# Patient Record
Sex: FEMALE | Race: WHITE | Employment: PART TIME | URBAN - METROPOLITAN AREA
[De-identification: names, ages, dates, MRNs, and addresses within clinical notes are randomized per-mention and may not be internally consistent; named-entity substitution may affect disease eponyms.]

---

## 2020-06-11 ENCOUNTER — HOSPITAL ENCOUNTER (INPATIENT)
Age: 20
LOS: 7 days | Discharge: PSYCHIATRIC OR SUBSTANCE USE FACILITY | DRG: 421 | End: 2020-06-18
Attending: EMERGENCY MEDICINE | Admitting: INTERNAL MEDICINE
Payer: MEDICARE

## 2020-06-11 ENCOUNTER — APPOINTMENT (OUTPATIENT)
Dept: CT IMAGING | Age: 20
DRG: 421 | End: 2020-06-11
Payer: MEDICARE

## 2020-06-11 ENCOUNTER — APPOINTMENT (OUTPATIENT)
Dept: GENERAL RADIOLOGY | Age: 20
DRG: 421 | End: 2020-06-11
Payer: MEDICARE

## 2020-06-11 PROBLEM — E87.1 HYPONATREMIA: Status: ACTIVE | Noted: 2020-06-11

## 2020-06-11 LAB
A/G RATIO: 1 (ref 1.1–2.2)
ALBUMIN SERPL-MCNC: 3.4 G/DL (ref 3.4–5)
ALBUMIN SERPL-MCNC: 3.5 G/DL (ref 3.4–5)
ALP BLD-CCNC: 92 U/L (ref 40–129)
ALT SERPL-CCNC: 10 U/L (ref 10–40)
AMORPHOUS: ABNORMAL /HPF
AMPHETAMINE SCREEN, URINE: NORMAL
ANION GAP SERPL CALCULATED.3IONS-SCNC: 18 MMOL/L (ref 3–16)
ANION GAP SERPL CALCULATED.3IONS-SCNC: 24 MMOL/L (ref 3–16)
ANION GAP SERPL CALCULATED.3IONS-SCNC: 26 MMOL/L (ref 3–16)
APTT: 25.6 SEC (ref 24.2–36.2)
AST SERPL-CCNC: 25 U/L (ref 15–37)
BACTERIA: ABNORMAL /HPF
BANDED NEUTROPHILS RELATIVE PERCENT: 11 % (ref 0–7)
BARBITURATE SCREEN URINE: NORMAL
BASE EXCESS VENOUS: 0.4 MMOL/L (ref -3–3)
BASOPHILS ABSOLUTE: 0 K/UL (ref 0–0.2)
BASOPHILS RELATIVE PERCENT: 0 %
BENZODIAZEPINE SCREEN, URINE: NORMAL
BETA-HYDROXYBUTYRATE: 0.5 MMOL/L (ref 0–0.27)
BILIRUB SERPL-MCNC: 1.7 MG/DL (ref 0–1)
BILIRUBIN URINE: ABNORMAL
BLOOD, URINE: ABNORMAL
BUN BLDV-MCNC: 24 MG/DL (ref 7–20)
BUN BLDV-MCNC: 31 MG/DL (ref 7–20)
BUN BLDV-MCNC: 33 MG/DL (ref 7–20)
CALCIUM SERPL-MCNC: 4.8 MG/DL (ref 8.3–10.6)
CALCIUM SERPL-MCNC: 7.4 MG/DL (ref 8.3–10.6)
CALCIUM SERPL-MCNC: 8.1 MG/DL (ref 8.3–10.6)
CANNABINOID SCREEN URINE: NORMAL
CARBOXYHEMOGLOBIN: 2.2 % (ref 0–1.5)
CHLORIDE BLD-SCNC: 64 MMOL/L (ref 99–110)
CHLORIDE BLD-SCNC: 72 MMOL/L (ref 99–110)
CHLORIDE BLD-SCNC: 90 MMOL/L (ref 99–110)
CHLORIDE URINE RANDOM: <20 MMOL/L
CLARITY: CLEAR
CO2: 20 MMOL/L (ref 21–32)
CO2: 26 MMOL/L (ref 21–32)
CO2: 33 MMOL/L (ref 21–32)
COCAINE METABOLITE SCREEN URINE: NORMAL
COLOR: YELLOW
CREAT SERPL-MCNC: 0.7 MG/DL (ref 0.6–1.1)
CREAT SERPL-MCNC: 1.1 MG/DL (ref 0.6–1.1)
CREAT SERPL-MCNC: 1.1 MG/DL (ref 0.6–1.1)
CREATININE URINE: 239.6 MG/DL (ref 28–259)
CRYSTALS, UA: ABNORMAL /HPF
EKG ATRIAL RATE: 126 BPM
EKG DIAGNOSIS: NORMAL
EKG P AXIS: 12 DEGREES
EKG P-R INTERVAL: 114 MS
EKG Q-T INTERVAL: 398 MS
EKG QRS DURATION: 96 MS
EKG QTC CALCULATION (BAZETT): 576 MS
EKG R AXIS: 82 DEGREES
EKG T AXIS: -73 DEGREES
EKG VENTRICULAR RATE: 126 BPM
EOSINOPHILS ABSOLUTE: 0 K/UL (ref 0–0.6)
EOSINOPHILS RELATIVE PERCENT: 0 %
ETHANOL: NORMAL MG/DL (ref 0–0.08)
GFR AFRICAN AMERICAN: >60
GFR NON-AFRICAN AMERICAN: >60
GLOBULIN: 3.5 G/DL
GLUCOSE BLD-MCNC: 117 MG/DL (ref 70–99)
GLUCOSE BLD-MCNC: 152 MG/DL (ref 70–99)
GLUCOSE BLD-MCNC: 197 MG/DL (ref 70–99)
GLUCOSE BLD-MCNC: 206 MG/DL (ref 70–99)
GLUCOSE URINE: NEGATIVE MG/DL
HCG QUALITATIVE: NEGATIVE
HCO3 VENOUS: 22.6 MMOL/L (ref 23–29)
HCT VFR BLD CALC: 36.9 % (ref 36–48)
HEMOGLOBIN: 12.8 G/DL (ref 12–16)
INR BLD: 1.6 (ref 0.86–1.14)
KETONES, URINE: NEGATIVE MG/DL
LEUKOCYTE ESTERASE, URINE: NEGATIVE
LYMPHOCYTES ABSOLUTE: 2.1 K/UL (ref 1–5.1)
LYMPHOCYTES RELATIVE PERCENT: 23 %
Lab: NORMAL
MAGNESIUM: 1.8 MG/DL (ref 1.8–2.4)
MAGNESIUM: 1.9 MG/DL (ref 1.8–2.4)
MCH RBC QN AUTO: 34.4 PG (ref 26–34)
MCHC RBC AUTO-ENTMCNC: 34.7 G/DL (ref 31–36)
MCV RBC AUTO: 99.1 FL (ref 80–100)
METHADONE SCREEN, URINE: NORMAL
METHEMOGLOBIN VENOUS: 0.3 %
MICROSCOPIC EXAMINATION: YES
MONOCYTES ABSOLUTE: 0.4 K/UL (ref 0–1.3)
MONOCYTES RELATIVE PERCENT: 4 %
MUCUS: ABNORMAL /LPF
MYELOCYTE PERCENT: 1 %
NEUTROPHILS ABSOLUTE: 6.7 K/UL (ref 1.7–7.7)
NEUTROPHILS RELATIVE PERCENT: 61 %
NITRITE, URINE: POSITIVE
O2 CONTENT, VEN: 11 VOL %
O2 SAT, VEN: 98 %
O2 THERAPY: ABNORMAL
OPIATE SCREEN URINE: NORMAL
OSMOLALITY URINE: 709 MOSM/KG (ref 390–1070)
OSMOLALITY: 272 MOSM/KG (ref 275–295)
OXYCODONE URINE: NORMAL
PCO2, VEN: 27.4 MMHG (ref 40–50)
PDW BLD-RTO: 17.5 % (ref 12.4–15.4)
PERFORMED ON: ABNORMAL
PH UA: 6.5
PH UA: 6.5 (ref 5–8)
PH VENOUS: 7.53 (ref 7.35–7.45)
PHENCYCLIDINE SCREEN URINE: NORMAL
PHOSPHORUS: 2.3 MG/DL (ref 2.5–4.9)
PLATELET # BLD: 338 K/UL (ref 135–450)
PLATELET SLIDE REVIEW: ADEQUATE
PMV BLD AUTO: 8.7 FL (ref 5–10.5)
PO2, VEN: 104.9 MMHG (ref 25–40)
POTASSIUM REFLEX MAGNESIUM: 2.2 MMOL/L (ref 3.5–5.1)
POTASSIUM SERPL-SCNC: 1.5 MMOL/L (ref 3.5–5.1)
POTASSIUM SERPL-SCNC: 2.2 MMOL/L (ref 3.5–5.1)
POTASSIUM, UR: 32.2 MMOL/L
PRO-BNP: 730 PG/ML (ref 0–124)
PROPOXYPHENE SCREEN: NORMAL
PROTEIN UA: 30 MG/DL
PROTHROMBIN TIME: 18.6 SEC (ref 10–13.2)
RBC # BLD: 3.73 M/UL (ref 4–5.2)
RBC UA: ABNORMAL /HPF (ref 0–4)
SLIDE REVIEW: ABNORMAL
SODIUM BLD-SCNC: 121 MMOL/L (ref 136–145)
SODIUM BLD-SCNC: 124 MMOL/L (ref 136–145)
SODIUM BLD-SCNC: 128 MMOL/L (ref 136–145)
SODIUM URINE: <20 MMOL/L
SPECIFIC GRAVITY UA: 1.02 (ref 1–1.03)
TCO2 CALC VENOUS: 23 MMOL/L
TOTAL CK: 139 U/L (ref 26–192)
TOTAL PROTEIN: 7 G/DL (ref 6.4–8.2)
TROPONIN: 0.03 NG/ML
TROPONIN: <0.01 NG/ML
TSH REFLEX: 11.48 UIU/ML (ref 0.27–4.2)
UREA NITROGEN, UR: 1282 MG/DL (ref 800–1666)
URINE REFLEX TO CULTURE: ABNORMAL
URINE TYPE: ABNORMAL
UROBILINOGEN, URINE: 2 E.U./DL
VACUOLATED NEUTROPHILS: PRESENT
WBC # BLD: 9.2 K/UL (ref 4–11)
WBC UA: ABNORMAL /HPF (ref 0–5)

## 2020-06-11 PROCEDURE — 84481 FREE ASSAY (FT-3): CPT

## 2020-06-11 PROCEDURE — 6360000002 HC RX W HCPCS: Performed by: INTERNAL MEDICINE

## 2020-06-11 PROCEDURE — 83880 ASSAY OF NATRIURETIC PEPTIDE: CPT

## 2020-06-11 PROCEDURE — 82803 BLOOD GASES ANY COMBINATION: CPT

## 2020-06-11 PROCEDURE — 84300 ASSAY OF URINE SODIUM: CPT

## 2020-06-11 PROCEDURE — 81001 URINALYSIS AUTO W/SCOPE: CPT

## 2020-06-11 PROCEDURE — 2580000003 HC RX 258: Performed by: INTERNAL MEDICINE

## 2020-06-11 PROCEDURE — 71045 X-RAY EXAM CHEST 1 VIEW: CPT

## 2020-06-11 PROCEDURE — 84133 ASSAY OF URINE POTASSIUM: CPT

## 2020-06-11 PROCEDURE — G0480 DRUG TEST DEF 1-7 CLASSES: HCPCS

## 2020-06-11 PROCEDURE — 96365 THER/PROPH/DIAG IV INF INIT: CPT

## 2020-06-11 PROCEDURE — 85025 COMPLETE CBC W/AUTO DIFF WBC: CPT

## 2020-06-11 PROCEDURE — 83036 HEMOGLOBIN GLYCOSYLATED A1C: CPT

## 2020-06-11 PROCEDURE — 93005 ELECTROCARDIOGRAM TRACING: CPT | Performed by: EMERGENCY MEDICINE

## 2020-06-11 PROCEDURE — 6370000000 HC RX 637 (ALT 250 FOR IP): Performed by: EMERGENCY MEDICINE

## 2020-06-11 PROCEDURE — 83735 ASSAY OF MAGNESIUM: CPT

## 2020-06-11 PROCEDURE — 82436 ASSAY OF URINE CHLORIDE: CPT

## 2020-06-11 PROCEDURE — 6360000002 HC RX W HCPCS: Performed by: EMERGENCY MEDICINE

## 2020-06-11 PROCEDURE — 84540 ASSAY OF URINE/UREA-N: CPT

## 2020-06-11 PROCEDURE — 2500000003 HC RX 250 WO HCPCS: Performed by: INTERNAL MEDICINE

## 2020-06-11 PROCEDURE — 6370000000 HC RX 637 (ALT 250 FOR IP): Performed by: INTERNAL MEDICINE

## 2020-06-11 PROCEDURE — 82570 ASSAY OF URINE CREATININE: CPT

## 2020-06-11 PROCEDURE — 80061 LIPID PANEL: CPT

## 2020-06-11 PROCEDURE — 2000000000 HC ICU R&B

## 2020-06-11 PROCEDURE — 84443 ASSAY THYROID STIM HORMONE: CPT

## 2020-06-11 PROCEDURE — 93010 ELECTROCARDIOGRAM REPORT: CPT | Performed by: INTERNAL MEDICINE

## 2020-06-11 PROCEDURE — 80307 DRUG TEST PRSMV CHEM ANLYZR: CPT

## 2020-06-11 PROCEDURE — 82010 KETONE BODYS QUAN: CPT

## 2020-06-11 PROCEDURE — 84703 CHORIONIC GONADOTROPIN ASSAY: CPT

## 2020-06-11 PROCEDURE — 83935 ASSAY OF URINE OSMOLALITY: CPT

## 2020-06-11 PROCEDURE — 2580000003 HC RX 258

## 2020-06-11 PROCEDURE — 87086 URINE CULTURE/COLONY COUNT: CPT

## 2020-06-11 PROCEDURE — 80053 COMPREHEN METABOLIC PANEL: CPT

## 2020-06-11 PROCEDURE — 82533 TOTAL CORTISOL: CPT

## 2020-06-11 PROCEDURE — 85730 THROMBOPLASTIN TIME PARTIAL: CPT

## 2020-06-11 PROCEDURE — 84439 ASSAY OF FREE THYROXINE: CPT

## 2020-06-11 PROCEDURE — 70450 CT HEAD/BRAIN W/O DYE: CPT

## 2020-06-11 PROCEDURE — 2580000003 HC RX 258: Performed by: EMERGENCY MEDICINE

## 2020-06-11 PROCEDURE — 82550 ASSAY OF CK (CPK): CPT

## 2020-06-11 PROCEDURE — 82024 ASSAY OF ACTH: CPT

## 2020-06-11 PROCEDURE — 36415 COLL VENOUS BLD VENIPUNCTURE: CPT

## 2020-06-11 PROCEDURE — 99285 EMERGENCY DEPT VISIT HI MDM: CPT

## 2020-06-11 PROCEDURE — 84484 ASSAY OF TROPONIN QUANT: CPT

## 2020-06-11 PROCEDURE — 83930 ASSAY OF BLOOD OSMOLALITY: CPT

## 2020-06-11 PROCEDURE — 85610 PROTHROMBIN TIME: CPT

## 2020-06-11 RX ORDER — POTASSIUM CHLORIDE 7.45 MG/ML
10 INJECTION INTRAVENOUS
Status: DISPENSED | OUTPATIENT
Start: 2020-06-11 | End: 2020-06-11

## 2020-06-11 RX ORDER — SODIUM CHLORIDE 9 MG/ML
INJECTION, SOLUTION INTRAVENOUS CONTINUOUS
Status: DISCONTINUED | OUTPATIENT
Start: 2020-06-11 | End: 2020-06-12

## 2020-06-11 RX ORDER — SODIUM CHLORIDE 0.9 % (FLUSH) 0.9 %
SYRINGE (ML) INJECTION
Status: COMPLETED
Start: 2020-06-11 | End: 2020-06-11

## 2020-06-11 RX ORDER — DEXTROSE, SODIUM CHLORIDE, AND POTASSIUM CHLORIDE 5; .45; .3 G/100ML; G/100ML; G/100ML
INJECTION INTRAVENOUS CONTINUOUS
Status: DISCONTINUED | OUTPATIENT
Start: 2020-06-11 | End: 2020-06-11 | Stop reason: ALTCHOICE

## 2020-06-11 RX ORDER — SODIUM CHLORIDE 9 MG/ML
INJECTION, SOLUTION INTRAVENOUS
Status: DISPENSED
Start: 2020-06-11 | End: 2020-06-12

## 2020-06-11 RX ORDER — MAGNESIUM SULFATE 1 G/100ML
1 INJECTION INTRAVENOUS ONCE
Status: COMPLETED | OUTPATIENT
Start: 2020-06-11 | End: 2020-06-11

## 2020-06-11 RX ORDER — LORAZEPAM 1 MG/1
1 TABLET ORAL ONCE
Status: DISCONTINUED | OUTPATIENT
Start: 2020-06-11 | End: 2020-06-11

## 2020-06-11 RX ORDER — 0.9 % SODIUM CHLORIDE 0.9 %
1000 INTRAVENOUS SOLUTION INTRAVENOUS ONCE
Status: COMPLETED | OUTPATIENT
Start: 2020-06-11 | End: 2020-06-11

## 2020-06-11 RX ORDER — LORAZEPAM 2 MG/ML
1 INJECTION INTRAMUSCULAR ONCE
Status: COMPLETED | OUTPATIENT
Start: 2020-06-12 | End: 2020-06-11

## 2020-06-11 RX ORDER — CALCIUM CHLORIDE 100 MG/ML
1 INJECTION INTRAVENOUS; INTRAVENTRICULAR ONCE
Status: DISCONTINUED | OUTPATIENT
Start: 2020-06-11 | End: 2020-06-11 | Stop reason: ALTCHOICE

## 2020-06-11 RX ORDER — MAGNESIUM SULFATE 1 G/100ML
1 INJECTION INTRAVENOUS ONCE
Status: COMPLETED | OUTPATIENT
Start: 2020-06-11 | End: 2020-06-12

## 2020-06-11 RX ORDER — POTASSIUM CHLORIDE 20 MEQ/1
40 TABLET, EXTENDED RELEASE ORAL ONCE
Status: DISCONTINUED | OUTPATIENT
Start: 2020-06-11 | End: 2020-06-11

## 2020-06-11 RX ORDER — MAGNESIUM SULFATE 1 G/100ML
1 INJECTION INTRAVENOUS PRN
Status: DISCONTINUED | OUTPATIENT
Start: 2020-06-11 | End: 2020-06-18 | Stop reason: HOSPADM

## 2020-06-11 RX ORDER — PROCHLORPERAZINE EDISYLATE 5 MG/ML
10 INJECTION INTRAMUSCULAR; INTRAVENOUS EVERY 6 HOURS PRN
Status: DISCONTINUED | OUTPATIENT
Start: 2020-06-11 | End: 2020-06-18 | Stop reason: HOSPADM

## 2020-06-11 RX ORDER — DEXTROSE, SODIUM CHLORIDE, AND POTASSIUM CHLORIDE 5; .45; .15 G/100ML; G/100ML; G/100ML
INJECTION INTRAVENOUS CONTINUOUS PRN
Status: DISCONTINUED | OUTPATIENT
Start: 2020-06-11 | End: 2020-06-12

## 2020-06-11 RX ORDER — POTASSIUM CHLORIDE 7.45 MG/ML
10 INJECTION INTRAVENOUS
Status: COMPLETED | OUTPATIENT
Start: 2020-06-11 | End: 2020-06-12

## 2020-06-11 RX ORDER — DEXTROSE MONOHYDRATE 25 G/50ML
12.5 INJECTION, SOLUTION INTRAVENOUS PRN
Status: DISCONTINUED | OUTPATIENT
Start: 2020-06-11 | End: 2020-06-18 | Stop reason: HOSPADM

## 2020-06-11 RX ORDER — POTASSIUM CHLORIDE 20MEQ/15ML
40 LIQUID (ML) ORAL ONCE
Status: DISCONTINUED | OUTPATIENT
Start: 2020-06-11 | End: 2020-06-11 | Stop reason: CLARIF

## 2020-06-11 RX ORDER — DEXTROSE, SODIUM CHLORIDE, AND POTASSIUM CHLORIDE 5; .45; .3 G/100ML; G/100ML; G/100ML
INJECTION INTRAVENOUS CONTINUOUS
Status: DISCONTINUED | OUTPATIENT
Start: 2020-06-11 | End: 2020-06-11 | Stop reason: CLARIF

## 2020-06-11 RX ORDER — POTASSIUM CHLORIDE 7.45 MG/ML
10 INJECTION INTRAVENOUS PRN
Status: DISCONTINUED | OUTPATIENT
Start: 2020-06-11 | End: 2020-06-12

## 2020-06-11 RX ORDER — DEXAMETHASONE SODIUM PHOSPHATE 10 MG/ML
6 INJECTION, SOLUTION INTRAMUSCULAR; INTRAVENOUS ONCE
Status: COMPLETED | OUTPATIENT
Start: 2020-06-11 | End: 2020-06-11

## 2020-06-11 RX ORDER — LEVOTHYROXINE SODIUM ANHYDROUS 100 UG/5ML
66 INJECTION, POWDER, LYOPHILIZED, FOR SOLUTION INTRAVENOUS
Status: DISCONTINUED | OUTPATIENT
Start: 2020-06-11 | End: 2020-06-11

## 2020-06-11 RX ORDER — ONDANSETRON 2 MG/ML
4 INJECTION INTRAMUSCULAR; INTRAVENOUS
Status: DISCONTINUED | OUTPATIENT
Start: 2020-06-11 | End: 2020-06-11

## 2020-06-11 RX ADMIN — SODIUM CHLORIDE 1000 ML: 9 INJECTION, SOLUTION INTRAVENOUS at 20:15

## 2020-06-11 RX ADMIN — POTASSIUM CHLORIDE, DEXTROSE MONOHYDRATE AND SODIUM CHLORIDE: 300; 5; 450 INJECTION, SOLUTION INTRAVENOUS at 22:25

## 2020-06-11 RX ADMIN — ONDANSETRON 4 MG: 2 INJECTION INTRAMUSCULAR; INTRAVENOUS at 17:56

## 2020-06-11 RX ADMIN — POTASSIUM BICARBONATE 40 MEQ: 782 TABLET, EFFERVESCENT ORAL at 18:27

## 2020-06-11 RX ADMIN — POTASSIUM CHLORIDE: 2 INJECTION, SOLUTION, CONCENTRATE INTRAVENOUS at 23:06

## 2020-06-11 RX ADMIN — POTASSIUM BICARBONATE 40 MEQ: 782 TABLET, EFFERVESCENT ORAL at 23:04

## 2020-06-11 RX ADMIN — Medication 10 ML: at 23:35

## 2020-06-11 RX ADMIN — NYSTATIN 500000 UNITS: 500000 SUSPENSION ORAL at 23:10

## 2020-06-11 RX ADMIN — POTASSIUM CHLORIDE 10 MEQ: 7.46 INJECTION, SOLUTION INTRAVENOUS at 22:21

## 2020-06-11 RX ADMIN — POTASSIUM CHLORIDE 10 MEQ: 7.46 INJECTION, SOLUTION INTRAVENOUS at 17:58

## 2020-06-11 RX ADMIN — CEFTRIAXONE SODIUM 1 G: 1 INJECTION, POWDER, FOR SOLUTION INTRAMUSCULAR; INTRAVENOUS at 20:59

## 2020-06-11 RX ADMIN — LORAZEPAM 1 MG: 2 INJECTION INTRAMUSCULAR; INTRAVENOUS at 23:39

## 2020-06-11 RX ADMIN — DEXAMETHASONE SODIUM PHOSPHATE 6 MG: 10 INJECTION, SOLUTION INTRAMUSCULAR; INTRAVENOUS at 23:04

## 2020-06-11 RX ADMIN — NYSTATIN 500000 UNITS: 500000 SUSPENSION ORAL at 19:12

## 2020-06-11 RX ADMIN — POTASSIUM CHLORIDE 10 MEQ: 7.46 INJECTION, SOLUTION INTRAVENOUS at 19:12

## 2020-06-11 RX ADMIN — FOLIC ACID: 5 INJECTION, SOLUTION INTRAMUSCULAR; INTRAVENOUS; SUBCUTANEOUS at 22:58

## 2020-06-11 RX ADMIN — SODIUM CHLORIDE 1000 ML: 9 INJECTION, SOLUTION INTRAVENOUS at 17:58

## 2020-06-11 RX ADMIN — POTASSIUM CHLORIDE 10 MEQ: 7.46 INJECTION, SOLUTION INTRAVENOUS at 23:20

## 2020-06-11 RX ADMIN — MAGNESIUM SULFATE 1 G: 1 INJECTION INTRAVENOUS at 20:02

## 2020-06-11 RX ADMIN — CALCIUM CHLORIDE 1 G: 100 INJECTION INTRAVENOUS; INTRAVENTRICULAR at 23:27

## 2020-06-11 ASSESSMENT — PAIN SCALES - GENERAL: PAINLEVEL_OUTOF10: 0

## 2020-06-11 NOTE — ED PROVIDER NOTES
children: Not on file    Years of education: Not on file    Highest education level: Not on file   Occupational History    Not on file   Social Needs    Financial resource strain: Not on file    Food insecurity     Worry: Not on file     Inability: Not on file    Transportation needs     Medical: Not on file     Non-medical: Not on file   Tobacco Use    Smoking status: Never Smoker    Smokeless tobacco: Never Used   Substance and Sexual Activity    Alcohol use: No    Drug use: Not on file    Sexual activity: Not on file   Lifestyle    Physical activity     Days per week: Not on file     Minutes per session: Not on file    Stress: Not on file   Relationships    Social connections     Talks on phone: Not on file     Gets together: Not on file     Attends Hindu service: Not on file     Active member of club or organization: Not on file     Attends meetings of clubs or organizations: Not on file     Relationship status: Not on file    Intimate partner violence     Fear of current or ex partner: Not on file     Emotionally abused: Not on file     Physically abused: Not on file     Forced sexual activity: Not on file   Other Topics Concern    Not on file   Social History Narrative    Not on file     Current Facility-Administered Medications   Medication Dose Route Frequency Provider Last Rate Last Dose    ondansetron (ZOFRAN) injection 4 mg  4 mg Intravenous Q1H PRN William Saha MD   4 mg at 06/11/20 1756    potassium chloride 10 mEq/100 mL IVPB (Peripheral Line)  10 mEq Intravenous Q1H William Saha MD   Stopped at 06/11/20 2015    cefTRIAXone (ROCEPHIN) 1 g IVPB in 50 mL D5W minibag  1 g Intravenous Q24H Giovanny Crooks MD   Stopped at 06/11/20 2120     No current outpatient medications on file. No Known Allergies    REVIEW OF SYSTEMS  10 systems reviewed, pertinent positives per HPI otherwise noted to be negative.     PHYSICAL EXAM  /77   Pulse 128   Temp 99 °F (37.2 °C) (Oral) Lymphocytes Absolute 2.1 1.0 - 5.1 K/uL    Monocytes Absolute 0.4 0.0 - 1.3 K/uL    Eosinophils Absolute 0.0 0.0 - 0.6 K/uL    Basophils Absolute 0.0 0.0 - 0.2 K/uL    Bands Relative 11 (H) 0 - 7 %    Myelocyte Percent 1 (A) %    Vacuolated Neutrophils Present (A)    Comprehensive Metabolic Panel w/ Reflex to MG   Result Value Ref Range    Sodium 121 (L) 136 - 145 mmol/L    Potassium reflex Magnesium 2.2 (LL) 3.5 - 5.1 mmol/L    Chloride 64 (L) 99 - 110 mmol/L    CO2 33 (H) 21 - 32 mmol/L    Anion Gap 24 (H) 3 - 16    Glucose 197 (H) 70 - 99 mg/dL    BUN 33 (H) 7 - 20 mg/dL    CREATININE 1.1 0.6 - 1.1 mg/dL    GFR Non-African American >60 >60    GFR African American >60 >60    Calcium 8.1 (L) 8.3 - 10.6 mg/dL    Total Protein 7.0 6.4 - 8.2 g/dL    Alb 3.5 3.4 - 5.0 g/dL    Albumin/Globulin Ratio 1.0 (L) 1.1 - 2.2    Total Bilirubin 1.7 (H) 0.0 - 1.0 mg/dL    Alkaline Phosphatase 92 40 - 129 U/L    ALT 10 10 - 40 U/L    AST 25 15 - 37 U/L    Globulin 3.5 g/dL   Urinalysis Reflex to Culture   Result Value Ref Range    Color, UA Yellow Straw/Yellow    Clarity, UA Clear Clear    Glucose, Ur Negative Negative mg/dL    Bilirubin Urine MODERATE (A) Negative    Ketones, Urine Negative Negative mg/dL    Specific Gravity, UA 1.020 1.005 - 1.030    Blood, Urine SMALL (A) Negative    pH, UA 6.5 5.0 - 8.0    Protein, UA 30 (A) Negative mg/dL    Urobilinogen, Urine 2.0 (A) <2.0 E.U./dL    Nitrite, Urine POSITIVE (A) Negative    Leukocyte Esterase, Urine Negative Negative    Microscopic Examination YES     Urine Type NotGiven     Urine Reflex to Culture Not Indicated    HCG Qualitative, Serum   Result Value Ref Range    hCG Qual Negative Detects HCG level >10 MIU/mL   Ethanol   Result Value Ref Range    Ethanol Lvl None Detected mg/dL   Drug screen multi urine   Result Value Ref Range    Amphetamine Screen, Urine Neg Negative <1000ng/mL    Barbiturate Screen, Ur Neg Negative <200 ng/mL    Benzodiazepine Screen, Urine Neg Negative <200 ng/mL    Cannabinoid Scrn, Ur Neg Negative <50 ng/mL    Cocaine Metabolite Screen, Urine Neg Negative <300 ng/mL    Opiate Scrn, Ur Neg Negative <300 ng/mL    PCP Screen, Urine Neg Negative <25 ng/mL    Methadone Screen, Urine Neg Negative <300 ng/mL    Propoxyphene Scrn, Ur Neg Negative <300 ng/mL    Oxycodone Urine Neg Negative <100 ng/ml    pH, UA 6.5     Drug Screen Comment: see below    TSH with Reflex   Result Value Ref Range    TSH 11.48 (H) 0.27 - 4.20 uIU/mL   Magnesium   Result Value Ref Range    Magnesium 1.80 1.80 - 2.40 mg/dL   Brain Natriuretic Peptide   Result Value Ref Range    Pro- (H) 0 - 124 pg/mL   Troponin   Result Value Ref Range    Troponin 0.03 (H) <0.01 ng/mL   CK   Result Value Ref Range    Total  26 - 192 U/L   Protime-INR   Result Value Ref Range    Protime 18.6 (H) 10.0 - 13.2 sec    INR 1.60 (H) 0.86 - 1.14   APTT   Result Value Ref Range    aPTT 25.6 24.2 - 36.2 sec   Beta-Hydroxybutyrate   Result Value Ref Range    Beta-Hydroxybutyrate 0.50 (H) 0.00 - 0.27 mmol/L   Microscopic Urinalysis   Result Value Ref Range    Mucus, UA 2+ (A) None Seen /LPF    WBC, UA 3-5 0 - 5 /HPF    RBC, UA 5-10 (A) 0 - 4 /HPF    Bacteria, UA 1+ (A) None Seen /HPF    Amorphous, UA 2+ /HPF    Crystals, UA 2+ Sulfa (A) None Seen /HPF   Osmolality   Result Value Ref Range    Osmolality 272 (L) 275 - 295 mOsm/kg   Creatinine, urine, random   Result Value Ref Range    Creatinine, Ur 239.6 28.0 - 259.0 mg/dL   Urea nitrogen, urine   Result Value Ref Range    Urea Nitrogen, Ur 1282.0 800.0 - 1666.0 mg/dL   Electrolytes urine random   Result Value Ref Range    Sodium, Ur <20 Not Established mmol/L    Potassium, Ur 32.2 Not Established mmol/L    Chloride <20 Not Established mmol/L   Blood gas, venous   Result Value Ref Range    pH, Anthony 7.534 (H) 7.350 - 7.450    pCO2, Anthony 27.4 (L) 40.0 - 50.0 mmHg    pO2, Anthony 104.9 (H) 25.0 - 40.0 mmHg    HCO3, Venous 22.6 (L) 23.0 - 29.0 mmol/L    Base Excess, Anthony normal in appearance. No evidence of mass effect or midline shift. There is mild prominence of sulci overlying convexities of cerebral hemispheres. No significant area of abnormal attenuation of brain parenchyma is identified. No abnormal extra-axial fluid collections are identified. ORBITS: The visualized portion of the orbits demonstrate no acute abnormality. SINUSES: The visualized paranasal sinuses and mastoid air cells demonstrate no acute abnormality. SOFT TISSUES/SKULL:  No acute abnormality of the visualized skull or soft tissues. No evidence of acute intracranial abnormality. Xr Chest Portable    Result Date: 6/11/2020  EXAMINATION: ONE XRAY VIEW OF THE CHEST 6/11/2020 4:59 pm COMPARISON: None available HISTORY: ORDERING SYSTEM PROVIDED HISTORY: syncope TECHNOLOGIST PROVIDED HISTORY: Reason for exam:->syncope Reason for Exam: Dizziness (family found her down) FINDINGS: No acute bony abnormality. The heart size and mediastinal contours are within normal limits. The lungs are clear. Negative portable study. ED COURSE/MDM  Patient seen and evaluated. Old records reviewed. Labs and imaging reviewed and results discussed with patient. Patient presenting for possible syncopal event and actually states that she feels \"numb\" all over although no focal neurologic deficits to suggest stroke. She is noted to be markedly hypokalemic and hyponatremic. These were briefly repleted both orally and intravenously regarding her potassium as well as patient was given IV fluids. Her creatinine is noted to be 1.1. Negative pregnancy test.  Negative CT scan of the head. Patient with odd affect, stating that also she has been unable to eat. She has thrush noted throughout her mouth this certainly may be contributory but certainly I am concerned about her social situation this will likely need to be looked into further once her acute medical issues are initially addressed.   At this time I feel that inpatient admission for further management is most appropriate. I spoke with Dr. Jabari Wynn. We thoroughly discussed the history, physical exam, laboratory and imaging studies, as well as, emergency department course. Based upon that discussion, we've decided to admit Hair Lynch for further observation and evaluation of Pepe Langston's possible syncope and hypoK/hypoNa. As I have deemed necessary from their history, physical and studies, I have considered and evaluated Hair Lynch for the following diagnoses:  DIABETES, INTRACRANIAL HEMORRHAGE, MENINGITIS, SEPSIS SUBARACHNOID HEMORRHAGE, SUBDURAL HEMATOMA, & STROKE. The total Critical Care time is 44 minutes which excludes separately billable procedures. During the patient's ED course, the patient was given:  Medications   ondansetron (ZOFRAN) injection 4 mg (4 mg Intravenous Given 6/11/20 1756)   potassium chloride 10 mEq/100 mL IVPB (Peripheral Line) (0 mEq Intravenous Stopped 6/11/20 2015)   cefTRIAXone (ROCEPHIN) 1 g IVPB in 50 mL D5W minibag (0 g Intravenous Stopped 6/11/20 2120)   0.9 % sodium chloride bolus (0 mLs Intravenous Stopped 6/11/20 1912)   potassium bicarb-citric acid (EFFER-K) effervescent tablet 40 mEq (40 mEq Oral Given 6/11/20 1827)   nystatin (MYCOSTATIN) 519191 UNIT/ML suspension 500,000 Units (500,000 Units Oral Given 6/11/20 1912)   magnesium sulfate 1 g in dextrose 5% 100 mL IVPB (0 g Intravenous Stopped 6/11/20 2120)   0.9 % sodium chloride bolus (0 mLs Intravenous Stopped 6/11/20 2120)        CLINICAL IMPRESSION  1. Hyponatremia    2. Hypokalemia    3. Paresthesias    4. Syncope, unspecified syncope type        Blood pressure 111/77, pulse 128, temperature 99 °F (37.2 °C), temperature source Oral, resp. rate 18, height 5' 5\" (1.651 m), weight 120 lb (54.4 kg), last menstrual period 05/11/2020, SpO2 100 %, not currently breastfeeding.     DISPOSITION  Milagro Leon was

## 2020-06-11 NOTE — ED NOTES
Pt was rolled off of lift board. Pt had dried stool all over her body and was wearing a depends. When asked why she was covered in stool, she said that \"it was from earlier\". Pt reports that she has been having difficulty walking for a couple weeks. Today they \"carried\" her to the living room.  Socks were ori Brewer RN  06/11/20 4229

## 2020-06-12 ENCOUNTER — APPOINTMENT (OUTPATIENT)
Dept: GENERAL RADIOLOGY | Age: 20
DRG: 421 | End: 2020-06-12
Payer: MEDICARE

## 2020-06-12 ENCOUNTER — APPOINTMENT (OUTPATIENT)
Dept: VASCULAR LAB | Age: 20
DRG: 421 | End: 2020-06-12
Payer: MEDICARE

## 2020-06-12 ENCOUNTER — APPOINTMENT (OUTPATIENT)
Dept: CT IMAGING | Age: 20
DRG: 421 | End: 2020-06-12
Payer: MEDICARE

## 2020-06-12 PROBLEM — E44.0 MODERATE PROTEIN-CALORIE MALNUTRITION (HCC): Chronic | Status: ACTIVE | Noted: 2020-06-12

## 2020-06-12 LAB
ANION GAP SERPL CALCULATED.3IONS-SCNC: 10 MMOL/L (ref 3–16)
ANION GAP SERPL CALCULATED.3IONS-SCNC: 11 MMOL/L (ref 3–16)
ANION GAP SERPL CALCULATED.3IONS-SCNC: 11 MMOL/L (ref 3–16)
ANION GAP SERPL CALCULATED.3IONS-SCNC: 12 MMOL/L (ref 3–16)
ANION GAP SERPL CALCULATED.3IONS-SCNC: 12 MMOL/L (ref 3–16)
ANISOCYTOSIS: ABNORMAL
BANDED NEUTROPHILS RELATIVE PERCENT: 2 % (ref 0–7)
BASOPHILS ABSOLUTE: 0 K/UL (ref 0–0.2)
BASOPHILS RELATIVE PERCENT: 0 %
BUN BLDV-MCNC: 25 MG/DL (ref 7–20)
BUN BLDV-MCNC: 31 MG/DL (ref 7–20)
BUN BLDV-MCNC: 32 MG/DL (ref 7–20)
BUN BLDV-MCNC: 32 MG/DL (ref 7–20)
BUN BLDV-MCNC: 33 MG/DL (ref 7–20)
C DIFF TOXIN/ANTIGEN: NORMAL
C-REACTIVE PROTEIN: 81.6 MG/L (ref 0–5.1)
CALCIUM SERPL-MCNC: 8 MG/DL (ref 8.3–10.6)
CALCIUM SERPL-MCNC: 8.1 MG/DL (ref 8.3–10.6)
CALCIUM SERPL-MCNC: 8.3 MG/DL (ref 8.3–10.6)
CALCIUM SERPL-MCNC: 8.4 MG/DL (ref 8.3–10.6)
CALCIUM SERPL-MCNC: 8.5 MG/DL (ref 8.3–10.6)
CHLORIDE BLD-SCNC: 76 MMOL/L (ref 99–110)
CHLORIDE BLD-SCNC: 78 MMOL/L (ref 99–110)
CHLORIDE BLD-SCNC: 79 MMOL/L (ref 99–110)
CHLORIDE BLD-SCNC: 80 MMOL/L (ref 99–110)
CHLORIDE BLD-SCNC: 83 MMOL/L (ref 99–110)
CHOLESTEROL, TOTAL: 136 MG/DL (ref 0–199)
CO2: 29 MMOL/L (ref 21–32)
CO2: 30 MMOL/L (ref 21–32)
CO2: 31 MMOL/L (ref 21–32)
CO2: 31 MMOL/L (ref 21–32)
CO2: 32 MMOL/L (ref 21–32)
CORTISOL TOTAL: 90.2 UG/DL
CREAT SERPL-MCNC: 0.7 MG/DL (ref 0.6–1.1)
CREAT SERPL-MCNC: 0.8 MG/DL (ref 0.6–1.1)
CREAT SERPL-MCNC: 0.9 MG/DL (ref 0.6–1.1)
CREAT SERPL-MCNC: 0.9 MG/DL (ref 0.6–1.1)
CREAT SERPL-MCNC: 1 MG/DL (ref 0.6–1.1)
EKG ATRIAL RATE: 125 BPM
EKG DIAGNOSIS: NORMAL
EKG P AXIS: -9 DEGREES
EKG P-R INTERVAL: 132 MS
EKG Q-T INTERVAL: 338 MS
EKG QRS DURATION: 86 MS
EKG QTC CALCULATION (BAZETT): 487 MS
EKG R AXIS: 51 DEGREES
EKG T AXIS: -70 DEGREES
EKG VENTRICULAR RATE: 125 BPM
EOSINOPHILS ABSOLUTE: 0 K/UL (ref 0–0.6)
EOSINOPHILS RELATIVE PERCENT: 0 %
ESTIMATED AVERAGE GLUCOSE: 111.2 MG/DL
FERRITIN: 1051 NG/ML (ref 15–150)
FOLATE: 3.69 NG/ML (ref 4.78–24.2)
FREE THYROXINE INDEX: 6.2 UG/DL (ref 4.4–11.4)
GFR AFRICAN AMERICAN: >60
GFR NON-AFRICAN AMERICAN: >60
GLUCOSE BLD-MCNC: 100 MG/DL (ref 70–99)
GLUCOSE BLD-MCNC: 113 MG/DL (ref 70–99)
GLUCOSE BLD-MCNC: 121 MG/DL (ref 70–99)
GLUCOSE BLD-MCNC: 121 MG/DL (ref 70–99)
GLUCOSE BLD-MCNC: 126 MG/DL (ref 70–99)
GLUCOSE BLD-MCNC: 138 MG/DL (ref 70–99)
GLUCOSE BLD-MCNC: 164 MG/DL (ref 70–99)
GLUCOSE BLD-MCNC: 88 MG/DL (ref 70–99)
GLUCOSE BLD-MCNC: 95 MG/DL (ref 70–99)
HBA1C MFR BLD: 5.5 %
HCT VFR BLD CALC: 27 % (ref 36–48)
HDLC SERPL-MCNC: 21 MG/DL (ref 40–60)
HEMOGLOBIN: 9.4 G/DL (ref 12–16)
HIV AG/AB: NORMAL
HIV ANTIGEN: NORMAL
HIV-1 ANTIBODY: NORMAL
HIV-2 AB: NORMAL
INR BLD: 1.45 (ref 0.86–1.14)
IRON SATURATION: NORMAL % (ref 15–50)
IRON: 140 UG/DL (ref 37–145)
LACTIC ACID: 1.2 MMOL/L (ref 0.4–2)
LACTIC ACID: 1.5 MMOL/L (ref 0.4–2)
LDL CHOLESTEROL CALCULATED: 74 MG/DL
LV EF: 58 %
LVEF MODALITY: NORMAL
LYMPHOCYTES ABSOLUTE: 1 K/UL (ref 1–5.1)
LYMPHOCYTES RELATIVE PERCENT: 14 %
MAGNESIUM: 1.9 MG/DL (ref 1.8–2.4)
MAGNESIUM: 2.1 MG/DL (ref 1.8–2.4)
MAGNESIUM: 2.2 MG/DL (ref 1.8–2.4)
MAGNESIUM: 2.3 MG/DL (ref 1.8–2.4)
MAGNESIUM: 2.5 MG/DL (ref 1.8–2.4)
MCH RBC QN AUTO: 34.7 PG (ref 26–34)
MCHC RBC AUTO-ENTMCNC: 35 G/DL (ref 31–36)
MCV RBC AUTO: 99.1 FL (ref 80–100)
MONOCYTES ABSOLUTE: 0.1 K/UL (ref 0–1.3)
MONOCYTES RELATIVE PERCENT: 2 %
NEUTROPHILS ABSOLUTE: 6 K/UL (ref 1.7–7.7)
NEUTROPHILS RELATIVE PERCENT: 82 %
ORGANISM: ABNORMAL
PARATHYROID HORMONE INTACT: 26.2 PG/ML (ref 14–72)
PDW BLD-RTO: 17.2 % (ref 12.4–15.4)
PERFORMED ON: ABNORMAL
PERFORMED ON: NORMAL
PHOSPHORUS: 1.4 MG/DL (ref 2.5–4.9)
PHOSPHORUS: 1.5 MG/DL (ref 2.5–4.9)
PHOSPHORUS: 2.6 MG/DL (ref 2.5–4.9)
PHOSPHORUS: 2.9 MG/DL (ref 2.5–4.9)
PHOSPHORUS: 4.6 MG/DL (ref 2.5–4.9)
PLATELET # BLD: 236 K/UL (ref 135–450)
PLATELET SLIDE REVIEW: ADEQUATE
PMV BLD AUTO: 9.2 FL (ref 5–10.5)
POTASSIUM SERPL-SCNC: 3.1 MMOL/L (ref 3.5–5.1)
POTASSIUM SERPL-SCNC: 3.2 MMOL/L (ref 3.5–5.1)
POTASSIUM SERPL-SCNC: 3.4 MMOL/L (ref 3.5–5.1)
POTASSIUM SERPL-SCNC: 3.4 MMOL/L (ref 3.5–5.1)
POTASSIUM SERPL-SCNC: 3.6 MMOL/L (ref 3.5–5.1)
PROTHROMBIN TIME: 16.9 SEC (ref 10–13.2)
RBC # BLD: 2.72 M/UL (ref 4–5.2)
RHEUMATOID FACTOR: <10 IU/ML
SEDIMENTATION RATE, ERYTHROCYTE: 31 MM/HR (ref 0–20)
SLIDE REVIEW: ABNORMAL
SODIUM BLD-SCNC: 119 MMOL/L (ref 136–145)
SODIUM BLD-SCNC: 119 MMOL/L (ref 136–145)
SODIUM BLD-SCNC: 121 MMOL/L (ref 136–145)
SODIUM BLD-SCNC: 122 MMOL/L (ref 136–145)
SODIUM BLD-SCNC: 124 MMOL/L (ref 136–145)
T3 FREE: 1.5 PG/ML (ref 2.3–4.2)
T3 TOTAL: 0.74 NG/ML (ref 0.8–2)
T3 UPTAKE PERCENT: 0.9 TBI (ref 0.8–1.3)
T4 FREE: 1.8 NG/DL (ref 0.9–1.8)
T4 TOTAL: 5.6 UG/DL (ref 4.5–10.9)
TOTAL CK: 322 U/L (ref 26–192)
TOTAL IRON BINDING CAPACITY: NORMAL UG/DL (ref 260–445)
TRIGL SERPL-MCNC: 204 MG/DL (ref 0–150)
TROPONIN: <0.01 NG/ML
URINE CULTURE, ROUTINE: ABNORMAL
VITAMIN B-12: 353 PG/ML (ref 211–911)
VITAMIN D 25-HYDROXY: 13.1 NG/ML
VLDLC SERPL CALC-MCNC: 41 MG/DL
WBC # BLD: 7.2 K/UL (ref 4–11)
WHITE BLOOD CELLS (WBC), STOOL: ABNORMAL

## 2020-06-12 PROCEDURE — 86235 NUCLEAR ANTIGEN ANTIBODY: CPT

## 2020-06-12 PROCEDURE — 6360000002 HC RX W HCPCS: Performed by: INTERNAL MEDICINE

## 2020-06-12 PROCEDURE — 80048 BASIC METABOLIC PNL TOTAL CA: CPT

## 2020-06-12 PROCEDURE — 86140 C-REACTIVE PROTEIN: CPT

## 2020-06-12 PROCEDURE — 86038 ANTINUCLEAR ANTIBODIES: CPT

## 2020-06-12 PROCEDURE — 2500000003 HC RX 250 WO HCPCS: Performed by: INTERNAL MEDICINE

## 2020-06-12 PROCEDURE — 87505 NFCT AGENT DETECTION GI: CPT

## 2020-06-12 PROCEDURE — 83540 ASSAY OF IRON: CPT

## 2020-06-12 PROCEDURE — 82728 ASSAY OF FERRITIN: CPT

## 2020-06-12 PROCEDURE — 83970 ASSAY OF PARATHORMONE: CPT

## 2020-06-12 PROCEDURE — 84100 ASSAY OF PHOSPHORUS: CPT

## 2020-06-12 PROCEDURE — 93010 ELECTROCARDIOGRAM REPORT: CPT | Performed by: INTERNAL MEDICINE

## 2020-06-12 PROCEDURE — 87336 ENTAMOEB HIST DISPR AG IA: CPT

## 2020-06-12 PROCEDURE — 82306 VITAMIN D 25 HYDROXY: CPT

## 2020-06-12 PROCEDURE — 86702 HIV-2 ANTIBODY: CPT

## 2020-06-12 PROCEDURE — 74177 CT ABD & PELVIS W/CONTRAST: CPT

## 2020-06-12 PROCEDURE — 2580000003 HC RX 258: Performed by: INTERNAL MEDICINE

## 2020-06-12 PROCEDURE — 36592 COLLECT BLOOD FROM PICC: CPT

## 2020-06-12 PROCEDURE — 36415 COLL VENOUS BLD VENIPUNCTURE: CPT

## 2020-06-12 PROCEDURE — 86431 RHEUMATOID FACTOR QUANT: CPT

## 2020-06-12 PROCEDURE — 71045 X-RAY EXAM CHEST 1 VIEW: CPT

## 2020-06-12 PROCEDURE — 83630 LACTOFERRIN FECAL (QUAL): CPT

## 2020-06-12 PROCEDURE — 02HV33Z INSERTION OF INFUSION DEVICE INTO SUPERIOR VENA CAVA, PERCUTANEOUS APPROACH: ICD-10-PCS | Performed by: INTERNAL MEDICINE

## 2020-06-12 PROCEDURE — 87324 CLOSTRIDIUM AG IA: CPT

## 2020-06-12 PROCEDURE — 83550 IRON BINDING TEST: CPT

## 2020-06-12 PROCEDURE — 99254 IP/OBS CNSLTJ NEW/EST MOD 60: CPT | Performed by: INTERNAL MEDICINE

## 2020-06-12 PROCEDURE — 85025 COMPLETE CBC W/AUTO DIFF WBC: CPT

## 2020-06-12 PROCEDURE — 93970 EXTREMITY STUDY: CPT

## 2020-06-12 PROCEDURE — 83036 HEMOGLOBIN GLYCOSYLATED A1C: CPT

## 2020-06-12 PROCEDURE — 71270 CT THORAX DX C-/C+: CPT

## 2020-06-12 PROCEDURE — 93306 TTE W/DOPPLER COMPLETE: CPT

## 2020-06-12 PROCEDURE — 82550 ASSAY OF CK (CPK): CPT

## 2020-06-12 PROCEDURE — 6360000004 HC RX CONTRAST MEDICATION: Performed by: INTERNAL MEDICINE

## 2020-06-12 PROCEDURE — 83735 ASSAY OF MAGNESIUM: CPT

## 2020-06-12 PROCEDURE — 87390 HIV-1 AG IA: CPT

## 2020-06-12 PROCEDURE — 2580000003 HC RX 258

## 2020-06-12 PROCEDURE — 84484 ASSAY OF TROPONIN QUANT: CPT

## 2020-06-12 PROCEDURE — 85652 RBC SED RATE AUTOMATED: CPT

## 2020-06-12 PROCEDURE — 87449 NOS EACH ORGANISM AG IA: CPT

## 2020-06-12 PROCEDURE — 36556 INSERT NON-TUNNEL CV CATH: CPT

## 2020-06-12 PROCEDURE — 93005 ELECTROCARDIOGRAM TRACING: CPT | Performed by: INTERNAL MEDICINE

## 2020-06-12 PROCEDURE — 99255 IP/OBS CONSLTJ NEW/EST HI 80: CPT | Performed by: PSYCHIATRY & NEUROLOGY

## 2020-06-12 PROCEDURE — 6370000000 HC RX 637 (ALT 250 FOR IP): Performed by: INTERNAL MEDICINE

## 2020-06-12 PROCEDURE — 82607 VITAMIN B-12: CPT

## 2020-06-12 PROCEDURE — 87328 CRYPTOSPORIDIUM AG IA: CPT

## 2020-06-12 PROCEDURE — 82746 ASSAY OF FOLIC ACID SERUM: CPT

## 2020-06-12 PROCEDURE — 83605 ASSAY OF LACTIC ACID: CPT

## 2020-06-12 PROCEDURE — 85610 PROTHROMBIN TIME: CPT

## 2020-06-12 PROCEDURE — 99232 SBSQ HOSP IP/OBS MODERATE 35: CPT | Performed by: INTERNAL MEDICINE

## 2020-06-12 PROCEDURE — 99291 CRITICAL CARE FIRST HOUR: CPT | Performed by: INTERNAL MEDICINE

## 2020-06-12 PROCEDURE — 2000000000 HC ICU R&B

## 2020-06-12 PROCEDURE — 86701 HIV-1ANTIBODY: CPT

## 2020-06-12 PROCEDURE — 86225 DNA ANTIBODY NATIVE: CPT

## 2020-06-12 RX ORDER — POTASSIUM CHLORIDE 750 MG/1
40 TABLET, EXTENDED RELEASE ORAL ONCE
Status: COMPLETED | OUTPATIENT
Start: 2020-06-12 | End: 2020-06-12

## 2020-06-12 RX ORDER — SODIUM CHLORIDE 0.9 % (FLUSH) 0.9 %
SYRINGE (ML) INJECTION
Status: COMPLETED
Start: 2020-06-12 | End: 2020-06-12

## 2020-06-12 RX ORDER — FLUCONAZOLE 2 MG/ML
200 INJECTION, SOLUTION INTRAVENOUS EVERY 24 HOURS
Status: DISCONTINUED | OUTPATIENT
Start: 2020-06-12 | End: 2020-06-15

## 2020-06-12 RX ORDER — LORAZEPAM 2 MG/ML
0.5 INJECTION INTRAMUSCULAR ONCE
Status: COMPLETED | OUTPATIENT
Start: 2020-06-12 | End: 2020-06-12

## 2020-06-12 RX ORDER — SODIUM CHLORIDE 0.9 % (FLUSH) 0.9 %
SYRINGE (ML) INJECTION
Status: DISPENSED
Start: 2020-06-12 | End: 2020-06-12

## 2020-06-12 RX ORDER — LEVOTHYROXINE SODIUM 0.03 MG/1
25 TABLET ORAL DAILY
Status: DISCONTINUED | OUTPATIENT
Start: 2020-06-13 | End: 2020-06-18

## 2020-06-12 RX ORDER — POTASSIUM CHLORIDE 29.8 MG/ML
20 INJECTION INTRAVENOUS PRN
Status: DISCONTINUED | OUTPATIENT
Start: 2020-06-12 | End: 2020-06-18 | Stop reason: HOSPADM

## 2020-06-12 RX ADMIN — CALCIUM GLUCONATE 1 G: 98 INJECTION, SOLUTION INTRAVENOUS at 10:55

## 2020-06-12 RX ADMIN — NYSTATIN 500000 UNITS: 500000 SUSPENSION ORAL at 12:02

## 2020-06-12 RX ADMIN — POTASSIUM CHLORIDE AND SODIUM CHLORIDE: 900; 150 INJECTION, SOLUTION INTRAVENOUS at 10:35

## 2020-06-12 RX ADMIN — NYSTATIN 500000 UNITS: 500000 SUSPENSION ORAL at 21:10

## 2020-06-12 RX ADMIN — LORAZEPAM 0.5 MG: 2 INJECTION INTRAMUSCULAR; INTRAVENOUS at 00:47

## 2020-06-12 RX ADMIN — ENOXAPARIN SODIUM 40 MG: 40 INJECTION SUBCUTANEOUS at 09:08

## 2020-06-12 RX ADMIN — POTASSIUM CHLORIDE 40 MEQ: 750 TABLET, EXTENDED RELEASE ORAL at 10:34

## 2020-06-12 RX ADMIN — MAGNESIUM SULFATE IN DEXTROSE 1 G: 10 INJECTION, SOLUTION INTRAVENOUS at 00:12

## 2020-06-12 RX ADMIN — POTASSIUM CHLORIDE AND SODIUM CHLORIDE: 900; 150 INJECTION, SOLUTION INTRAVENOUS at 23:04

## 2020-06-12 RX ADMIN — POTASSIUM PHOSPHATE, MONOBASIC AND POTASSIUM PHOSPHATE, DIBASIC 30 MMOL: 224; 236 INJECTION, SOLUTION, CONCENTRATE INTRAVENOUS at 10:33

## 2020-06-12 RX ADMIN — POTASSIUM CHLORIDE 20 MEQ: 400 INJECTION, SOLUTION INTRAVENOUS at 23:48

## 2020-06-12 RX ADMIN — NYSTATIN 500000 UNITS: 500000 SUSPENSION ORAL at 09:08

## 2020-06-12 RX ADMIN — SODIUM CHLORIDE, PRESERVATIVE FREE: 5 INJECTION INTRAVENOUS at 23:00

## 2020-06-12 RX ADMIN — POTASSIUM CHLORIDE 10 MEQ: 7.46 INJECTION, SOLUTION INTRAVENOUS at 01:07

## 2020-06-12 RX ADMIN — NYSTATIN 500000 UNITS: 500000 SUSPENSION ORAL at 17:35

## 2020-06-12 RX ADMIN — CEFTRIAXONE SODIUM 1 G: 1 INJECTION, POWDER, FOR SOLUTION INTRAMUSCULAR; INTRAVENOUS at 21:10

## 2020-06-12 RX ADMIN — IOHEXOL 50 ML: 240 INJECTION, SOLUTION INTRATHECAL; INTRAVASCULAR; INTRAVENOUS; ORAL at 16:44

## 2020-06-12 RX ADMIN — FLUCONAZOLE, SODIUM CHLORIDE 200 MG: 2 INJECTION INTRAVENOUS at 09:14

## 2020-06-12 RX ADMIN — POTASSIUM CHLORIDE 10 MEQ: 7.46 INJECTION, SOLUTION INTRAVENOUS at 02:44

## 2020-06-12 RX ADMIN — IOPAMIDOL 75 ML: 755 INJECTION, SOLUTION INTRAVENOUS at 16:43

## 2020-06-12 ASSESSMENT — PAIN SCALES - GENERAL
PAINLEVEL_OUTOF10: 0

## 2020-06-12 ASSESSMENT — ENCOUNTER SYMPTOMS
TROUBLE SWALLOWING: 1
EYES NEGATIVE: 1
GASTROINTESTINAL NEGATIVE: 1
RESPIRATORY NEGATIVE: 1

## 2020-06-12 NOTE — CONSULTS
[facility]  [phone]        Reason for Consultation/Chief Complaint: \"I have been having difficulty swallowing, Right atrial mass\"    History of Present Illness:  Valeria Esquivel is a 22 y.o. patient who presented to the hospital with complaints of depression dysphagia failure to thrive. Weight loss 50 lbs over last 18 months. She admits to dysphagia with solids. Incidental finding of possible right atrial mass. No prior heart disease. No cardiac symptoms. I have been asked to provide consultation regarding further management and testing. Past Medical History:   has no past medical history on file. Surgical History:   has no past surgical history on file. Social History:   reports that she has never smoked. She has never used smokeless tobacco. She reports that she does not drink alcohol. Family History:  family history includes Diabetes in her sister; High Blood Pressure in her father; High Cholesterol in her father. Home Medications:  Were reviewed and are listed in nursing record. and/or listed below  Prior to Admission medications    Not on File        Allergies:  Patient has no known allergies. Review of Systems:   12 point ROS negative in all areas as listed below except as in [place]  Constitutional, EENT, Cardiovascular, pulmonary, GI, , Musculoskeletal, skin, neurological, hematological, endocrine, Psychiatric    Physical Examination:    Vitals:    06/12/20 1400   BP: 121/82   Pulse: 123   Resp: 18   Temp:    SpO2: 100%    Weight: 148 lb (67.1 kg)         General Appearance:  Alert, cooperative, no distress, appears stated age   Head:  Normocephalic, without obvious abnormality, atraumatic   Eyes:   eyes prominent almost exophthalmos PERRL, conjunctiva/corneas clear    Teeth are in poor condition.    Nose: Nares normal, no drainage or sinus tenderness   Throat: Lips, mucosa, and tongue normal   Neck: Supple, symmetrical, trachea midline, no adenopathy, thyroid: not enlarged, symmetric, no tenderness/mass/nodules, no carotid bruit or JVD       Lungs:   Diminished  to auscultation bilaterally, respirations unlabored   Chest Wall:  No tenderness or deformity   Heart:  Regular rate and rhythm, S1, S2 normal, no murmur, rub or gallop   Abdomen:   Soft, non-tender, bowel sounds active all four quadrants,  no masses, no organomegaly           Extremities: Extremities normal, atraumatic, no cyanosis, 1+ bilat leg edema   Pulses: 1+ and symmetric   Skin: Skin color red on legs thick texture, turgor poor, no rashes or lesions   Pysch: Normal mood and affect   Neurologic: Normal gross motor and sensory exam.         Labs  CBC:   Lab Results   Component Value Date    WBC 7.2 06/12/2020    RBC 2.72 06/12/2020    HGB 9.4 06/12/2020    HCT 27.0 06/12/2020    MCV 99.1 06/12/2020    RDW 17.2 06/12/2020     06/12/2020     CMP:    Lab Results   Component Value Date     06/12/2020    K 3.2 06/12/2020    K 2.2 06/11/2020    CL 78 06/12/2020    CO2 30 06/12/2020    BUN 32 06/12/2020    CREATININE 0.9 06/12/2020    GFRAA >60 06/12/2020    AGRATIO 1.0 06/11/2020    LABGLOM >60 06/12/2020    GLUCOSE 126 06/12/2020    PROT 7.0 06/11/2020    CALCIUM 8.5 06/12/2020    BILITOT 1.7 06/11/2020    ALKPHOS 92 06/11/2020    AST 25 06/11/2020    ALT 10 06/11/2020     PT/INR:  No results found for: PTINR  Lab Results   Component Value Date    CKTOTAL 322 (H) 06/12/2020    TROPONINI <0.01 06/12/2020       I have reviewed the following tests and documented in this encounter as follows:        Impression   No acute cardiopulmonary process. Left internal jugular line with the tip at the cavoatrial junction.          Final 06/12/2020 6:32 AM 14 Sinus tachycardiaPossible Inferior infarct , age undeterminedST & T wave abnormality, consider anterolateral ischemiaAbnormal ECGWhen compared with ECG of 11-JUN-2020 16:19,No significant change was foundConfirmed by Primus Barthel MD, [address] ([id number]) on 6/12/2020 8:19:14 AM      Final 06/12/2020  6:32 AM 14   Sinus tachycardiaPossible Inferior infarct , age undeterminedST & T wave abnormality, consider anterolateral ischemiaAbnormal ECGWhen compared with ECG of 11-JUN-2020 16:19,No significant change was foundConfirmed by Mary Kate Blanco MD, [address] (1988) on 6/12/2020 8:19:14 AM   Echo doppler of heart: 6.12.20    Summary   Normal LV systolic function with an estimated EF of 55-60%. No regional wall motion abnormalities are seen. Normal left ventricular diastolic filling pressure. Mild pulmonic regurgitation present. Medium-sized echodensity and possible mass attached to right atrial free   wall. Consider RON for better evaluation. Normal systolic pulmonary artery pressure (SPAP) estimated at 19mmHg (RA   pressure 3mmHg). Assessment  Possible right atrial mass    Patient Active Problem List   Diagnosis    Obesity    Hyponatremia    Moderate protein-calorie malnutrition (HCC)    Hypokalemia    Syncope    Right atrial mass    Dysphagia    Elevated TSH    Severe malnutrition ([address]         Plan:    I had the opportunity to review the clinical symptoms and presentation of Ericka Lundberg. I recommend that the patient undergo dysphagia evaluation  If no esophageal obstruction then we will do RON  Meanwhile recommend CT chest with contrast.    I will address the patient's cardiac risk factors and adjusted pharmacologic treatment as needed. In addition, I have reinforced the need for patient directed risk factor modification. Tobacco use was discussed with the patient and educated on the negative effects. I have asked the patient to not utilize these agents. NA    Thank you for allowing to us to participate in the care or Ericka Lundberg. Further evaluation will be based upon the patient's clinical course and testing results. All questions and concerns were addressed to the patient/family.  Alternatives to my

## 2020-06-12 NOTE — CONSULTS
Medications: potassium chloride, dextrose, magnesium sulfate, sodium phosphate IVPB **OR** sodium phosphate IVPB **OR** sodium phosphate IVPB, dextrose 5% and 0.45% NaCl with KCl 20 mEq, prochlorperazine, perflutren lipid microspheres  Allergies: No Known Allergies    ROS:   Constitutional: negative for chills, fevers and sweats. Positive for activity change, decreased appetite, fatigue, and unintentional weight loss. Eyes: negative for cataracts, icterus and redness  Ears, nose, mouth, throat, and face: negative for epistaxis, hearing loss and sore throat  Respiratory: negative for cough, hemoptysis and sputum  Cardiovascular: negative for chest pain, dyspnea and lower extremity edema  Gastrointestinal: as per HPI  Genitourinary:negative for dysuria, frequency and hematuria  Neurological: negative for coordination problems and gait problems. Positive for dizziness, weakness. Behavioral/Psych: Negative for mood swings. Positive for anxiety, depression. Physical Exam   BP (!) 126/93   Pulse 120   Temp 98 °F (36.7 °C) (Oral)   Resp 14   Ht 5' 4.5\" (1.638 m)   Wt 148 lb (67.1 kg)   LMP 05/11/2020 (Approximate)   SpO2 100%   BMI 25.01 kg/m²     General appearance: alert, appears stated age, cooperative and no distress  Head: Normocephalic, without obvious abnormality, atraumatic  Eyes: conjunctivae/corneas clear. PERRL, EOM's intact. Fundi benign.   Neck: no adenopathy and supple, symmetrical, trachea midline  Lungs: clear to auscultation bilaterally  Heart: regular rate and rhythm, S1, S2 normal, no murmur, click, rub or gallop  Abdomen: soft, non-tender; bowel sounds normal; no masses,  no organomegaly  Extremities: extremities normal, atraumatic, no cyanosis or edema    Lab and Imaging Review   Labs:  CBC:   Recent Labs     06/11/20  1630 06/12/20  0443   WBC 9.2 7.2   HGB 12.8 9.4*   HCT 36.9 27.0*   MCV 99.1 99.1    236     BMP:   Recent Labs     06/11/20  2227 06/12/20  0443 06/12/20  0850   NA clear to auscultation, no wheezes, rales or rhonchi, symmetric air entry  Heart - normal rate, regular rhythm, normal S1, S2, no murmurs, rubs, clicks or gallops  Abdomen - soft, nontender, nondistended, no masses or organomegaly  Extremities - no pedal edema noted          Assessment:   75-year-old female with no past medical history admitted for evaluation of dysphagia and weight loss c/b multiple electrolyte abnormalities. Symptoms are concerning for esophageal dysmotility disorder, candida esophagitis, eosinophilic esophagitis, esophageal stricture, or hiatal hernia. Plan:   1. Continue supportive care  2. PPI qAM  3. Strict I/Os  4. Monitor and replenish electrolytes  5. Monitor and document BMs  6. Speech pathology consult  7. Consider CT abdomen/pelvis with contrast  8. Continue NPO  9. Continue Nystatin swish and swallow for oral thrush  10. PT/OT  11. Will consider EGD pending speech therapy recommendations     Nery Calabrese PA-C  11:50 AM 6/12/2020                      22year old female admitted with failure to thrive. She complains of dysphagia. Monitor and replenish electrolytes. Check CT abd/pelvis. Check stool tests. Speech therapy consult. Nutritionist consult. Will consider EGD based on above workup pending correction of electrolytes.     Meghan Duke MD          [phone]  [phone]

## 2020-06-12 NOTE — PROGRESS NOTES
Shift assessment was completed (see flow sheet). Pt is A/O, denies any pain or other needs at this time- Redness noted to BLE, foot weakness. Respirations are even, unlabored, with diminished sounds. Labs to be drawn per Nephro. Echo at bedside. Scheduled medications to follow- crushed with water. Call light within reach. Bed in lowest position. Bed alarm on. Will continue to monitor. Patient is not able to demonstrated the ability to move from a reclining position to an upright position within the recliner.  however patient is alert, oriented and able to provide informed consent

## 2020-06-12 NOTE — CONSULTS
Psychiatry Initial Consultation    Admission Date:    2020    Reason for Consult:  Depression? Unable to care for self? SOI: pt, ED, some family     HPI:   This is a domiciled, never , unemployed 17-year-old without known psych history who was brought in by EMS severely deconditioned. She was admitted to the ICU with multiple medical issues including hyponatremia, and hyperkalemia      Today, the patient tells me that she has struggled with symptoms of depression for months including low mood, anhedonia, amotivation, decreased appetite, insomnia, excessive tearfulness, excessive guilt, self criticism, and suicidality. She has not thought about suicide recently. She reports that she has lost her appetite and even when she wants to eat, she cannot. She says \"it just stays on my plate. \"  She has been spending most of her time isolated in bed. She has no motivation including to engage in ADLs. She is deconditioned. She had to be carried out from the house to EMS. She does not endorse or describe positive psychotic symptoms but does have significant latencies, flat affect, and impaired insight and judgment. Despite her electrolyte abnormalities, she was oriented to day, date, place, and the context of this evaluation. Past Psychiatric History:    Previous Diagnoses: None   PreviousHosp: None  OutpatientTx: None  Med Trials: None  Suicidality: Yes, no history of attempts. History of violence: none    Past Medical History:  No chronic conditions  No traumatic brain injuries  no seizures  No major surgeries    Home Medications:  None     Chemical Dependency History:   None. No treatment programs. UDS negative    Family Mental Health Hx:    Other. Unknown diagnosis. Multiple hospitalizations. Paternal uncle completed suicide    Social Hx:   [place]  1 sister. Parents . Mother  in  of meningitis. She graduated high school. Afterward traveled with her dad.   He is

## 2020-06-12 NOTE — PROGRESS NOTES
Care rounds completed with Dr. Brandie Villafana and multidisciplinary team. Reviewed labs, meds, VS, assessment, & plan of care for today. Give PO K- 40meq, replacements per Nephro. See dictated note and new orders for details.      Dr. Vernon Lindsay at the bedside to examine patient. See progress note for details.     High risk vesicant drug infusing:  __________    Multiple incompatible medications infusing:  _________    CVP Monitoring:  _________    Extremely difficult IV access challenge:  ___YEs_____    Continued need for central line access:  ____Yes______    Addressed with physician:  ____Yes____    RIGHT PATIENT, RIGHT TIME, RIGHT LINE

## 2020-06-12 NOTE — CARE COORDINATION
Case Management Assessment  Initial Evaluation    Date/Time of Evaluation: 6/12/2020 9:59 AM  Assessment Completed by: Chris Thorne    Patient Name: Deniz Zacarias  YOB: 2000  Diagnosis: Hyponatremia [E87.1]  Date / Time: 6/11/2020  3:21 PM  Admission status/Date:6/11/2020 1521 inpt  Chart Reviewed: Yes      Patient Interviewed: Yes   Family Interviewed:  No      Hospitalization in the last 30 days:  No    Contacts  :     Relationship to Patient:   Phone Number:    Alternate Contact:     Relationship to Patient:     Phone Number:    Met with:    Current PCP none--referred to [facility]  none--referred to [facility] required for SNF : Y, N        3 night stay required - Y, N    ADLS  Support Systems: Family Members  Transportation: sister    Meal Preparation: self    Housing  Home Environment: 2 story with her father, her sister, her sister's boyfriend and her niece  Steps: 4  Plans to Return to Present Housing: Yes  Other Identified Issues:     Milton Chapin 76  Currently active with [address] : No  Type of Home Care Services: None  Passport/Waiver : No  :                      Phone Number:    Passport/Waiver Services:           [facility] Provider: none  Equipment: noneWalker___Cane___RTS___ BSC___Shower Chair___  02__ at ____Liter(s)---Uses________HHN___ CPAP___ BiPap___ Hospital Bed___W/C____Other________      Has Home O2 in place on admit:  No    If above answer is No ---is pt presently on O2 during hospitalization:  No  if yes CM to follow for potential DC O2 need  Informed of need to bring portable home O2 tank on day of discharge for nursing to connect prior to leaving:   Not Indicated  Verbalized agreement/Understanding:   Not Indicated    Community Service Affiliation  Dialysis:  No    · Name:  · Location  · Dialysis Schedule:  · Phone:   · Fax:     Outpatient PT/OT: No    Cancer Center: No CHF Clinic: No     Pulmonary Rehab: No  Pain Clinic: No  Community Mental Health: No    Wound Clinic: No     COVID SCREENING: No       Other: The Plan for Transition of Care is related to the following treatment goals: home    The Patient and/or patient representative pt was provided with a choice of provider and agrees   with the discharge plan. [] Yes [] No    Freedom of choice list was provided with basic dialogue that supports the patient's individualized plan of care/goals, treatment preferences and shares the quality data associated with the providers. [] Yes [] No    DISCHARGE PLAN: Reviewed chart. Role of discharge planner explained and patient verbalized understanding. Pt is currently in the ICU. Pt is from a  2 story with her father, her sister, her sister's boyfriend and her niece and plans to return. Pt does not have a PCP. Pt states that she would like a referral to [facility]. CM placed info in d/c instructions. CM also called Sulema Rivas with [facility], and left a VM regarding pt. Pt states that she does not have insurance. CM made referral to [facility]. Katiana Mcnulty is aware. Pt states that she is self sufficient. Explained Case Management role/services. Addendum 6/12/2020 1205: Katiana Mcnulty with [facility] saw pt and pt has Medicaid now as of today.

## 2020-06-12 NOTE — CONSULTS
Heart rate 90 patient is being seen at the request of Naveen Carbajal MD  for a consultation for Possible DKA.  Hypo Na/K/Cl.  Weight loss, syncope, weakness, inability to ambulate. HISTORY OF PRESENT ILLNESS:   22years old presented with generalized weakness and inability to ambulate. Felt numb all over. Feels too weak to ambulate. Questionable passing out. Difficulty swallowing with weight loss-about 50 pounds past 6 months. Very poor PO intake since end of last year. Very poor historian with vague answers. Patient lives with several family members and EMS was asked to wait outside while they family carried patient to them. GI illness in January and since then patient has not been eating very well. No alcohol. No drugs. No smoking. No constipation or diarrhea. PAST MEDICAL HISTORY:  None       PAST SURGICAL HISTORY:  No past surgical history on file. FAMILY HISTORY:  family history includes Diabetes in her sister; High Blood Pressure in her father; High Cholesterol in her father. SOCIAL HISTORY:   reports that she has never smoked. She has never used smokeless tobacco.    Scheduled Meds:   cefTRIAXone (ROCEPHIN) IV  1 g Intravenous Q24H    enoxaparin  40 mg Subcutaneous Daily    nystatin  5 mL Oral 4x Daily     Continuous Infusions:   dextrose 5% and 0.45% NaCl with KCl 20 mEq      [Held by provider] sodium chloride      insulin      sodium chloride      IV infusion builder Stopped (06/12/20 0546)    sodium chloride       PRN Meds:  potassium chloride, dextrose, magnesium sulfate, sodium phosphate IVPB **OR** sodium phosphate IVPB **OR** sodium phosphate IVPB, dextrose 5% and 0.45% NaCl with KCl 20 mEq, prochlorperazine, perflutren lipid microspheres    ALLERGIES:  Patient has No Known Allergies.     REVIEW OF SYSTEMS:  Constitutional: Negative for fever  HENT: Negative for sore throat  Eyes: Negative for redness   Respiratory: Negative for dyspnea, cough  Cardiovascular: Negative for chest pain  Gastrointestinal: + dysphagia   Genitourinary: Negative for hematuria   Musculoskeletal: Negative for arthralgias   Skin: Negative for rash  Neurological: Negative for syncope  Hematological: Negative for adenopathy  Psychiatric/Behavorial: Negative for anxiety    PHYSICAL EXAM:  Blood pressure 117/74, pulse 124, temperature 97.8 °F (36.6 °C), temperature source Oral, resp. rate 15, height 5' 5\" (1.651 m), weight 148 lb (67.1 kg), last menstrual period 05/11/2020, SpO2 100 %, not currently breastfeeding.' on RA  Gen: + Distress. Ill-appearing  Eyes: PERRL. No sclera icterus. No conjunctival injection. ENT: No discharge. Pharynx clear. Neck: Trachea midline. No obvious mass. Resp: + Accessory muscle use. No crackles. No wheezes. Few rhonchi. No dullness on percussion. CV: Regular rate. Regular rhythm. No murmur or rub. No edema. GI: Non-tender. Non-distended. No hernia. Skin: Warm and dry. No nodule on exposed extremities. Lymph: No cervical LAD. No supraclavicular LAD. M/S: No cyanosis. No joint deformity. No clubbing. L foot edema erythema and stiffness   Neuro: Awake. Alert. Moves all four extremities. Psych: Oriented x 3. No anxiety.        LABS:  CBC:   Recent Labs     06/11/20  1630 06/12/20  0443   WBC 9.2 7.2   HGB 12.8 9.4*   HCT 36.9 27.0*   MCV 99.1 99.1    236     BMP:   Recent Labs     06/11/20  2048 06/11/20  2227 06/12/20  0443   * 124* 119*   K 1.5* 2.2* 3.4*   CL 90* 72* 76*   CO2 20* 26 31   PHOS  --  2.3* 1.4*   BUN 24* 31* 32*   CREATININE 0.7 1.1 0.9     LIVER PROFILE:   Recent Labs     06/11/20  1630   AST 25   ALT 10   BILITOT 1.7*   ALKPHOS 92     PT/INR:   Recent Labs     06/11/20  1630 06/12/20  0443   PROTIME 18.6* 16.9*   INR 1.60* 1.45*     APTT:   Recent Labs     06/11/20  1630   APTT 25.6     UA:  Recent Labs     06/11/20  1914   COLORU Yellow   PHUR 6.5  6.5   WBCUA 3-5   RBCUA 5-10*   MUCUS 2+*   BACTERIA 1+*   CLARITYU Clear   SPECGRAV with life threatening, unstable organ failure, including direct patient contact, management of life support systems, review of data including imaging and labs, discussions with other team members and physicians is 33 minutes, excluding procedures.

## 2020-06-12 NOTE — PROGRESS NOTES
Occupational/Physical Therapy  Orders received, chart reviewed. Held per RN due to patient working with MD and has to drink contrast.  Will reattempt 6/13/20.   SHALONDA Houston/TAWANA GalavizReyesBaylor Scott & [facility], [address] [facility], DPT #[id number]

## 2020-06-12 NOTE — PLAN OF CARE
Nutrition Problem:  Moderate malnutrition  Intervention: Food and/or Nutrient Delivery: Continue NPO  Nutritional Goals: patient will adhere to NPO status until medically cleared/cleared by SLP for diet advancement with appropriate po diet consistencies

## 2020-06-12 NOTE — CONSULTS
Nephrology Consult Note  [email or web address]        Reason for Consult: Electrolyte Abnormalities  Consulting Physician: Sheri Joe MD    HISTORY OF PRESENT ILLNESS:      The patient is a 22 y.o. female with no past medical history presents with dysphagia the past 6 months. Patient said that about end of last year, she started experiencing intermittent dysphagia to solids. Denies any difficulty with fluid intake and no odynophagia. The symptom had been progressively worse and lost about 50lbs in the past 6 months. Recently experiencing dizziness, lightheadedness and generalized weakness. On admission her sodium, potassium and phosphorus were low. She was tachycardic but nit hypotensive. She was started on IVF with electrolyte replacement and we were consulted for further evaluation and management. Past Medical History:    No past medical history on file. Past Surgical History:    No past surgical history on file. Current Medications:    No current facility-administered medications on file prior to encounter. No current outpatient medications on file prior to encounter. Allergies:  Patient has no known allergies.     Social History:    Social History     Socioeconomic History    Marital status: Single     Spouse name: Not on file    Number of children: Not on file    Years of education: Not on file    Highest education level: Not on file   Occupational History    Not on file   Social Needs    Financial resource strain: Not on file    Food insecurity     Worry: Not on file     Inability: Not on file    Transportation needs     Medical: Not on file     Non-medical: Not on file   Tobacco Use    Smoking status: Never Smoker    Smokeless tobacco: Never Used   Substance and Sexual Activity    Alcohol use: No    Drug use: Not on file    Sexual activity: Not on file   Lifestyle    Physical activity     Days per week: Not on file     Minutes per session: Not on file    Stress: Not on file Relationships    Social connections     Talks on phone: Not on file     Gets together: Not on file     Attends Baptism service: Not on file     Active member of club or organization: Not on file     Attends meetings of clubs or organizations: Not on file     Relationship status: Not on file    Intimate partner violence     Fear of current or ex partner: Not on file     Emotionally abused: Not on file     Physically abused: Not on file     Forced sexual activity: Not on file   Other Topics Concern    Not on file   Social History Narrative    Not on file       Family History:       Problem Relation Age of Onset    High Blood Pressure Father     High Cholesterol Father     Diabetes Sister        REVIEW OF SYSTEMS:    Review of Systems   Constitutional: Positive for activity change, appetite change, fatigue and unexpected weight change. Negative for chills and fever. HENT: Positive for trouble swallowing. Eyes: Negative. Respiratory: Negative. Cardiovascular: Negative. Gastrointestinal: Negative. Genitourinary: Negative. Neurological: Positive for dizziness, weakness and light-headedness. PHYSICAL EXAM:    Vitals:    /80   Pulse 126   Temp 98 °F (36.7 °C) (Oral)   Resp 14   Ht 5' 5\" (1.651 m)   Wt 148 lb (67.1 kg)   LMP 05/11/2020 (Approximate)   SpO2 100%   BMI 24.63 kg/m²   I/O last 3 completed shifts: In: 7067 [P.O.:120; I.V.:1595]  Out: -   I/O this shift: In: 80 [I.V.:82]  Out: -     Physical Exam:  Physical Exam  Vitals signs reviewed. Constitutional:       General: She is not in acute distress. HENT:      Head: Normocephalic and atraumatic. Eyes:      General: No scleral icterus. Conjunctiva/sclera: Conjunctivae normal.   Cardiovascular:      Rate and Rhythm: Tachycardia present. Heart sounds: No friction rub. Pulmonary:      Effort: Pulmonary effort is normal. No respiratory distress. Breath sounds: No wheezing.    Abdominal: anti-scl Ab, RF and CRP were ordered. - Will also check ESR.  - Further plans per primary service. Anemia. - Will check ferritin, iron studies, B12 and folate. Critical care time=50mins. Discussed with ICU team.    Thank you for allowing me to participate in the care of this patient. Please do not hesitate to contact me at [phone] if with questions.     Marian Johnson MD  6/12/2020  [facility]

## 2020-06-12 NOTE — DISCHARGE INSTR - COC
treatment of the diagnosis listed and that she requires {Admit to Appropriate Level of Care:28503} for {GREATER/LESS:346569332} 30 days.      Update Admission H&P: {CHP DME Changes in [facility]}    PHYSICIAN SIGNATURE:  {Esignature:[id number]}

## 2020-06-12 NOTE — PROGRESS NOTES
I have been informed of the new consult on Ms. Mickey Lee. Dr. Radha Waldrop to see in the am.    Extremely complex constellation of electrolyte abnormalities to include hyponatremia, hypokalemia, profound hypochloremic metabolic alkalosis and hypotension. TSH is elevated at 11.4. Note urine Na <20 and urine K is 32.2 (even with hypokalemia) with CL <20. Beta hydroxybutyric acid elevated at 0.5 - glucose mildly elevated, but question if this may also be c/w starvation ketosis. Reportedly also with thrush and with weight loss and though this could be c/w possible diabetes, will check HIV as well. Discussed with Dr. Blake Dillon. Agree with volume administration, K supplementation.

## 2020-06-12 NOTE — PROCEDURES
A time-out was completed verifying correct patient, procedure, site, positioning, and special equipment if applicable. The patient was placed in a dependent position appropriate for central line placement based on the vein to be cannulated. The patients left neck was prepped and draped in sterile fashion. 1% Lidocaine was used to anesthetize the surrounding skin area. A 20 cm triple lumen catheter was introduced into the the internal jugular using the Seldinger technique with ultrasound guidance. The catheter was threaded smoothly over the guide wire and appropriate dark blood return was obtained. Each lumen of the catheter was evacuated of air and flushed with sterile saline. The catheter was then sutured in place to the skin and a sterile dressing applied. I was present for the entire procedure. Estimated Blood Loss: <10cc    The patient tolerated the procedure well and there were no complications. Post procedure CXR was ordered and is pending. Image will be reviewed prior to use of the line.       Myke Stearns M.D.

## 2020-06-13 LAB
ANION GAP SERPL CALCULATED.3IONS-SCNC: 10 MMOL/L (ref 3–16)
ANION GAP SERPL CALCULATED.3IONS-SCNC: 10 MMOL/L (ref 3–16)
ANION GAP SERPL CALCULATED.3IONS-SCNC: 11 MMOL/L (ref 3–16)
ANION GAP SERPL CALCULATED.3IONS-SCNC: 9 MMOL/L (ref 3–16)
ANISOCYTOSIS: ABNORMAL
ANTI-DSDNA IGG: <1 IU/ML (ref 0–9)
ANTI-NUCLEAR ANTIBODY (ANA): NEGATIVE
ANTI-SCL70 IGG: <0.2 AI (ref 0–0.9)
BANDED NEUTROPHILS RELATIVE PERCENT: 1 % (ref 0–7)
BASOPHILS ABSOLUTE: 0 K/UL (ref 0–0.2)
BASOPHILS RELATIVE PERCENT: 0 %
BUN BLDV-MCNC: 11 MG/DL (ref 7–20)
BUN BLDV-MCNC: 15 MG/DL (ref 7–20)
BUN BLDV-MCNC: 19 MG/DL (ref 7–20)
BUN BLDV-MCNC: 9 MG/DL (ref 7–20)
CALCIUM SERPL-MCNC: 7.3 MG/DL (ref 8.3–10.6)
CALCIUM SERPL-MCNC: 7.7 MG/DL (ref 8.3–10.6)
CALCIUM SERPL-MCNC: 7.8 MG/DL (ref 8.3–10.6)
CALCIUM SERPL-MCNC: 8.3 MG/DL (ref 8.3–10.6)
CHLORIDE BLD-SCNC: 88 MMOL/L (ref 99–110)
CHLORIDE BLD-SCNC: 92 MMOL/L (ref 99–110)
CHLORIDE BLD-SCNC: 92 MMOL/L (ref 99–110)
CHLORIDE BLD-SCNC: 94 MMOL/L (ref 99–110)
CO2: 26 MMOL/L (ref 21–32)
CO2: 27 MMOL/L (ref 21–32)
CREAT SERPL-MCNC: <0.5 MG/DL (ref 0.6–1.1)
CRYPTOSPORIDIUM ANTIGEN STOOL: NORMAL
E HISTOLYTICA ANTIGEN STOOL: NORMAL
EKG ATRIAL RATE: 124 BPM
EKG DIAGNOSIS: NORMAL
EKG P AXIS: 43 DEGREES
EKG P-R INTERVAL: 128 MS
EKG Q-T INTERVAL: 334 MS
EKG QRS DURATION: 76 MS
EKG QTC CALCULATION (BAZETT): 479 MS
EKG R AXIS: 52 DEGREES
EKG T AXIS: -4 DEGREES
EKG VENTRICULAR RATE: 124 BPM
EOSINOPHILS ABSOLUTE: 0 K/UL (ref 0–0.6)
EOSINOPHILS RELATIVE PERCENT: 0 %
GFR AFRICAN AMERICAN: >60
GFR NON-AFRICAN AMERICAN: >60
GIARDIA ANTIGEN STOOL: NORMAL
GLUCOSE BLD-MCNC: 125 MG/DL (ref 70–99)
GLUCOSE BLD-MCNC: 90 MG/DL (ref 70–99)
GLUCOSE BLD-MCNC: 91 MG/DL (ref 70–99)
GLUCOSE BLD-MCNC: 93 MG/DL (ref 70–99)
GLUCOSE BLD-MCNC: 94 MG/DL (ref 70–99)
HCT VFR BLD CALC: 22 % (ref 36–48)
HEMOGLOBIN: 7.5 G/DL (ref 12–16)
HYPOCHROMIA: ABNORMAL
INR BLD: 1.06 (ref 0.86–1.14)
INR BLD: 1.17 (ref 0.86–1.14)
LYMPHOCYTES ABSOLUTE: 1.6 K/UL (ref 1–5.1)
LYMPHOCYTES RELATIVE PERCENT: 24 %
MAGNESIUM: 1.5 MG/DL (ref 1.8–2.4)
MAGNESIUM: 1.8 MG/DL (ref 1.8–2.4)
MAGNESIUM: 2.1 MG/DL (ref 1.8–2.4)
MAGNESIUM: 2.4 MG/DL (ref 1.8–2.4)
MCH RBC QN AUTO: 34.7 PG (ref 26–34)
MCHC RBC AUTO-ENTMCNC: 34.3 G/DL (ref 31–36)
MCV RBC AUTO: 101.2 FL (ref 80–100)
METAMYELOCYTES RELATIVE PERCENT: 1 %
MONOCYTES ABSOLUTE: 0.3 K/UL (ref 0–1.3)
MONOCYTES RELATIVE PERCENT: 4 %
MYELOCYTE PERCENT: 1 %
NEUTROPHILS ABSOLUTE: 4.9 K/UL (ref 1.7–7.7)
NEUTROPHILS RELATIVE PERCENT: 69 %
PDW BLD-RTO: 18.7 % (ref 12.4–15.4)
PERFORMED ON: NORMAL
PHOSPHORUS: 2 MG/DL (ref 2.5–4.9)
PHOSPHORUS: 2.6 MG/DL (ref 2.5–4.9)
PHOSPHORUS: 3.3 MG/DL (ref 2.5–4.9)
PHOSPHORUS: 3.3 MG/DL (ref 2.5–4.9)
PLATELET # BLD: 160 K/UL (ref 135–450)
PLATELET SLIDE REVIEW: ADEQUATE
PMV BLD AUTO: 8.7 FL (ref 5–10.5)
POTASSIUM SERPL-SCNC: 3.4 MMOL/L (ref 3.5–5.1)
POTASSIUM SERPL-SCNC: 3.4 MMOL/L (ref 3.5–5.1)
POTASSIUM SERPL-SCNC: 3.7 MMOL/L (ref 3.5–5.1)
POTASSIUM SERPL-SCNC: 4.1 MMOL/L (ref 3.5–5.1)
PROTHROMBIN TIME: 12.3 SEC (ref 10–13.2)
PROTHROMBIN TIME: 13.6 SEC (ref 10–13.2)
RBC # BLD: 2.17 M/UL (ref 4–5.2)
SLIDE REVIEW: ABNORMAL
SODIUM BLD-SCNC: 125 MMOL/L (ref 136–145)
SODIUM BLD-SCNC: 129 MMOL/L (ref 136–145)
SODIUM BLD-SCNC: 129 MMOL/L (ref 136–145)
SODIUM BLD-SCNC: 130 MMOL/L (ref 136–145)
TOTAL CK: 200 U/L (ref 26–192)
TOTAL CK: 338 U/L (ref 26–192)
WBC # BLD: 6.8 K/UL (ref 4–11)

## 2020-06-13 PROCEDURE — 2580000003 HC RX 258: Performed by: INTERNAL MEDICINE

## 2020-06-13 PROCEDURE — 92526 ORAL FUNCTION THERAPY: CPT

## 2020-06-13 PROCEDURE — 6370000000 HC RX 637 (ALT 250 FOR IP): Performed by: INTERNAL MEDICINE

## 2020-06-13 PROCEDURE — 93005 ELECTROCARDIOGRAM TRACING: CPT | Performed by: INTERNAL MEDICINE

## 2020-06-13 PROCEDURE — 36415 COLL VENOUS BLD VENIPUNCTURE: CPT

## 2020-06-13 PROCEDURE — 97166 OT EVAL MOD COMPLEX 45 MIN: CPT

## 2020-06-13 PROCEDURE — 99232 SBSQ HOSP IP/OBS MODERATE 35: CPT | Performed by: INTERNAL MEDICINE

## 2020-06-13 PROCEDURE — 84100 ASSAY OF PHOSPHORUS: CPT

## 2020-06-13 PROCEDURE — 6360000002 HC RX W HCPCS: Performed by: INTERNAL MEDICINE

## 2020-06-13 PROCEDURE — 36592 COLLECT BLOOD FROM PICC: CPT

## 2020-06-13 PROCEDURE — 2060000000 HC ICU INTERMEDIATE R&B

## 2020-06-13 PROCEDURE — 82530 CORTISOL FREE: CPT

## 2020-06-13 PROCEDURE — 92610 EVALUATE SWALLOWING FUNCTION: CPT

## 2020-06-13 PROCEDURE — 99233 SBSQ HOSP IP/OBS HIGH 50: CPT | Performed by: INTERNAL MEDICINE

## 2020-06-13 PROCEDURE — 2580000003 HC RX 258

## 2020-06-13 PROCEDURE — 93010 ELECTROCARDIOGRAM REPORT: CPT | Performed by: INTERNAL MEDICINE

## 2020-06-13 PROCEDURE — 80048 BASIC METABOLIC PNL TOTAL CA: CPT

## 2020-06-13 PROCEDURE — 85025 COMPLETE CBC W/AUTO DIFF WBC: CPT

## 2020-06-13 PROCEDURE — 82550 ASSAY OF CK (CPK): CPT

## 2020-06-13 PROCEDURE — 2500000003 HC RX 250 WO HCPCS: Performed by: INTERNAL MEDICINE

## 2020-06-13 PROCEDURE — 83735 ASSAY OF MAGNESIUM: CPT

## 2020-06-13 PROCEDURE — 97530 THERAPEUTIC ACTIVITIES: CPT

## 2020-06-13 PROCEDURE — 85610 PROTHROMBIN TIME: CPT

## 2020-06-13 PROCEDURE — 94761 N-INVAS EAR/PLS OXIMETRY MLT: CPT

## 2020-06-13 RX ORDER — MAGNESIUM SULFATE IN WATER 40 MG/ML
2 INJECTION, SOLUTION INTRAVENOUS ONCE
Status: COMPLETED | OUTPATIENT
Start: 2020-06-13 | End: 2020-06-13

## 2020-06-13 RX ORDER — SODIUM CHLORIDE 0.9 % (FLUSH) 0.9 %
SYRINGE (ML) INJECTION
Status: COMPLETED
Start: 2020-06-13 | End: 2020-06-13

## 2020-06-13 RX ORDER — SODIUM CHLORIDE 9 MG/ML
INJECTION, SOLUTION INTRAVENOUS CONTINUOUS
Status: DISCONTINUED | OUTPATIENT
Start: 2020-06-13 | End: 2020-06-14

## 2020-06-13 RX ORDER — BUSPIRONE HYDROCHLORIDE 5 MG/1
5 TABLET ORAL EVERY 8 HOURS PRN
Status: DISCONTINUED | OUTPATIENT
Start: 2020-06-13 | End: 2020-06-18

## 2020-06-13 RX ADMIN — MAGNESIUM SULFATE IN WATER 2 G: 40 INJECTION, SOLUTION INTRAVENOUS at 13:02

## 2020-06-13 RX ADMIN — CEFTRIAXONE SODIUM 1 G: 1 INJECTION, POWDER, FOR SOLUTION INTRAMUSCULAR; INTRAVENOUS at 21:37

## 2020-06-13 RX ADMIN — POTASSIUM CHLORIDE 20 MEQ: 400 INJECTION, SOLUTION INTRAVENOUS at 00:52

## 2020-06-13 RX ADMIN — Medication 10 ML: at 05:32

## 2020-06-13 RX ADMIN — NYSTATIN 500000 UNITS: 500000 SUSPENSION ORAL at 18:25

## 2020-06-13 RX ADMIN — SODIUM CHLORIDE: 9 INJECTION, SOLUTION INTRAVENOUS at 12:46

## 2020-06-13 RX ADMIN — AZITHROMYCIN MONOHYDRATE 500 MG: 500 INJECTION, POWDER, LYOPHILIZED, FOR SOLUTION INTRAVENOUS at 10:02

## 2020-06-13 RX ADMIN — POTASSIUM CHLORIDE AND SODIUM CHLORIDE: 900; 150 INJECTION, SOLUTION INTRAVENOUS at 09:57

## 2020-06-13 RX ADMIN — ENOXAPARIN SODIUM 40 MG: 40 INJECTION SUBCUTANEOUS at 10:07

## 2020-06-13 RX ADMIN — FLUCONAZOLE, SODIUM CHLORIDE 200 MG: 2 INJECTION INTRAVENOUS at 09:58

## 2020-06-13 RX ADMIN — LEVOTHYROXINE SODIUM 25 MCG: 25 TABLET ORAL at 06:31

## 2020-06-13 RX ADMIN — SODIUM PHOSPHATE, MONOBASIC, MONOHYDRATE 15 MMOL: 276; 142 INJECTION, SOLUTION INTRAVENOUS at 06:57

## 2020-06-13 RX ADMIN — POTASSIUM CHLORIDE 20 MEQ: 400 INJECTION, SOLUTION INTRAVENOUS at 18:25

## 2020-06-13 RX ADMIN — NYSTATIN 500000 UNITS: 500000 SUSPENSION ORAL at 21:37

## 2020-06-13 RX ADMIN — NYSTATIN 500000 UNITS: 500000 SUSPENSION ORAL at 12:46

## 2020-06-13 RX ADMIN — BUSPIRONE HYDROCHLORIDE 5 MG: 5 TABLET ORAL at 10:07

## 2020-06-13 RX ADMIN — NYSTATIN 500000 UNITS: 500000 SUSPENSION ORAL at 10:06

## 2020-06-13 ASSESSMENT — PAIN SCALES - GENERAL
PAINLEVEL_OUTOF10: 0
PAINLEVEL_OUTOF10: 0

## 2020-06-13 NOTE — PROGRESS NOTES
PROTIME 18.6* 16.9* 13.6*   INR 1.60* 1.45* 1.17*     APTT:   Recent Labs     06/11/20  1630   APTT 25.6       Doppler LE:  negative for DVT      6/12/20 ECHO Summary   Normal LV systolic function with an estimated EF of 55-60%. No regional wall motion abnormalities are seen. Normal left ventricular diastolic filling pressure. Mild pulmonic regurgitation present. Medium-sized echodensity and possible mass attached to right atrial free   wall. Consider RON for better evaluation. Normal systolic pulmonary artery pressure (SPAP) estimated at 19mmHg (RAP 3mmHg). Assessment:    1. Abnormal ECHO finding:   -possible right atrial mass with echodensity visualized attached to right atrial free wall   -? Artifact vs myxoma vs tumor vs thrombus. -will perform RON if medically stable and after GI evaluation for dysphagia and weight loss   -correct lytes as appropriate    2. Tachycardia:   -EKG with sinus tachycardia vs atrial tachycardia   -continue to follow tele   -repeat EKG when HR decreased       3. Electrolytes and endocrine:   -correct TSH per IM doc (11.48)   -Initial K+ 1.5 and Qr=773   --admit EKG with ST changes which may be related to lyte abnormalities    4. Dysphagia:   -GI consult   -EGD when stable and after speech evaluation per Dr. Dung Rosario   -note H/H decreased to 7.5/22 (prior 12.8/36. 9)    5.  Psychiatry:   -Dr. Radha Jules inpatient when medically stable      Patient Active Problem List    Diagnosis Date Noted    Moderate protein-calorie malnutrition ([facility] [address] 06/12/2020    Hypokalemia     Syncope     Right atrial mass     Dysphagia     Elevated TSH     Severe malnutrition ([facility] [address]     Hyponatremia 06/11/2020    Obesity 04/17/2015     Tanisha Copeland MD 6/13/2020 7:18 AM

## 2020-06-13 NOTE — PROGRESS NOTES
Admit: 2020    Name:  Fabi Cuadra  Room:  [address] [place], [place] [address]  MRN:    [id number]    Critical Care Daily Progress Note for 2020     Interval History:     22year old presented with weakness, inability to ambulate, numbness, light-headedness, dysphagia, and possible syncope. Also reported unplanned weight loss. Noted to have severe hyponatremia, failure to thrive  Admitted to ICU.      Patient seen. She is awake alert and oriented. Flat affect. Her appetite has been poor. Complains of dysphagia. NPO for swallowing eval. she is depressed, seen by psychiatry. Sodium level slowly improving with IV hydration. Seen by intensivist and nephrologist.   Plan patient will need to go to inpatient psychiatric unit [facility] once medically stable. Patient denies any specific complaints to me other than generalized weakness. She has been having diarrhea.   No fevers or chills        Scheduled Meds:   fluconazole  200 mg Intravenous Q24H    levothyroxine  25 mcg Oral Daily    cefTRIAXone (ROCEPHIN) IV  1 g Intravenous Q24H    enoxaparin  40 mg Subcutaneous Daily    nystatin  5 mL Oral 4x Daily       Continuous Infusions:   IV infusion builder 100 mL/hr at 20 2304       PRN Meds:  potassium chloride, dextrose, magnesium sulfate, sodium phosphate IVPB **OR** sodium phosphate IVPB **OR** sodium phosphate IVPB, prochlorperazine, perflutren lipid microspheres         Objective:     Temp  Av.3 °F (36.8 °C)  Min: 98 °F (36.7 °C)  Max: 98.7 °F (37.1 °C)  Pulse  Av.7  Min: 111  Max: 139  BP  Min: 113/83  Max: 129/73  SpO2  Av.8 %  Min: 97 %  Max: 100 %  Patient Vitals for the past 4 hrs:   BP Temp Temp src Pulse Resp SpO2 Weight   20 0736 -- -- -- 123 14 100 % --   20 0700 126/78 -- -- 120 12 100 % --   20 0500 117/82 98.7 °F (37.1 °C) Oral 139 26 100 % 145 lb (65.8 kg)         Intake/Output Summary (Last 24 hours) at 2020 0840  Last data filed at 2020 6514  Gross per possible DKA. DKA protocol will also allow for correcting electrolytes. pH pending (VBG), AG 24, , ?-hydroxybutyrate 0.5. HgbA1C 5.5. #  UTI, IV ceftriaxone. Cx negative. #  Elevated INR, 1.60, unclear etiology but possibly related to malnutrition, normal LFTs except for mildly elevated T bili of 1.7. Repeat INR 1.45. Would repeat bilirubin. #  Indeterminate troponin, 0.03, repeats ordered, telemetry. Abnormal EKG may be related to electrolyte abnormalities will monitor on telemetry. #  Failure to thrive with 50 pound unplanned weight loss. Unclear etiology. Banana bag. Tox screen negative. Dietitian c/s. Psychiatry consulted.    -check CT scan of chest abdomen and pelvis- infiltrates in lungs    Gram positive pna  Rocephin,azithro    #  Diffuse numbness/tingling, unclear etiology (acid/base, electrolytes?). Monitor. #  Dysphagia  - pt C/o Difficulty swallowing, SLP eval.    #  Possible syncope w/ generalized weakness, inability to ambulate. Fall precautions, PT/OT. Checked echo: this showed Medium-sized echodensity and possible mass attached to right atrial free   wall. #  Prolonged QTC, 576 ms, may be related to electrolyte abnormalities. Avoid QT prolonging agents as able. #  Marked dehydration, IVF gently secondary to hyponatremia. Left atrial mass by ECHO  Card consult  RON    #  Oral thrush, Nystatin swish and sallow. Chk HIV. Pt reports that she has never been sexually active and is not currently. She lives with family, denies abuse/captivity/being forced to do anything she did not want to, etc.   Gi consult   Will need EGD     #Depression.   She will need inpatient psych transfer to [facility] once medically stable      DVT Prophylaxis: Lovenox  Diet:  NPO  Code Status: Full Code   Dispo - Admit to inpatient ICU      Kishor Rolle MD   6/13/2020

## 2020-06-13 NOTE — PROGRESS NOTES
PROGRESS NOTE  S:19 yrs Patient  admitted on 6/11/2020 with Hyponatremia [E87.1] . Today she is tolerating general diet. Denies dysphagia, heartburn or epigastric pain    Exam:   Vitals:    06/13/20 1400   BP: (!) 145/84   Pulse: 123   Resp: 19   Temp:    SpO2:       General appearance: alert, appears stated age and cooperative  HEENT: Oropharynx clear, no lesions  Neck: no adenopathy and supple, symmetrical, trachea midline  Lungs: clear to auscultation bilaterally  Heart: regular rate and rhythm, S1, S2 normal, no murmur, click, rub or gallop  Abdomen: soft, non-tender; bowel sounds normal; no masses,  no organomegaly  Extremities: extremities normal, atraumatic, no cyanosis or edema     Medications: Reviewed    Labs:  CBC:   Recent Labs     06/11/20  1630 06/12/20  0443 06/13/20  0520   WBC 9.2 7.2 6.8   HGB 12.8 9.4* 7.5*   HCT 36.9 27.0* 22.0*   MCV 99.1 99.1 101.2*    236 160     BMP:   Recent Labs     06/12/20  2245 06/13/20  0520 06/13/20  1040   * 125* 129*   K 3.1* 4.1 3.7   CL 83* 88* 92*   CO2 29 26 27   PHOS 2.9 2.0* 3.3   BUN 25* 19 15   CREATININE 0.7 <0.5* <0.5*     LIVER PROFILE:   Recent Labs     06/11/20  1630   AST 25   ALT 10   PROT 7.0   BILITOT 1.7*   ALKPHOS 92     PT/INR:   Recent Labs     06/11/20  1630 06/12/20  0443 06/13/20  0520   INR 1.60* 1.45* 1.17*     CT chest/abd/pelvis  1. Mild multifocal bilateral infectious versus inflammatory pneumonitis. 2. Fatty infiltration of the liver. Impression:22year old female admitted with failure to thrive. Recommendation:  1. Continue supportive care  2. No plans for endoscopy at this time as she is tolerating general diet  3. Can check barium esophagram if necessary for RON purposes  4. Encourage nutrition with supplements  5. PT/OT  6.  Will follow      Crystal Solares MD  2:53 PM 6/13/2020

## 2020-06-13 NOTE — PROGRESS NOTES
eating environment. Per MD note, \"The patient is a 22 y.o. female with PMH below, presents with diffuse numbness, generalized weakness, inability to ambulate, light headedness, difficulty swallowing, possible syncope, unplanned weight loss. While she is alert and oriented, she is not a very good historian. Many of her answers are vague and non-specific. Reportedly EMS was summoned to home where she says she lives with several family members they were reportedly asked to wait outside while occupants of the house carried the pt out to them. Her sister reported to me by phone that she had a GI illness in January and has not been eating very well since that time. She has been spending increasing amount of time in bed recently.       When the pt is asked why she isn't eating she says she \"can't swallow. \"  When asked why she says \"things will not go down. \"  She denies globus/pain. She denies bulimia or trying to lose weight. She has reportedly lost ~50# in last 6 mos. She denies any drug use, no OTC's, no Rx's. She says she has felt numb all over. She says she has been unable to walk bc she is too weak. She says that when she was being brought to the door to she thinks she might have \"passed out. \"  However, she was still in bed at the time and did not fall. She also reports that she has been lightheaded. She reports a family hx of thyroid problems and DM. \"    Rigoberto/LIZZIE echevarriaays SLP for entry for BSE. Pt pleasant and cooperative. Oriented x4. Pt reinforces above h/o dysphagia with dry solids, including steak and chicken. Pt is able to eat mac and cheese and other moist and soft solids as well as cereal with peanut butter. Pt reports frequent dry mouth. Pt states she does not feel solids sticking, rather is just unable to initiate swallow of dry solids. Pt reports nystatin rinse (also Diflucan) now being used for thrush, and pt reports sensation of improvement this day. Pt states she is lactose intolerant.

## 2020-06-13 NOTE — PLAN OF CARE
Problem: Falls - Risk of:  Goal: Will remain free from falls  Description: Will remain free from falls  Outcome: Ongoing     Problem: Nutrition  Goal: Understanding of nutritional guidelines  6/13/2020 0015 by Rufino Allen RN  Outcome: Ongoing

## 2020-06-13 NOTE — PROGRESS NOTES
Pulmonary & Critical Care Medicine ICU Progress Note    CC: Hyponatremia    Events of Last 24 hours: On RA   Sinus tachycardia     Invasive Lines: IV: PIVs          / / /   No results for input(s): PHART, HJO3DVR, PO2ART in the last 72 hours. IV:   IV infusion builder 100 mL/hr at 20 2304       Vitals:  Blood pressure 117/82, pulse 139, temperature 98.7 °F (37.1 °C), temperature source Oral, resp. rate 26, height 5' 4.5\" (1.638 m), weight 145 lb (65.8 kg), last menstrual period 2020, SpO2 100 %, not currently breastfeeding. on RA  Temp  Av.3 °F (36.8 °C)  Min: 98 °F (36.7 °C)  Max: 98.7 °F (37.1 °C)    Intake/Output Summary (Last 24 hours) at 2020 0736  Last data filed at 2020 0533  Gross per 24 hour   Intake 2986 ml   Output --   Net 2986 ml     EXAM:  Gen:  No distress. Ill-appearing  Eyes: PERRL. No sclera icterus. No conjunctival injection. ENT: No discharge. Pharynx clear. Neck: Trachea midline. No obvious mass. Resp:  No accessory muscle use. Minimal crackles. No wheezes. No rhonchi. No dullness on percussion. CV: Regular rate. Regular rhythm. No murmur or rub. No edema. GI: Non-tender. Non-distended. No hernia. Skin: Warm and dry. No nodule on exposed extremities. Lymph: No cervical LAD. No supraclavicular LAD. M/S: No cyanosis. No joint deformity. No clubbing. L foot edema erythema and stiffness. R foot blister    Neuro: Awake. Alert. Moves all four extremities. Psych: Oriented x 3. No anxiety.      Scheduled Meds:   fluconazole  200 mg Intravenous Q24H    levothyroxine  25 mcg Oral Daily    cefTRIAXone (ROCEPHIN) IV  1 g Intravenous Q24H    enoxaparin  40 mg Subcutaneous Daily    nystatin  5 mL Oral 4x Daily     PRN Meds:  potassium chloride, dextrose, magnesium sulfate, sodium phosphate IVPB **OR** sodium phosphate IVPB **OR** sodium phosphate IVPB, prochlorperazine, perflutren lipid microspheres    Results:  CBC:   Recent Labs     20  1630 06/12/20  0443 06/13/20  0520   WBC 9.2 7.2 6.8   HGB 12.8 9.4* 7.5*   HCT 36.9 27.0* 22.0*   MCV 99.1 99.1 101.2*    236 160     BMP:   Recent Labs     06/12/20  1540 06/12/20  2245 06/13/20  0520   * 122* 125*   K 3.6 3.1* 4.1   CL 79* 83* 88*   CO2 31 29 26   PHOS 4.6 2.9 2.0*   BUN 31* 25* 19   CREATININE 0.8 0.7 <0.5*     LIVER PROFILE:   Recent Labs     06/11/20  1630   AST 25   ALT 10   BILITOT 1.7*   ALKPHOS 92       Cultures:  6/11 UC E. Coli  6/12 C. difficile negative        Films:  6/12 CT chest/abdomen imaging reviewed by me and showed  1. Mild multifocal bilateral infectious versus inflammatory pneumonitis. 2. Fatty infiltration of the liver. Chest x-ray 6/12 imaging was reviewed by me and showed   No acute cardiopulmonary process. Left internal jugular line with the tip at the cavoatrial junction     6/11/20 head CT  No evidence of acute intracranial abnormality.     Echocardiogram 6/12/2020   Normal LV systolic function with an estimated EF of 55-60%.   No regional wall motion abnormalities are seen.   Normal left ventricular diastolic filling pressure.   Mild pulmonic regurgitation present.   Medium-sized echodensity and possible mass attached to right atrial free   wall. Consider RON for better evaluation.   Normal systolic pulmonary artery pressure (SPAP) estimated at 19mmHg (RA   pressure 3mmHg).    6/12 LE Doppler no DVT       ASSESSMENT:  · Abnormal CT chest with multifocal GGO's-favor inflammatory/infection  · E. coli UTI  · Dysphagia with 50 pound weight loss along   · Syncope- likely right atrial mass with outflow obstruction  · R atrial mass on echocardiogram 6/12/2020.  DDx  artifact, myxoma, clot, sarcoma  · Severe dehydration with malnutrition and possible starvation ketosis  · Oral thrush  · Left foot erythema with small ulcer/blister  -negative LE Doppler  · Electrolytes disturbance with metabolic acidosis             PLAN:  · Supplemental oxygen to maintain SaO2 >92%; wean as tolerated  · Electrolytes replacement  · Diflucan, ceftriaxone, Zithromax  · CTD workup- negative so far   · Discussed with CT surgery- Dr. Majo Marie   · EGD followed by RON   · Daily CK   · D/W cardiology and internal medicine  · GI prophylaxis: PPIs  · DVT prophylaxis: Lovenox  · MRSA prophylaxis: Bactroban  · Okay to move out of the ICU from our perspective

## 2020-06-13 NOTE — PROGRESS NOTES
Shift assessment was completed (see flow sheet). Pt is A/O- Flat affect. Confused on previous days events/ Conversations. Pt encouraged to participate in being Bathed. Teeth Brushed. Pt denies any pain or other needs at this time- requests Flexi seal Not be replaced. Noted blisters on top of bilateral feet (Dr Linton Orn informed). Respirations are even, unlabored, with Clear sounds. Scheduled medications to follow- whole with water. Speech at bedside. Call light within reach. Bed in lowest position. Bed alarm on. Will continue to monitor. Patient is not able to demonstrated the ability to move from a reclining position to an upright position within the recliner.  however patient is alert, oriented and able to provide informed consent     High risk vesicant drug infusing:  __________    Multiple incompatible medications infusing:  _________    CVP Monitoring:  _________    Extremely difficult IV access challenge:  ________    Continued need for central line access:  ____YES______    Addressed with physician:  ____YES____    RIGHT PATIENT, RIGHT TIME, RIGHT LINE

## 2020-06-13 NOTE — PROGRESS NOTES
Bedside report given to Mitchell Velasqeuz. Pt to be transferred to PCU room 319 via BED. Tele monitoring continued. Pt denies any needs. 4 eyes completed on transfer.

## 2020-06-13 NOTE — PROGRESS NOTES
Department of Internal Medicine  Nephrology Progress Note        SUBJECTIVE:    We are following this patient for electrolytes disorders. The patient was seen and examined; she feels well today with no CP, SOB, nausea or vomiting. ROS: No fever or chills. Social: No family at bedside. Physical Exam:    VITALS:  BP (!) 149/73   Pulse 120   Temp 98 °F (36.7 °C) (Axillary)   Resp 15   Ht 5' 4.5\" (1.638 m)   Wt 145 lb (65.8 kg)   LMP 05/11/2020 (Approximate)   SpO2 100%   BMI 24.50 kg/m²     General appearance: Seems comfortable, no acute distress. Neck: Trachea midline, thyroid normal.   Lungs:  Non labored breathing, CTA to anterior auscultation. Heart:  S1S2 normal, rub or gallop. No peripheral edema. Abdomen: Soft, non-tender, no organomegaly. Skin: No lesions or rashes, warm to touch. DATA:    CBC:   Lab Results   Component Value Date    WBC 6.8 06/13/2020    RBC 2.17 06/13/2020    HGB 7.5 06/13/2020    HCT 22.0 06/13/2020    .2 06/13/2020    MCH 34.7 06/13/2020    MCHC 34.3 06/13/2020    RDW 18.7 06/13/2020     06/13/2020    MPV 8.7 06/13/2020     BMP:    Lab Results   Component Value Date     06/13/2020    K 3.7 06/13/2020    K 2.2 06/11/2020    CL 92 06/13/2020    CO2 27 06/13/2020    BUN 15 06/13/2020    LABALBU 3.4 06/11/2020    CREATININE <0.5 06/13/2020    CALCIUM 7.3 06/13/2020    GFRAA >60 06/13/2020    LABGLOM >60 06/13/2020    GLUCOSE 125 06/13/2020       IMPRESSION/RECOMMENDATIONS:      Hyponatremia.   - Serum osmolality low, urine sodium and chloride <20 and urine osmolality elevated consistent with hypovolemic cause of hyponatremia.   - Improving with NS IVF.     Hypokalemia.   - High suspicion that this is related to her poor nutritional intake. - Urine K-to-creatinine ratio of 13meq/g; hard to interpret this result in the setting of volume depletion as there is likely a component of secondary hyperaldosteronism.    - Will continue with volume

## 2020-06-13 NOTE — PROGRESS NOTES
throughout  BP: 134/86    Positioning Needs: In bed, call light and needs in reach. Exercise / Activities Initiated:   Hand flex/ext:  x10  Wrist flex/ext:  x10  Forearm sup/pronation:  x10  Shoulder flex/ext:  x10   Elbow flex/ext:  x10     Patient/Family Education:   Role of OT    Assessment of Deficits: Pt seen for Occupational therapy evaluation in acute care setting. Pt demonstrated decreased Activity tolerance, ADLs, IADLs, Balance , Bed mobility, Strength, Transfers and Coping Skills. Pt functioning below baseline and will likely benefit from skilled occupational therapy services to maximize safety and independence. Goal(s) : To be met in 3 Visits:  1). Bed to [facility]: Mod A    To be met in 5 Visits:  1). Supine to/from Sit:  Assess at next visit as able  2). Upper Body Bathing:   Min A  3). Lower Body Bathing: Mod A  4). Upper Body Dressing:  Min A  5). Lower Body Dressing: Mod A  6). Pt to demonstrate UE exs x 15 reps with minimal cues    Rehabilitation Potential:  Good for goals listed above. Strengths for achieving goals include: PLOF and Pt cooperative  Barriers to achieving goals include:  Weakness     Plan: To be seen 5 x/wk while in acute care setting for therapeutic exercises, bed mobility, transfers, dressing, bathing, family/patient education, ADL/IADL retraining, energy conservation training.      Rachel Smoke, MOT, OTR/L   [id number]           If patient discharges from this facility prior to next visit, this note will serve as the Discharge Summary

## 2020-06-14 LAB
ABO/RH: NORMAL
ANION GAP SERPL CALCULATED.3IONS-SCNC: 9 MMOL/L (ref 3–16)
ANION GAP SERPL CALCULATED.3IONS-SCNC: 9 MMOL/L (ref 3–16)
ANISOCYTOSIS: ABNORMAL
ANTIBODY SCREEN: NORMAL
BASOPHILS ABSOLUTE: 0 K/UL (ref 0–0.2)
BASOPHILS RELATIVE PERCENT: 0 %
BLOOD BANK DISPENSE STATUS: NORMAL
BLOOD BANK PRODUCT CODE: NORMAL
BPU ID: NORMAL
BUN BLDV-MCNC: 5 MG/DL (ref 7–20)
BUN BLDV-MCNC: 8 MG/DL (ref 7–20)
CALCIUM SERPL-MCNC: 8 MG/DL (ref 8.3–10.6)
CALCIUM SERPL-MCNC: 8.1 MG/DL (ref 8.3–10.6)
CHLORIDE BLD-SCNC: 95 MMOL/L (ref 99–110)
CHLORIDE BLD-SCNC: 97 MMOL/L (ref 99–110)
CO2: 27 MMOL/L (ref 21–32)
CO2: 28 MMOL/L (ref 21–32)
CREAT SERPL-MCNC: <0.5 MG/DL (ref 0.6–1.1)
CREAT SERPL-MCNC: <0.5 MG/DL (ref 0.6–1.1)
DESCRIPTION BLOOD BANK: NORMAL
EOSINOPHILS ABSOLUTE: 0.1 K/UL (ref 0–0.6)
EOSINOPHILS RELATIVE PERCENT: 2 %
GFR AFRICAN AMERICAN: >60
GFR AFRICAN AMERICAN: >60
GFR NON-AFRICAN AMERICAN: >60
GFR NON-AFRICAN AMERICAN: >60
GLUCOSE BLD-MCNC: 111 MG/DL (ref 70–99)
GLUCOSE BLD-MCNC: 92 MG/DL (ref 70–99)
HCT VFR BLD CALC: 20.1 % (ref 36–48)
HCT VFR BLD CALC: 26.5 % (ref 36–48)
HEMOGLOBIN: 6.9 G/DL (ref 12–16)
HEMOGLOBIN: 9.1 G/DL (ref 12–16)
INR BLD: 1.03 (ref 0.86–1.14)
LYMPHOCYTES ABSOLUTE: 2.2 K/UL (ref 1–5.1)
LYMPHOCYTES RELATIVE PERCENT: 31 %
MAGNESIUM: 1.5 MG/DL (ref 1.8–2.4)
MAGNESIUM: 1.9 MG/DL (ref 1.8–2.4)
MCH RBC QN AUTO: 34.8 PG (ref 26–34)
MCHC RBC AUTO-ENTMCNC: 34.1 G/DL (ref 31–36)
MCV RBC AUTO: 102.1 FL (ref 80–100)
METAMYELOCYTES RELATIVE PERCENT: 5 %
MONOCYTES ABSOLUTE: 0.3 K/UL (ref 0–1.3)
MONOCYTES RELATIVE PERCENT: 4 %
MYELOCYTE PERCENT: 4 %
NEUTROPHILS ABSOLUTE: 4.5 K/UL (ref 1.7–7.7)
NEUTROPHILS RELATIVE PERCENT: 54 %
PDW BLD-RTO: 18.7 % (ref 12.4–15.4)
PHOSPHORUS: 2.6 MG/DL (ref 2.5–4.9)
PHOSPHORUS: 2.7 MG/DL (ref 2.5–4.9)
PLATELET # BLD: 148 K/UL (ref 135–450)
PLATELET SLIDE REVIEW: ADEQUATE
PMV BLD AUTO: 8.9 FL (ref 5–10.5)
POTASSIUM SERPL-SCNC: 3 MMOL/L (ref 3.5–5.1)
POTASSIUM SERPL-SCNC: 3.7 MMOL/L (ref 3.5–5.1)
PROTHROMBIN TIME: 12 SEC (ref 10–13.2)
RBC # BLD: 1.97 M/UL (ref 4–5.2)
SLIDE REVIEW: ABNORMAL
SODIUM BLD-SCNC: 131 MMOL/L (ref 136–145)
SODIUM BLD-SCNC: 134 MMOL/L (ref 136–145)
TOTAL CK: 159 U/L (ref 26–192)
WBC # BLD: 7.2 K/UL (ref 4–11)

## 2020-06-14 PROCEDURE — 2580000003 HC RX 258: Performed by: INTERNAL MEDICINE

## 2020-06-14 PROCEDURE — P9016 RBC LEUKOCYTES REDUCED: HCPCS

## 2020-06-14 PROCEDURE — 99233 SBSQ HOSP IP/OBS HIGH 50: CPT | Performed by: INTERNAL MEDICINE

## 2020-06-14 PROCEDURE — 86923 COMPATIBILITY TEST ELECTRIC: CPT

## 2020-06-14 PROCEDURE — 86850 RBC ANTIBODY SCREEN: CPT

## 2020-06-14 PROCEDURE — 85018 HEMOGLOBIN: CPT

## 2020-06-14 PROCEDURE — 85610 PROTHROMBIN TIME: CPT

## 2020-06-14 PROCEDURE — 6360000002 HC RX W HCPCS: Performed by: INTERNAL MEDICINE

## 2020-06-14 PROCEDURE — 86901 BLOOD TYPING SEROLOGIC RH(D): CPT

## 2020-06-14 PROCEDURE — 85014 HEMATOCRIT: CPT

## 2020-06-14 PROCEDURE — 84100 ASSAY OF PHOSPHORUS: CPT

## 2020-06-14 PROCEDURE — 97112 NEUROMUSCULAR REEDUCATION: CPT

## 2020-06-14 PROCEDURE — 97530 THERAPEUTIC ACTIVITIES: CPT

## 2020-06-14 PROCEDURE — 6370000000 HC RX 637 (ALT 250 FOR IP): Performed by: INTERNAL MEDICINE

## 2020-06-14 PROCEDURE — 97535 SELF CARE MNGMENT TRAINING: CPT

## 2020-06-14 PROCEDURE — 36592 COLLECT BLOOD FROM PICC: CPT

## 2020-06-14 PROCEDURE — 97162 PT EVAL MOD COMPLEX 30 MIN: CPT

## 2020-06-14 PROCEDURE — 80048 BASIC METABOLIC PNL TOTAL CA: CPT

## 2020-06-14 PROCEDURE — 83735 ASSAY OF MAGNESIUM: CPT

## 2020-06-14 PROCEDURE — 99232 SBSQ HOSP IP/OBS MODERATE 35: CPT | Performed by: INTERNAL MEDICINE

## 2020-06-14 PROCEDURE — 85025 COMPLETE CBC W/AUTO DIFF WBC: CPT

## 2020-06-14 PROCEDURE — 82550 ASSAY OF CK (CPK): CPT

## 2020-06-14 PROCEDURE — 2060000000 HC ICU INTERMEDIATE R&B

## 2020-06-14 PROCEDURE — 86900 BLOOD TYPING SEROLOGIC ABO: CPT

## 2020-06-14 PROCEDURE — 36430 TRANSFUSION BLD/BLD COMPNT: CPT

## 2020-06-14 RX ORDER — DEXAMETHASONE 1 MG
1 TABLET ORAL ONCE
Status: COMPLETED | OUTPATIENT
Start: 2020-06-14 | End: 2020-06-14

## 2020-06-14 RX ORDER — 0.9 % SODIUM CHLORIDE 0.9 %
20 INTRAVENOUS SOLUTION INTRAVENOUS ONCE
Status: COMPLETED | OUTPATIENT
Start: 2020-06-14 | End: 2020-06-14

## 2020-06-14 RX ORDER — SODIUM CHLORIDE 9 MG/ML
INJECTION, SOLUTION INTRAVENOUS
Status: DISCONTINUED
Start: 2020-06-14 | End: 2020-06-15

## 2020-06-14 RX ADMIN — LEVOTHYROXINE SODIUM 25 MCG: 25 TABLET ORAL at 09:04

## 2020-06-14 RX ADMIN — CEFTRIAXONE SODIUM 1 G: 1 INJECTION, POWDER, FOR SOLUTION INTRAMUSCULAR; INTRAVENOUS at 20:08

## 2020-06-14 RX ADMIN — SODIUM CHLORIDE 20 ML: 9 INJECTION, SOLUTION INTRAVENOUS at 13:35

## 2020-06-14 RX ADMIN — MAGNESIUM SULFATE 1 G: 1 INJECTION INTRAVENOUS at 21:18

## 2020-06-14 RX ADMIN — DEXAMETHASONE 1 MG: 1 TABLET ORAL at 21:15

## 2020-06-14 RX ADMIN — POTASSIUM CHLORIDE 20 MEQ: 400 INJECTION, SOLUTION INTRAVENOUS at 22:36

## 2020-06-14 RX ADMIN — POTASSIUM CHLORIDE 20 MEQ: 400 INJECTION, SOLUTION INTRAVENOUS at 18:54

## 2020-06-14 RX ADMIN — NYSTATIN 500000 UNITS: 500000 SUSPENSION ORAL at 18:08

## 2020-06-14 RX ADMIN — NYSTATIN 500000 UNITS: 500000 SUSPENSION ORAL at 20:08

## 2020-06-14 RX ADMIN — NYSTATIN 500000 UNITS: 500000 SUSPENSION ORAL at 13:35

## 2020-06-14 RX ADMIN — AZITHROMYCIN MONOHYDRATE 500 MG: 500 INJECTION, POWDER, LYOPHILIZED, FOR SOLUTION INTRAVENOUS at 09:03

## 2020-06-14 RX ADMIN — NYSTATIN 500000 UNITS: 500000 SUSPENSION ORAL at 09:04

## 2020-06-14 RX ADMIN — POTASSIUM CHLORIDE 20 MEQ: 400 INJECTION, SOLUTION INTRAVENOUS at 21:20

## 2020-06-14 RX ADMIN — FLUCONAZOLE, SODIUM CHLORIDE 200 MG: 2 INJECTION INTRAVENOUS at 09:03

## 2020-06-14 RX ADMIN — ENOXAPARIN SODIUM 40 MG: 40 INJECTION SUBCUTANEOUS at 09:04

## 2020-06-14 RX ADMIN — MAGNESIUM SULFATE 1 G: 1 INJECTION INTRAVENOUS at 18:48

## 2020-06-14 NOTE — CARE COORDINATION
INTERDISCIPLINARY PLAN OF CARE CONFERENCE    Date/Time: 6/14/2020 8:44 AM  Completed by: Jelena Solorio, Case Management      Patient Name:  Osman Ponce  YOB: 2000  Admitting Diagnosis: Hyponatremia [E87.1]     Admit Date/Time:  6/11/2020  3:21 PM    Chart reviewed. Interdisciplinary team met to discuss patient progress and discharge plans. Disciplines included Case Management, Nursing, and Dietitian. Current Status:ongoing    PT/OT recommendation:ARU is recommended    Anticipated Discharge Date: tbd  Expected D/C Disposition:  [facility] when medically stable  Confirmed plan with patient and/or family Yes  Discharge Plan Comments: Reviewed chart. Pt was transferred out of ICU to PCU. Role of discharge planner explained and patient verbalized understanding. Pt lives in 2 story home with father (who is a ), sister, brother in law, niece. Per Dr. Julio Anand note, plan is for pt to go to [facility] when medically stable. PT/OT recommending ARU. Can eval after [facility]  PT/OT will continue to work with pt while at [facility]. Referral made to [facility] on 6/12/2020 and Demian Frankel is aware. Info in d/c instructions, as well. Demian Frankel will follow up with pt. Marcell Rogers with [facility] spoke with pt and was able to get Medicaid for pt at bedside. Pt now has Medicaid. The Plan for Transition of Care is related to the following treatment goals: [facility]    The Patient and/or patient representative pt was provided with a choice of provider and agrees   with the discharge plan. [] Yes [] No    Freedom of choice list was provided with basic dialogue that supports the patient's individualized plan of care/goals, treatment preferences and shares the quality data associated with the providers.  [] Yes [] No    Home O2 in place on admit: No  Pt informed of need to bring portable home O2 tank on day of discharge for nursing to connect prior to leaving:  Not Indicated  Verbalized agreement/Understanding:  Not Indicated

## 2020-06-14 NOTE — PROGRESS NOTES
1 unit PRBC started. Patient observed for initial 15 minutes with no sign/symptoms of transfusion reaction. Patient currently awake in bed with call light and personal belongings within reach. Patient voices no current needs.

## 2020-06-14 NOTE — PROGRESS NOTES
[facility] Daily Progress Note      Admit Date:  6/11/2020    Subjective:  Ms. Jose Luis Ya seen today for cardiology follow up. Denies chest pain, shortness of breath, edema, dizziness, palpitations and syncope. She is eating breakfast. Denies dysphagia abdominal pain but has not had any bowel movements and denies bleeding any where.            Reason for Consultation/Chief Complaint: \"I have been having difficulty swallowing, Right atrial mass\"     History of Present Illness:  Joanna Wallace is a 22 y.o. patient who presented to the hospital with complaints of depression dysphagia failure to thrive. Weight loss 50 lbs over last 18 months. She admits to dysphagia with solids. Incidental finding of possible right atrial mass. No prior heart disease. No cardiac symptoms. ROS:  12 point ROS negative in all areas as listed below except as in [address], EENT, Cardiovascular, pulmonary, GI, , Musculoskeletal, skin, neurological, hematological, endocrine, Psychiatric    History reviewed. No pertinent past medical history. No past surgical history on file. Objective:   /85   Pulse 125   Temp 98 °F (36.7 °C) (Oral)   Resp 16   Ht 5' 4.5\" (1.638 m)   Wt 159 lb 8 oz (72.3 kg)   LMP 05/11/2020 (Approximate)   SpO2 100%   BMI 26.96 kg/m²       Intake/Output Summary (Last 24 hours) at 6/14/2020 2016  Last data filed at 6/14/2020 2401  Gross per 24 hour   Intake 2975 ml   Output 700 ml   Net 2275 ml       TELEMETRY: sinus tachycardia rate 106/min  Physical Exam:  General: No Respiratory distress, appears well developed and well nourished. Eyes:  Sclera nonicteric  Nose/Sinuses:  negative findings: nose shows no deformity, asymmetry, or inflammation, nasal mucosa normal, septum midline with no perforation or bleeding  Back:  no pain to palpation  Joint:  no active joint inflammation  Musculoskeletal:  negative  Skin:  Warm and dry  Neck:  Negative for JVD and Carotid Bruits. Chest:  Clear to auscultation, respiration easy  Cardiovascular:  RRR, S1S2 normal, no murmur, no rub or thrill. Neuro: intact    Medications:    azithromycin  500 mg Intravenous Q24H    fluconazole  200 mg Intravenous Q24H    levothyroxine  25 mcg Oral Daily    cefTRIAXone (ROCEPHIN) IV  1 g Intravenous Q24H    enoxaparin  40 mg Subcutaneous Daily    nystatin  5 mL Oral 4x Daily       busPIRone, potassium chloride, dextrose, magnesium sulfate, sodium phosphate IVPB **OR** sodium phosphate IVPB **OR** sodium phosphate IVPB, prochlorperazine, perflutren lipid microspheres    Lab Data:  CBC:   Recent Labs     06/12/20  0443 06/13/20  0520 06/14/20  0600   WBC 7.2 6.8 7.2   HGB 9.4* 7.5* 6.9*   HCT 27.0* 22.0* 20.1*   MCV 99.1 101.2* 102.1*    160 148     BMP:   Recent Labs     06/13/20  1730 06/13/20  2315 06/14/20  0200   * 130* 131*   K 3.4* 3.4* 3.7   CL 92* 94* 95*   CO2 27 27 27   PHOS 3.3 2.6 2.6   BUN 11 9 8   CREATININE <0.5* <0.5* <0.5*     LIVER PROFILE:   Recent Labs     06/11/20  1630   AST 25   ALT 10   BILITOT 1.7*   ALKPHOS 92     PT/INR:   Recent Labs     06/13/20  0520 06/13/20  2315 06/14/20  0600   PROTIME 13.6* 12.3 12.0   INR 1.17* 1.06 1.03     APTT:   Recent Labs     06/11/20  1630   APTT 25.6     BNP:  No results for input(s): BNP in the last 72 hours. IMAGING:   Doppler LE:  negative for DVT   chest xray 6.12.20     Impression   No acute cardiopulmonary process.       Left internal jugular line with the tip at the cavoatrial junction.                  6/12/20 ECHO Summary   Normal LV systolic function with an estimated EF of 55-60%.   No regional wall motion abnormalities are seen.   Normal left ventricular diastolic filling pressure.   Mild pulmonic regurgitation present.   Medium-sized echodensity and possible mass attached to right atrial free   wall.  Consider RON for better evaluation.   Normal systolic pulmonary artery pressure (SPAP) estimated at 19mmHg (RAP

## 2020-06-14 NOTE — PROGRESS NOTES
PROGRESS NOTE  S:19 yrs Patient  admitted on 6/11/2020 with Hyponatremia [E87.1] . Today she is tolerating diet without any difficulty. Good appetite. Exam:   Vitals:    06/14/20 1339   BP: 129/87   Pulse: 130   Resp: 16   Temp: 98.9 °F (37.2 °C)   SpO2: 100%      General appearance: alert, appears stated age and cooperative  HEENT: Oropharynx clear, no lesions  Neck: no adenopathy and supple, symmetrical, trachea midline  Lungs: clear to auscultation bilaterally  Heart: regular rate and rhythm, S1, S2 normal, no murmur, click, rub or gallop  Abdomen: soft, non-tender; bowel sounds normal; no masses,  no organomegaly  Extremities: extremities normal, atraumatic, no cyanosis or edema     Medications: Reviewed    Labs:  CBC:   Recent Labs     06/12/20  0443 06/13/20  0520 06/14/20  0600   WBC 7.2 6.8 7.2   HGB 9.4* 7.5* 6.9*   HCT 27.0* 22.0* 20.1*   MCV 99.1 101.2* 102.1*    160 148     BMP:   Recent Labs     06/13/20  1730 06/13/20  2315 06/14/20  0200   * 130* 131*   K 3.4* 3.4* 3.7   CL 92* 94* 95*   CO2 27 27 27   PHOS 3.3 2.6 2.6   BUN 11 9 8   CREATININE <0.5* <0.5* <0.5*     LIVER PROFILE:   Recent Labs     06/11/20  1630   AST 25   ALT 10   PROT 7.0   BILITOT 1.7*   ALKPHOS 92     PT/INR:   Recent Labs     06/13/20  0520 06/13/20  2315 06/14/20  0600   INR 1.17* 1.06 1.03       Impression:22year old female admitted with failure to thrive.        Recommendation:  1. Continue supportive care  2. Barium esophagram to evaluate for any structural abnormalities  3. Advance diet with supplements  4. PT/OT  5. Monitor and replenish electrolytes  6.  Ok to proceed with RON from GI standpoint      Julieta Galan MD  2:56 PM 6/14/2020

## 2020-06-14 NOTE — PROGRESS NOTES
Department of Internal Medicine  Nephrology Progress Note        SUBJECTIVE:    We are following this patient for electrolytes disorders. The patient was seen and examined; she feels well today with no CP, SOB, nausea or vomiting. ROS: No fever or chills. Social: Family at bedside. Physical Exam:    VITALS:  /85   Pulse 125   Temp 98 °F (36.7 °C) (Oral)   Resp 16   Ht 5' 4.5\" (1.638 m)   Wt 159 lb 8 oz (72.3 kg)   LMP 05/11/2020 (Approximate)   SpO2 100%   BMI 26.96 kg/m²     General appearance: Seems comfortable, no acute distress. Neck: Trachea midline, thyroid normal.   Lungs:  Non labored breathing, CTA to anterior auscultation. Heart:  S1S2 normal, rub or gallop. No peripheral edema. Abdomen: Soft, non-tender, no organomegaly. Skin: No lesions or rashes, warm to touch. DATA:    CBC:   Lab Results   Component Value Date    WBC 7.2 06/14/2020    RBC 1.97 06/14/2020    HGB 6.9 06/14/2020    HCT 20.1 06/14/2020    .1 06/14/2020    MCH 34.8 06/14/2020    MCHC 34.1 06/14/2020    RDW 18.7 06/14/2020     06/14/2020    MPV 8.9 06/14/2020     BMP:    Lab Results   Component Value Date     06/14/2020    K 3.7 06/14/2020    K 2.2 06/11/2020    CL 95 06/14/2020    CO2 27 06/14/2020    BUN 8 06/14/2020    LABALBU 3.4 06/11/2020    CREATININE <0.5 06/14/2020    CALCIUM 8.0 06/14/2020    GFRAA >60 06/14/2020    LABGLOM >60 06/14/2020    GLUCOSE 92 06/14/2020       IMPRESSION/RECOMMENDATIONS:      Hyponatremia.   - Serum osmolality low, urine sodium and chloride <20 and urine osmolality elevated consistent with hypovolemic cause of hyponatremia.   - Improved with NS IVF.     Hypokalemia.   - High suspicion that this is related to her poor nutritional intake.   - Urine K-to-creatinine ratio of 13meq/g; hard to interpret this result in the setting of volume depletion as there is likely a component of secondary hyperaldosteronism.   - Continue with PRN potassium replacement.     Hypophosphatemia/Hypocalcemia. - Improved with replacement.     Dysphagia. - GI consulted.   - Further plans per primary service.     Anemia.  - Hemoglobin trending down rather quickly, will defer to primary team.

## 2020-06-14 NOTE — PROGRESS NOTES
Pulmonary & Critical Care Medicine ICU Progress Note    CC: Hyponatremia    Events of Last 24 hours: On RA   No fever and denies any respiratory symptoms  Sinus tachycardia   100% saturation on room air  No birds at home  Denies any exposure to fumes, dust, or chemicals    Invasive Lines: IV: PIVs          / / /   No results for input(s): PHART, DFT1LMC, PO2ART in the last 72 hours. IV:      Vitals:  Blood pressure 124/81, pulse 132, temperature 97.4 °F (36.3 °C), temperature source Oral, resp. rate 16, height 5' 4.5\" (1.638 m), weight 159 lb 8 oz (72.3 kg), last menstrual period 2020, SpO2 100 %, not currently breastfeeding. on RA  Temp  Av °F (36.7 °C)  Min: 97.4 °F (36.3 °C)  Max: 98.5 °F (36.9 °C)    Intake/Output Summary (Last 24 hours) at 2020 0811  Last data filed at 2020 5273  Gross per 24 hour   Intake 2975 ml   Output 700 ml   Net 2275 ml     EXAM:  Gen:  No distress. Ill-appearing  Eyes: PERRL. No sclera icterus. No conjunctival injection. ENT: No discharge. Pharynx clear. Neck: Trachea midline. No obvious mass. Resp:  No accessory muscle use. No crackles. No wheezes. No rhonchi. No dullness on percussion. CV: Regular rate. Regular rhythm. No murmur or rub. No edema. GI: Non-tender. Non-distended. No hernia. Skin: Warm and dry. No nodule on exposed extremities. Lymph: No cervical LAD. No supraclavicular LAD. M/S: No cyanosis. No joint deformity. No clubbing. L foot edema erythema and stiffness-improving. R foot blister    Neuro: Awake. Alert. Moves all four extremities. Psych: Oriented x 3. No anxiety.      Scheduled Meds:   azithromycin  500 mg Intravenous Q24H    fluconazole  200 mg Intravenous Q24H    levothyroxine  25 mcg Oral Daily    cefTRIAXone (ROCEPHIN) IV  1 g Intravenous Q24H    enoxaparin  40 mg Subcutaneous Daily    nystatin  5 mL Oral 4x Daily     PRN Meds:  busPIRone, potassium chloride, dextrose, magnesium sulfate, sodium phosphate IVPB

## 2020-06-14 NOTE — PROGRESS NOTES
Dr. Heraclio Deleon on unit, this RN verified MD receiving message with panic lab values she stated Dr. Mary Alice Felipe would be caring for this patient. Perfect serve sent to Dr. Mary Alice Felipe to notify with no new orders at this time.

## 2020-06-14 NOTE — PROGRESS NOTES
alkalosis, VBG pH 7.5; pCO2 27; HCo3 22.6. Correct electrolytes. #  Hyperglycemia, 206, 197, new onset DM2? Concerning for possible DKA. DKA protocol will also allow for correcting electrolytes. pH pending (VBG), AG 24, , ?-hydroxybutyrate 0.5. HgbA1C 5.5. #  UTI, IV ceftriaxone. Cx negative. #  Elevated INR, 1.60, unclear etiology but possibly related to malnutrition, normal LFTs except for mildly elevated T bili of 1.7. Repeat INR 1.45. Would repeat bilirubin. #  Indeterminate troponin, 0.03, repeats ordered, telemetry. Abnormal EKG may be related to electrolyte abnormalities will monitor on telemetry. #  Failure to thrive with 50 pound unplanned weight loss. Unclear etiology. Banana bag. Tox screen negative. Dietitian c/s. Psychiatry consulted.    -check CT scan of chest abdomen and pelvis- infiltrates in lungs    Gram positive pna  Rocephin,azithro    #  Diffuse numbness/tingling, unclear etiology (acid/base, electrolytes?). Monitor. #  Possible syncope w/ generalized weakness, inability to ambulate. Fall precautions, PT/OT. Checked echo: this showed Medium-sized echodensity and possible mass attached to right atrial free   wall. #  Prolonged QTC, 576 ms, may be related to electrolyte abnormalities. Avoid QT prolonging agents as able. #  Marked dehydration, IVF gently secondary to hyponatremia. Left atrial mass by ECHO  Card consult  RON  Barium swallow to rule out esophageal issues before RON    #  Oral thrush, Nystatin swish and sallow. Chk HIV. Pt reports that she has never been sexually active and is not currently. She lives with family, denies abuse/captivity/being forced to do anything she did not want to, etc.   Gi consult- no need for EGD      #Depression.   She will need inpatient psych transfer to [facility] once medically stable     Elevated cortisol  Free cortisol ordered- pending  Will order  overnight dexamethasone suppression test

## 2020-06-15 ENCOUNTER — APPOINTMENT (OUTPATIENT)
Dept: GENERAL RADIOLOGY | Age: 20
DRG: 421 | End: 2020-06-15
Payer: MEDICARE

## 2020-06-15 ENCOUNTER — APPOINTMENT (OUTPATIENT)
Dept: MRI IMAGING | Age: 20
DRG: 421 | End: 2020-06-15
Payer: MEDICARE

## 2020-06-15 LAB
ANION GAP SERPL CALCULATED.3IONS-SCNC: 8 MMOL/L (ref 3–16)
ANISOCYTOSIS: ABNORMAL
ATYPICAL LYMPHOCYTE RELATIVE PERCENT: 4 % (ref 0–6)
BANDED NEUTROPHILS RELATIVE PERCENT: 4 % (ref 0–7)
BASOPHILS ABSOLUTE: 0 K/UL (ref 0–0.2)
BASOPHILS RELATIVE PERCENT: 0 %
BUN BLDV-MCNC: 4 MG/DL (ref 7–20)
CALCIUM SERPL-MCNC: 8.1 MG/DL (ref 8.3–10.6)
CHLORIDE BLD-SCNC: 99 MMOL/L (ref 99–110)
CO2: 26 MMOL/L (ref 21–32)
CORTISOL - AM: 2 UG/DL (ref 4.3–22.4)
CREAT SERPL-MCNC: <0.5 MG/DL (ref 0.6–1.1)
EOSINOPHILS ABSOLUTE: 0 K/UL (ref 0–0.6)
EOSINOPHILS RELATIVE PERCENT: 0 %
GFR AFRICAN AMERICAN: >60
GFR NON-AFRICAN AMERICAN: >60
GI BACTERIAL PATHOGENS BY PCR: NORMAL
GLUCOSE BLD-MCNC: 119 MG/DL (ref 70–99)
HCT VFR BLD CALC: 28.3 % (ref 36–48)
HEMOGLOBIN: 9.7 G/DL (ref 12–16)
INR BLD: 1.03 (ref 0.86–1.14)
LYMPHOCYTES ABSOLUTE: 2.7 K/UL (ref 1–5.1)
LYMPHOCYTES RELATIVE PERCENT: 20 %
MAGNESIUM: 2.1 MG/DL (ref 1.8–2.4)
MCH RBC QN AUTO: 34 PG (ref 26–34)
MCHC RBC AUTO-ENTMCNC: 34.2 G/DL (ref 31–36)
MCV RBC AUTO: 99.5 FL (ref 80–100)
METAMYELOCYTES RELATIVE PERCENT: 2 %
MONOCYTES ABSOLUTE: 1 K/UL (ref 0–1.3)
MONOCYTES RELATIVE PERCENT: 9 %
MYELOCYTE PERCENT: 2 %
NEUTROPHILS ABSOLUTE: 7.6 K/UL (ref 1.7–7.7)
NEUTROPHILS RELATIVE PERCENT: 59 %
PDW BLD-RTO: 18.7 % (ref 12.4–15.4)
PHOSPHORUS: 2.4 MG/DL (ref 2.5–4.9)
PLATELET # BLD: 152 K/UL (ref 135–450)
PLATELET SLIDE REVIEW: ADEQUATE
PMV BLD AUTO: 8.9 FL (ref 5–10.5)
POIKILOCYTES: ABNORMAL
POTASSIUM SERPL-SCNC: 3.7 MMOL/L (ref 3.5–5.1)
PROTHROMBIN TIME: 12 SEC (ref 10–13.2)
RBC # BLD: 2.85 M/UL (ref 4–5.2)
SARS-COV-2, NAAT: NOT DETECTED
SLIDE REVIEW: ABNORMAL
SODIUM BLD-SCNC: 133 MMOL/L (ref 136–145)
TOTAL CK: 155 U/L (ref 26–192)
WBC # BLD: 11.4 K/UL (ref 4–11)

## 2020-06-15 PROCEDURE — 85025 COMPLETE CBC W/AUTO DIFF WBC: CPT

## 2020-06-15 PROCEDURE — 97110 THERAPEUTIC EXERCISES: CPT

## 2020-06-15 PROCEDURE — 82550 ASSAY OF CK (CPK): CPT

## 2020-06-15 PROCEDURE — 74220 X-RAY XM ESOPHAGUS 1CNTRST: CPT

## 2020-06-15 PROCEDURE — 99233 SBSQ HOSP IP/OBS HIGH 50: CPT | Performed by: INTERNAL MEDICINE

## 2020-06-15 PROCEDURE — 36592 COLLECT BLOOD FROM PICC: CPT

## 2020-06-15 PROCEDURE — 2580000003 HC RX 258: Performed by: INTERNAL MEDICINE

## 2020-06-15 PROCEDURE — 83735 ASSAY OF MAGNESIUM: CPT

## 2020-06-15 PROCEDURE — 97112 NEUROMUSCULAR REEDUCATION: CPT

## 2020-06-15 PROCEDURE — 6360000002 HC RX W HCPCS: Performed by: INTERNAL MEDICINE

## 2020-06-15 PROCEDURE — A9579 GAD-BASE MR CONTRAST NOS,1ML: HCPCS | Performed by: INTERNAL MEDICINE

## 2020-06-15 PROCEDURE — U0002 COVID-19 LAB TEST NON-CDC: HCPCS

## 2020-06-15 PROCEDURE — 99232 SBSQ HOSP IP/OBS MODERATE 35: CPT | Performed by: NURSE PRACTITIONER

## 2020-06-15 PROCEDURE — 6370000000 HC RX 637 (ALT 250 FOR IP): Performed by: INTERNAL MEDICINE

## 2020-06-15 PROCEDURE — 97530 THERAPEUTIC ACTIVITIES: CPT

## 2020-06-15 PROCEDURE — 6360000004 HC RX CONTRAST MEDICATION: Performed by: INTERNAL MEDICINE

## 2020-06-15 PROCEDURE — 2580000003 HC RX 258

## 2020-06-15 PROCEDURE — 84100 ASSAY OF PHOSPHORUS: CPT

## 2020-06-15 PROCEDURE — 70553 MRI BRAIN STEM W/O & W/DYE: CPT

## 2020-06-15 PROCEDURE — 2060000000 HC ICU INTERMEDIATE R&B

## 2020-06-15 PROCEDURE — 85610 PROTHROMBIN TIME: CPT

## 2020-06-15 PROCEDURE — 93005 ELECTROCARDIOGRAM TRACING: CPT | Performed by: INTERNAL MEDICINE

## 2020-06-15 PROCEDURE — 82533 TOTAL CORTISOL: CPT

## 2020-06-15 PROCEDURE — 80048 BASIC METABOLIC PNL TOTAL CA: CPT

## 2020-06-15 RX ORDER — FOLIC ACID 1 MG/1
1 TABLET ORAL DAILY
Status: DISCONTINUED | OUTPATIENT
Start: 2020-06-15 | End: 2020-06-18

## 2020-06-15 RX ORDER — HYDROCORTISONE 10 MG/1
10 TABLET ORAL 2 TIMES DAILY
Status: DISCONTINUED | OUTPATIENT
Start: 2020-06-15 | End: 2020-06-15

## 2020-06-15 RX ORDER — COSYNTROPIN 0.25 MG/ML
250 INJECTION, POWDER, FOR SOLUTION INTRAMUSCULAR; INTRAVENOUS ONCE
Status: DISCONTINUED | OUTPATIENT
Start: 2020-06-16 | End: 2020-06-16

## 2020-06-15 RX ORDER — SODIUM CHLORIDE 0.9 % (FLUSH) 0.9 %
SYRINGE (ML) INJECTION
Status: COMPLETED
Start: 2020-06-15 | End: 2020-06-15

## 2020-06-15 RX ADMIN — NYSTATIN 500000 UNITS: 500000 SUSPENSION ORAL at 13:14

## 2020-06-15 RX ADMIN — Medication 10 ML: at 10:49

## 2020-06-15 RX ADMIN — GADOTERIDOL 14 ML: 279.3 INJECTION, SOLUTION INTRAVENOUS at 16:53

## 2020-06-15 RX ADMIN — FLUCONAZOLE, SODIUM CHLORIDE 200 MG: 2 INJECTION INTRAVENOUS at 10:53

## 2020-06-15 RX ADMIN — NYSTATIN 500000 UNITS: 500000 SUSPENSION ORAL at 10:48

## 2020-06-15 RX ADMIN — ENOXAPARIN SODIUM 40 MG: 40 INJECTION SUBCUTANEOUS at 10:48

## 2020-06-15 RX ADMIN — CEFTRIAXONE SODIUM 1 G: 1 INJECTION, POWDER, FOR SOLUTION INTRAMUSCULAR; INTRAVENOUS at 22:06

## 2020-06-15 RX ADMIN — METOPROLOL TARTRATE 25 MG: 25 TABLET, FILM COATED ORAL at 22:06

## 2020-06-15 RX ADMIN — AZITHROMYCIN MONOHYDRATE 500 MG: 500 INJECTION, POWDER, LYOPHILIZED, FOR SOLUTION INTRAVENOUS at 10:45

## 2020-06-15 RX ADMIN — NYSTATIN 500000 UNITS: 500000 SUSPENSION ORAL at 22:11

## 2020-06-15 RX ADMIN — LEVOTHYROXINE SODIUM 25 MCG: 25 TABLET ORAL at 10:53

## 2020-06-15 RX ADMIN — FOLIC ACID 1 MG: 1 TABLET ORAL at 10:48

## 2020-06-15 RX ADMIN — NYSTATIN 500000 UNITS: 500000 SUSPENSION ORAL at 19:07

## 2020-06-15 RX ADMIN — METOPROLOL TARTRATE 25 MG: 25 TABLET, FILM COATED ORAL at 13:14

## 2020-06-15 RX ADMIN — BUSPIRONE HYDROCHLORIDE 5 MG: 5 TABLET ORAL at 10:48

## 2020-06-15 ASSESSMENT — PAIN SCALES - GENERAL
PAINLEVEL_OUTOF10: 0

## 2020-06-15 ASSESSMENT — ENCOUNTER SYMPTOMS
WHEEZING: 0
SHORTNESS OF BREATH: 0

## 2020-06-15 NOTE — PROGRESS NOTES
thyroidmegaly, no JVD  Skin:  Warm and dry; no rash or lesions  Heart:  Tachycardic, normal apex, S1 and S2 normal, no M/G/R  Lungs:  Normal respiratory effort; CTA  Abdomen: soft, non tender, + bowel sounds  Extremities: redness to top left foot and edema  Neuro: alert and oriented, moves legs and arms equally, flat affect      Data Reviewed:  Ekg 6/13/2020  Sinus tachycardiaNonspecific ST & T wave changesWhen compared with ECG of 12-JUN-2020 06:32,T wave inversion less evident in Inferior leadsT wave inversion no longer evident in Anterolateral leadsConfirmed by Mannie Vazquez MD, [facility] ([id number]) on 6/13/2020 4:35:35 PM       Echo 6/12/2020:   Normal LV systolic function with an estimated EF of 55-60%. No regional wall motion abnormalities are seen. Normal left ventricular diastolic filling pressure. Mild pulmonic regurgitation present. Medium-sized echodensity and possible mass attached to right atrial free   wall. Consider RON for better evaluation. Normal systolic pulmonary artery pressure (SPAP) estimated at 19mmHg (RAP 3mmHg). Doppler LE:  negative for DVT    Esophogram 6/15/2020: Impression   1. Mild esophageal dysmotility.  No mucosal lesions or stricture. 2. Small sliding-type hiatal hernia.          Renal Profile:  Lab Results   Component Value Date    CREATININE <0.5 06/15/2020    BUN 4 06/15/2020     06/15/2020    K 3.7 06/15/2020    K 2.2 06/11/2020    CL 99 06/15/2020    CO2 26 06/15/2020     CBC:    Lab Results   Component Value Date    WBC 11.4 06/15/2020    RBC 2.85 06/15/2020    HGB 9.7 06/15/2020    HCT 28.3 06/15/2020    MCV 99.5 06/15/2020    RDW 18.7 06/15/2020     06/15/2020     BNP:    Lab Results   Component Value Date    PROBNP 730 06/11/2020     Fasting Lipid Panel:    Lab Results   Component Value Date    CHOL 136 06/11/2020    HDL 21 06/11/2020    TRIG 204 06/11/2020     Cardiac Enzymes:  CK/MbTroponin  Lab Results   Component Value Date    CKTOTAL 155 06/15/2020

## 2020-06-15 NOTE — PROGRESS NOTES
cortisol  For cosyntropin stim test  Ct abd with no adrenal masses  MRI brain with no pituitory mass    # Anemia  Transfused 1 unit  of red cells   Iron def noted  Likely malnutrition     # Diarrhea  - + fecal leukocytes  - GI consulted     DVT Prophylaxis: Lovenox  Diet:  soft diet   Code Status: Full Code   Dispo - PCU, then BHI once medically stable.        RAMIREZ Viera - CNP   6/15/2020    Agree with above  Changes made to note    Ramy Cortez MD 6/15/2020 8:06 PM

## 2020-06-15 NOTE — PROGRESS NOTES
PROGRESS NOTE  S:19 yrs Patient  admitted on 6/11/2020 with Hyponatremia [E87.1] . Today she is tolerating dysphagia, soft diet without difficulty. Good appetite. Passed a loose, brown BM this AM.    Exam:   Vitals:    06/15/20 0822   BP: (!) 135/93   Pulse: 123   Resp: 14   Temp: 97.3 °F (36.3 °C)   SpO2: 100%      General appearance: alert, appears stated age and cooperative  HEENT: Oropharynx clear, no lesions  Neck: no adenopathy and supple, symmetrical, trachea midline  Lungs: clear to auscultation bilaterally  Heart: regular rate and rhythm, S1, S2 normal, no murmur, click, rub or gallop  Abdomen: soft, non-tender; bowel sounds normal; no masses,  no organomegaly  Extremities: edema 2+ BLE and tenderness     Medications: Reviewed    Labs:  CBC:   Recent Labs     06/13/20  0520 06/14/20  0600 06/14/20  1810 06/15/20  0600   WBC 6.8 7.2  --  11.4*   HGB 7.5* 6.9* 9.1* 9.7*   HCT 22.0* 20.1* 26.5* 28.3*   .2* 102.1*  --  99.5    148  --  152     BMP:   Recent Labs     06/14/20  0200 06/14/20  1810 06/15/20  0200   * 134* 133*   K 3.7 3.0* 3.7   CL 95* 97* 99   CO2 27 28 26   PHOS 2.6 2.7 2.4*   BUN 8 5* 4*   CREATININE <0.5* <0.5* <0.5*     LIVER PROFILE: No results for input(s): AST, ALT, LIPASE, PROT, BILIDIR, BILITOT, ALKPHOS in the last 72 hours. Invalid input(s): AMYLASE,  ALB  PT/INR:   Recent Labs     06/13/20  2315 06/14/20  0600 06/15/20  0600   INR 1.06 1.03 1.03       IMAGING:  FL ESOPHAGRAM   Impression   1. Mild esophageal dysmotility.  No mucosal lesions or stricture. 2. Small sliding-type hiatal hernia. Attending Supervising [de-identified] Attestation Statement  The patient is a 22 y.o. female. I have performed a history and physical examination of the patient. I discussed the case with my physician assistant Keiry Alvarez PA-C    I reviewed the patient's Past Medical History, Past Surgical History, Medications, and Allergies. Physical Exam:  Vitals:    06/15/20 0345 06/15/20 0459 06/15/20 0822 06/15/20 1314   BP: (!) 134/93  (!) 135/93 125/89   Pulse: 128  123 125   Resp: 16  14    Temp: 97.5 °F (36.4 °C)  97.3 °F (36.3 °C)    TempSrc: Oral  Oral    SpO2: 100%  100%    Weight:  159 lb (72.1 kg)     Height:           Physical Examination: General appearance - alert, well appearing, and in no distress  Mental status - alert, oriented to person, place, and time  Eyes - pupils equal and reactive, extraocular eye movements intact  Neck - supple, no significant adenopathy  Chest - clear to auscultation, no wheezes, rales or rhonchi, symmetric air entry  Heart - normal rate, regular rhythm, normal S1, S2, no murmurs, rubs, clicks or gallops  Abdomen - soft, nontender, nondistended, no masses or organomegaly  Extremities - no pedal edema noted          Impression: 22year old female admitted with failure to thrive.     Recommendation:  1. Continue supportive care  2. Continue diet as tolerated per SLP recommendations  3. Encourage nutrition with supplementation  4. PT/OT  5. Monitor and replenish electrolytes  6. Monitor and document output  7. Ok to proceed with RON from GI standpoint  8. No plans for EGD at this time      Windy Stevens PA-C  11:19 AM 6/15/2020                      22year old female admitted with failure to thrive, UTI, R atrial mass. She denies any further dysphagia and tolerating general diet. Her esophagram was essentially negative. Advance diet as tolerated. PT/OT. 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 Cleared to proceed with RON from GI standpoint.     Partha Colon MD          [phone]  [phone]

## 2020-06-15 NOTE — PROGRESS NOTES
Inpatient Physical Therapy Daily Treatment Note    Unit: PCU  Date:  6/15/2020  Patient Name:    Osman Ponce  Admitting diagnosis:  Hyponatremia [E87.1]  Admit Date:  6/11/2020  Precautions/Restrictions:  Fall risk, Bed/chair alarm, Lines -Purewick catheter and PICC left and Telemetry      Discharge Recommendations: ARU/IRF (inpatient rehab facility)   DME needs for discharge: defer to facility       Therapy recommendation for EMS Transport: requires transport by cot due to difficulty with transfers    Therapy recommendations for staff:   Assist of 2 with use of gait belt and squat pivot transfer  for all transfers to/from [facility]  to/from chair   Can use maxi-move if pt unable to tolerate pivot due to pain in  B LEs    History of Present Illness: (Per Dr. Kena Fritz H&P on 6/11/20) The patient is [phone] y.o. female with PMH below, presents with diffuse numbness, generalized weakness, inability to ambulate, light headedness, difficulty swallowing, possible syncope, unplanned weight loss. While she is alert and oriented, she is not a very good historian. Josselin Tapia of her answers are vague and non-specific.  Reportedly EMS was summoned to home where she says she lives with several family members they were reportedly asked to wait outside while occupants of the house carried the pt out to them. Karla Foster sister reported to me by phone that she had a GI illness in January and has not been eating very well since that time. She has been spending increasing amount of time in bed recently.    When the pt is asked why she isn't eating she says she \"can't swallow. \"  When asked why she says \"things will not go down. \"  She denies globus/pain.  She denies bulimia or trying to lose weight. Esthela Sorto has reportedly lost ~50# in last 6 mos. Esthela Sorto denies any drug use, no OTC's, no Rx's. Esthela Sorto says she has felt numb all over. Esthela Sorto says she has been unable to walk bc she is too weak.  She says that when she was being brought to the door to she

## 2020-06-15 NOTE — PROGRESS NOTES
Occupational Therapy Daily Treatment Note    Unit: PCU  Date:  6/15/2020  Patient Name:    Domenico Gallo  Admitting diagnosis:  Hyponatremia [E87.1]  Admit Date:  6/11/2020  Precautions/Restrictions:  fall risk, IV, bed/chair alarm and purewick catheter        Discharge Recommendations: Acute Rehab (ARU)/ Inpatient Rehab Facility (IRF)  DME needs for discharge: defer to facility       Therapy recommendations for staff:   Assist of 2 (moderate assist) with use of CINDY STEDY for all transfers to/from BSC/chair    AM-PAC Score: AM-PAC Inpatient Daily Activity Raw Score: 11  Home Health S4 Level: NA       Treatment Time:  10:40-11:25  Treatment number:  2    Total Treatment Time:  45 minutes    History of Present Illness: From hospital ED note, Dr. Philippe Deleon: \"19 y.o. female  who presents to the ED complaining of possible syncopal event.  Patient states that she was actually going to come to the hospital today because she has felt \"numb all over\".  She has had several months now where she was having difficulty eating.  She states that just was that she could not swallow still had decreased p.o. intake.  Seem to be getting better but then has since worsened and she estimates that she has lost possibly 50 pounds over the past several months.  No vomiting.  She states that she is not nauseous with this it is just more that she cannot swallow.  It does not feel like it is getting stuck. \"     Subjective:  Pt agreeable to tx however pt did not want to stand due to pain in her feet. Pain   Yes  Rating: severe  Location:B feet   Pain Medicine Status: No request made      Bed Mobility:   Supine to Sit:  SBA using bedrail for assist   Sit to Supine:  Min A with L LE   Rolling:           SBA  Scooting:        SBA    Transfer Training:   Sit to stand:    Max A of 2 up to walker only standing on L LE due to pain in R (needed encouragement to participate in any standing activity)   Stand to sit:  Min A  Bed to

## 2020-06-16 ENCOUNTER — ANESTHESIA EVENT (OUTPATIENT)
Dept: NON INVASIVE DIAGNOSTICS | Age: 20
DRG: 421 | End: 2020-06-16
Payer: MEDICARE

## 2020-06-16 ENCOUNTER — ANESTHESIA (OUTPATIENT)
Dept: NON INVASIVE DIAGNOSTICS | Age: 20
DRG: 421 | End: 2020-06-16
Payer: MEDICARE

## 2020-06-16 VITALS — SYSTOLIC BLOOD PRESSURE: 97 MMHG | DIASTOLIC BLOOD PRESSURE: 59 MMHG | OXYGEN SATURATION: 100 %

## 2020-06-16 LAB
ADRENOCORTICOTROPIC HORMONE: 17 PG/ML (ref 6–58)
ALBUMIN SERPL-MCNC: 2.7 G/DL (ref 3.4–5)
ANION GAP SERPL CALCULATED.3IONS-SCNC: 9 MMOL/L (ref 3–16)
BUN BLDV-MCNC: 4 MG/DL (ref 7–20)
CALCIUM SERPL-MCNC: 8.1 MG/DL (ref 8.3–10.6)
CHLORIDE BLD-SCNC: 100 MMOL/L (ref 99–110)
CO2: 25 MMOL/L (ref 21–32)
CREAT SERPL-MCNC: <0.5 MG/DL (ref 0.6–1.1)
EKG ATRIAL RATE: 90 BPM
EKG DIAGNOSIS: NORMAL
EKG P AXIS: 59 DEGREES
EKG P-R INTERVAL: 142 MS
EKG Q-T INTERVAL: 374 MS
EKG QRS DURATION: 88 MS
EKG QTC CALCULATION (BAZETT): 457 MS
EKG R AXIS: 34 DEGREES
EKG T AXIS: 13 DEGREES
EKG VENTRICULAR RATE: 90 BPM
GFR AFRICAN AMERICAN: >60
GFR NON-AFRICAN AMERICAN: >60
GLUCOSE BLD-MCNC: 97 MG/DL (ref 70–99)
LV EF: 58 %
LVEF MODALITY: NORMAL
MAGNESIUM: 1.4 MG/DL (ref 1.8–2.4)
PHOSPHORUS: 3.2 MG/DL (ref 2.5–4.9)
POTASSIUM SERPL-SCNC: 3.1 MMOL/L (ref 3.5–5.1)
SODIUM BLD-SCNC: 134 MMOL/L (ref 136–145)

## 2020-06-16 PROCEDURE — 2580000003 HC RX 258

## 2020-06-16 PROCEDURE — 97112 NEUROMUSCULAR REEDUCATION: CPT

## 2020-06-16 PROCEDURE — 2060000000 HC ICU INTERMEDIATE R&B

## 2020-06-16 PROCEDURE — 93325 DOPPLER ECHO COLOR FLOW MAPG: CPT | Performed by: INTERNAL MEDICINE

## 2020-06-16 PROCEDURE — 93010 ELECTROCARDIOGRAM REPORT: CPT | Performed by: INTERNAL MEDICINE

## 2020-06-16 PROCEDURE — 93312 ECHO TRANSESOPHAGEAL: CPT

## 2020-06-16 PROCEDURE — 97535 SELF CARE MNGMENT TRAINING: CPT

## 2020-06-16 PROCEDURE — 6370000000 HC RX 637 (ALT 250 FOR IP): Performed by: PHYSICIAN ASSISTANT

## 2020-06-16 PROCEDURE — 2500000003 HC RX 250 WO HCPCS: Performed by: NURSE ANESTHETIST, CERTIFIED REGISTERED

## 2020-06-16 PROCEDURE — 6370000000 HC RX 637 (ALT 250 FOR IP): Performed by: INTERNAL MEDICINE

## 2020-06-16 PROCEDURE — 97530 THERAPEUTIC ACTIVITIES: CPT

## 2020-06-16 PROCEDURE — 85025 COMPLETE CBC W/AUTO DIFF WBC: CPT

## 2020-06-16 PROCEDURE — 2580000003 HC RX 258: Performed by: INTERNAL MEDICINE

## 2020-06-16 PROCEDURE — 2580000003 HC RX 258: Performed by: NURSE ANESTHETIST, CERTIFIED REGISTERED

## 2020-06-16 PROCEDURE — 99232 SBSQ HOSP IP/OBS MODERATE 35: CPT | Performed by: INTERNAL MEDICINE

## 2020-06-16 PROCEDURE — 80069 RENAL FUNCTION PANEL: CPT

## 2020-06-16 PROCEDURE — B246ZZ4 ULTRASONOGRAPHY OF RIGHT AND LEFT HEART, TRANSESOPHAGEAL: ICD-10-PCS | Performed by: INTERNAL MEDICINE

## 2020-06-16 PROCEDURE — 93312 ECHO TRANSESOPHAGEAL: CPT | Performed by: INTERNAL MEDICINE

## 2020-06-16 PROCEDURE — 6360000002 HC RX W HCPCS: Performed by: NURSE ANESTHETIST, CERTIFIED REGISTERED

## 2020-06-16 PROCEDURE — 6360000002 HC RX W HCPCS: Performed by: INTERNAL MEDICINE

## 2020-06-16 PROCEDURE — 83735 ASSAY OF MAGNESIUM: CPT

## 2020-06-16 RX ORDER — TRAMADOL HYDROCHLORIDE 50 MG/1
50 TABLET ORAL EVERY 6 HOURS PRN
Status: DISCONTINUED | OUTPATIENT
Start: 2020-06-16 | End: 2020-06-18

## 2020-06-16 RX ORDER — LIDOCAINE HYDROCHLORIDE 20 MG/ML
INJECTION, SOLUTION INFILTRATION; PERINEURAL PRN
Status: DISCONTINUED | OUTPATIENT
Start: 2020-06-16 | End: 2020-06-16 | Stop reason: SDUPTHER

## 2020-06-16 RX ORDER — LACTOBACILLUS RHAMNOSUS GG 10B CELL
1 CAPSULE ORAL
Status: DISCONTINUED | OUTPATIENT
Start: 2020-06-16 | End: 2020-06-18

## 2020-06-16 RX ORDER — DICYCLOMINE HYDROCHLORIDE 10 MG/1
10 CAPSULE ORAL
Status: DISCONTINUED | OUTPATIENT
Start: 2020-06-16 | End: 2020-06-18

## 2020-06-16 RX ORDER — SODIUM CHLORIDE, SODIUM LACTATE, POTASSIUM CHLORIDE, CALCIUM CHLORIDE 600; 310; 30; 20 MG/100ML; MG/100ML; MG/100ML; MG/100ML
INJECTION, SOLUTION INTRAVENOUS CONTINUOUS PRN
Status: DISCONTINUED | OUTPATIENT
Start: 2020-06-16 | End: 2020-06-16 | Stop reason: SDUPTHER

## 2020-06-16 RX ORDER — COSYNTROPIN 0.25 MG/ML
250 INJECTION, POWDER, FOR SOLUTION INTRAMUSCULAR; INTRAVENOUS ONCE
Status: COMPLETED | OUTPATIENT
Start: 2020-06-17 | End: 2020-06-17

## 2020-06-16 RX ORDER — SODIUM CHLORIDE 0.9 % (FLUSH) 0.9 %
SYRINGE (ML) INJECTION
Status: COMPLETED
Start: 2020-06-16 | End: 2020-06-16

## 2020-06-16 RX ORDER — PROPOFOL 10 MG/ML
INJECTION, EMULSION INTRAVENOUS PRN
Status: DISCONTINUED | OUTPATIENT
Start: 2020-06-16 | End: 2020-06-16 | Stop reason: SDUPTHER

## 2020-06-16 RX ADMIN — SODIUM CHLORIDE, POTASSIUM CHLORIDE, SODIUM LACTATE AND CALCIUM CHLORIDE: 600; 310; 30; 20 INJECTION, SOLUTION INTRAVENOUS at 09:06

## 2020-06-16 RX ADMIN — FOLIC ACID 1 MG: 1 TABLET ORAL at 12:53

## 2020-06-16 RX ADMIN — POTASSIUM CHLORIDE 20 MEQ: 400 INJECTION, SOLUTION INTRAVENOUS at 10:18

## 2020-06-16 RX ADMIN — LIDOCAINE HYDROCHLORIDE 40 MG: 20 INJECTION, SOLUTION INFILTRATION; PERINEURAL at 09:26

## 2020-06-16 RX ADMIN — NYSTATIN 500000 UNITS: 500000 SUSPENSION ORAL at 12:54

## 2020-06-16 RX ADMIN — DICYCLOMINE HYDROCHLORIDE 10 MG: 10 CAPSULE ORAL at 18:45

## 2020-06-16 RX ADMIN — NYSTATIN 500000 UNITS: 500000 SUSPENSION ORAL at 20:56

## 2020-06-16 RX ADMIN — Medication 1 CAPSULE: at 12:54

## 2020-06-16 RX ADMIN — CEFTRIAXONE SODIUM 1 G: 1 INJECTION, POWDER, FOR SOLUTION INTRAMUSCULAR; INTRAVENOUS at 20:55

## 2020-06-16 RX ADMIN — Medication 10 ML: at 10:20

## 2020-06-16 RX ADMIN — POTASSIUM CHLORIDE 20 MEQ: 400 INJECTION, SOLUTION INTRAVENOUS at 08:18

## 2020-06-16 RX ADMIN — MAGNESIUM SULFATE 1 G: 1 INJECTION INTRAVENOUS at 08:08

## 2020-06-16 RX ADMIN — PROPOFOL 100 MG: 10 INJECTION, EMULSION INTRAVENOUS at 09:26

## 2020-06-16 RX ADMIN — ENOXAPARIN SODIUM 40 MG: 40 INJECTION SUBCUTANEOUS at 12:55

## 2020-06-16 RX ADMIN — METOPROLOL TARTRATE 12.5 MG: 25 TABLET, FILM COATED ORAL at 20:56

## 2020-06-16 RX ADMIN — MAGNESIUM SULFATE 1 G: 1 INJECTION INTRAVENOUS at 13:16

## 2020-06-16 RX ADMIN — LEVOTHYROXINE SODIUM 25 MCG: 25 TABLET ORAL at 13:03

## 2020-06-16 RX ADMIN — METOPROLOL TARTRATE 12.5 MG: 25 TABLET, FILM COATED ORAL at 12:55

## 2020-06-16 RX ADMIN — BUSPIRONE HYDROCHLORIDE 5 MG: 5 TABLET ORAL at 12:54

## 2020-06-16 RX ADMIN — NYSTATIN 500000 UNITS: 500000 SUSPENSION ORAL at 18:45

## 2020-06-16 RX ADMIN — AZITHROMYCIN MONOHYDRATE 500 MG: 500 INJECTION, POWDER, LYOPHILIZED, FOR SOLUTION INTRAVENOUS at 12:57

## 2020-06-16 ASSESSMENT — PAIN SCALES - GENERAL
PAINLEVEL_OUTOF10: 0
PAINLEVEL_OUTOF10: 0

## 2020-06-16 NOTE — ANESTHESIA PRE PROCEDURE
Status:                                                                                 BMI:   Wt Readings from Last 3 Encounters:   06/16/20 153 lb 11.2 oz (69.7 kg)   11/20/15 (!) 225 lb (102.1 kg) (99 %, Z= 2.27)*   04/17/15 (!) 231 lb (104.8 kg) (>99 %, Z= 2.36)*     * Growth percentiles are based on [facility] (Girls, 2-20 Years) data. There is no height or weight on file to calculate BMI.    CBC:   Lab Results   Component Value Date    WBC 14.2 06/16/2020    RBC 2.68 06/16/2020    HGB 8.9 06/16/2020    HCT 26.8 06/16/2020    MCV 99.7 06/16/2020    RDW 18.9 06/16/2020     06/16/2020       CMP:   Lab Results   Component Value Date     06/16/2020    K 3.1 06/16/2020    K 2.2 06/11/2020     06/16/2020    CO2 25 06/16/2020    BUN 4 06/16/2020    CREATININE <0.5 06/16/2020    GFRAA >60 06/16/2020    AGRATIO 1.0 06/11/2020    LABGLOM >60 06/16/2020    GLUCOSE 97 06/16/2020    PROT 7.0 06/11/2020    CALCIUM 8.1 06/16/2020    BILITOT 1.7 06/11/2020    ALKPHOS 92 06/11/2020    AST 25 06/11/2020    ALT 10 06/11/2020       POC Tests: No results for input(s): POCGLU, POCNA, POCK, POCCL, POCBUN, POCHEMO, POCHCT in the last 72 hours.     Coags:   Lab Results   Component Value Date    PROTIME 12.0 06/15/2020    INR 1.03 06/15/2020    APTT 25.6 06/11/2020       HCG (If Applicable): No results found for: PREGTESTUR, PREGSERUM, HCG, HCGQUANT     ABGs: No results found for: PHART, PO2ART, SUX3OLX, ZWB7JRJ, BEART, [id number]     Type & Screen (If Applicable):  No results found for: LABABO, LABRH    Drug/Infectious Status (If Applicable):  No results found for: HIV, HEPCAB    COVID-19 Screening (If Applicable):   Lab Results   Component Value Date    COVID19 Not Detected 06/15/2020         Anesthesia Evaluation  Patient summary reviewed and Nursing notes reviewed  Airway: Mallampati: II  TM distance: >3 FB   Neck ROM: full  Mouth opening: > = 3 FB Dental: normal exam         Pulmonary:Negative Pulmonary ROS and normal

## 2020-06-16 NOTE — FLOWSHEET NOTE
06/15/20 2205   Vitals   Temp 98 °F (36.7 °C)   Temp Source Oral   Pulse 96   Heart Rate Source Monitor   Resp 15   /67   BP Location Right upper arm   BP Upper/Lower Upper   BP Method Automatic   Patient Position Semi fowlers   Level of Consciousness 0   MEWS Score 1   Patient Currently in Pain Denies   Cardiac Rhythm NSR;SB   Oxygen Therapy   SpO2 99 %   Pulse Oximeter Device Location Finger   O2 Device None (Room air)   Shift assessment completed-see flow sheet. Patient in bed awake,alert and oriented. Vitals WNL. HR 96. Normal sinus on monitor. Evening medications given per order. Replaced purewick and changed patient. Patient denies any further needs at this time,will continue to monitor,call light within reach.

## 2020-06-16 NOTE — PROGRESS NOTES
Inpatient Physical Therapy Daily Treatment Note    Unit: PCU  Date:  6/16/2020  Patient Name:    Miguel Stevenson  Admitting diagnosis:  Hyponatremia [E87.1]  Admit Date:  6/11/2020  Precautions/Restrictions:  Fall risk, Bed/chair alarm, Lines -PICC left and Telemetry      Discharge Recommendations: ARU/IRF (inpatient rehab facility)   DME needs for discharge: defer to facility       Therapy recommendation for EMS Transport: can transport by wheelchair    Therapy recommendations for staff:   Assist of 2 with use of CINDY STEDY for all transfers to/from [facility]  to/from chair    History of Present Illness: (Per Dr. Chavez Kimball H&P on 6/11/20) The patient is [phone] y.o. female with PMH below, presents with diffuse numbness, generalized weakness, inability to ambulate, light headedness, difficulty swallowing, possible syncope, unplanned weight loss. While she is alert and oriented, she is not a very good historian. Everett Novak of her answers are vague and non-specific.  Reportedly EMS was summoned to home where she says she lives with several family members they were reportedly asked to wait outside while occupants of the house carried the pt out to them. Elder Monteiro sister reported to me by phone that she had a GI illness in January and has not been eating very well since that time. She has been spending increasing amount of time in bed recently.    When the pt is asked why she isn't eating she says she \"can't swallow. \"  When asked why she says \"things will not go down. \"  She denies globus/pain.  She denies bulimia or trying to lose weight. Megan Reinoso has reportedly lost ~50# in last 6 mos. Megan Reinoso denies any drug use, no OTC's, no Rx's. Megan Reinoso says she has felt numb all over. Megan Reinoso says she has been unable to walk bc she is too weak.  She says that when she was being brought to the door to she thinks she might have \"passed out. \" [facility], she was still in bed at the time and did not fall.  She also reports that she has been lightheaded.  She reports a family hx of thyroid problems and DM.  6/12 - Psych consult, plan for pt to transfer to [facility] once medically stable    Home Health S4 Level Recommendation: NA  AM-PAC Mobility Score   AM-PAC Inpatient Mobility Raw Score : 12       Treatment Time:  10:45-11:30  Treatment number: 3  Timed Code Treatment Minutes: 45 minutes  Total Treatment Minutes:  45  minutes    Cognition    A&O orientation not directly assessed. Able to follow 2 step commands    Subjective  Patient lying supine in bed with no family present   Pt agreeable to this PT tx. Pain   No    Bed Mobility   Supine to Sit:    CGA, with cues for sequencing  Sit to Supine:   Not Tested  Rolling:   CGA with use of rail  Scooting:   SBA    Transfer Training     Sit to stand:    Mod A  and 2 persons to  (3 trials) and to [facility] (3 trials)   Pt unable to reach neutral with B ankle DF and compensates with increased hip flexion and knee extension for sit<>stands, therapist must block feet/LE anteriorly intermittently to prevent sliding  Stand to sit:   Mod A  and 2 persons  Bed to Chair:   Total A with use of CINDY STEDY    Gait Training gait completed as indicated below  Marching in [facility] x3 (bilaterally) - pt with hyperextension in knees due to unable to reach neutral ankle, leaning back against chair for support and increased hip flexion (able to weight shift well)    Stair Training deferred, pt unsafe/not appropriate to complete stairs at this time    Therapeutic Exercise all completed bilaterally unless indicated   See OT note for UE reaching    Balance  Sitting:  Good - ; Supervision  Comments: 5 minutes at EOB    Standing: Fair -; CGA-Mod A  Comments: 10 minutes total   Forward flexed posture with moderate use of UEs on walker, pt able to use unilateral support intermittently but leans heavily back against bed/chair to do so (maintains hyperextension of knees and hip flexion in order to place heel flat on floor due to decreased ankle

## 2020-06-16 NOTE — PLAN OF CARE
Nutrition Problem:  Moderate malnutrition  Intervention: Food and/or Nutrient Delivery: Continue current diet, Start ONS  Nutritional Goals: patient will consume > 50% of her meals on general + soft and bite-sized consistency x 3 meals per day + > 50% of Ensure high-protein with meals

## 2020-06-16 NOTE — PROGRESS NOTES
[facility]       Progress Note           Subjective/   22y.o. year old female who we are seeing in consultation for hyponatremia. HPI:  Sodium trend improving, urine output of 1.5 liters over last 24 hours. Denies any sob. ROS:  Intake improving. +weak. Objective/   GEN:  Chronically ill, BP (!) 91/57   Pulse 92   Temp 98 °F (36.7 °C) (Oral)   Resp 14   Ht 5' 4.5\" (1.638 m)   Wt 153 lb 11.2 oz (69.7 kg)   LMP 05/11/2020 (Approximate)   SpO2 99%   BMI 25.98 kg/m²   HEENT: non-icteric, no JVD  CV: S1, S2 without m/r/g; no LE edema  RESP: CTA B without w/r/r; breathing wnl  ABD: +bs, soft, nt, no hsm  SKIN: warm, no rashes    Data/  Recent Labs     06/14/20  0600 06/14/20  1810 06/15/20  0600 06/16/20  0523   WBC 7.2  --  11.4* 14.2*   HGB 6.9* 9.1* 9.7* 8.9*   HCT 20.1* 26.5* 28.3* 26.8*   .1*  --  99.5 99.7     --  152 138     Recent Labs     06/14/20  1810 06/15/20  0200 06/16/20  0523   * 133* 134*   K 3.0* 3.7 3.1*   CL 97* 99 100   CO2 28 26 25   GLUCOSE 111* 119* 97   PHOS 2.7 2.4* 3.2   MG 1.50* 2.10 1.40*   BUN 5* 4* 4*   CREATININE <0.5* <0.5* <0.5*   LABGLOM >60 >60 >60   GFRAA >60 >60 >60       Assessment/Plan     Hyponatremia.   - Serum osmolality low, urine sodium and chloride <20 and urine osmolality elevated   consistent with hypovolemic cause of hyponatremia.   - Improved with NS IVF, now off     Hypokalemia/Hypomagnesemia.   - High suspicion that this is related to her poor nutritional intake. - Urine K-to-creatinine ratio of 13meq/g; hard to interpret this result in the setting of volume   depletion as there is likely a component of secondary hyperaldosteronism.   - Continue with PRN replacement.     Hypophosphatemia/Hypocalcemia. - Improved with replacement.     Dysphagia. - GI consulted.     Anemia. - Plans per Admitting.

## 2020-06-16 NOTE — PROGRESS NOTES
· Problem:  Moderate malnutrition  · Etiology: related to Insufficient energy/nutrient consumption, Difficulty swallowing     Signs and symptoms:  as evidenced by Intake 25-50%, Diet history of poor intake, Presence of wounds, Swallow study results, Mild muscle loss, Lab values    Objective Information:  · Nutrition-Focused Physical Findings: patient continues to have poor appetite/po intake; SLP is following patient - patient c/o difficulty swallowing/dysphagia; + flat affect; she is A & O x 4; patient c/o pain when swallowing - tongue is coated/blistered; abdomen is non-distended, non-tender, soft, and bowel sounds are active; + BM this am noted  · Wound Type: Stage II, Pressure Ulcer(stage II pressure injury on coccyx)  · Current Nutrition Therapies:  · Oral Diet Orders: General, Dysphagia  Soft and Bite-Sized (Dysphagia 3)   · Oral Diet intake: 26-50%, %(most meals < 50% consumed)  · Oral Nutrition Supplement (ONS) Orders: None  · ONS intake: (none ordered)  · Anthropometric Measures:  · Ht: 5' 4.5\" (163.8 cm)   · Current Body Wt: 153 lb 11 oz (69.7 kg)(actual; bed scale; obtained on 6/16/20)  · Admission Body Wt: 145 lb (65.8 kg)(actual; bed scale; obtained on 6/11/20)  · Usual Body Wt: (unable to obtain; patient weighed 254# 3 oz in Aug. 2013; she weighed 225# in Nov. 2015)  · Weight Change:  + 8# weight gain x 5 days admission  · Ideal Body Wt: 123 lb (55.8 kg), % Ideal Body 124%  · BMI Classification: BMI 25.0 - 29.9 Overweight    Nutrition Interventions:   Continue current diet, Start ONS  Continued Inpatient Monitoring, Coordination of Care, Speech Therapy    Nutrition Evaluation:   · Evaluation: Goals set   · Goals: patient will consume > 50% of her meals on general + soft and bite-sized consistency x 3 meals per day + > 50% of Ensure high-protein with meals    · Monitoring: Meal Intake, Supplement Intake, Diet Tolerance, Skin Integrity, Wound Healing, Mental Status/Confusion, Weight, Pertinent

## 2020-06-16 NOTE — PROGRESS NOTES
4 Eyes Skin Assessment     The patient is being assess for   Shift Handoff    I agree that 2 RN's have performed a thorough Head to Toe Skin Assessment on the patient. ALL assessment sites listed below have been assessed. Areas assessed by both nurses:   [x]   Head, Face, and Ears   [x]   Shoulders, Back, and Chest, Abdomen  [x]   Arms, Elbows, and Hands   [x]   Coccyx, Sacrum, and Ischium  [x]   Legs, Feet, and Heels        Excoriation to Aretha area, scattered bruising and redness. Blister to R foot. **SHARE this note so that the co-signing nurse is able to place an eSignature**    Co-signer eSignature: Electronically signed by Nicanor Krabbe, RN on 6/17/20 at 5:49 PM EDT    Does the Patient have Skin Breakdown?   No          Ez Prevention initiated:  Yes   Wound Care Orders initiated:  No      WOC nurse consulted for Pressure Injury (Stage 3,4, Unstageable, DTI, NWPT, Complex wounds)and New or Established Ostomies:  NA      Primary Nurse eSignature: Electronically signed by Eleazar Beltrán RN on 6/16/20 at 7:50 AM EDT

## 2020-06-16 NOTE — PROGRESS NOTES
infiltration of the liver. VL Extremity Venous Bilateral   Final Result      XR CHEST PORTABLE   Final Result   No acute cardiopulmonary process. Left internal jugular line with the tip at the cavoatrial junction. CT Head WO Contrast   Final Result   No evidence of acute intracranial abnormality. XR CHEST PORTABLE   Final Result   Negative portable study. Echo 6/12/2020   Summary   Normal LV systolic function with an estimated EF of 55-60%. No regional wall motion abnormalities are seen. Normal left ventricular diastolic filling pressure. Mild pulmonic regurgitation present. Medium-sized echodensity and possible mass attached to right atrial free   wall. Consider RON for better evaluation. Normal systolic pulmonary artery pressure (SPAP) estimated at 19mmHg (RA   pressure 3mmHg). Venous doppler LE  There is no evidence of deep or superficial venous thrombosis involving the   lower extremities bilaterally. Assessment & Plan:     Severe hyponatremia  - sec to Severe dehydration,malnutrition and starvation ketosis- unsure if hyperaldosteronism playing a role  - Na of 121 on admission   - Admitted to ICU , intensivist and nephro consulted. - improved with  IV fluids - NS  -close monitoring given numerous electrolyte abnormalities. Resolving      # Hypokalemia, severe, 2.2  # Hypomagnesemia  - likely with poor oral intake   - Replacement protocols. K Improved to 3.7 today. - Mag WNL today. #  Elevated TSH, 11.48  #  Hypothyroid   other thyroid studies   T3-T4 studies ordered. T3 is low. T4 WNL. - Started Synthroid. # Tachycardia  - likely starvation related.  Improving with IVF and adding low dose BB  buspar prn for anxiety    #  Gram positive pna  Rocephin D#5, azithro D#4      #  Hyperglycemia, 206, , no DM , A1c at 5.5     #  Elevated INR, 1.60, unclear etiology but possibly related to malnutrition, normal LFTs except for mildly elevated T bili of 1.7. Repeat INR 1.45. #  Indeterminate troponin, 0.03, repeats ordered, telemetry. Abnormal EKG may be related to electrolyte abnormalities will monitor on telemetry. Repeats negative. #  Failure to thrive with 50 pound unplanned weight loss. Unclear etiology. Banana bag. Tox screen negative. Dietitian c/s. Psychiatry consulted.    -checked CT scan of chest abdomen and pelvis- neg   Except for  infiltrates in lungs      #  Diffuse numbness/tingling, unclear etiology- Connective disease workup neg  RA factor neg. ORLY neg  Elevated sed rate and CRP noted  Unsure if related to hypothyroid and folate def   Supplements initiated   Add tramadol for pain     #  Possible syncope w/ generalized weakness, inability to ambulate. Fall precautions, PT/OT. # Left atrial mass by ECHO  Card consulted  RON in am    #  Oral thrush, Nystatin swish and swallow. Chk HIV. Pt reports that she has never been sexually active and is not currently. She lives with family, denies abuse/captivity/being forced to do anything she did not want to, etc.   Gi consulted- no need for EGD    #Depression. She will need inpatient psych transfer to [facility] once medically stable     # Elevated free cortisol level. Low AM cortisol  For cosyntropin stim test  Ct abd with no adrenal masses  MRI brain with no pituitory mass    # Anemia  Transfused 1 unit  of red cells   Iron def noted  Likely malnutrition     # Diarrhea  - + fecal leukocytes  - GI consulted     DVT Prophylaxis: Lovenox  Diet:  soft diet   Code Status: Full Code   Dispo - PCU, then [facility] once medically stable.            Giovanny Mars MD 6/16/2020 7:16 AM

## 2020-06-16 NOTE — PROGRESS NOTES
Bedside shift report given to [address] transferred    4 Eyes Skin Assessment     The patient is being assess for   Shift Handoff    I agree that 2 RN's have performed a thorough Head to Toe Skin Assessment on the patient. ALL assessment sites listed below have been assessed. Areas assessed by both nurses:   [x]   Head, Face, and Ears   [x]   Shoulders, Back, and Chest, Abdomen  [x]   Arms, Elbows, and Hands   [x]   Coccyx, Sacrum, and Ischium  [x]   Legs, Feet, and Heels        Excoriation to Aretha area, scattered bruising and redness. Blister to R foot. **SHARE this note so that the co-signing nurse is able to place an eSignature**    Co-signer eSignature: Electronically signed by Nidia Suarez RN on 6/16/20 at 12:52 AM EDT    Does the Patient have Skin Breakdown?   No          Ez Prevention initiated:  Yes   Wound Care Orders initiated:  No      WOC nurse consulted for Pressure Injury (Stage 3,4, Unstageable, DTI, NWPT, Complex wounds)and New or Established Ostomies:  NA      Primary Nurse eSignature: Electronically signed by Mara Granados RN on 6/15/20 at 9:26 PM EDT

## 2020-06-16 NOTE — PROGRESS NOTES
Pulmonary & Critical Care Medicine Progress Note    CC: Hyponatremia    Events of Last 24 hours:   Patient had RON which did not reveal any cardiac mass. Invasive Lines: IV: PIVs          / / /   Vitals:  Blood pressure 109/70, pulse 91, temperature 97.8 °F (36.6 °C), temperature source Oral, resp. rate 14, height 5' 4.5\" (1.638 m), weight 153 lb 11.2 oz (69.7 kg), last menstrual period 2020, SpO2 100 %, not currently breastfeeding. on RA  Temp  Av.9 °F (36.6 °C)  Min: 97.8 °F (36.6 °C)  Max: 98 °F (36.7 °C)    Intake/Output Summary (Last 24 hours) at 2020 1202  Last data filed at 2020 1053  Gross per 24 hour   Intake 845 ml   Output 1650 ml   Net -805 ml     EXAM:  Gen:  No distress. chronically Ill-appearing, comfortable. Eyes: PERRL. No sclera icterus. No conjunctival injection. ENT: No discharge. Pharynx clear. Neck: Trachea midline. No obvious mass. Resp:  No accessory muscle use. No crackles. No wheezes. No rhonchi. No dullness on percussion. CV: Regular rate. Regular rhythm. No murmur or rub. No edema. GI: Non-tender. Non-distended. No hernia. Skin: Warm and dry. No nodule on exposed extremities. Lymph: No cervical LAD. No supraclavicular LAD. M/S: No cyanosis. No joint deformity. No clubbing. L foot edema erythema and stiffness-improving. R foot blister    Neuro: Awake. Alert. Moves all four extremities. Psych: Oriented x 3. No anxiety.      Scheduled Meds:   metoprolol tartrate  12.5 mg Oral BID    lactobacillus  1 capsule Oral Daily with breakfast    dicyclomine  10 mg Oral BID AC    folic acid  1 mg Oral Daily    cosyntropin  250 mcg Intravenous Once    azithromycin  500 mg Intravenous Q24H    levothyroxine  25 mcg Oral Daily    cefTRIAXone (ROCEPHIN) IV  1 g Intravenous Q24H    enoxaparin  40 mg Subcutaneous Daily    nystatin  5 mL Oral 4x Daily     PRN Meds:  traMADol, busPIRone, potassium chloride, dextrose, magnesium sulfate, sodium phosphate

## 2020-06-16 NOTE — PROCEDURES
H&P Update    I have reviewed the history and physical and examined the patient and find no relevant changes. I have reviewed with the patient and/or family the risks, benefits, and alternatives to the procedure. Pre-sedation Assessment    Patient:  Susy Enriquez   :   2000  Intended Procedure: RON for abnormal ECHO concerning for possible right atrial mass      Penryn nurses notes reviewed and agreed. Medications reviewed  Allergies: Allergies   Allergen Reactions    Lactose Intolerance (Gi)          Pre-Procedure Assessment/Plan: Per anesthesia    Level of Sedation Plan: Per anesthesia    Post Procedure plan: Return to same level of care    Prelim findings:  -normal LV systolic function  -NO intracardiac masses; right atrium with catheter tip superior part of RA  -lipomatous hypertrophy of interatrial septum    Formal report to follow    No complications    SIgning off. Please call if further questions.

## 2020-06-17 LAB
ALBUMIN SERPL-MCNC: 2.7 G/DL (ref 3.4–5)
ANION GAP SERPL CALCULATED.3IONS-SCNC: 11 MMOL/L (ref 3–16)
ANISOCYTOSIS: ABNORMAL
ANISOCYTOSIS: ABNORMAL
ATYPICAL LYMPHOCYTE RELATIVE PERCENT: 6 % (ref 0–6)
BANDED NEUTROPHILS RELATIVE PERCENT: 5 % (ref 0–7)
BANDED NEUTROPHILS RELATIVE PERCENT: 7 % (ref 0–7)
BASOPHILS ABSOLUTE: 0 K/UL (ref 0–0.2)
BASOPHILS ABSOLUTE: 0 K/UL (ref 0–0.2)
BASOPHILS RELATIVE PERCENT: 0 %
BASOPHILS RELATIVE PERCENT: 0 %
BUN BLDV-MCNC: 5 MG/DL (ref 7–20)
CALCIUM SERPL-MCNC: 8.2 MG/DL (ref 8.3–10.6)
CHLORIDE BLD-SCNC: 101 MMOL/L (ref 99–110)
CO2: 22 MMOL/L (ref 21–32)
CORTISOL TOTAL: 11.8 UG/DL
CORTISOL TOTAL: 20.9 UG/DL
CORTISOL TOTAL: 23.2 UG/DL
CREAT SERPL-MCNC: <0.5 MG/DL (ref 0.6–1.1)
EOSINOPHILS ABSOLUTE: 0 K/UL (ref 0–0.6)
EOSINOPHILS ABSOLUTE: 0.1 K/UL (ref 0–0.6)
EOSINOPHILS RELATIVE PERCENT: 0 %
EOSINOPHILS RELATIVE PERCENT: 1 %
GFR AFRICAN AMERICAN: >60
GFR NON-AFRICAN AMERICAN: >60
GLUCOSE BLD-MCNC: 91 MG/DL (ref 70–99)
HCT VFR BLD CALC: 26.8 % (ref 36–48)
HCT VFR BLD CALC: 28.2 % (ref 36–48)
HEMATOLOGY PATH CONSULT: NORMAL
HEMATOLOGY PATH CONSULT: YES
HEMOGLOBIN: 8.9 G/DL (ref 12–16)
HEMOGLOBIN: 9.4 G/DL (ref 12–16)
LYMPHOCYTES ABSOLUTE: 4.6 K/UL (ref 1–5.1)
LYMPHOCYTES ABSOLUTE: 6.2 K/UL (ref 1–5.1)
LYMPHOCYTES RELATIVE PERCENT: 37 %
LYMPHOCYTES RELATIVE PERCENT: 38 %
MAGNESIUM: 1.7 MG/DL (ref 1.8–2.4)
MCH RBC QN AUTO: 33.3 PG (ref 26–34)
MCH RBC QN AUTO: 33.7 PG (ref 26–34)
MCHC RBC AUTO-ENTMCNC: 33.4 G/DL (ref 31–36)
MCHC RBC AUTO-ENTMCNC: 33.4 G/DL (ref 31–36)
MCV RBC AUTO: 100.8 FL (ref 80–100)
MCV RBC AUTO: 99.7 FL (ref 80–100)
METAMYELOCYTES RELATIVE PERCENT: 1 %
METAMYELOCYTES RELATIVE PERCENT: 3 %
MONOCYTES ABSOLUTE: 0.6 K/UL (ref 0–1.3)
MONOCYTES ABSOLUTE: 0.9 K/UL (ref 0–1.3)
MONOCYTES RELATIVE PERCENT: 5 %
MONOCYTES RELATIVE PERCENT: 6 %
MYELOCYTE PERCENT: 4 %
MYELOCYTE PERCENT: 4 %
NEUTROPHILS ABSOLUTE: 7 K/UL (ref 1.7–7.7)
NEUTROPHILS ABSOLUTE: 7.1 K/UL (ref 1.7–7.7)
NEUTROPHILS RELATIVE PERCENT: 38 %
NEUTROPHILS RELATIVE PERCENT: 45 %
NUCLEATED RED BLOOD CELLS: 1 /100 WBC
NUCLEATED RED BLOOD CELLS: 1 /100 WBC
PDW BLD-RTO: 18.9 % (ref 12.4–15.4)
PDW BLD-RTO: 18.9 % (ref 12.4–15.4)
PHOSPHORUS: 4.1 MG/DL (ref 2.5–4.9)
PLATELET # BLD: 138 K/UL (ref 135–450)
PLATELET # BLD: 199 K/UL (ref 135–450)
PLATELET SLIDE REVIEW: ADEQUATE
PLATELET SLIDE REVIEW: ADEQUATE
PMV BLD AUTO: 9.2 FL (ref 5–10.5)
PMV BLD AUTO: 9.3 FL (ref 5–10.5)
POIKILOCYTES: ABNORMAL
POLYCHROMASIA: ABNORMAL
POTASSIUM SERPL-SCNC: 3.4 MMOL/L (ref 3.5–5.1)
RBC # BLD: 2.68 M/UL (ref 4–5.2)
RBC # BLD: 2.8 M/UL (ref 4–5.2)
SLIDE REVIEW: ABNORMAL
SLIDE REVIEW: ABNORMAL
SODIUM BLD-SCNC: 134 MMOL/L (ref 136–145)
WBC # BLD: 12.3 K/UL (ref 4–11)
WBC # BLD: 14.2 K/UL (ref 4–11)

## 2020-06-17 PROCEDURE — 2580000003 HC RX 258: Performed by: INTERNAL MEDICINE

## 2020-06-17 PROCEDURE — 6360000002 HC RX W HCPCS: Performed by: INTERNAL MEDICINE

## 2020-06-17 PROCEDURE — 6370000000 HC RX 637 (ALT 250 FOR IP): Performed by: INTERNAL MEDICINE

## 2020-06-17 PROCEDURE — 97530 THERAPEUTIC ACTIVITIES: CPT

## 2020-06-17 PROCEDURE — 6370000000 HC RX 637 (ALT 250 FOR IP): Performed by: PHYSICIAN ASSISTANT

## 2020-06-17 PROCEDURE — 99232 SBSQ HOSP IP/OBS MODERATE 35: CPT | Performed by: INTERNAL MEDICINE

## 2020-06-17 PROCEDURE — 36415 COLL VENOUS BLD VENIPUNCTURE: CPT

## 2020-06-17 PROCEDURE — 80069 RENAL FUNCTION PANEL: CPT

## 2020-06-17 PROCEDURE — 97535 SELF CARE MNGMENT TRAINING: CPT

## 2020-06-17 PROCEDURE — 82533 TOTAL CORTISOL: CPT

## 2020-06-17 PROCEDURE — 92526 ORAL FUNCTION THERAPY: CPT

## 2020-06-17 PROCEDURE — 85025 COMPLETE CBC W/AUTO DIFF WBC: CPT

## 2020-06-17 PROCEDURE — 83735 ASSAY OF MAGNESIUM: CPT

## 2020-06-17 PROCEDURE — 2060000000 HC ICU INTERMEDIATE R&B

## 2020-06-17 RX ORDER — BUSPIRONE HYDROCHLORIDE 5 MG/1
5 TABLET ORAL EVERY 8 HOURS PRN
Status: CANCELLED | OUTPATIENT
Start: 2020-06-17

## 2020-06-17 RX ORDER — FOLIC ACID 1 MG/1
1 TABLET ORAL DAILY
Status: CANCELLED | OUTPATIENT
Start: 2020-06-18

## 2020-06-17 RX ORDER — ERGOCALCIFEROL 1.25 MG/1
50000 CAPSULE ORAL WEEKLY
Status: DISCONTINUED | OUTPATIENT
Start: 2020-06-17 | End: 2020-06-18

## 2020-06-17 RX ORDER — TRAMADOL HYDROCHLORIDE 50 MG/1
50 TABLET ORAL EVERY 6 HOURS PRN
Status: CANCELLED | OUTPATIENT
Start: 2020-06-17

## 2020-06-17 RX ORDER — PAROXETINE HYDROCHLORIDE 20 MG/1
10 TABLET, FILM COATED ORAL NIGHTLY
Status: DISCONTINUED | OUTPATIENT
Start: 2020-06-17 | End: 2020-06-18

## 2020-06-17 RX ORDER — LACTOBACILLUS RHAMNOSUS GG 10B CELL
1 CAPSULE ORAL
Status: CANCELLED | OUTPATIENT
Start: 2020-06-18

## 2020-06-17 RX ORDER — LEVOTHYROXINE SODIUM 0.03 MG/1
25 TABLET ORAL DAILY
Status: CANCELLED | OUTPATIENT
Start: 2020-06-18

## 2020-06-17 RX ORDER — DICYCLOMINE HYDROCHLORIDE 10 MG/1
10 CAPSULE ORAL
Status: CANCELLED | OUTPATIENT
Start: 2020-06-17

## 2020-06-17 RX ORDER — SODIUM CHLORIDE 0.9 % (FLUSH) 0.9 %
SYRINGE (ML) INJECTION
Status: DISPENSED
Start: 2020-06-17 | End: 2020-06-17

## 2020-06-17 RX ORDER — ERGOCALCIFEROL 1.25 MG/1
50000 CAPSULE ORAL WEEKLY
Status: CANCELLED | OUTPATIENT
Start: 2020-06-24

## 2020-06-17 RX ADMIN — ENOXAPARIN SODIUM 40 MG: 40 INJECTION SUBCUTANEOUS at 08:03

## 2020-06-17 RX ADMIN — NYSTATIN 500000 UNITS: 500000 SUSPENSION ORAL at 16:50

## 2020-06-17 RX ADMIN — FOLIC ACID 1 MG: 1 TABLET ORAL at 08:02

## 2020-06-17 RX ADMIN — DICYCLOMINE HYDROCHLORIDE 10 MG: 10 CAPSULE ORAL at 16:50

## 2020-06-17 RX ADMIN — MAGNESIUM GLUCONATE 500 MG ORAL TABLET 400 MG: 500 TABLET ORAL at 12:27

## 2020-06-17 RX ADMIN — METOPROLOL TARTRATE 12.5 MG: 25 TABLET, FILM COATED ORAL at 08:02

## 2020-06-17 RX ADMIN — ERGOCALCIFEROL 50000 UNITS: 1.25 CAPSULE ORAL at 08:05

## 2020-06-17 RX ADMIN — Medication 1 CAPSULE: at 08:02

## 2020-06-17 RX ADMIN — AZITHROMYCIN MONOHYDRATE 500 MG: 500 INJECTION, POWDER, LYOPHILIZED, FOR SOLUTION INTRAVENOUS at 08:02

## 2020-06-17 RX ADMIN — NYSTATIN 500000 UNITS: 500000 SUSPENSION ORAL at 08:02

## 2020-06-17 RX ADMIN — METOPROLOL TARTRATE 12.5 MG: 25 TABLET, FILM COATED ORAL at 20:49

## 2020-06-17 RX ADMIN — DICYCLOMINE HYDROCHLORIDE 10 MG: 10 CAPSULE ORAL at 05:14

## 2020-06-17 RX ADMIN — COSYNTROPIN 250 MCG: 0.25 INJECTION, POWDER, LYOPHILIZED, FOR SOLUTION INTRAMUSCULAR; INTRAVENOUS at 08:02

## 2020-06-17 RX ADMIN — CEFTRIAXONE SODIUM 1 G: 1 INJECTION, POWDER, FOR SOLUTION INTRAMUSCULAR; INTRAVENOUS at 20:48

## 2020-06-17 RX ADMIN — NYSTATIN 500000 UNITS: 500000 SUSPENSION ORAL at 12:27

## 2020-06-17 RX ADMIN — PAROXETINE HYDROCHLORIDE 10 MG: 20 TABLET, FILM COATED ORAL at 20:49

## 2020-06-17 RX ADMIN — NYSTATIN 500000 UNITS: 500000 SUSPENSION ORAL at 20:48

## 2020-06-17 RX ADMIN — LEVOTHYROXINE SODIUM 25 MCG: 25 TABLET ORAL at 05:14

## 2020-06-17 ASSESSMENT — PAIN SCALES - GENERAL
PAINLEVEL_OUTOF10: 0

## 2020-06-17 NOTE — ANESTHESIA POSTPROCEDURE EVALUATION
Department of Anesthesiology  Postprocedure Note    Patient: Florian Uribe  MRN: [id number]  YOB: 2000  Date of evaluation: 6/17/2020  Time:  9:09 AM     Procedure Summary     Date:  06/16/20 Room / Location:  [facility] Stress Lab    Anesthesia Start:  0163 Anesthesia Stop:  6464    Procedure:  TRANSESOPHAGEAL ECHO Diagnosis:      Scheduled Providers:   Responsible Provider:  Kenia Landon MD    Anesthesia Type:  MAC ASA Status:  3          Anesthesia Type: No value filed. Lito Phase I:      Lito Phase II:      Last vitals: Reviewed and per EMR flowsheets.        Anesthesia Post Evaluation    Patient location during evaluation: PACU  Patient participation: complete - patient participated  Level of consciousness: awake and alert  Pain score: 0  Airway patency: patent  Nausea & Vomiting: no nausea and no vomiting  Complications: no  Cardiovascular status: blood pressure returned to baseline  Respiratory status: acceptable  Hydration status: stable

## 2020-06-17 NOTE — PROGRESS NOTES
exs x 15 reps with minimal cues      Plan: cont with [address], OTR/L [id number]        If patient discharges from this facility prior to next visit, this note will serve as the Discharge Summary

## 2020-06-17 NOTE — PROGRESS NOTES
Admit: 2020    Name:  Kaylene Zaidi  Room:  /3352-85  MRN:    [id number]    Critical Care Daily Progress Note for 2020     Interval History:     22year old presented with weakness, inability to ambulate, numbness, light-headedness, dysphagia, and possible syncope. Also reported unplanned weight loss. Noted to have severe hyponatremia, failure to thrive  Admitted to ICU.      Patient seen. She is awake alert and oriented. Flat affect. Her appetite has been poor. Complains of dysphagia. NPO for swallowing eval. she is depressed, seen by psychiatry. Sodium level slowly improving with IV hydration. Seen by intensivist and nephrologist.   Plan patient will need to go to inpatient psychiatric unit [facility] once medically stable. Patient denies any specific complaints to me other than generalized weakness. She has been having diarrhea. No fevers or chills    Diarrhea improved. HR better with low dose BB  Unable to work with PT with severe neuropathic pain in both feet    Neg RON with no atrial mass     Pt reports she is doing ok today   Mood much improved.  Able to eat better  Reports depression for a while  PT recommends ARU      Scheduled Meds:   vitamin D  50,000 Units Oral Weekly    metoprolol tartrate  12.5 mg Oral BID    lactobacillus  1 capsule Oral Daily with breakfast    dicyclomine  10 mg Oral BID AC    cosyntropin  250 mcg Intravenous Once    folic acid  1 mg Oral Daily    azithromycin  500 mg Intravenous Q24H    levothyroxine  25 mcg Oral Daily    cefTRIAXone (ROCEPHIN) IV  1 g Intravenous Q24H    enoxaparin  40 mg Subcutaneous Daily    nystatin  5 mL Oral 4x Daily       Continuous Infusions:      PRN Meds:  traMADol, busPIRone, potassium chloride, dextrose, magnesium sulfate, sodium phosphate IVPB **OR** sodium phosphate IVPB **OR** sodium phosphate IVPB, prochlorperazine, perflutren lipid microspheres         Objective:     Temp  Av.8 °F (36.6 °C)  Min: 96.9 °F (36.1 °C)  Max: 98.6 °F (37 °C)  Pulse  Av.7  Min: 86  Max: 101  BP  Min: 87/49  Max: 119/83  SpO2  Av %  Min: 100 %  Max: 100 %  No data found. Intake/Output Summary (Last 24 hours) at 2020 0801  Last data filed at 2020 0533  Gross per 24 hour   Intake 490 ml   Output 800 ml   Net -310 ml       Physical Exam:    General:  Young female, fully Awake, alert and oriented. Appears to be not in any distress  Mucous Membranes:  Pink , anicteric  Neck: No JVD, no carotid bruit, no thyromegaly  Chest:  Clear to auscultation bilaterally, no added sounds  Cardiovascular:  RRR S1S2 heard, no murmurs or gallops  Abdomen:  Soft, undistended, non tender, no organomegaly, BS present  Extremities: No edema or cyanosis. Distal pulses well felt  Neurological : significant weakness in both LE from thigh to foot noted  Tenderness in both feet to touch . Reflexes normal   grossly normal              Lab Data:  CBC:   Recent Labs     06/15/20  0600 20  0523 20  0515   WBC 11.4* 14.2* 12.3*   RBC 2.85* 2.68* 2.80*   HGB 9.7* 8.9* 9.4*   HCT 28.3* 26.8* 28.2*   MCV 99.5 99.7 100.8*   RDW 18.7* 18.9* 18.9*    138 199     BMP:   Recent Labs     20  1810 06/15/20  0200 20  0523   * 133* 134*   K 3.0* 3.7 3.1*   CL 97* 99 100   CO2 28 26 25   PHOS 2.7 2.4* 3.2   BUN 5* 4* 4*   CREATININE <0.5* <0.5* <0.5*     BNP: No results for input(s): BNP in the last 72 hours. PT/INR:   Recent Labs     06/15/20  0600   PROTIME 12.0   INR 1.03     APTT:  No results for input(s): APTT in the last 72 hours. Cultures  Urine: less than 10 K CFU/mL E. Coli  Fecal leukocytes positive    Radiology  MRI BRAIN W WO CONTRAST   Final Result   No pituitary mass identified. FL ESOPHAGRAM   Final Result   1. Mild esophageal dysmotility. No mucosal lesions or stricture. 2. Small sliding-type hiatal hernia. CT CHEST ABDOMEN PELVIS W CONTRAST   Final Result   1.  Mild multifocal bilateral

## 2020-06-17 NOTE — PROGRESS NOTES
Patient resting in bed, no distress noted. VSS. Patient denies any needs. Call light in reach. Will continue to monitor.

## 2020-06-17 NOTE — PROGRESS NOTES
Patient resting in bed, AM assessment completed. Coritsol level drawn and meds given. Lungs clear. BS+. Speech at bedside to evaluate patient. Meds given. Purewick in place. No acute distress noted. Call light in reach. Will continue to monitor.

## 2020-06-17 NOTE — PROGRESS NOTES
4 Eyes Skin Assessment     The patient is being assess for   Shift Handoff    I agree that 2 RN's have performed a thorough Head to Toe Skin Assessment on the patient. ALL assessment sites listed below have been assessed. Areas assessed by both nurses:   [x]   Head, Face, and Ears   [x]   Shoulders, Back, and Chest, Abdomen  [x]   Arms, Elbows, and Hands   [x]   Coccyx, Sacrum, and Ischium  [x]   Legs, Feet, and Heels        Excoriation to Aretha area, scattered bruising and redness. Blister to R foot.       **SHARE this note so that the co-signing nurse is able to place an eSignature**    Co-signer eSignature: Electronically signed by Stanley Green RN on 6/18/20 at 6:16 AM EDT    Does the Patient have Skin Breakdown?   No          Ez Prevention initiated:  No   Wound Care Orders initiated:  No      WOC nurse consulted for Pressure Injury (Stage 3,4, Unstageable, DTI, NWPT, Complex wounds)and New or Established Ostomies:  No      Primary Nurse eSignature: Electronically signed by Steve Nguyen RN on 6/17/20 at 5:49 PM EDT

## 2020-06-17 NOTE — FLOWSHEET NOTE
06/16/20 2053   Vitals   Temp 96.9 °F (36.1 °C)   Temp Source Oral   Pulse 97   Heart Rate Source Monitor   Resp 15   /60   BP Location Right upper arm   BP Upper/Lower Upper   BP Method Automatic   Patient Position Supine   Level of Consciousness 0   MEWS Score 1   Patient Currently in Pain No   Cardiac Rhythm NSR;ST   Oxygen Therapy   SpO2 100 %   O2 Device None (Room air)   Shift assessment completed-see flow sheet. Patient in bed awake,alert and oriented x4. Vitals obtained. 100% on RA, lung sounds clear. HR 97 normal sinus to sinus tach on monitor. Evening medications given per order. Patient denies any needs at this time. Will continue to monitor,call light within reach.

## 2020-06-17 NOTE — PROGRESS NOTES
treatment with goals per plan of care. Discharge Recommendations: No SLP services required at this time    If pt discharges from hospital prior to Speech/Swallowing discharge, this note serves as tx and discharge summary. Total Treatment Time / Charges     Time in Time out Total Time / units   Cognitive Tx         Speech Tx      Dysphagia Tx 810 830 20 min / 1 unit     Signature: Abhi KUNZ.  [id number] [facility]  Speech-Language Pathologist [id number]

## 2020-06-17 NOTE — PROGRESS NOTES
Inpatient Physical Therapy Daily Treatment Note    Unit: PCU  Date:  6/17/2020  Patient Name:    Florian Uribe  Admitting diagnosis:  Hyponatremia [E87.1]  Admit Date:  6/11/2020  Precautions/Restrictions:  Fall risk, Bed/chair alarm, Lines -PICC left and Telemetry      Discharge Recommendations: ARU/IRF (inpatient rehab facility)   DME needs for discharge: defer to facility       Therapy recommendation for EMS Transport: can transport by wheelchair    Therapy recommendations for staff:   Assist of 2 with use of CINDY STEDY for all transfers to/from [facility]  to/from chair    History of Present Illness: (Per Dr. Henna Jerome H&P on 6/11/20) The patient is [phone] y.o. female with PMH below, presents with diffuse numbness, generalized weakness, inability to ambulate, light headedness, difficulty swallowing, possible syncope, unplanned weight loss. While she is alert and oriented, she is not a very good historian. Jorge A Hector of her answers are vague and non-specific.  Reportedly EMS was summoned to home where she says she lives with several family members they were reportedly asked to wait outside while occupants of the house carried the pt out to them. Russell Valdovinos sister reported to me by phone that she had a GI illness in January and has not been eating very well since that time. She has been spending increasing amount of time in bed recently.    When the pt is asked why she isn't eating she says she \"can't swallow. \"  When asked why she says \"things will not go down. \"  She denies globus/pain.  She denies bulimia or trying to lose weight. Georges Smith has reportedly lost ~50# in last 6 mos. Georges Smith denies any drug use, no OTC's, no Rx's. Georges Smith says she has felt numb all over. Georges Smith says she has been unable to walk bc she is too weak.  She says that when she was being brought to the door to she thinks she might have \"passed out. \" Dolly Sousa, she was still in bed at the time and did not fall.  She also reports that she has been lightheaded.  She

## 2020-06-18 ENCOUNTER — HOSPITAL ENCOUNTER (INPATIENT)
Age: 20
LOS: 6 days | Discharge: NURSING, REHAB OR LONG-TERM CARE | DRG: 754 | End: 2020-06-24
Attending: PSYCHIATRY & NEUROLOGY | Admitting: PSYCHIATRY & NEUROLOGY
Payer: MEDICARE

## 2020-06-18 VITALS
RESPIRATION RATE: 18 BRPM | WEIGHT: 155.1 LBS | OXYGEN SATURATION: 100 % | HEART RATE: 115 BPM | TEMPERATURE: 97 F | SYSTOLIC BLOOD PRESSURE: 109 MMHG | HEIGHT: 65 IN | DIASTOLIC BLOOD PRESSURE: 79 MMHG | BODY MASS INDEX: 25.84 KG/M2

## 2020-06-18 PROBLEM — F39 MOOD DISORDER (HCC): Status: ACTIVE | Noted: 2020-06-18

## 2020-06-18 LAB
ALBUMIN SERPL-MCNC: 2.4 G/DL (ref 3.4–5)
ANION GAP SERPL CALCULATED.3IONS-SCNC: 7 MMOL/L (ref 3–16)
ANISOCYTOSIS: ABNORMAL
BANDED NEUTROPHILS RELATIVE PERCENT: 2 % (ref 0–7)
BASOPHILS ABSOLUTE: 0 K/UL (ref 0–0.2)
BASOPHILS RELATIVE PERCENT: 0 %
BUN BLDV-MCNC: 6 MG/DL (ref 7–20)
CALCIUM SERPL-MCNC: 8.1 MG/DL (ref 8.3–10.6)
CHLORIDE BLD-SCNC: 103 MMOL/L (ref 99–110)
CO2: 25 MMOL/L (ref 21–32)
CREAT SERPL-MCNC: <0.5 MG/DL (ref 0.6–1.1)
EOSINOPHILS ABSOLUTE: 0.1 K/UL (ref 0–0.6)
EOSINOPHILS RELATIVE PERCENT: 1 %
GFR AFRICAN AMERICAN: >60
GFR NON-AFRICAN AMERICAN: >60
GLUCOSE BLD-MCNC: 98 MG/DL (ref 70–99)
HCT VFR BLD CALC: 25 % (ref 36–48)
HEMATOLOGY PATH CONSULT: NO
HEMOGLOBIN: 8.4 G/DL (ref 12–16)
IGA: 359 MG/DL (ref 70–400)
LYMPHOCYTES ABSOLUTE: 3.4 K/UL (ref 1–5.1)
LYMPHOCYTES RELATIVE PERCENT: 39 %
MAGNESIUM: 1.6 MG/DL (ref 1.8–2.4)
MCH RBC QN AUTO: 33.8 PG (ref 26–34)
MCHC RBC AUTO-ENTMCNC: 33.5 G/DL (ref 31–36)
MCV RBC AUTO: 100.8 FL (ref 80–100)
MONOCYTES ABSOLUTE: 0.4 K/UL (ref 0–1.3)
MONOCYTES RELATIVE PERCENT: 5 %
NEUTROPHILS ABSOLUTE: 4.8 K/UL (ref 1.7–7.7)
NEUTROPHILS RELATIVE PERCENT: 53 %
PDW BLD-RTO: 20.1 % (ref 12.4–15.4)
PHOSPHORUS: 4 MG/DL (ref 2.5–4.9)
PLATELET # BLD: 243 K/UL (ref 135–450)
PLATELET SLIDE REVIEW: ADEQUATE
PMV BLD AUTO: 8.6 FL (ref 5–10.5)
POTASSIUM SERPL-SCNC: 3.6 MMOL/L (ref 3.5–5.1)
RBC # BLD: 2.48 M/UL (ref 4–5.2)
SLIDE REVIEW: ABNORMAL
SODIUM BLD-SCNC: 135 MMOL/L (ref 136–145)
TISSUE TRANSGLUTAMINASE IGA: <0.5 U/ML (ref 0–14)
WBC # BLD: 8.7 K/UL (ref 4–11)

## 2020-06-18 PROCEDURE — 99239 HOSP IP/OBS DSCHRG MGMT >30: CPT | Performed by: INTERNAL MEDICINE

## 2020-06-18 PROCEDURE — 97530 THERAPEUTIC ACTIVITIES: CPT

## 2020-06-18 PROCEDURE — 6370000000 HC RX 637 (ALT 250 FOR IP): Performed by: INTERNAL MEDICINE

## 2020-06-18 PROCEDURE — 6360000002 HC RX W HCPCS: Performed by: INTERNAL MEDICINE

## 2020-06-18 PROCEDURE — 2580000003 HC RX 258: Performed by: INTERNAL MEDICINE

## 2020-06-18 PROCEDURE — 83520 IMMUNOASSAY QUANT NOS NONAB: CPT

## 2020-06-18 PROCEDURE — 97110 THERAPEUTIC EXERCISES: CPT

## 2020-06-18 PROCEDURE — 99233 SBSQ HOSP IP/OBS HIGH 50: CPT | Performed by: PSYCHIATRY & NEUROLOGY

## 2020-06-18 PROCEDURE — 1240000000 HC EMOTIONAL WELLNESS R&B

## 2020-06-18 PROCEDURE — 80069 RENAL FUNCTION PANEL: CPT

## 2020-06-18 PROCEDURE — 83735 ASSAY OF MAGNESIUM: CPT

## 2020-06-18 PROCEDURE — 83516 IMMUNOASSAY NONANTIBODY: CPT

## 2020-06-18 PROCEDURE — 82784 ASSAY IGA/IGD/IGG/IGM EACH: CPT

## 2020-06-18 PROCEDURE — 2580000003 HC RX 258

## 2020-06-18 PROCEDURE — 97535 SELF CARE MNGMENT TRAINING: CPT

## 2020-06-18 PROCEDURE — 85025 COMPLETE CBC W/AUTO DIFF WBC: CPT

## 2020-06-18 PROCEDURE — 6370000000 HC RX 637 (ALT 250 FOR IP): Performed by: PHYSICIAN ASSISTANT

## 2020-06-18 PROCEDURE — 90833 PSYTX W PT W E/M 30 MIN: CPT | Performed by: PSYCHIATRY & NEUROLOGY

## 2020-06-18 RX ORDER — OLANZAPINE 10 MG/1
10 TABLET ORAL
Status: ACTIVE | OUTPATIENT
Start: 2020-06-18 | End: 2020-06-18

## 2020-06-18 RX ORDER — ACETAMINOPHEN 325 MG/1
650 TABLET ORAL EVERY 4 HOURS PRN
Status: DISCONTINUED | OUTPATIENT
Start: 2020-06-18 | End: 2020-06-19

## 2020-06-18 RX ORDER — BUSPIRONE HYDROCHLORIDE 5 MG/1
5 TABLET ORAL EVERY 8 HOURS PRN
Status: DISCONTINUED | OUTPATIENT
Start: 2020-06-18 | End: 2020-06-18 | Stop reason: ALTCHOICE

## 2020-06-18 RX ORDER — PAROXETINE HYDROCHLORIDE 20 MG/1
10 TABLET, FILM COATED ORAL NIGHTLY
Status: DISCONTINUED | OUTPATIENT
Start: 2020-06-18 | End: 2020-06-22

## 2020-06-18 RX ORDER — DICYCLOMINE HYDROCHLORIDE 10 MG/1
10 CAPSULE ORAL
Status: DISCONTINUED | OUTPATIENT
Start: 2020-06-19 | End: 2020-06-24 | Stop reason: HOSPADM

## 2020-06-18 RX ORDER — SODIUM CHLORIDE 0.9 % (FLUSH) 0.9 %
SYRINGE (ML) INJECTION
Status: COMPLETED
Start: 2020-06-18 | End: 2020-06-18

## 2020-06-18 RX ORDER — ERGOCALCIFEROL 1.25 MG/1
50000 CAPSULE ORAL WEEKLY
Status: DISCONTINUED | OUTPATIENT
Start: 2020-06-24 | End: 2020-06-24 | Stop reason: HOSPADM

## 2020-06-18 RX ORDER — OLANZAPINE 10 MG/1
10 INJECTION, POWDER, LYOPHILIZED, FOR SOLUTION INTRAMUSCULAR
Status: ACTIVE | OUTPATIENT
Start: 2020-06-18 | End: 2020-06-18

## 2020-06-18 RX ORDER — TRAMADOL HYDROCHLORIDE 50 MG/1
50 TABLET ORAL EVERY 6 HOURS PRN
Status: DISCONTINUED | OUTPATIENT
Start: 2020-06-18 | End: 2020-06-19

## 2020-06-18 RX ORDER — HYDROXYZINE PAMOATE 25 MG/1
50 CAPSULE ORAL 3 TIMES DAILY PRN
Status: DISCONTINUED | OUTPATIENT
Start: 2020-06-18 | End: 2020-06-22

## 2020-06-18 RX ORDER — LEVOTHYROXINE SODIUM 0.03 MG/1
25 TABLET ORAL DAILY
Status: DISCONTINUED | OUTPATIENT
Start: 2020-06-19 | End: 2020-06-19

## 2020-06-18 RX ORDER — FOLIC ACID 1 MG/1
1 TABLET ORAL DAILY
Status: DISCONTINUED | OUTPATIENT
Start: 2020-06-19 | End: 2020-06-24 | Stop reason: HOSPADM

## 2020-06-18 RX ORDER — TRAZODONE HYDROCHLORIDE 50 MG/1
50 TABLET ORAL NIGHTLY PRN
Status: DISCONTINUED | OUTPATIENT
Start: 2020-06-18 | End: 2020-06-22

## 2020-06-18 RX ORDER — PAROXETINE HYDROCHLORIDE 20 MG/1
10 TABLET, FILM COATED ORAL NIGHTLY
Status: CANCELLED | OUTPATIENT
Start: 2020-06-18

## 2020-06-18 RX ORDER — LACTOBACILLUS RHAMNOSUS GG 10B CELL
1 CAPSULE ORAL
Status: DISCONTINUED | OUTPATIENT
Start: 2020-06-19 | End: 2020-06-24 | Stop reason: HOSPADM

## 2020-06-18 RX ORDER — MAGNESIUM HYDROXIDE/ALUMINUM HYDROXICE/SIMETHICONE 120; 1200; 1200 MG/30ML; MG/30ML; MG/30ML
30 SUSPENSION ORAL EVERY 6 HOURS PRN
Status: DISCONTINUED | OUTPATIENT
Start: 2020-06-18 | End: 2020-06-24 | Stop reason: HOSPADM

## 2020-06-18 RX ORDER — BENZTROPINE MESYLATE 1 MG/ML
2 INJECTION INTRAMUSCULAR; INTRAVENOUS 2 TIMES DAILY PRN
Status: DISCONTINUED | OUTPATIENT
Start: 2020-06-18 | End: 2020-06-22

## 2020-06-18 RX ADMIN — METOPROLOL TARTRATE 12.5 MG: 25 TABLET, FILM COATED ORAL at 08:58

## 2020-06-18 RX ADMIN — LEVOTHYROXINE SODIUM 25 MCG: 25 TABLET ORAL at 05:08

## 2020-06-18 RX ADMIN — Medication 10 ML: at 08:59

## 2020-06-18 RX ADMIN — ENOXAPARIN SODIUM 40 MG: 40 INJECTION SUBCUTANEOUS at 08:58

## 2020-06-18 RX ADMIN — Medication 400 MG: at 22:02

## 2020-06-18 RX ADMIN — NYSTATIN 500000 UNITS: 500000 SUSPENSION ORAL at 12:14

## 2020-06-18 RX ADMIN — AZITHROMYCIN MONOHYDRATE 500 MG: 500 INJECTION, POWDER, LYOPHILIZED, FOR SOLUTION INTRAVENOUS at 08:58

## 2020-06-18 RX ADMIN — MAGNESIUM GLUCONATE 500 MG ORAL TABLET 400 MG: 500 TABLET ORAL at 08:58

## 2020-06-18 RX ADMIN — Medication 1 CAPSULE: at 08:57

## 2020-06-18 RX ADMIN — NYSTATIN 500000 UNITS: 500000 SUSPENSION ORAL at 08:57

## 2020-06-18 RX ADMIN — FOLIC ACID 1 MG: 1 TABLET ORAL at 08:58

## 2020-06-18 RX ADMIN — DICYCLOMINE HYDROCHLORIDE 10 MG: 10 CAPSULE ORAL at 05:08

## 2020-06-18 RX ADMIN — PAROXETINE HYDROCHLORIDE 10 MG: 20 TABLET, FILM COATED ORAL at 22:02

## 2020-06-18 RX ADMIN — METOPROLOL TARTRATE 25 MG: 25 TABLET, FILM COATED ORAL at 22:03

## 2020-06-18 ASSESSMENT — PAIN SCALES - GENERAL
PAINLEVEL_OUTOF10: 0

## 2020-06-18 ASSESSMENT — LIFESTYLE VARIABLES: HISTORY_ALCOHOL_USE: NO

## 2020-06-18 ASSESSMENT — PATIENT HEALTH QUESTIONNAIRE - PHQ9: SUM OF ALL RESPONSES TO PHQ QUESTIONS 1-9: 5

## 2020-06-18 ASSESSMENT — SLEEP AND FATIGUE QUESTIONNAIRES
DO YOU USE A SLEEP AID: NO
DO YOU HAVE DIFFICULTY SLEEPING: NO

## 2020-06-18 NOTE — PROGRESS NOTES
Reflexes normal   grossly normal              Lab Data:  CBC:   Recent Labs     06/16/20  0523 06/17/20  0515 06/18/20  0511   WBC 14.2* 12.3* 8.7   RBC 2.68* 2.80* 2.48*   HGB 8.9* 9.4* 8.4*   HCT 26.8* 28.2* 25.0*   MCV 99.7 100.8* 100.8*   RDW 18.9* 18.9* 20.1*    199 243     BMP:   Recent Labs     06/16/20  0523 06/17/20  0515 06/18/20  0511   * 134* 135*   K 3.1* 3.4* 3.6    101 103   CO2 25 22 25   PHOS 3.2 4.1 4.0   BUN 4* 5* 6*   CREATININE <0.5* <0.5* <0.5*     BNP: No results for input(s): BNP in the last 72 hours. PT/INR:   No results for input(s): PROTIME, INR in the last 72 hours. APTT:  No results for input(s): APTT in the last 72 hours. Cultures  Urine: less than 10 K CFU/mL E. Coli  Fecal leukocytes positive    Radiology  MRI BRAIN W WO CONTRAST   Final Result   No pituitary mass identified. FL ESOPHAGRAM   Final Result   1. Mild esophageal dysmotility. No mucosal lesions or stricture. 2. Small sliding-type hiatal hernia. CT CHEST ABDOMEN PELVIS W CONTRAST   Final Result   1. Mild multifocal bilateral infectious versus inflammatory pneumonitis. 2. Fatty infiltration of the liver. VL Extremity Venous Bilateral   Final Result      XR CHEST PORTABLE   Final Result   No acute cardiopulmonary process. Left internal jugular line with the tip at the cavoatrial junction. CT Head WO Contrast   Final Result   No evidence of acute intracranial abnormality. XR CHEST PORTABLE   Final Result   Negative portable study. Echo 6/12/2020   Summary   Normal LV systolic function with an estimated EF of 55-60%. No regional wall motion abnormalities are seen. Normal left ventricular diastolic filling pressure. Mild pulmonic regurgitation present. Medium-sized echodensity and possible mass attached to right atrial free   wall. Consider RON for better evaluation.    Normal systolic pulmonary artery pressure (SPAP) estimated at 19mmHg

## 2020-06-18 NOTE — PROGRESS NOTES
board  Bed to [facility]:   Min A for squat pivot   Standard toilet:   Not Tested    Activity Tolerance   Pt completed therapy session with minimal Pain noted with transfers. ADL Training:   Upper body dressing:  Not Tested  Upper body bathing:  Not Tested  Lower body dressing:  SBA to don socks and underwear  Lower body bathing:  Not Tested  Toileting:   SBA for hygiene after BM   Grooming/Hygiene:  Not Tested    Therapeutic Exercise:   N/A     Patient Education:   Role of OT  Recommendations for DC   Use of slide transfer board     Positioning Needs:   Reclined in chair with call light and needs in reach. Alarm Set    Family Present:  No    Assessment: Patient with improved participation in mobility and ADLs. Patient able to use slide transfer board with stand by assist for all transfers. Patient will benefit from intensive inpatient rehabilitation to return to independent lifestyle. GOALS-continue all  1). Bed to [facility]: Mod A     To be met in 5 Visits:  1). Supine to/from Sit:            Min assist 6/14 (GOAL MET 6/16, upgrade to independent)   2). Upper Body Bathing:         Min A  3). Lower Body Bathing: Mod A  4). Upper Body Dressing:       Min A  5). Lower Body Dressing: Mod A  6).  Pt to demonstrate UE exs x 15 reps with minimal cues      Plan: cont with [facility], OTR/L #[id number]      If patient discharges from this facility prior to next visit, this note will serve as the Discharge Summary

## 2020-06-18 NOTE — PROGRESS NOTES
Report given to [facility]. Pt. Stable. Care transferred at this time. PCA was in room while patient was on the phone with father and sister. [place] father say F you to patients sister and she replied you already have done that. Notified MD of conversation. Order to re consult psych. Case management already consulted passed on to day shift RN to notify.

## 2020-06-18 NOTE — PROGRESS NOTES
SHERWIN  11:28 AM 6/18/2020                      I reviewed and agree with the findings and plan documented in her note with the appropriate changes made to it.     Electronically signed by Abimael Tripathi MD on 6/18/20 at 6:51 PM EDT          (O) [phone]  [phone]

## 2020-06-18 NOTE — DISCHARGE SUMMARY
Psychiatry  Cardiology  GI   Nephrology  Pulmonology       Physical Exam at Discharge:    /77   Pulse 115   Temp 97 °F (36.1 °C) (Oral)   Resp 18   Ht 5' 4.5\" (1.638 m)   Wt 155 lb 1.6 oz (70.4 kg)   LMP 05/11/2020 (Approximate)   SpO2 100%   BMI 26.21 kg/m²     See today's progress note    CBC:   Recent Labs     06/16/20  0523 06/17/20  0515 06/18/20  0511   WBC 14.2* 12.3* 8.7   HGB 8.9* 9.4* 8.4*   HCT 26.8* 28.2* 25.0*   MCV 99.7 100.8* 100.8*    199 243     BMP:   Recent Labs     06/16/20  0523 06/17/20  0515 06/18/20  0511   * 134* 135*   K 3.1* 3.4* 3.6    101 103   CO2 25 22 25   PHOS 3.2 4.1 4.0   BUN 4* 5* 6*   CREATININE <0.5* <0.5* <0.5*       U/A:    Lab Results   Component Value Date    COLORU Yellow 06/11/2020    WBCUA 3-5 06/11/2020    RBCUA 5-10 06/11/2020    MUCUS 2+ 06/11/2020    BACTERIA 1+ 06/11/2020    CLARITYU Clear 06/11/2020    SPECGRAV 1.020 06/11/2020    LEUKOCYTESUR Negative 06/11/2020    BLOODU SMALL 06/11/2020    GLUCOSEU Negative 06/11/2020    AMORPHOUS 2+ 06/11/2020       CULTURES  Urine: less than 10 K CFU/mL E. Coli  Fecal leukocytes positive    RADIOLOGY  MRI BRAIN W WO CONTRAST   Final Result   No pituitary mass identified. FL ESOPHAGRAM   Final Result   1. Mild esophageal dysmotility. No mucosal lesions or stricture. 2. Small sliding-type hiatal hernia. CT CHEST ABDOMEN PELVIS W CONTRAST   Final Result   1. Mild multifocal bilateral infectious versus inflammatory pneumonitis. 2. Fatty infiltration of the liver. VL Extremity Venous Bilateral   Final Result      XR CHEST PORTABLE   Final Result   No acute cardiopulmonary process. Left internal jugular line with the tip at the cavoatrial junction. CT Head WO Contrast   Final Result   No evidence of acute intracranial abnormality. XR CHEST PORTABLE   Final Result   Negative portable study.            Echo 6/12/2020   Summary   Normal LV systolic

## 2020-06-18 NOTE — FLOWSHEET NOTE
06/18/20 1410   Encounter Summary   Services provided to: Patient not available   Referral/Consult From: [address] [facility] Family members   Continue Visiting   (6/18/Phone support.  No answer.)

## 2020-06-19 LAB
A/G RATIO: 1.2 (ref 1.1–2.2)
ALBUMIN SERPL-MCNC: 2.7 G/DL (ref 3.4–5)
ALP BLD-CCNC: 225 U/L (ref 40–129)
ALT SERPL-CCNC: 96 U/L (ref 10–40)
ANION GAP SERPL CALCULATED.3IONS-SCNC: 10 MMOL/L (ref 3–16)
ANISOCYTOSIS: ABNORMAL
AST SERPL-CCNC: 131 U/L (ref 15–37)
BANDED NEUTROPHILS RELATIVE PERCENT: 1 % (ref 0–7)
BASOPHILS ABSOLUTE: 0 K/UL (ref 0–0.2)
BASOPHILS RELATIVE PERCENT: 0 %
BILIRUB SERPL-MCNC: 0.3 MG/DL (ref 0–1)
BUN BLDV-MCNC: 4 MG/DL (ref 7–20)
CALCIUM SERPL-MCNC: 8.4 MG/DL (ref 8.3–10.6)
CHLORIDE BLD-SCNC: 100 MMOL/L (ref 99–110)
CHOLESTEROL, TOTAL: 120 MG/DL (ref 0–199)
CO2: 25 MMOL/L (ref 21–32)
CORTISOL FREE, SERUM: 0.57 UG/DL
CREAT SERPL-MCNC: <0.5 MG/DL (ref 0.6–1.1)
EOSINOPHILS ABSOLUTE: 0.1 K/UL (ref 0–0.6)
EOSINOPHILS RELATIVE PERCENT: 1 %
GFR AFRICAN AMERICAN: >60
GFR NON-AFRICAN AMERICAN: >60
GLOBULIN: 2.3 G/DL
GLUCOSE BLD-MCNC: 92 MG/DL (ref 70–99)
HCT VFR BLD CALC: 25.8 % (ref 36–48)
HDLC SERPL-MCNC: 27 MG/DL (ref 40–60)
HEMATOLOGY PATH CONSULT: NO
HEMOGLOBIN: 8.5 G/DL (ref 12–16)
LDL CHOLESTEROL CALCULATED: 77 MG/DL
LYMPHOCYTES ABSOLUTE: 3.1 K/UL (ref 1–5.1)
LYMPHOCYTES RELATIVE PERCENT: 61 %
MCH RBC QN AUTO: 33 PG (ref 26–34)
MCHC RBC AUTO-ENTMCNC: 33.1 G/DL (ref 31–36)
MCV RBC AUTO: 99.7 FL (ref 80–100)
MONOCYTES ABSOLUTE: 0.3 K/UL (ref 0–1.3)
MONOCYTES RELATIVE PERCENT: 6 %
NEUTROPHILS ABSOLUTE: 1.6 K/UL (ref 1.7–7.7)
NEUTROPHILS RELATIVE PERCENT: 31 %
PDW BLD-RTO: 20 % (ref 12.4–15.4)
PLATELET # BLD: 414 K/UL (ref 135–450)
PLATELET SLIDE REVIEW: ADEQUATE
PMV BLD AUTO: 7.8 FL (ref 5–10.5)
POTASSIUM SERPL-SCNC: 3.4 MMOL/L (ref 3.5–5.1)
RBC # BLD: 2.58 M/UL (ref 4–5.2)
SLIDE REVIEW: ABNORMAL
SODIUM BLD-SCNC: 135 MMOL/L (ref 136–145)
TOTAL PROTEIN: 5 G/DL (ref 6.4–8.2)
TRIGL SERPL-MCNC: 79 MG/DL (ref 0–150)
TSH SERPL DL<=0.05 MIU/L-ACNC: 33.24 UIU/ML (ref 0.27–4.2)
VLDLC SERPL CALC-MCNC: 16 MG/DL
WBC # BLD: 5.1 K/UL (ref 4–11)

## 2020-06-19 PROCEDURE — 83036 HEMOGLOBIN GLYCOSYLATED A1C: CPT

## 2020-06-19 PROCEDURE — 1240000000 HC EMOTIONAL WELLNESS R&B

## 2020-06-19 PROCEDURE — 80053 COMPREHEN METABOLIC PANEL: CPT

## 2020-06-19 PROCEDURE — 85025 COMPLETE CBC W/AUTO DIFF WBC: CPT

## 2020-06-19 PROCEDURE — 97112 NEUROMUSCULAR REEDUCATION: CPT

## 2020-06-19 PROCEDURE — 99223 1ST HOSP IP/OBS HIGH 75: CPT | Performed by: PSYCHIATRY & NEUROLOGY

## 2020-06-19 PROCEDURE — 97166 OT EVAL MOD COMPLEX 45 MIN: CPT

## 2020-06-19 PROCEDURE — 36415 COLL VENOUS BLD VENIPUNCTURE: CPT

## 2020-06-19 PROCEDURE — 84443 ASSAY THYROID STIM HORMONE: CPT

## 2020-06-19 PROCEDURE — 97530 THERAPEUTIC ACTIVITIES: CPT

## 2020-06-19 PROCEDURE — 97162 PT EVAL MOD COMPLEX 30 MIN: CPT

## 2020-06-19 PROCEDURE — 6370000000 HC RX 637 (ALT 250 FOR IP): Performed by: INTERNAL MEDICINE

## 2020-06-19 PROCEDURE — 80061 LIPID PANEL: CPT

## 2020-06-19 RX ORDER — LEVOTHYROXINE SODIUM 0.05 MG/1
50 TABLET ORAL DAILY
Status: DISCONTINUED | OUTPATIENT
Start: 2020-06-20 | End: 2020-06-24 | Stop reason: HOSPADM

## 2020-06-19 RX ADMIN — PAROXETINE HYDROCHLORIDE 10 MG: 20 TABLET, FILM COATED ORAL at 21:37

## 2020-06-19 RX ADMIN — METOPROLOL TARTRATE 12.5 MG: 25 TABLET, FILM COATED ORAL at 21:36

## 2020-06-19 RX ADMIN — FOLIC ACID 1 MG: 1 TABLET ORAL at 10:01

## 2020-06-19 RX ADMIN — DICYCLOMINE HYDROCHLORIDE 10 MG: 10 CAPSULE ORAL at 06:31

## 2020-06-19 RX ADMIN — DICYCLOMINE HYDROCHLORIDE 10 MG: 10 CAPSULE ORAL at 16:06

## 2020-06-19 RX ADMIN — Medication 400 MG: at 21:38

## 2020-06-19 RX ADMIN — LEVOTHYROXINE SODIUM 25 MCG: 25 TABLET ORAL at 06:31

## 2020-06-19 RX ADMIN — Medication 1 CAPSULE: at 10:01

## 2020-06-19 RX ADMIN — METOPROLOL TARTRATE 12.5 MG: 25 TABLET, FILM COATED ORAL at 09:53

## 2020-06-19 ASSESSMENT — SLEEP AND FATIGUE QUESTIONNAIRES
DO YOU USE A SLEEP AID: NO
DO YOU HAVE DIFFICULTY SLEEPING: YES
DIFFICULTY FALLING ASLEEP: YES
DIFFICULTY ARISING: NO
RESTFUL SLEEP: YES
DIFFICULTY STAYING ASLEEP: YES
SLEEP PATTERN: DIFFICULTY FALLING ASLEEP;DISTURBED/INTERRUPTED SLEEP;EARLY AWAKENING
AVERAGE NUMBER OF SLEEP HOURS: 5

## 2020-06-19 ASSESSMENT — LIFESTYLE VARIABLES: HISTORY_ALCOHOL_USE: NO

## 2020-06-19 ASSESSMENT — PATIENT HEALTH QUESTIONNAIRE - PHQ9: SUM OF ALL RESPONSES TO PHQ QUESTIONS 1-9: 5

## 2020-06-19 NOTE — PROGRESS NOTES
Inpatient [facility] Physical Therapy Evaluation and Treatment    Unit:  [facility]  Date:  6/19/2020  Patient Name:    Cody Gomes  Admitting diagnosis:  Mood disorder [facility]) Krysta Sales Date:  6/18/2020  Precautions/Restrictions/Medical Indications    Standard BHI Precautions  Fall Risk  History of current Episode: (Per Dr. Laci Becerril H&P on 6/11/20) The patient is [phone] y.o. female with PMH below, presents with diffuse numbness, generalized weakness, inability to ambulate, light headedness, difficulty swallowing, possible syncope, unplanned weight loss. While she is alert and oriented, she is not a very good historian. Mason Mikes of her answers are vague and non-specific.  Reportedly EMS was summoned to home where she says she lives with several family members they were reportedly asked to wait outside while occupants of the house carried the pt out to them. Ibrahima Smith sister reported to me by phone that she had a GI illness in January and has not been eating very well since that time. She has been spending increasing amount of time in bed recently.    When the pt is asked why she isn't eating she says she \"can't swallow. \"  When asked why she says \"things will not go down. \"  She denies globus/pain.  She denies bulimia or trying to lose weight. Danica Smith has reportedly lost ~50# in last 6 mos. Danica Smith denies any drug use, no OTC's, no Rx's. Danica Smith says she has felt numb all over. Danica Smith says she has been unable to walk bc she is too weak.  She says that when she was being brought to the door to she thinks she might have \"passed out. \" Robert Smith, she was still in bed at the time and did not fall.  She also reports that she has been lightheaded. She reports a family hx of thyroid problems and DM.  6/12 - Psych consult, plan for pt to transfer to [facility] once medically stable  6/17 - pt refusing [facility] as parent does not want pt to be without her phone, pt now considering rehab  6/18: Patient was transferred to [facility] from [facility]     Treatment Time: 5883 -

## 2020-06-19 NOTE — GROUP NOTE
Group Therapy Note    Date: 6/19/2020    Group Start Time: 1000  Group End Time: 0961  Group Topic: [address]        Group Therapy Note    Attendees: 11    Patient's Goal: to actively participate in group discussion regarding emotion identification. Pts were encouraged to practice emotion identification by listening to music utilized, identifying their emotional reaction, and communicating those emotions with peers to increase emotion identification, improve communication skills, and improve overall mood. Notes: Melissa Molly actively engaged in group throughout. Pt actively identified emotion words and emotional reactions to music utilized. Pt left group early due to meeting with staff. Status After Intervention:  Unchanged    Participation Level:  Active Listener and Interactive    Participation Quality: Appropriate and Attentive      Speech:  normal      Thought Process/Content: Linear      Affective Functioning: Constricted/Restricted      Mood: anxious and depressed      Level of consciousness:  Alert, Oriented x4 and Attentive      Response to Learning: Able to verbalize current knowledge/experience, Able to verbalize/acknowledge new learning, Capable of insight and Progressing to goal      Endings: None Reported    Modes of Intervention: Education, Support, Socialization, Exploration, Clarifying, Problem-solving, Activity and Media      Discipline Responsible: Psychoeducational Specialist      Signature:  CAROLINA Bhagat

## 2020-06-19 NOTE — PROGRESS NOTES
Inpatient Occupational Therapy  Evaluation and Treatment    Unit: Adult-[facility]  Date:  6/19/2020  Patient Name:    Lino David  Admitting diagnosis:  Mood disorder [facility] Lisa Resendiz Date:  6/18/2020  Precautions/Restrictions/WB Status/ Lines/ Wounds/ Oxygen: fall risk; general BHI precautions     Treatment Time:  4969-8389  Treatment Number: 1     Billable Treatment Time: 44 minutes   Total Treatment Time:   54   minutes    Patient Goals for Therapy:  \" to walk again \"      Discharge Recommendations: Acute Rehab (ARU)/ Inpatient [address] [facility] (IR[facility])  DME needs for discharge: defer to facility       Therapy recommendations for staff:   Assist of 2 with use of CINDY STEDY for all transfers to/from BSC/chair    History of Present Illness:  (Per Dr. Tirso Moreira H&P on 6/11/20) The patient is [phone] y.o. female with PMH below, presents with diffuse numbness, generalized weakness, inability to ambulate, light headedness, difficulty swallowing, possible syncope, unplanned weight loss. While she is alert and oriented, she is not a very good historian. Audra Kruger of her answers are vague and non-specific.  Reportedly EMS was summoned to home where she says she lives with several family members they were reportedly asked to wait outside while occupants of the house carried the pt out to them. Flaquito Orozco sister reported to me by phone that she had a GI illness in January and has not been eating very well since that time. She has been spending increasing amount of time in bed recently. 6/18: pt transferred to [facility] from [address] Level Recommendation:  NA  AM-PAC Score: AM-PAC Inpatient Daily Activity Raw Score: 13    Preadmission Environment    Pt. Mago Duke family (sister, sister boyfriend, niece) - Does not have 24/7 assistance at this time.  Reports family is in and out with their schedules.   Home environment:    two story home  Steps to enter first floor:   4 steps to enter with 1 hand rail   Steps to second issues\". Pt reports he has never been physical with her or her niece (his child). Pt states \"he was angry before she was born\". Pt reports there are no longer guns in the home and she is no longer afraid. Pt stated \"I feel safe at home\". Pt reports in April she began to have difficulty with her feet swelling. From there she spent more time in bed and began having pain in her feet and bottom. Approximately 3 weeks before admission, pt states she was only getting out of bed to use the bathroom or let the dogs out. Pt reports she was not able to eat and lost a lot of weight. Pt states she was using the door knobs and furniture for her balance. Upper Extremity ROM:    WFL    Upper Extremity Strength:    BUE strength diminished but not formally assessed w/ MMT    Upper Extremity Sensation    WNL    Upper Extremity Proprioception:  WNL    Coordination and Tone  WNL    Balance  Functional Sitting Balance: WNL  Functional Standing Balance:NT    Bed mobility:    Supine to sit:   Not Tested  Sit to supine:   Not Tested  Rolling:    Not Tested  Scooting in sitting:  Not Tested  Scooting to head of bed:   Not Tested    Bridging:   Not Tested    Transfers:    Sit to stand:  Not Tested  Stand to sit:  Not Tested  Bed to chair:   Not Tested  Standard toilet: Not Tested  Bed to [facility]:  Not Tested   Pt propelled the w/c independently into the [place] [address] to complete the evaluation. Dressing:      UE:   Not Tested  LE:    Not Tested    Bathing:    UE:  Not Tested  LE:  Not Tested    Eating:   Independent    Toileting:  Not Tested    Activity Tolerance   Pt completed therapy session with No adverse symptoms noted w/activity     Coping skills: Pt defined coping skills and reviewed worksheet. Positioning Needs:   Pt in w/c at community room table coloring at conclusion of the session.      Exercise / Activities Initiated:   N/A    Patient/Family Education:   Role of OT  Recommendations for DC - Extensive time spent discussing ARU. Pt agreeable to ARU reporting she wants to walk again and feels if she goes home from here she is worried about her progress (as she would not have access to daily therapy). She wants to work with therapy and get better. Assessment of Deficits: Pt seen for Occupational therapy evaluation in acute care setting. Pt demonstrated decreased ADLs, IADLs, Balance , Bed mobility, Transfers and Coping Skills. Pt functioning below baseline and will likely benefit from skilled occupational therapy services to maximize safety and independence. Goal(s) : To be met in 3 Visits:  1). Bed to toilet: Mod A   2). Pt to participate in conversation based upon healthy coping skills. To be met in 5 Visits:  1). Supine to/from Sit:  Supervision with HOB flat and no hand rails   2). Upper Body Bathing:   Min A  3). Lower Body Bathing: Mod A  4). Upper Body Dressing:  Min A  5). Lower Body Dressing: Mod A  6). Pt to demonstrate UE exs x 15 reps with minimal cues  7). Pt. To complete daily schedule of healthy activities/routines with min assist.  8). Pt. To verbalize understanding of 3 communication skills. Rehabilitation Potential:  Good for goals listed above. Strengths for achieving goals include: Pt motivated and goal setting  Barriers to achieving goals include:  Complexity of condition     Plan: To be seen 5 x/wk while in acute care setting for therapeutic exercises, bed mobility, transfers, dressing, bathing, family/patient education, ADL/IADL retraining, energy conservation training.      PIEDAD Gamboa, OTR/L   [id number]           If patient discharges from this facility prior to next visit, this note will serve as the Discharge Summary

## 2020-06-19 NOTE — BH NOTE
Order for CMP and CBC with differential ordered by Dr. Milagro Rocha due to trending/low labs. Orders placed. Will continue to monitor.

## 2020-06-19 NOTE — GROUP NOTE
Group Therapy Note    Date: 6/19/2020    Group Start Time: 1115  Group End Time: 1200  Group Topic: [address]        Group Therapy Note    Attendees: 11         Patient's Goal:  Patient will complete worksheet on humility. Will identify how humility contributes to overall happiness. Will explore ways to cultivate humility. Notes:  Patient attended group. Completed the worksheet and discussed. Talked about how humility contributes to happiness. Identified activities to do in the patients life that will cultivate humility and happiness. Status After Intervention:  Improved    Participation Level:  Active Listener and Interactive    Participation Quality: Appropriate, Attentive, Sharing and Supportive      Speech:  normal      Thought Process/Content: Logical      Affective Functioning: Congruent      Mood: anxious and depressed      Level of consciousness:  Alert and Oriented x4      Response to Learning: Able to verbalize current knowledge/experience      Endings: None Reported    Modes of Intervention: Education, Support, Socialization and Exploration      Discipline Responsible: /Counselor      Signature:  Georges Lema 52

## 2020-06-19 NOTE — H&P
Hospital Medicine History & Physical      PCP: No primary care provider on file. Date of Admission: 6/18/2020    Date of Service: Pt seen/examined on 6/19/2020    Chief Complaint: Dizziness    History Of Present Illness: The patient is a 22 y.o. female with no significant past medical history who presented to [facility] with complaint of dizziness. Patient was found down by her family and brought to the ED. She was found to be severely hyponatremic and was not eating/drinking thought to be 2/2 severe depression. Patient was seen and evaluated by the medical team and was transferred to the adult psychiatric unit for continued management. This note serves as an admission medical H&P.    PCP: No primary care provider on file. Tobacco use: denies  ETOH use: denies  Illicit drug use: denies    Patient reports continued weakness in her legs. Past Medical History:    History reviewed. No pertinent past medical history. Past Surgical History:    History reviewed. No pertinent surgical history. Medications Prior to Admission:    Prior to Admission medications    Not on File       Allergies:  Lactose intolerance (gi)    Social History:  The patient currently lives at home     TOBACCO:   reports that she has never smoked. She has never used smokeless tobacco.  ETOH:   reports no history of alcohol use.       Family History:   Positive as follows:        Problem Relation Age of Onset    High Blood Pressure Father     High Cholesterol Father     Diabetes Sister        REVIEW OF SYSTEMS:     Constitutional: + weakness, + fatigue, Negative for fever   HENT: Negative for sore throat   Eyes: Negative for redness   Respiratory: Negative  for dyspnea, cough   Cardiovascular: Negative for chest pain   Gastrointestinal: Negative for vomiting, diarrhea   Genitourinary: Negative for hematuria   Musculoskeletal: Negative for arthralgias   Skin: Negative for rash   Neurological: Negative for syncope    Hematological:

## 2020-06-19 NOTE — PLAN OF CARE
Problem: Depressive Behavior With or Without Suicide Precautions:  Goal: Able to verbalize support systems  Description: Able to verbalize support systems  Outcome: Ongoing Patient answered assessment questions about her safety prior to admission and progression of how she was not  walking and staying in bed at home. Patient cooperative and did seem anxious about her sisters boyfriend prior to her sisters baby being born. See OT note per Fermin Baldwin for today. Problem: Skin Integrity:  Goal: Absence of new skin breakdown  Description: Absence of new skin breakdown  Outcome: Ongoing  Physical therapist reported patient has a small open area on dorsal side of right foot. Patient is to wear her boots once per shift for an hour. Patient needs assist (x 2) for transfers. Problem: Nutrition  Goal: Optimal nutrition therapy  6/19/2020 1933 by Javed Perez RN  Outcome: Ongoing:  Patient orders were re-done per dietician so her low calorie and high protein shakes will come at all three meals. Patient was able to swallow her large pill today. She had no difficulty with eating and drinking at meals.

## 2020-06-20 LAB
ALBUMIN SERPL-MCNC: 2.7 G/DL (ref 3.4–5)
ALP BLD-CCNC: 177 U/L (ref 40–129)
ALT SERPL-CCNC: 78 U/L (ref 10–40)
AST SERPL-CCNC: 70 U/L (ref 15–37)
BILIRUB SERPL-MCNC: 0.3 MG/DL (ref 0–1)
BILIRUBIN DIRECT: <0.2 MG/DL (ref 0–0.3)
BILIRUBIN, INDIRECT: ABNORMAL MG/DL (ref 0–1)
TOTAL PROTEIN: 4.8 G/DL (ref 6.4–8.2)

## 2020-06-20 PROCEDURE — 6370000000 HC RX 637 (ALT 250 FOR IP): Performed by: PHYSICIAN ASSISTANT

## 2020-06-20 PROCEDURE — 1240000000 HC EMOTIONAL WELLNESS R&B

## 2020-06-20 PROCEDURE — 80076 HEPATIC FUNCTION PANEL: CPT

## 2020-06-20 PROCEDURE — 36415 COLL VENOUS BLD VENIPUNCTURE: CPT

## 2020-06-20 PROCEDURE — 6370000000 HC RX 637 (ALT 250 FOR IP): Performed by: INTERNAL MEDICINE

## 2020-06-20 PROCEDURE — 99233 SBSQ HOSP IP/OBS HIGH 50: CPT | Performed by: PSYCHIATRY & NEUROLOGY

## 2020-06-20 RX ADMIN — DICYCLOMINE HYDROCHLORIDE 10 MG: 10 CAPSULE ORAL at 07:04

## 2020-06-20 RX ADMIN — Medication 400 MG: at 20:20

## 2020-06-20 RX ADMIN — FOLIC ACID 1 MG: 1 TABLET ORAL at 08:34

## 2020-06-20 RX ADMIN — METOPROLOL TARTRATE 12.5 MG: 25 TABLET, FILM COATED ORAL at 20:20

## 2020-06-20 RX ADMIN — Medication 1 CAPSULE: at 08:34

## 2020-06-20 RX ADMIN — LEVOTHYROXINE SODIUM 50 MCG: 50 TABLET ORAL at 07:04

## 2020-06-20 RX ADMIN — METOPROLOL TARTRATE 12.5 MG: 25 TABLET, FILM COATED ORAL at 08:34

## 2020-06-20 RX ADMIN — Medication 400 MG: at 09:16

## 2020-06-20 RX ADMIN — DICYCLOMINE HYDROCHLORIDE 10 MG: 10 CAPSULE ORAL at 18:05

## 2020-06-20 RX ADMIN — PAROXETINE HYDROCHLORIDE 10 MG: 20 TABLET, FILM COATED ORAL at 20:20

## 2020-06-20 ASSESSMENT — PAIN SCALES - GENERAL
PAINLEVEL_OUTOF10: 0

## 2020-06-20 NOTE — PLAN OF CARE
Problem: Depressive Behavior With or Without Suicide Precautions:  Goal: Able to verbalize and/or display a decrease in depressive symptoms  Description: Able to verbalize and/or display a decrease in depressive symptoms  Outcome: Ongoing   Cindy Parsons has been out in the day room this shift. Patient attended group. Medication compliant. Assisted to bedside commode x 1. Boots applied for one hour from 2200 to 2300.  Denies current SI/HI, denies A/V/H.

## 2020-06-20 NOTE — GROUP NOTE
Group Therapy Note    Date: 6/20/2020    Group Start Time: 1000  Group End Time: 5178  Group Topic: Psychoeducation    [address] [facility]        Group Therapy Note    Attendees: 8       Patient's Goal:  Patient will increase feelings of gratitude and learn how to practice it regularly    Notes:  Patient was provided with handouts about the benefits of practicing gratitude, how to use a gratitude journal, andhow to practice acts of kindness. Patient identified how using a gratitude journal could benefit her and improve her mood. Status After Intervention:  Improved    Participation Level:  Active Listener    Participation Quality: Appropriate and Attentive      Speech:  normal      Thought Process/Content: Logical      Affective Functioning: Congruent      Mood: depressed      Level of consciousness:  Alert, Oriented x4 and Attentive      Response to Learning: Able to verbalize current knowledge/experience, Capable of insight and Progressing to goal      Endings: None Reported    Modes of Intervention: Education      Discipline Responsible: /Counselor      Signature:  REBECA Thakur

## 2020-06-21 LAB
ESTIMATED AVERAGE GLUCOSE: 102.5 MG/DL
HBA1C MFR BLD: 5.2 %

## 2020-06-21 PROCEDURE — 6370000000 HC RX 637 (ALT 250 FOR IP): Performed by: INTERNAL MEDICINE

## 2020-06-21 PROCEDURE — 1240000000 HC EMOTIONAL WELLNESS R&B

## 2020-06-21 PROCEDURE — 6370000000 HC RX 637 (ALT 250 FOR IP): Performed by: PHYSICIAN ASSISTANT

## 2020-06-21 RX ADMIN — Medication 400 MG: at 23:59

## 2020-06-21 RX ADMIN — LEVOTHYROXINE SODIUM 50 MCG: 50 TABLET ORAL at 06:46

## 2020-06-21 RX ADMIN — FOLIC ACID 1 MG: 1 TABLET ORAL at 08:33

## 2020-06-21 RX ADMIN — METOPROLOL TARTRATE 12.5 MG: 25 TABLET, FILM COATED ORAL at 08:33

## 2020-06-21 RX ADMIN — PAROXETINE HYDROCHLORIDE 10 MG: 20 TABLET, FILM COATED ORAL at 20:55

## 2020-06-21 RX ADMIN — Medication 400 MG: at 08:37

## 2020-06-21 RX ADMIN — Medication 1 CAPSULE: at 08:33

## 2020-06-21 RX ADMIN — DICYCLOMINE HYDROCHLORIDE 10 MG: 10 CAPSULE ORAL at 06:46

## 2020-06-21 RX ADMIN — DICYCLOMINE HYDROCHLORIDE 10 MG: 10 CAPSULE ORAL at 16:25

## 2020-06-21 ASSESSMENT — PAIN SCALES - GENERAL
PAINLEVEL_OUTOF10: 0

## 2020-06-21 NOTE — PLAN OF CARE
Pt A&O, assessment of pt's skin during morning care, skin clean and dry no redness noted, blanchable. Pt denies SI, HI, and AVH, no distress noted calm and cooperative with care. Attends community meeting and groups, social with select peers.

## 2020-06-21 NOTE — GROUP NOTE
Group Therapy Note    Date: 6/21/2020    Group Start Time: 1350  Group End Time: 1430  Group Topic: Psychoeducation    [address] [facility]        Group Therapy Note    Attendees: 11         Patient's Goal:  Patients were invited to participate in a Psycho-Education / Art Therapy group on centering and grounding. Patients were encouraged to create a mandala and use it to reflect on something they wanted to add to or let go of in their lives. While working, patients were invited to listen to relaxing, group appropriate music. Notes:  Aleena Frost appeared attentive to information given by therapist on mandalas and demonstrated calm focus while engaging in art making. Status After Intervention:  Unchanged    Participation Level:  Active Listener and Interactive    Participation Quality: Appropriate, Attentive and Sharing      Speech:  hesitant      Thought Process/Content: Linear      Affective Functioning: Flat      Mood: anxious and depressed      Level of consciousness:  Alert, Oriented x4 and Attentive      Response to Learning: Able to verbalize current knowledge/experience, Able to verbalize/acknowledge new learning, Able to retain information and Capable of insight      Endings: None Reported    Modes of Intervention: Education, Support, Socialization, Exploration, Activity and Media      Discipline Responsible: Psychoeducational Specialist      Signature:  Nancy Garcia, [address] [facility]

## 2020-06-21 NOTE — PLAN OF CARE
Pt has been visible on unit, quietly social. Brighter per staff that was here last night. Pleasant. Animated. Denied SI/HI. Denied hallucinations, paranoia. Helped with toileting, use of Jefry Rashid Steady. Had soft BM. No complaints. Did not want to wear boots tonight. No complaints. Hopes to have physical therapy today.

## 2020-06-21 NOTE — PROGRESS NOTES
Department of Psychiatry  Attending Progress Note  Chief Complaint: follow-up depression  Patient's chart was reviewed and collaborated with  about the treatment plan. SUBJECTIVE:    Patient is feeling better. Suicidal ideation:  denies suicidal ideation. Patient does not have medication side effects. Pt stated that she is feeling good. We talked about her dogs and her niece and she enjoys taking care of her 1.4 y/o niece. Pt stated that dad drives a truck and he is away and she stays with sister and reports that she has a good relationship. Pt denies any si and any sx's of depression. Pt reports physical status is improving    ROS: Patient has new complaints: no  Sleeping adequately:  Yes   Appetite adequate: Yes  Attending groups: Yes  Visitors:No    OBJECTIVE    Physical  VITALS:  /62   Pulse 121   Temp 97.8 °F (36.6 °C) (Oral)   Resp 16   Ht 5' 4.5\" (1.638 m)   Wt 155 lb 1 oz (70.3 kg) Comment: actual weight; obtained on PCU on 6/17/20  SpO2 96%   BMI 26.21 kg/m²     Mental Status Examination:  Patients appearance was well-appearing, hospital attire, in chair, fair grooming and fair hygiene. Thoughts are Logical. Homicidal ideations none. No abnormal movements, tics or mannerisms. Memory intact Aims 0. Concentration Good. Alert and oriented X 4. Insight and Judgement fair. Patient was cooperative. Patient gait in wheelchair due to weakness.  Mood ok, affect reactive when talking about family Hallucinations Absent, suicidal ideations no specific plan to harm self Speech fluent and spontaneous    Data  Labs:   Admission on 06/18/2020   Component Date Value Ref Range Status    Hemoglobin A1C 06/19/2020 5.2  See comment % Final    Comment: Comment:  Diagnosis of Diabetes: > or = 6.5%  Increased risk of diabetes (Prediabetes): 5.7-6.4%  Glycemic Control: Nonpregnant Adults: <7.0%                    Pregnant: <6.0%        eAG 06/19/2020 102.5  mg/dL Final    TSH 06/19/2020 33.24* 0.27 - 4.20 uIU/mL Final    Cholesterol, Total 06/19/2020 120  0 - 199 mg/dL Final    Triglycerides 06/19/2020 79  0 - 150 mg/dL Final    HDL 06/19/2020 27* 40 - 60 mg/dL Final    LDL Calculated 06/19/2020 77  <100 mg/dL Final    VLDL Cholesterol Calculated 06/19/2020 16  Not Established mg/dL Final    Sodium 06/19/2020 135* 136 - 145 mmol/L Final    Potassium 06/19/2020 3.4* 3.5 - 5.1 mmol/L Final    Chloride 06/19/2020 100  99 - 110 mmol/L Final    CO2 06/19/2020 25  21 - 32 mmol/L Final    Anion Gap 06/19/2020 10  3 - 16 Final    Glucose 06/19/2020 92  70 - 99 mg/dL Final    BUN 06/19/2020 4* 7 - 20 mg/dL Final    CREATININE 06/19/2020 <0.5* 0.6 - 1.1 mg/dL Final    GFR Non- 06/19/2020 >60  >60 Final    Comment: >60 mL/min/1.73m2 EGFR, calc. for ages 25 and older using the  MDRD formula (not corrected for weight), is valid for stable  renal function.  GFR  06/19/2020 >60  >60 Final    Comment: Chronic Kidney Disease: less than 60 ml/min/1.73 sq.m. Kidney Failure: less than 15 ml/min/1.73 sq.m. Results valid for patients 18 years and older.       Calcium 06/19/2020 8.4  8.3 - 10.6 mg/dL Final    Total Protein 06/19/2020 5.0* 6.4 - 8.2 g/dL Final    Alb 06/19/2020 2.7* 3.4 - 5.0 g/dL Final    Albumin/Globulin Ratio 06/19/2020 1.2  1.1 - 2.2 Final    Total Bilirubin 06/19/2020 0.3  0.0 - 1.0 mg/dL Final    Alkaline Phosphatase 06/19/2020 225* 40 - 129 U/L Final    ALT 06/19/2020 96* 10 - 40 U/L Final    AST 06/19/2020 131* 15 - 37 U/L Final    Globulin 06/19/2020 2.3  g/dL Final    WBC 06/19/2020 5.1  4.0 - 11.0 K/uL Final    RBC 06/19/2020 2.58* 4.00 - 5.20 M/uL Final    Hemoglobin 06/19/2020 8.5* 12.0 - 16.0 g/dL Final    Hematocrit 06/19/2020 25.8* 36.0 - 48.0 % Final    MCV 06/19/2020 99.7  80.0 - 100.0 fL Final    MCH 06/19/2020 33.0  26.0 - 34.0 pg Final    MCHC 06/19/2020 33.1  31.0 - 36.0 g/dL Final    RDW 06/19/2020 20.0* 12.4 -

## 2020-06-22 PROBLEM — R74.01 TRANSAMINITIS: Status: ACTIVE | Noted: 2020-06-22

## 2020-06-22 PROBLEM — D64.9 ANEMIA: Status: ACTIVE | Noted: 2020-06-22

## 2020-06-22 LAB — TSH RECEPTOR AB: <0.9 IU/L

## 2020-06-22 PROCEDURE — 6370000000 HC RX 637 (ALT 250 FOR IP): Performed by: PSYCHIATRY & NEUROLOGY

## 2020-06-22 PROCEDURE — 6370000000 HC RX 637 (ALT 250 FOR IP): Performed by: PHYSICIAN ASSISTANT

## 2020-06-22 PROCEDURE — 97530 THERAPEUTIC ACTIVITIES: CPT

## 2020-06-22 PROCEDURE — 97116 GAIT TRAINING THERAPY: CPT

## 2020-06-22 PROCEDURE — 99233 SBSQ HOSP IP/OBS HIGH 50: CPT | Performed by: PSYCHIATRY & NEUROLOGY

## 2020-06-22 PROCEDURE — 6370000000 HC RX 637 (ALT 250 FOR IP): Performed by: INTERNAL MEDICINE

## 2020-06-22 PROCEDURE — 1240000000 HC EMOTIONAL WELLNESS R&B

## 2020-06-22 RX ORDER — TRAZODONE HYDROCHLORIDE 50 MG/1
50 TABLET ORAL NIGHTLY PRN
Status: DISCONTINUED | OUTPATIENT
Start: 2020-06-22 | End: 2020-06-24 | Stop reason: HOSPADM

## 2020-06-22 RX ORDER — PAROXETINE HYDROCHLORIDE 20 MG/1
20 TABLET, FILM COATED ORAL NIGHTLY
Status: DISCONTINUED | OUTPATIENT
Start: 2020-06-22 | End: 2020-06-24 | Stop reason: HOSPADM

## 2020-06-22 RX ORDER — IBUPROFEN 400 MG/1
400 TABLET ORAL EVERY 6 HOURS PRN
Status: DISCONTINUED | OUTPATIENT
Start: 2020-06-22 | End: 2020-06-23

## 2020-06-22 RX ORDER — HYDROXYZINE PAMOATE 25 MG/1
25 CAPSULE ORAL 3 TIMES DAILY PRN
Status: DISCONTINUED | OUTPATIENT
Start: 2020-06-22 | End: 2020-06-24 | Stop reason: HOSPADM

## 2020-06-22 RX ADMIN — DICYCLOMINE HYDROCHLORIDE 10 MG: 10 CAPSULE ORAL at 16:06

## 2020-06-22 RX ADMIN — METOPROLOL TARTRATE 12.5 MG: 25 TABLET, FILM COATED ORAL at 08:23

## 2020-06-22 RX ADMIN — Medication 400 MG: at 08:24

## 2020-06-22 RX ADMIN — PAROXETINE HYDROCHLORIDE 20 MG: 20 TABLET, FILM COATED ORAL at 21:25

## 2020-06-22 RX ADMIN — DICYCLOMINE HYDROCHLORIDE 10 MG: 10 CAPSULE ORAL at 08:26

## 2020-06-22 RX ADMIN — LEVOTHYROXINE SODIUM 50 MCG: 50 TABLET ORAL at 08:26

## 2020-06-22 RX ADMIN — Medication 1 CAPSULE: at 08:24

## 2020-06-22 RX ADMIN — Medication 400 MG: at 21:25

## 2020-06-22 RX ADMIN — FOLIC ACID 1 MG: 1 TABLET ORAL at 08:24

## 2020-06-22 RX ADMIN — METOPROLOL TARTRATE 12.5 MG: 25 TABLET, FILM COATED ORAL at 21:25

## 2020-06-22 ASSESSMENT — PAIN SCALES - GENERAL
PAINLEVEL_OUTOF10: 0

## 2020-06-22 NOTE — BH NOTE
Group Therapy Note    Date: 6/21/2020  Start Time: 20:00  End Time:  21:00  Number of Participants: 12    Type of Group: Recreational  Wrap up    Polo Soto Information  Module Name:  /  Session Number:  /    Patient's Goal:  Work 0n strength in legs    Notes:  continuing to work on goal    Status After Intervention:  Unchanged    Participation Level:  Active Listener and Interactive    Participation Quality: Appropriate, Attentive and Sharing      Speech:  normal      Thought Process/Content: Logical      Affective Functioning: Blunted      Mood: anxious      Level of consciousness:  Alert and Attentive      Response to Learning: Able to change behavior      Endings: None Reported    Modes of Intervention: Socialization and Problem-solving      Discipline Responsible: Behavorial Health Tech      Signature:  Nomi Cortes

## 2020-06-22 NOTE — PLAN OF CARE
Pt A&O, visible on unit attends group and community meeting social with select peers. Pt pleasant and cooperative with care denies SI, HI, and AVH, no distress noted will continue to monitor.

## 2020-06-22 NOTE — GROUP NOTE
Group Therapy Note    Date: 6/22/2020    Group Start Time: 3444  Group End Time: 3462  Group Topic: Healthy Living/Wellness    [address] [facility]    Group Therapy Note    Attendees: 2    In lieu of visitation being cancelled due to COVID-19, patients were permitted to use their personal device to connect with their support system over FaceTime with staff supervision. Notes:  Pt was engaged in [address] with sister and father. Pt's niece seemed to be crying loudly inconsolably throughout their 30 minute conversation with pt. Pt was quiet and spoke minimally throughout conversation.     Status After Intervention:  Unchanged    Participation Level: Minimal    Participation Quality: Appropriate      Speech:  hesitant      Thought Process/Content: JESSICA      Affective Functioning: Flat and Constricted/Restricted      Mood: anxious and depressed      Level of consciousness:  Alert      Response to Learning: Progressing to goal        Endings: None Reported    Modes of Intervention: Support, Socialization, Exploration and Clarifying      Discipline Responsible: /Counselor      Signature:  JAISON Yañez, R-JOSE ENRIQUE

## 2020-06-22 NOTE — PROGRESS NOTES
Inpatient Behavior Health Physical Therapy Daily Treatment Note    Unit:  [facility]  Date:  6/22/2020  Patient Name:    Jaci Galo  Admitting diagnosis:  Mood disorder [facility]) Dave Harp Date:  6/18/2020  Precautions/Restrictions:    Standard BHI Precautions  Fall Risk  History of current Episode: (Per Dr. Ledy Justice H&P on 6/11/20) The patient is [phone] y.o. female with PMH below, presents with diffuse numbness, generalized weakness, inability to ambulate, light headedness, difficulty swallowing, possible syncope, unplanned weight loss. While she is alert and oriented, she is not a very good historian. Lainey Uribe of her answers are vague and non-specific.  Reportedly EMS was summoned to home where she says she lives with several family members they were reportedly asked to wait outside while occupants of the house carried the pt out to them. Marlyn Born sister reported to me by phone that she had a GI illness in January and has not been eating very well since that time. She has been spending increasing amount of time in bed recently.    When the pt is asked why she isn't eating she says she \"can't swallow. \"  When asked why she says \"things will not go down. \"  She denies globus/pain.  She denies bulimia or trying to lose weight. Amanuel Franks has reportedly lost ~50# in last 6 mos. Amanuel Franks denies any drug use, no OTC's, no Rx's. Amanuel Franks says she has felt numb all over. Amanuel Franks says she has been unable to walk bc she is too weak.  She says that when she was being brought to the door to she thinks she might have \"passed out. \" Gildardo Dearth, she was still in bed at the time and did not fall.  She also reports that she has been lightheaded. She reports a family hx of thyroid problems and DM.  6/12 - Psych consult, plan for pt to transfer to [facility] once medically stable  6/17 - pt refusing [facility] as parent does not want pt to be without her phone, pt now considering rehab  6/18: Patient was transferred to [facility] from PCU.       Discharge Recommendations from 40 minutes    Signature: Dionne Gtz, PT on 6/22/20 at 4:46 PM EDT    If patient discharges from this facility prior to next visit, this note will serve as the Discharge Summary.

## 2020-06-23 LAB
A/G RATIO: 1.2 (ref 1.1–2.2)
ALBUMIN SERPL-MCNC: 2.7 G/DL (ref 3.4–5)
ALP BLD-CCNC: 101 U/L (ref 40–129)
ALT SERPL-CCNC: 46 U/L (ref 10–40)
ANION GAP SERPL CALCULATED.3IONS-SCNC: 10 MMOL/L (ref 3–16)
AST SERPL-CCNC: 36 U/L (ref 15–37)
BASOPHILS ABSOLUTE: 0 K/UL (ref 0–0.2)
BASOPHILS RELATIVE PERCENT: 0.5 %
BILIRUB SERPL-MCNC: 0.4 MG/DL (ref 0–1)
BUN BLDV-MCNC: 6 MG/DL (ref 7–20)
CALCIUM SERPL-MCNC: 8.3 MG/DL (ref 8.3–10.6)
CHLORIDE BLD-SCNC: 103 MMOL/L (ref 99–110)
CO2: 27 MMOL/L (ref 21–32)
CREAT SERPL-MCNC: <0.5 MG/DL (ref 0.6–1.1)
EOSINOPHILS ABSOLUTE: 0.1 K/UL (ref 0–0.6)
EOSINOPHILS RELATIVE PERCENT: 1.2 %
GFR AFRICAN AMERICAN: >60
GFR NON-AFRICAN AMERICAN: >60
GLOBULIN: 2.2 G/DL
GLUCOSE BLD-MCNC: 97 MG/DL (ref 70–99)
HCT VFR BLD CALC: 25 % (ref 36–48)
HEMOGLOBIN: 8.2 G/DL (ref 12–16)
LYMPHOCYTES ABSOLUTE: 1.9 K/UL (ref 1–5.1)
LYMPHOCYTES RELATIVE PERCENT: 31.3 %
MAGNESIUM: 1.9 MG/DL (ref 1.8–2.4)
MCH RBC QN AUTO: 33.8 PG (ref 26–34)
MCHC RBC AUTO-ENTMCNC: 33 G/DL (ref 31–36)
MCV RBC AUTO: 102.4 FL (ref 80–100)
MONOCYTES ABSOLUTE: 0.6 K/UL (ref 0–1.3)
MONOCYTES RELATIVE PERCENT: 9.2 %
NEUTROPHILS ABSOLUTE: 3.5 K/UL (ref 1.7–7.7)
NEUTROPHILS RELATIVE PERCENT: 57.8 %
PDW BLD-RTO: 19.3 % (ref 12.4–15.4)
PLATELET # BLD: 719 K/UL (ref 135–450)
PMV BLD AUTO: 6.4 FL (ref 5–10.5)
POTASSIUM SERPL-SCNC: 3.5 MMOL/L (ref 3.5–5.1)
RBC # BLD: 2.44 M/UL (ref 4–5.2)
SODIUM BLD-SCNC: 140 MMOL/L (ref 136–145)
TOTAL PROTEIN: 4.9 G/DL (ref 6.4–8.2)
TSH SERPL DL<=0.05 MIU/L-ACNC: 7.26 UIU/ML (ref 0.27–4.2)
WBC # BLD: 6.1 K/UL (ref 4–11)

## 2020-06-23 PROCEDURE — 85025 COMPLETE CBC W/AUTO DIFF WBC: CPT

## 2020-06-23 PROCEDURE — 6370000000 HC RX 637 (ALT 250 FOR IP): Performed by: PSYCHIATRY & NEUROLOGY

## 2020-06-23 PROCEDURE — 97116 GAIT TRAINING THERAPY: CPT

## 2020-06-23 PROCEDURE — 36415 COLL VENOUS BLD VENIPUNCTURE: CPT

## 2020-06-23 PROCEDURE — 1240000000 HC EMOTIONAL WELLNESS R&B

## 2020-06-23 PROCEDURE — 97530 THERAPEUTIC ACTIVITIES: CPT

## 2020-06-23 PROCEDURE — 99233 SBSQ HOSP IP/OBS HIGH 50: CPT | Performed by: PSYCHIATRY & NEUROLOGY

## 2020-06-23 PROCEDURE — 80053 COMPREHEN METABOLIC PANEL: CPT

## 2020-06-23 PROCEDURE — 6370000000 HC RX 637 (ALT 250 FOR IP): Performed by: INTERNAL MEDICINE

## 2020-06-23 PROCEDURE — 97535 SELF CARE MNGMENT TRAINING: CPT

## 2020-06-23 PROCEDURE — 83735 ASSAY OF MAGNESIUM: CPT

## 2020-06-23 PROCEDURE — 84443 ASSAY THYROID STIM HORMONE: CPT

## 2020-06-23 PROCEDURE — 6370000000 HC RX 637 (ALT 250 FOR IP): Performed by: PHYSICIAN ASSISTANT

## 2020-06-23 RX ORDER — IBUPROFEN 400 MG/1
400 TABLET ORAL EVERY 6 HOURS PRN
Status: DISCONTINUED | OUTPATIENT
Start: 2020-06-23 | End: 2020-06-24 | Stop reason: HOSPADM

## 2020-06-23 RX ADMIN — Medication 400 MG: at 21:05

## 2020-06-23 RX ADMIN — METOPROLOL TARTRATE 12.5 MG: 25 TABLET, FILM COATED ORAL at 08:46

## 2020-06-23 RX ADMIN — PAROXETINE HYDROCHLORIDE 20 MG: 20 TABLET, FILM COATED ORAL at 21:06

## 2020-06-23 RX ADMIN — DICYCLOMINE HYDROCHLORIDE 10 MG: 10 CAPSULE ORAL at 06:57

## 2020-06-23 RX ADMIN — Medication 400 MG: at 08:47

## 2020-06-23 RX ADMIN — FOLIC ACID 1 MG: 1 TABLET ORAL at 08:47

## 2020-06-23 RX ADMIN — LEVOTHYROXINE SODIUM 50 MCG: 50 TABLET ORAL at 06:57

## 2020-06-23 RX ADMIN — Medication 1 CAPSULE: at 08:47

## 2020-06-23 RX ADMIN — DICYCLOMINE HYDROCHLORIDE 10 MG: 10 CAPSULE ORAL at 18:27

## 2020-06-23 ASSESSMENT — PAIN SCALES - GENERAL: PAINLEVEL_OUTOF10: 0

## 2020-06-23 NOTE — PROGRESS NOTES
Department of Psychiatry  Progress Note    Patient's chart was reviewed. Discussed with treatment team. Met with patient. SUBJECTIVE:  Subjective improvement - she reports feeling better - less depressed, improving appetite and sleep, and less tearful. SI has resolved. Objective improvement - more reactive affect, more curious and communicative about her condition, and now asks question about her careplan. When asked about being here, she says it has helped and wants to be here for treatment. Once transferred to our unit, she agreed to stay on a voluntary basis and so a hold was not initiated after the Statement of Belief. Tolerating Paxil well and without side effects. Agreed to increase dose starting tonight. ROS:   Patient has new complaints: no  Sleeping adequately:  Yes   Appetite adequate: Yes  Engaged in programming: Yes    OBJECTIVE:  VITALS:  BP (!) 90/54   Pulse 117   Temp 98.2 °F (36.8 °C) (Oral)   Resp 16   Ht 5' 4.5\" (1.638 m)   Wt 155 lb 1 oz (70.3 kg) Comment: actual weight; obtained on PCU on 6/17/20  SpO2 98%   BMI 26.21 kg/m²     Mental Status Examination:    Appearance: in gown, fair hygiene  Behavior/Attitude toward examiner:  Ok EC  Speech:  Paucity   Mood:  \"better\"  Affect: more reactive  Thought processes:  Goal directable  Thought Content: no SI, no HI  Perceptions: no AVH, not RTIS  Attention: fair   Abstraction: concrete  Cognition: fair  Insight: impaired  Judgment: improving    Medication:  Scheduled:   PARoxetine  20 mg Oral Nightly    levothyroxine  50 mcg Oral Daily    magnesium oxide  400 mg Oral BID    dicyclomine  10 mg Oral BID AC    folic acid  1 mg Oral Daily    lactobacillus  1 capsule Oral Daily with breakfast    metoprolol tartrate  12.5 mg Oral BID    [START ON 6/24/2020] vitamin D  50,000 Units Oral Weekly        PRN:  hydrOXYzine, ibuprofen, traZODone, magnesium hydroxide, aluminum & magnesium hydroxide-simethicone     AFormulation:   This

## 2020-06-23 NOTE — PROGRESS NOTES
wheelchair to RW or grab bar   Stand to sit:  Min A  Wheelchair <> chair:  NT  Functional mobility:  Mod A with RW to walk short distances and turns in bathroom. Patient with forward posture and requires cues for upright posture. Activity Tolerance   Pt completed therapy session with fatigue noted with activity. ADL Training: Bathing took place while seated on tub transfer bench using Handheld shower. Upper body dressing:  Setup to don t-shirt. Pt's \"clean\" t-shirt smelled and pt was encouraged to wear a gown instead. She declined. Upper body bathing:  Supervision/SBA to hold HH shower head  Lower body dressing:  Min A to pull up pants completely in back and for support in standing. Lower body bathing:  SBA for HH shower use. Pt sat to wash anselmo area-verbal cues needed for thoroughness. Toileting:  Min A for steadying to pull pants up/down. Pt wiped buttocks in seated position. Grooming/Hygiene:  Supervision/setup for brushing her hair    Therapeutic Exercise:   Completed through participation in wheelchair propulsion with DORYS Zapata. Patient Education:   Role of OT  Recommendations for DC  Safe RW use/hand placement   ADL retraining, transfer technique (feet placement)  Participating in shower with staff using shower chair. Positioning Needs:   Sitting in community room for group session    Family Present:  No    Assessment: Patient demonstrated significant improvements with ADLs, mobility and activity tolerance. Patient with brighter affect and asking questions appropriately. Patient will benefit from intensive inpatient rehabilitation to improve independence and return to baseline. GOALS  To be met in 3 Visits:  1). Bed to toilet: Mod A (Mod A w/c to toilet 6/23/20-continue)  2). Pt to participate in conversation based upon healthy coping skills.      To be met in 5 Visits:  1). Supine to/from Sit:             Supervision with HOB flat and no hand rails   2).  Upper Body Bathing:         Min

## 2020-06-23 NOTE — PROGRESS NOTES
breaks  Extremities utilized: R UE and L UE  Surface: even-level     Toileting  Sit>stand from toilet (standard height):  gait belt and CINDY STEDY  Stand>sit to toilet (standard height):   gait belt and CINDY STEDY  Aretha-care:      independent for front pericare in sitting, Max A for posterior pericare in standing  Hand hygiene:     N/A  LE pants management:    Min A sitting on commode  Cues: Therapeutic Exercises   Exercises in BOLD performed in unit today. Items not bolded are carried forward from prior visits for continuity of the record. Exercise/Equipment Resistance/Repetitions Other comments     Strengthening exercises       LAQ x10 Reviewed verbally for HEP   Marching (unsupported sitting) x10 Cued on upright posture during exericses, Reviewed verbally for HEP   Ankle pumps x10 Reviewed verbally for HEP   Toe scruches x10  For B LE swelling, Reviewed verbally for HEP                       Neuromuscular re-ed - balance exercises                               Balance  Static Sitting:  Good -  (forward head and rounded shoulder posture, able to correct with cues)  Dynamic Sitting:  Good -   Static Standing: Fair +  Dynamic Standing: Fair   See above neuromuscular re-ed exercises    Patient Education       Educated on importance of continued strengthening to facilitate functional return  Educated on importance of continued activity   Educated on safety with transfers  Educated on proper gait pattern and RW distance  Educated on proper positioning and posture with functional mobility  Educated on role of PT, POC as well as importance of continued activity  Educated on need to continue to hard multi-podus boots for ankle ROM outside of therapy time (1 hour in AM and 1 hour in PM) - reviewed with tech to follow-up with patient    Positioning Needs       Pt in wc and in community room, no alarm needed, positioned in proper neutral alignment and pressure relief provided. Needs within reach.   Within line of sight of nursing staff. Response to Treatment and Activity Tolerance  Pt tolerated treatment well  Pt limited by fatigue  No adverse symptoms noted w/activity    Assessment   Patient continues to improve with tolerance to activity and her motivation to participate in therapy also is improving. Pt is more actively engaged with therapy. Pt required cues for posture with ambulation but demonstrated improved endurance with maintaining posture. Recommending ARU/IRF/Inpatient Rehab Facility upon discharge as patient functioning well below baseline, demonstrates good rehab potential and unable to return home due to inability to negotiate stairs to enter home/bedroom/bathroom, burden of care beyond caregiver ability and home environment not conducive to patient recovery. Goals (all goals ongoing unless otherwise indicated)  To be met in 3 visits:  1). Demonstrate improved safety awareness with functional mobility requiring less overall cueing.      To be met in 6 visits:  1). Supine to/from sit  SBA to promote return to functional ADLs  2). Sit to/from stand   Mod A to promote return to functional ADLs  3). Gait: Ambulate 10 ft.  with Max A and use of LRAD (least restrictive assistive device) demonstrating improved gait pattern - Goal met 6/22, upthgthrthathdtheth:th th4th9th ft with CGA and RW  4). Tolerate B LE exercises 3 sets of 10-15 reps to improve strength   5). Tolerated standing balance exercises with improved balance to Fair  grade   6). Patient will be able to perform wheelchair mobility independently on the unit for 400 ft on level. 7). Patient will be able to tolerate bilateral positioning ankle boots for 5 hrs without any signs of skin irritation or tear. Plan   Continue with plan of care.     Time Coded Treatment Minutes:   40 minutes    Total Treatment Time:   40 minutes    Signature: Nicole Wilson PT on 6/22/20 at 4:46 PM EDT    If patient discharges from this facility prior to next visit, this note will serve as

## 2020-06-23 NOTE — GROUP NOTE
Group Therapy Note    Date: 6/23/2020    Group Start Time: 1100  Group End Time: 5819  Group T[facility]: [address] [facility]        Group Therapy Note    Attendees: 13           Patient's Goal:  Patient will complete worksheet on People Pleasing. Will discuss how people pleasing behavior negatively impacts mental health. Notes:  Patient engaged well in group. Completed the worksheet and discussed. She talked about her tendency to feel responsible for how her family feels. Status After Intervention:  Improved    Participation Level:  Active Listener and Interactive    Participation Quality: Appropriate, Attentive, Sharing and Supportive    Speech:  normal    Thought Process/Content: Logical  Linear    Affective Functioning: Congruent    Mood: anxious and depressed    Level of consciousness:  Oriented x4    Response to Learning: Able to verbalize current knowledge/experience and Capable of insight    Endings: None Reported    Modes of Intervention: Education, Support, Socialization and Exploration    Discipline Responsible: /Counselor      Signature:  Karen Pandya, [facility]

## 2020-06-24 ENCOUNTER — HOSPITAL ENCOUNTER (INPATIENT)
Age: 20
LOS: 9 days | Discharge: HOME | DRG: 861 | End: 2020-07-03
Attending: PHYSICAL MEDICINE & REHABILITATION | Admitting: PHYSICAL MEDICINE & REHABILITATION
Payer: MEDICARE

## 2020-06-24 VITALS
HEIGHT: 65 IN | DIASTOLIC BLOOD PRESSURE: 60 MMHG | RESPIRATION RATE: 16 BRPM | BODY MASS INDEX: 25.83 KG/M2 | SYSTOLIC BLOOD PRESSURE: 110 MMHG | OXYGEN SATURATION: 98 % | TEMPERATURE: 98.7 F | HEART RATE: 133 BPM | WEIGHT: 155 LBS

## 2020-06-24 PROBLEM — R74.01 TRANSAMINITIS: Status: RESOLVED | Noted: 2020-06-22 | Resolved: 2020-06-24

## 2020-06-24 PROBLEM — R53.81 DEBILITY: Status: ACTIVE | Noted: 2020-06-24

## 2020-06-24 PROCEDURE — 1280000000 HC REHAB R&B

## 2020-06-24 PROCEDURE — 99239 HOSP IP/OBS DSCHRG MGMT >30: CPT | Performed by: PSYCHIATRY & NEUROLOGY

## 2020-06-24 PROCEDURE — 97530 THERAPEUTIC ACTIVITIES: CPT

## 2020-06-24 PROCEDURE — 6370000000 HC RX 637 (ALT 250 FOR IP): Performed by: PHYSICIAN ASSISTANT

## 2020-06-24 PROCEDURE — 97110 THERAPEUTIC EXERCISES: CPT

## 2020-06-24 PROCEDURE — 97116 GAIT TRAINING THERAPY: CPT

## 2020-06-24 PROCEDURE — 6370000000 HC RX 637 (ALT 250 FOR IP): Performed by: PHYSICAL MEDICINE & REHABILITATION

## 2020-06-24 PROCEDURE — 6370000000 HC RX 637 (ALT 250 FOR IP): Performed by: INTERNAL MEDICINE

## 2020-06-24 PROCEDURE — 6370000000 HC RX 637 (ALT 250 FOR IP): Performed by: PSYCHIATRY & NEUROLOGY

## 2020-06-24 RX ORDER — TRAZODONE HYDROCHLORIDE 50 MG/1
50 TABLET ORAL NIGHTLY PRN
Status: CANCELLED | OUTPATIENT
Start: 2020-06-24

## 2020-06-24 RX ORDER — MAGNESIUM HYDROXIDE/ALUMINUM HYDROXICE/SIMETHICONE 120; 1200; 1200 MG/30ML; MG/30ML; MG/30ML
30 SUSPENSION ORAL EVERY 6 HOURS PRN
Status: DISCONTINUED | OUTPATIENT
Start: 2020-06-24 | End: 2020-07-03 | Stop reason: HOSPADM

## 2020-06-24 RX ORDER — LANOLIN ALCOHOL/MO/W.PET/CERES
400 CREAM (GRAM) TOPICAL 2 TIMES DAILY
Status: CANCELLED | OUTPATIENT
Start: 2020-06-24

## 2020-06-24 RX ORDER — FOLIC ACID 1 MG/1
1 TABLET ORAL DAILY
Status: CANCELLED | OUTPATIENT
Start: 2020-06-25

## 2020-06-24 RX ORDER — LEVOTHYROXINE SODIUM 0.05 MG/1
50 TABLET ORAL DAILY
Qty: 30 TABLET | Refills: 0 | Status: ON HOLD | OUTPATIENT
Start: 2020-06-25 | End: 2020-06-24

## 2020-06-24 RX ORDER — ACETAMINOPHEN 325 MG/1
650 TABLET ORAL EVERY 4 HOURS PRN
Status: DISCONTINUED | OUTPATIENT
Start: 2020-06-24 | End: 2020-07-03 | Stop reason: HOSPADM

## 2020-06-24 RX ORDER — HYDROXYZINE PAMOATE 25 MG/1
25 CAPSULE ORAL 3 TIMES DAILY PRN
Status: DISCONTINUED | OUTPATIENT
Start: 2020-06-24 | End: 2020-07-03 | Stop reason: HOSPADM

## 2020-06-24 RX ORDER — ERGOCALCIFEROL 1.25 MG/1
50000 CAPSULE ORAL WEEKLY
Status: DISCONTINUED | OUTPATIENT
Start: 2020-07-01 | End: 2020-07-03 | Stop reason: HOSPADM

## 2020-06-24 RX ORDER — DICYCLOMINE HYDROCHLORIDE 10 MG/1
10 CAPSULE ORAL
Status: DISCONTINUED | OUTPATIENT
Start: 2020-06-25 | End: 2020-07-03 | Stop reason: HOSPADM

## 2020-06-24 RX ORDER — ERGOCALCIFEROL 1.25 MG/1
50000 CAPSULE ORAL WEEKLY
Status: CANCELLED | OUTPATIENT
Start: 2020-07-01

## 2020-06-24 RX ORDER — LACTOBACILLUS RHAMNOSUS GG 10B CELL
1 CAPSULE ORAL
Status: DISCONTINUED | OUTPATIENT
Start: 2020-06-25 | End: 2020-07-03 | Stop reason: HOSPADM

## 2020-06-24 RX ORDER — POLYETHYLENE GLYCOL 3350 17 G/17G
17 POWDER, FOR SOLUTION ORAL DAILY PRN
Status: DISCONTINUED | OUTPATIENT
Start: 2020-06-24 | End: 2020-07-03 | Stop reason: HOSPADM

## 2020-06-24 RX ORDER — FOLIC ACID 1 MG/1
1 TABLET ORAL DAILY
Status: DISCONTINUED | OUTPATIENT
Start: 2020-06-25 | End: 2020-07-03 | Stop reason: HOSPADM

## 2020-06-24 RX ORDER — HYDROXYZINE PAMOATE 25 MG/1
25 CAPSULE ORAL 3 TIMES DAILY PRN
Status: CANCELLED | OUTPATIENT
Start: 2020-06-24

## 2020-06-24 RX ORDER — IBUPROFEN 400 MG/1
400 TABLET ORAL EVERY 6 HOURS PRN
Status: CANCELLED | OUTPATIENT
Start: 2020-06-24

## 2020-06-24 RX ORDER — PAROXETINE HYDROCHLORIDE 20 MG/1
20 TABLET, FILM COATED ORAL NIGHTLY
Status: CANCELLED | OUTPATIENT
Start: 2020-06-24

## 2020-06-24 RX ORDER — POLYETHYLENE GLYCOL 3350 17 G/17G
17 POWDER, FOR SOLUTION ORAL DAILY PRN
Status: CANCELLED | OUTPATIENT
Start: 2020-06-24

## 2020-06-24 RX ORDER — IBUPROFEN 400 MG/1
400 TABLET ORAL EVERY 6 HOURS PRN
Status: DISCONTINUED | OUTPATIENT
Start: 2020-06-24 | End: 2020-07-03 | Stop reason: HOSPADM

## 2020-06-24 RX ORDER — MAGNESIUM HYDROXIDE/ALUMINUM HYDROXICE/SIMETHICONE 120; 1200; 1200 MG/30ML; MG/30ML; MG/30ML
30 SUSPENSION ORAL EVERY 6 HOURS PRN
Status: CANCELLED | OUTPATIENT
Start: 2020-06-24

## 2020-06-24 RX ORDER — ERGOCALCIFEROL 1.25 MG/1
50000 CAPSULE ORAL WEEKLY
Qty: 4 CAPSULE | Refills: 0 | Status: ON HOLD | OUTPATIENT
Start: 2020-07-01 | End: 2020-06-24

## 2020-06-24 RX ORDER — TRAZODONE HYDROCHLORIDE 50 MG/1
50 TABLET ORAL NIGHTLY PRN
Status: DISCONTINUED | OUTPATIENT
Start: 2020-06-24 | End: 2020-07-03 | Stop reason: HOSPADM

## 2020-06-24 RX ORDER — PAROXETINE HYDROCHLORIDE 20 MG/1
20 TABLET, FILM COATED ORAL NIGHTLY
Status: DISCONTINUED | OUTPATIENT
Start: 2020-06-24 | End: 2020-07-03 | Stop reason: HOSPADM

## 2020-06-24 RX ORDER — ACETAMINOPHEN 325 MG/1
650 TABLET ORAL EVERY 4 HOURS PRN
Status: CANCELLED | OUTPATIENT
Start: 2020-06-24

## 2020-06-24 RX ORDER — DICYCLOMINE HYDROCHLORIDE 10 MG/1
10 CAPSULE ORAL
Status: CANCELLED | OUTPATIENT
Start: 2020-06-24

## 2020-06-24 RX ORDER — LEVOTHYROXINE SODIUM 0.05 MG/1
50 TABLET ORAL DAILY
Status: DISCONTINUED | OUTPATIENT
Start: 2020-06-25 | End: 2020-07-02

## 2020-06-24 RX ORDER — PAROXETINE HYDROCHLORIDE 20 MG/1
20 TABLET, FILM COATED ORAL NIGHTLY
Qty: 30 TABLET | Refills: 0 | Status: ON HOLD | OUTPATIENT
Start: 2020-06-24 | End: 2020-06-24

## 2020-06-24 RX ORDER — LACTOBACILLUS RHAMNOSUS GG 10B CELL
1 CAPSULE ORAL
Status: CANCELLED | OUTPATIENT
Start: 2020-06-25

## 2020-06-24 RX ORDER — LEVOTHYROXINE SODIUM 0.05 MG/1
50 TABLET ORAL DAILY
Status: CANCELLED | OUTPATIENT
Start: 2020-06-25

## 2020-06-24 RX ADMIN — METOPROLOL TARTRATE 12.5 MG: 25 TABLET, FILM COATED ORAL at 20:48

## 2020-06-24 RX ADMIN — FOLIC ACID 1 MG: 1 TABLET ORAL at 08:14

## 2020-06-24 RX ADMIN — LEVOTHYROXINE SODIUM 50 MCG: 50 TABLET ORAL at 07:02

## 2020-06-24 RX ADMIN — METOPROLOL TARTRATE 12.5 MG: 25 TABLET, FILM COATED ORAL at 08:13

## 2020-06-24 RX ADMIN — DICYCLOMINE HYDROCHLORIDE 10 MG: 10 CAPSULE ORAL at 07:02

## 2020-06-24 RX ADMIN — Medication 400 MG: at 08:14

## 2020-06-24 RX ADMIN — Medication 1 CAPSULE: at 08:14

## 2020-06-24 RX ADMIN — ERGOCALCIFEROL 50000 UNITS: 1.25 CAPSULE ORAL at 08:13

## 2020-06-24 RX ADMIN — MAGNESIUM GLUCONATE 500 MG ORAL TABLET 400 MG: 500 TABLET ORAL at 20:48

## 2020-06-24 RX ADMIN — PAROXETINE HYDROCHLORIDE 20 MG: 20 TABLET, FILM COATED ORAL at 20:48

## 2020-06-24 ASSESSMENT — PAIN SCALES - GENERAL
PAINLEVEL_OUTOF10: 0
PAINLEVEL_OUTOF10: 0

## 2020-06-24 NOTE — PLAN OF CARE
ARU PATIENT TREATMENT PLAN  [facility] [address] [place]   [phone]    Sree Gomer No Peed    : 2000  Acct #: [de-identified]  MRN: [id number]   PHYSICIAN:  Dimitrios Angeles DO  Primary Problem    Patient Active Problem List   Diagnosis    Obesity    Hyponatremia    Moderate malnutrition ([facility] [address]    Hypokalemia    Syncope    Right atrial mass    Dysphagia    Other specified hypothyroidism    Severe malnutrition (HCC)    Abnormal finding on echocardiogram    Tachycardia    Abnormal CT of the chest    E. coli UTI    Oral thrush    Paresthesias    Mood disorder ([address]    Anemia    Debility       Rehabilitation Diagnosis:     Debility [R53.81]       ADMIT DATE:2020    Patient Goals: To walk normally. Admitting Impairments: The patient is a 19 y.o. female with PMH below, presents with diffuse numbness, generalized weakness, inability to ambulate, light headedness, difficulty swallowing, possible syncope, unplanned weight loss resulting in Decreased functional mobility ; Decreased strength;Decreased endurance;Decreased balance  Barriers: None  Participation: Good     CARE PLAN     NURSING:  Cristina Bauer while on this unit will:     [x] Be continent of bowel and bladder     [x] Have an adequate number of bowel movements  [x] Urinate with no urinary retention >300ml in bladder  [] Complete bladder protocol with banks removal  [] Maintain O2 SATs at ___%  [x] Have pain managed while on ARU       [] Be pain free by discharge   [] Have no skin breakdown while on ARU  [x] Have improved skin integrity via wound measurements  [x] Have no signs/symptoms of infection at the wound site  [x] Be free from injury during hospitalization   [] Complete education with patient/family with understanding demonstrated for:  [x] Adjustment   [] Other:   Nursing interventions may include bowel/bladder training, education for medical assistive devices, medication education, O2 saturation management, energy conservation, stress management techniques, fall prevention, alarms protocol, seating and positioning, skin/wound care, pressure relief instruction,dressing changes,  infection protection, DVT prophylaxis, and/or assistance with in room safety with transfers to bed, toilet, wheelchair, shower as well as bathroom activities and hygiene. Patient/caregiver education for:   [x] Disease/sustained injury/management      [x] Medication Use   [] Surgical intervention   [x] Safety   [x] Body mechanics and or joint protection   [x] Health maintenance         PHYSICAL THERAPY:  Goals:                  Short term goals  Time Frame for Short term goals: 5 days  Short term goal 1: Patient will complete sit<>stand with SBA and LRAD. Short term goal 2: Patient will ambulate 150 ft with SBA and LRAD. Short term goal 3: Patient will ascend/descend 4 stairs with SBA and B HR  Short term goal 4: Patient will demonstrate tolerate 20 reps of B LE exercises. Short term goal 5: Patient will  an object from the floor with SBA and LRAD. Long term goals  Time Frame for Long term goals : 10 days  Long term goal 1: Patient will complete sit<>stand with MI and LRAD. Long term goal 2: Patient will ambulate 300 ft with MI and LRAD. Long term goal 3: Patient will ascend/descend 4 stairs with MI and B HR. Long term goal 4: Patient will be independent with HEP. Long term goal 5: Patient will  an object from the floor with MI and LRAD. These goals were reviewed with this patient at the time of assessment and Anjelica Morales is in agreement.      Plan of Care: Pt to be seen 5 out of 7 days per week, 90 mins (exact) per day for 9 days (exact)                   Current Treatment Recommendations: Strengthening, Transfer Training, Endurance Training, Balance Training, Gait Training, Functional Mobility Training, Stair training, Safety Education & Training      OCCUPATIONAL THERAPY:  Goals:             Short term goals  Time Frame for Short term goals: in 5 days   Short term goal 1: Perform grooming independently  Short term goal 2: Perform UE dressing independently  Short term goal 3: Perform toilet transfer with Mod I :  Long term goals  Time Frame for Long term goals : in 9 days (7/02)  Long term goal 1: Perform UE/LE bathing with s/u while seated on shower bench  Long term goal 2: Perform LE dressing Mod I  Long term goal 3: Perform tub/shower transfer with supervision  Long term goal 4: Perform item retrieval with Mod I :    These goals were reviewed with this patient at the time of assessment and Angeline Lovett is in agreement    Plan of Care:  Pt to be seen 5 out of 7 days per week, 90 mins (exact) per day for 9 days (exact)  Current Treatment Recommendations: Self-Care / ADL, Strengthening, Functional Mobility Training, Endurance Training, Balance Training, Safety Education & Training, Equipment Evaluation, Education, & procurement, Patient/Caregiver Education & Training      SPEECH THERAPY: Goals will be left blank if speech is not following this patient. Goals: Further ST not indicated at this time        CASE MANAGEMENT:  Goals:   Assist patient/family with discharge planning, patient/family counseling,   and coordination with insurance during ARU stay.       Admission Period/Goal FIM SCORES  FIM Admit/Goal Score   Eating/Swallowing    Grooming    Bathing    Upper Body Dressing    Lower Body Dressing    Toileting    Bladder Tony, Accidents = FIM    Bowel  Tony, Accidents = FIM    Bed/Chair Transfer    Toilet Transfer    Tub/Shower Transfer     Locomotion:  Ambulation (Walk) / Wheelchair:  W = walk , w/c = wheelchair  Distance:   1= 0-49 ft , 2=  ft, 3= 150+ ft Distance:    Level of Assist:    Mode:    FIM:       Stairs    Comprehension    Expression    Social Interaction    Problem Solving    Memory         Milagro Linares will be seen a minimum of 3 hours of therapy per day, a minimum of 5 out of 7 days per week. [] In this rare instance due to the nature of this patient's medical involvement, this patient will be seen 15 hours per week (900 minutes within a 7 day period). Treatments may include therapeutic exercises, gait training, neuromuscular re-ed, transfer training, community reintegration, bed mobility, w/c mobility and training, self care, home mgmt, cognitive training, energy conservation,dysphagia tx, speech/language/communication therapy, group therapy, and patient/family education. In addition, dietician/nutritionist may monitor calorie count as well as intake and collaboratively work with SLP on dietary upgrades. Neuropsychology/Psychology may evaluate and provide necessary support. Medical issues being managed closely and that require 24 hour availability of a physician:   [] Swallowing Precautions  [] Bowel/Bladder Fx  [] Weight bearing precautions   [] Wound Care    [x] Pain Mgmt   [] Infection Protection   [x] DVT Prophylaxis   [x] Fall Precautions  [x] Fluid/Electrolyte/Nutrition Balance   [] Voice Protection   [] Respiratory  [] Other:    Medical Prognosis: [x] Good  [] Fair    [] Guarded   Total expected IRF days 10  Anticipated discharge destination:    [] Home Independently   [x] Home Modified Independent  [] Home with supervision    []SNF     [] Other                                           Physician anticipated functional outcomes:  Home w/ assist as needed and home health vs. Outpatient therapy services. IPOC brief synthesis: The patient is I 40 y.o. female with PMH below, presents with diffuse numbness, generalized weakness, inability to ambulate, light headedness, difficulty swallowing, possible syncope, unplanned weight loss.  EMS was summoned to home where she says she lives with several family members they were reportedly asked to wait outside while occupants of the house carried the pt out to them. She had not been eating well since January which prompted the EMS call. She had Also been spending increasing amount of time in bed recently.     Due to this issues and being unable to swallow she apparently has been unable to walk bc she is too weak.  She says that when she was being brought to the door to she thinks she might have \"passed out. \" My Joel, she was still in bed at the time and did not fall.      This plan has been reviewed with Estuardo Barraza on 6/25 in a language the patient understands.   Estuardo Barraza has had the opportunity to include input with the therapy team.      I have reviewed this initial plan of care and agree with its contents:    Title   Name    Date    Time    Physician:   CAROL GonzalezP.H  PM&R  6/26/2020  2:42 PM      Case Mgmt: Nate Saleem LCSW    OT: Lg Katz OTR/L     PT: Marquita Dick PT    RN: Veronica Guadarrama RN    ST: Jovanna Palmer MS CCC-SLP    : Radha Sepulveda OTR/L    Other:

## 2020-06-24 NOTE — DISCHARGE INSTR - COC
Tachycardia R00.0    Abnormal CT of the chest R93.89    E. coli UTI N39.0, B96.20    Oral thrush B37.0    Paresthesias R20.2    Mood disorder (HCC) F39    Transaminitis R74.0    Anemia D64.9       Isolation/Infection:   Isolation          No Isolation        Patient Infection Status     Infection Onset Added Last Indicated Last Indicated By Review Planned Expiration Resolved Resolved By    None active    Resolved    COVID-19 Rule Out 06/15/20 06/15/20 06/15/20 COVID-19 (Ordered)   06/15/20 Rule-Out Test Resulted    C-diff Rule Out 06/12/20 06/12/20 06/12/20 Gastrointestinal Panel, Molecular (Ordered)   06/12/20 Rule-Out Test Resulted          Nurse Assessment:  Last Vital Signs: /60   Pulse 133   Temp 98.7 °F (37.1 °C) (Oral)   Resp 16   Ht 5' 4.5\" (1.638 m)   Wt 155 lb (70.3 kg)   SpO2 98%   BMI 26.19 kg/m²     Last documented pain score (0-10 scale): Pain Level: 0  Last Weight:   Wt Readings from Last 1 Encounters:   06/22/20 155 lb (70.3 kg)     Mental Status: Alert and oriented. Some recent & remote impairment. Pleasant and cooperative. IV Access:  none    Nursing Mobility/ADLs:  Walking   Able to stand and pivot with use of walker.   Transfer  With one assist, gait belt   Bathing  assist of 1  Dressing  assist of 1  Toileting  assist of 1   Feeding  independent after set up  Med Admin  cooperative  Med Delivery   Per nursing staff    Wound Care Documentation and Therapy:  Wound 06/12/20 Perineum Medial (Active)   Wound Pressure Stage  2 6/13/2020  5:00 AM   Dressing Status Other (Comment) 6/18/2020  8:23 AM   Dressing Changed Changed/New 6/12/2020  3:50 PM   Dressing/Treatment Protective barrier 6/18/2020  8:23 AM   Wound Cleansed Soap and water 6/12/2020  8:30 AM   Wound Assessment Red;Pink 6/18/2020  8:23 AM   Drainage Amount Scant 6/18/2020  8:23 AM   Drainage Description Serosanguinous 6/18/2020  8:23 AM   Odor None 6/18/2020  8:23 AM   Number of days: 12 Elimination:  Continence:   · Bowel: YES   · Bladder: Incont at night  Urinary Catheter: no  Colostomy/Ileostomy/Ileal Conduit:  NO       Date of Last BM: 6/24/20  No intake or output data in the 24 hours ending 06/24/20 1430  No intake/output data recorded. Safety Concerns:  Needs assist, with gait, belt and walker to stand and pivot. Impairments/Disabilities: has lower ext bilat weakness      Nutrition Therapy:  Current Nutrition Therapy: Receiving Ensure at each meal.      Routes of Feeding: Oral independent  Liquids: No Restrictions  Daily Fluid Restriction: no no  Last Modified Barium Swallow with Video (Video Swallowing Test): not done    Treatments at the Time of Hospital Discharge:   Respiratory Treatments: none  Oxygen Therapy:  is not on home oxygen therapy. Ventilator:    - No ventilator support    Rehab Therapies: Physical Therapy PT and OT  Weight Bearing Status/Restrictions:  Able to stand and pivot  Other Medical Equipment (for information only, NOT a DME order):  walker has bilat walking boots  Other Treatments:     Patient's personal belongings (please select all that are sent with patient): 2 tops   None    RN SIGNATURE:  CHERIE  [facility]    CASE MANAGEMENT/SOCIAL WORK SECTION    Inpatient Status Date: 6/18/2020 - 6/24/2020    Readmission Risk Assessment Score:  Readmission Risk              Risk of Unplanned Readmission:        8           Discharging to Facility/ Agency   · Name: [facility]) Adult Rehabilitation Unit  · Address: [address] [place] [address]  · Phone: [phone]  · Fax: [phone]    Dialysis Facility (if applicable)   · Name:  · Address:  · Dialysis Schedule:  · Phone:  · Fax:    / signature: Electronically signed by CONNIE Howell on 6/24/20 at 2:40 PM EDT    PHYSICIAN SECTION    Prognosis: {Prognosis:[id number]}    Condition at Discharge: [facility] Patient Condition:[id number]}    Rehab Potential (if transferring to Rehab):

## 2020-06-24 NOTE — BH NOTE
Assist x 1 patient with steady from wheelchair to bedside commode. Patient voided and had BM. Patient back to wheelchair and out in day room.

## 2020-06-24 NOTE — PROGRESS NOTES
Pt continues to make good progress with transfers and ambulation. Pt did well with supine exercises and demonstrated improved AROM from initial assessment due to improved strength. Pt will need to continue to progress hip strength to improve posture with ambulation. Pt is excited about her progress. Recommending ARU/IRF/Inpatient Rehab Facility upon discharge as patient functioning well below baseline, demonstrates good rehab potential and unable to return home due to inability to negotiate stairs to enter home/bedroom/bathroom, burden of care beyond caregiver ability and home environment not conducive to patient recovery. Goals (all goals ongoing unless otherwise indicated)  To be met in 3 visits:  1). Demonstrate improved safety awareness with functional mobility requiring less overall cueing.      To be met in 6 visits:  1).  Supine to/from sit  SBA to promote return to functional ADLs - Goal met 6/24, upgrade: indep from flat bed  2).  Sit to/from stand   Mod A to promote return to functional ADLs - Goal met 6/24, upgrade: Min A from multiple surfaces  3).  Gait: Ambulate 10 ft.  with Max A and use of LRAD (least restrictive assistive device) demonstrating improved gait pattern - Goal met 6/22, upthgthrthathdtheth:th th4th9th ft with CGA and RW  4).  Tolerate B LE exercises 3 sets of 10-15 reps to improve strength   5).  Tolerated standing balance exercises with improved balance to Fair  grade   6). Patient will be able to perform wheelchair mobility independently on the unit for 400 ft on level. 7). Patient will be able to tolerate bilateral positioning ankle boots for 5 hrs without any signs of skin irritation or tear.     Plan   Continue with plan of care.     Time Coded Treatment Minutes:   47 minutes    Total Treatment Time:   47 minutes    Electronically signed by Suzie Higuera PT on 6/24/20 at 1:00 PM EDT    If patient discharges from this facility prior to next visit, this note will serve as the Discharge

## 2020-06-24 NOTE — PROGRESS NOTES
Department of Psychiatry  Progress Note    Patient's chart was reviewed. Discussed with treatment team. Met with patient. SUBJECTIVE:  Overall doing much better - making gains in PT as well but still unable to transfer and walk independently. Electrolytes improved. TSH improved. Tolerating increase dose of Paxil well and without side effects. ROS:   Patient has new complaints: no  Sleeping adequately:  Yes   Appetite adequate: Yes  Engaged in programming: Yes    OBJECTIVE:  VITALS:  /60   Pulse 133   Temp 98.7 °F (37.1 °C) (Oral)   Resp 16   Ht 5' 4.5\" (1.638 m)   Wt 155 lb (70.3 kg)   SpO2 98%   BMI 26.19 kg/m²     Mental Status Examination:    Appearance: in gown, fair hygiene  Behavior/Attitude toward examiner:  Ok EC  Speech:  Paucity   Mood:  \"better\"  Affect: more reactive  Thought processes:  Goal directable  Thought Content: no SI, no HI  Perceptions: no AVH, not RTIS  Attention: fair   Abstraction: concrete  Cognition: fair  Insight: impaired  Judgment: improving    Medication:  Scheduled:   PARoxetine  20 mg Oral Nightly    levothyroxine  50 mcg Oral Daily    magnesium oxide  400 mg Oral BID    dicyclomine  10 mg Oral BID AC    folic acid  1 mg Oral Daily    lactobacillus  1 capsule Oral Daily with breakfast    metoprolol tartrate  12.5 mg Oral BID    vitamin D  50,000 Units Oral Weekly        PRN:  ibuprofen, hydrOXYzine, traZODone, magnesium hydroxide, aluminum & magnesium hydroxide-simethicone     AFormulation: This is a domiciled, never , unemployed 20-year-old without known psych history who was brought in by EMS severely deconditioned and derpessed.  She was originally admitted to the ICU with multiple medical issues.   She was stabilized, and transferred to inpatient psychiatry for further stabilization.      Mood disorder - MDD vs. Mood disorder due to a general medical condition    Principal Problem:    Mood disorder ([facility] [address]  Active Problems:    Elevated

## 2020-06-24 NOTE — GROUP NOTE
Group Therapy Note    Date: 6/24/2020    Group Start Time: 1000  Group End Time: 6939  Group Topic: [address]        Group Therapy Note    Attendees: 11    Patient's Goal: to engage in Mood Elevation intervention by actively listening to music and expressing reactions to songs utilized to elevate mood, increase positive coping skills, increase emotional awareness, and increase mood regulation. Notes: Milagro actively listened to music utilized and participated in group discussions with minimal prompting. Pt discussed music as a mood management tool. Milagro received a worksheet to utilize to make a personalized mood play list.     Status After Intervention:  Improved    Participation Level:  Active Listener    Participation Quality: Appropriate and Attentive      Speech:  normal      Thought Process/Content: Linear      Affective Functioning: Constricted/Restricted      Mood: depressed      Level of consciousness:  Alert, Oriented x4 and Attentive      Response to Learning: Able to verbalize current knowledge/experience, Able to verbalize/acknowledge new learning and Progressing to goal      Endings: None Reported    Modes of Intervention: Education, Support, Clarifying, Problem-solving, Activity and Media      Discipline Responsible: Psychoeducational Specialist      Signature:  CAROLINA Cote

## 2020-06-24 NOTE — GROUP NOTE
Group Therapy Note    Date: 6/24/2020    Group Start Time: 1110  Group End Time: 1200  Group Topic: Psychoeducation    [address] [facility]        Group Therapy Note    Attendees: 10         Patient's Goal: Patients were invited to participate in a Psycho-Education / Art Therapy Group on goal setting. Patients were asked to select either a journal or a small, travel-size white board and were asked to create a vision board on their journals or white boards, identifying concrete goals. At the end of session, patients were encouraged to share and process their work with the group and all of the white boards were placed in patients' lockers behind the nurses' station for safety. Notes:  Milagro selected to use a white board and engaged in art making until [facility] through session when she was pulled from group to meet with another staff member and did not return to group. Status After Intervention:  Unchanged    Participation Level:  Active Listener    Participation Quality: Appropriate and Attentive      Speech:  normal      Thought Process/Content: Linear      Affective Functioning: Flat      Mood: anxious and depressed      Level of consciousness:  Alert, Oriented x4 and Attentive      Response to Learning: Able to verbalize current knowledge/experience, Able to verbalize/acknowledge new learning, Able to retain information       Endings: None Reported    Modes of Intervention: Education, Support, Socialization, Exploration, Problem-solving and Activity      Discipline Responsible: Psychoeducational Specialist      Signature:  Medardo Callejas, [facility]

## 2020-06-24 NOTE — PLAN OF CARE
Problem: Depressive Behavior With or Without Suicide Precautions:  Goal: Able to verbalize and/or display a decrease in depressive symptoms  Description: Able to verbalize and/or display a decrease in depressive symptoms  Outcome: Ongoing  Patient visible on unit. Medication compliant. Patient denied SI/HI, A/V hallucinations. Patient states mood is improving as she is becoming stronger and able to ambulate more. Patient talked with family this evening and feels hopeful for the future. Problem: Falls - Risk of:  Goal: Will remain free from falls  Description: Victor Manuel Guerra will remain free from falls. She will accomplish this by using the call light when she needs to go to the bathroom or to get up for the day. Outcome: Ongoing  Patient was free from falls this shift. Fall precautions in place. X 1 assist. Patient reports no physical injury this shift. She is using call light appropriately and assists to help herself when she is able to do so. Safety checks continue Q 15 minutes through out the shift.

## 2020-06-25 PROCEDURE — 97166 OT EVAL MOD COMPLEX 45 MIN: CPT

## 2020-06-25 PROCEDURE — 97530 THERAPEUTIC ACTIVITIES: CPT

## 2020-06-25 PROCEDURE — 6360000002 HC RX W HCPCS: Performed by: PHYSICAL MEDICINE & REHABILITATION

## 2020-06-25 PROCEDURE — 97110 THERAPEUTIC EXERCISES: CPT

## 2020-06-25 PROCEDURE — 1280000000 HC REHAB R&B

## 2020-06-25 PROCEDURE — 97116 GAIT TRAINING THERAPY: CPT

## 2020-06-25 PROCEDURE — 92523 SPEECH SOUND LANG COMPREHEN: CPT

## 2020-06-25 PROCEDURE — 97162 PT EVAL MOD COMPLEX 30 MIN: CPT

## 2020-06-25 PROCEDURE — 97535 SELF CARE MNGMENT TRAINING: CPT

## 2020-06-25 PROCEDURE — 6370000000 HC RX 637 (ALT 250 FOR IP): Performed by: PHYSICAL MEDICINE & REHABILITATION

## 2020-06-25 RX ORDER — PANTOPRAZOLE SODIUM 40 MG/1
40 TABLET, DELAYED RELEASE ORAL
Status: DISCONTINUED | OUTPATIENT
Start: 2020-06-26 | End: 2020-07-03 | Stop reason: HOSPADM

## 2020-06-25 RX ADMIN — DICYCLOMINE HYDROCHLORIDE 10 MG: 10 CAPSULE ORAL at 06:08

## 2020-06-25 RX ADMIN — METOPROLOL TARTRATE 12.5 MG: 25 TABLET, FILM COATED ORAL at 09:34

## 2020-06-25 RX ADMIN — ENOXAPARIN SODIUM 40 MG: 40 INJECTION SUBCUTANEOUS at 09:34

## 2020-06-25 RX ADMIN — MAGNESIUM GLUCONATE 500 MG ORAL TABLET 400 MG: 500 TABLET ORAL at 21:10

## 2020-06-25 RX ADMIN — LEVOTHYROXINE SODIUM 50 MCG: 0.05 TABLET ORAL at 06:08

## 2020-06-25 RX ADMIN — METOPROLOL TARTRATE 12.5 MG: 25 TABLET, FILM COATED ORAL at 21:10

## 2020-06-25 RX ADMIN — PAROXETINE HYDROCHLORIDE 20 MG: 20 TABLET, FILM COATED ORAL at 21:10

## 2020-06-25 RX ADMIN — DICYCLOMINE HYDROCHLORIDE 10 MG: 10 CAPSULE ORAL at 16:00

## 2020-06-25 RX ADMIN — Medication 1 CAPSULE: at 09:34

## 2020-06-25 RX ADMIN — FOLIC ACID 1 MG: 1 TABLET ORAL at 09:34

## 2020-06-25 RX ADMIN — MAGNESIUM GLUCONATE 500 MG ORAL TABLET 400 MG: 500 TABLET ORAL at 09:34

## 2020-06-25 ASSESSMENT — PAIN SCALES - GENERAL
PAINLEVEL_OUTOF10: 0
PAINLEVEL_OUTOF10: 0

## 2020-06-25 NOTE — PLAN OF CARE
Problem: Neurological  Intervention: Speech Evaluation/treatment  Note: Speech/lang/cog evaluation completed this date.     Janet Oleary M.S. [address] [facility]  Speech-language pathologist  [id number]

## 2020-06-25 NOTE — PROGRESS NOTES
Physical Therapy    Facility/Department: [facility]  Initial Assessment    NAME: Maryanne Mathew  : 2000  MRN: [id number]    Date of Service: 2020    Discharge Recommendations:  Home with assist PRN, Home with Home health PT   PT Equipment Recommendations  Equipment Needed: Yes  Mobility Devices: Wilhemenia Benne: Rolling    Assessment   Body structures, Functions, Activity limitations: Decreased functional mobility ; Decreased strength;Decreased endurance;Decreased balance  Assessment: Patient is a 22year old female with dx of debility. At baseline, she is fully independent with mobility and ADLs. She currently requires CGA/min A for transfers, gait, and stairs. Patienis functioning well below her baseline and would benefit from continued skilled therapy at the [facility] to address the above deficits. Treatment Diagnosis: debility  Prognosis: Good  Decision Making: Low Complexity  PT Education: Goals; Functional Mobility Training;PT Role;Transfer Training; Adaptive Device Training;Plan of Care;Equipment;Gait Training;General Safety  Barriers to Learning: None  REQUIRES PT FOLLOW UP: Yes  Activity Tolerance  Activity Tolerance: Patient Tolerated treatment well;Patient limited by fatigue       Patient Diagnosis(es): There were no encounter diagnoses. has no past medical history on file. has no past surgical history on file.     Restrictions  Restrictions/Precautions  Restrictions/Precautions: Fall Risk, Up as Tolerated  Position Activity Restriction  Other position/activity restrictions: ambulate pt     Vision/Hearing  Vision: Within Functional Limits  Hearing: Within functional limits       Subjective  General  Chart Reviewed: Yes  Patient assessed for rehabilitation services?: Yes  Response To Previous Treatment: Not applicable  Family / Caregiver Present: No  Referring Practitioner: Mat Angeles  Referral Date : 20  Diagnosis: debility  Follows Commands: Within Functional Limits  General Comment  Comments: Patient resting supine in bed upon therapist arrival.  Subjective  Subjective: Patient agreeable to PT raben this AM.  Pain Screening  Patient Currently in Pain: Denies     Orientation  Orientation  Overall Orientation Status: Within Normal Limits     Social/Functional History  Social/Functional History  Lives With: Family(Dad, Sister, niece, and sister's boyfriend - will have 24 hr A between family members)  Type of Home: House  Home Layout: One level, Laundry in basement  Home Access: Stairs to enter with rails  Entrance Stairs - Number of Steps: 2-3  Entrance Stairs - Rails: Both  Bathroom Shower/Tub: Tub/Shower unit  Bathroom Toilet: Standard  Home Equipment: (no DME)  Receives Help From: Family  ADL Assistance: Independent  Homemaking Assistance: Independent  Homemaking Responsibilities: Yes  Ambulation Assistance: Independent  Transfer Assistance: Independent  Active : Yes     Cognition   Cognition  Overall Cognitive Status: WFL    Objective  RLE AROM: WNL  LLE AROM : WNL    Strength RLE  Strength RLE: Exception  R Hip Flexion: 4/5  R Hip ABduction: 4/5  R Hip ADduction: 4/5  R Knee Flexion: 4+/5  R Knee Extension: 4-/5  R Ankle Dorsiflexion: 4-/5    Strength LLE  Strength LLE: Exception  L Hip Flexion: 4/5  L Hip ABduction: 4/5  L Hip ADduction: 4/5  L Knee Flexion: 4+/5  L Knee Extension: 4-/5  L Ankle Dorsiflexion: 4-/5        Bed mobility  Bridging: Independent  Rolling to Left: Independent  Rolling to Right: Independent  Supine to Sit: Independent  Sit to Supine: Independent  Scooting: Independent     Transfers  Sit to Stand: Minimal Assistance  Stand to sit: Minimal Assistance  Bed to Chair: Minimal assistance  Car Transfer: Minimal Assistance     Ambulation  Ambulation?: Yes  More Ambulation?: Yes  Ambulation 1  Surface: level tile  Device: Rolling Walker  Assistance: Contact guard assistance  Gait Deviations: Slow Mariah  Distance: 8 ft x2, 22 ft x2, 10 ft x2, 200 ft  Comments: Pt requires occasional cues to continue pushing walker without stopping, rather than stopping walker and stepping to for each step. Ambulation 2  Surface - 2: carpet  Device 2: Rolling Walker  Assistance 2: Minimal assistance  Gait Deviations: Slow Mariah  Distance: 10 ft    Stairs/Curb  Stairs?: Yes  Stairs  # Steps : 4  Stairs Height: 6\"  Rails: Bilateral  Device: No Device  Assistance: Minimal assistance  Comment: step to pattern; mildly unsteady; decreased TKE    Wheelchair Activities  Wheelchair Type: Standard  Wheelchair Cushion: None  Level of Assistance for pressure relief activities: Independent  Wheelchair Parts Management: Yes  Left Leg Rest Level of Assistance: Stand by assistance  Right Leg Rest Level of Assistance: Stand by assistance  Left Brakes Level of Assistance: Modified independent  Right Brakes Level of Assistance: Modified independent    Propulsion: Yes  Propulsion 1  Propulsion: Manual  Level: Level Tile  Method: RUE;LUE  Level of Assistance: Supervision  Description/ Details: Pt demonstrates the ability to make turns and negotiate through doorways without difficulty. Distance: 182 ft x2 +20 ft     Balance  Sitting - Static: Good  Sitting - Dynamic: Good  Standing - Static: Fair;+  Standing - Dynamic: Fair   Patient picked up a cone from the floor with one hand on RW and CGA. Exercises  Gluteal Sets: 10x2   Hip Flexion: seated marches: 10x2 B LE 1#  Hip Abduction: with TKE: 10x2 B LE 1#  Knee Long Arc Quad: 10x2 B LE 1#  Other exercises  Other exercises?: Yes  Other exercises 1: hip abd with yellow TB: 10x2 B LE  Other exercises 2: hip add ball squeezes: 10x2 B LE     Plan   Plan  Times per week: 5 out ot 7 days  Times per day: Daily  Current Treatment Recommendations: Strengthening, Transfer Training, Endurance Training, Balance Training, Gait Training, Functional Mobility Training, Stair training, Safety Education & Training  Safety Devices  Type of devices:  All fall risk precautions in place, Bed alarm in place, Call light within reach, Left in bed, Patient at risk for falls, Gait belt, Telesitter in use    Goals  Short term goals  Time Frame for Short term goals: 5 days  Short term goal 1: Patient will complete sit<>stand with SBA and LRAD. Short term goal 2: Patient will ambulate 150 ft with SBA and LRAD. Short term goal 3: Patient will ascend/descend 4 stairs with SBA and B HR  Short term goal 4: Patient will demonstrate tolerate 20 reps of B LE exercises. Short term goal 5: Patient will  an object from the floor with SBA and LRAD. Long term goals  Time Frame for Long term goals : 9 days  Long term goal 1: Patient will complete sit<>stand with MI and LRAD. Long term goal 2: Patient will ambulate 300 ft with MI and LRAD. Long term goal 3: Patient will ascend/descend 4 stairs with MI and B HR. Long term goal 4: Patient will be independent with HEP. Long term goal 5: Patient will  an object from the floor with MI and LRAD.   Patient Goals   Patient goals : to get back to walking normally       Therapy Time   Individual Concurrent Group Co-treatment   Time In 0800         Time Out 0930         Minutes 90         Timed Code Treatment Minutes: [address], [address], [place] #[id number]

## 2020-06-25 NOTE — CARE COORDINATION
Case Management Acute Rehabilitation Admission Assessment     Objective:  met with the patient to complete initial assessment and review the role of Case Management while on the ARU. Patient educated on team conferences. Discussed family training with the patient/family on how it is encouraged on the unit. Order for \"discharge planning\" has been addressed.  will provide resource information as needed to include star ratings, agency comparison from Medicare. gov web site as well as disclosure of financial ties to any agencies affiliated with [facility] to patient and/or their families. Family Present: None    Primary : Criss Colon, father [phone]    Admit date: 6/24/2020                           Insurance: [employer]    Admitting diagnosis: Moderate malnutrition E44.0, Debility R53.81    Current home situation: Lives in the community in a two story home with father, sister, sister's boyfriend, minor niece and 7 dogs that share space with patient (husky, pitbull, boxer and four Chihuahuas. Father is a  and works during the week. Patient states sister is back to work and she watches 3year old niece. Durable Medical Equipment at home: None    Services prior to admission: None    Patient's goal(s): to go home. Working or Volunteering prior to admission: Unemployed. __Yes _X_No                        Accessibility to community resources/transportation: Patient states she is an active  and was splitting time with the family car. Active with: None  __Senior Services      __Council on Aging  __Other    Has patient experienced a recent loss or significant life event that would impact their care or ability to participate?   __No  _X_Yes, please explain: History of depression since mother's death in 2006, continued gradual decline resulting in need for hospitalization and rehab coupled with mental health diagnoses may impact mood and/or psychosocial wellbeing. Has patient ever been treated for emotional disorder(s)? __No  _X_Yes, how does this affect current situation? Mental health diagnoses include mood disorder. Psych consult : patient reports depression, suicidal ideation without intent/plan. Paternal uncle committed suicide. Major depressive disorder with psychotic features. Recommends admission to inpatient psych when medically stable. Medications noted for mental health include Paxil. Is patient and family coping appropriately with stressful events and this hospitalization? _X_Yes  __No, please explain: Patient presents with flat affect, but pleasant. Support system at home and in the community: Family provides sources of support, encouragement and socialization. Caregiver on discharge: Self with family supports. 24 hour care on discharge: Self with family supports. What patient needs to be at to return home alone or with family: Patient will need to be able to complete activities of daily living with minimal assistance as well as navigate stairs in order to return to home safely. Discharge plan: Intent is to return to home setting where patient lives with family and 7 dogs. Summary: Single, never , no children. Denies tobacco, alcohol or drug use at this time. Natural teeth, poor condition with some missing. No hearing or vision deficits noted at this time. Patient's mother   and patient notes physical/mental decline since that time. Patient is a high school graduate. PCP: No PCP noted at this time. Pharmacy: [facility] [phone]  Home Health: to be determined. Electronic continuity of care form is on the chart. Case Management (CM) will continue to follow for recommendations from the treatment team. Please notify Case Management if needs or concerns arise.        REBECA Foley

## 2020-06-25 NOTE — PROGRESS NOTES
4 Eyes Skin Assessment     The patient is being assess for  Admission    I agree that 2 RN's have performed a thorough Head to Toe Skin Assessment on the patient. ALL assessment sites listed below have been assessed. Areas assessed by both nurses:   [x]   Head, Face, and Ears   [x]   Shoulders, Back, and Chest  [x]   Arms, Elbows, and Hands   [x]   Coccyx, Sacrum, and IschIum  [x]   Legs, Feet, and Heels        Does the Patient have Skin Breakdown?   Yes LDA WOUND CARE was Initiated documentation include the Aretha-wound, Wound Assessment, Measurements, Dressing Treatment, Drainage, and Color\",         Ez Prevention initiated:  Yes   Wound Care Orders initiated:  Yes      [id number] nurse consulted for Pressure Injury (Stage 3,4, Unstageable, DTI, NWPT, and Complex wounds), New and Established Ostomies:  No      Nurse 1 eSignature: Electronically signed by Dana Caal RN on 6/24/20 at 10:34 PM EDT    **SHARE this note so that the co-signing nurse is able to place an eSignature**    Nurse 2 eSignature: Electronically signed by Srinivasa Craft RN on 6/25/20 at 6:04 AM EDT

## 2020-06-25 NOTE — CONSULTS
[facility] Wound Ostomy Continence Nurse  Consult Note       NAME:  Nora Posey RECORD NUMBER:  [id number]  AGE: 22 y.o. GENDER: female  : 2000  TODAY'S DATE:  2020    Subjective: I don't know how I got them. They don't hurt. Reason for WOCN Evaluation and Assessment: blisters to tops of sebastien feet      Milagro Mckay is a 22 y.o. female referred by:   [] Physician  [x] Nursing  [] Other:     Wound Identification:  Wound Type: traumatic  Contributing Factors: decreased mobility and Edema    Wound History:  Current Wound Care Treatment: R Foot Dorsal - non sting barrier wipe; L Foot Dorsal - betadine    Patient Goal of Care:  [x] Wound Healing  [] Odor Control  [] Palliative Care  [] Pain Control   [] Other:         PAST MEDICAL HISTORY    No past medical history on file. PAST SURGICAL HISTORY    No past surgical history on file. FAMILY HISTORY    Family History   Problem Relation Age of Onset    High Blood Pressure Father     High Cholesterol Father     Diabetes Sister        SOCIAL HISTORY    Social History     Tobacco Use    Smoking status: Never Smoker    Smokeless tobacco: Never Used   Substance Use Topics    Alcohol use: No    Drug use: Never       ALLERGIES    Allergies   Allergen Reactions    Lactose Intolerance (Gi)        MEDICATIONS    No current facility-administered medications on file prior to encounter. No current outpatient medications on file prior to encounter.        Objective: Lying in bed; working with OT    /73   Pulse 102   Temp 98.4 °F (36.9 °C) (Oral)   Resp 18   Ht 5' 5\" (1.651 m)   Wt 160 lb (72.6 kg)   SpO2 100%   BMI 26.63 kg/m²     LABS:  WBC:    Lab Results   Component Value Date    WBC 6.1 2020     H/H:    Lab Results   Component Value Date    HGB 8.2 2020    HCT 25.0 2020     PTT:    Lab Results   Component Value Date    APTT 25.6 2020   [APTT}  PT/INR:    Lab Results   Component Value Date    PROTIME 12.0 06/15/2020    INR 1.03 06/15/2020     HgBA1c:    Lab Results   Component Value Date    LABA1C 5.2 06/19/2020       Assessment: R Foot Dorsal - intact fluid filled blister  L Foot Dorsal - brown and yellow dry wound bed; appears to have been a blister that opened   Ez Risk Score: Ez Scale Score: 14    Patient Active Problem List   Diagnosis Code    Obesity E66.9    Hyponatremia E87.1    Moderate protein-calorie malnutrition ([address] E44.0    Hypokalemia E87.6    Syncope R55    Right atrial mass I51.89    Dysphagia R13.10    Other specified hypothyroidism E03.8    Severe malnutrition (HCC) E43    Abnormal finding on echocardiogram R93.1    Tachycardia R00.0    Abnormal CT of the chest R93.89    E. coli UTI N39.0, B96.20    Oral thrush B37.0    Paresthesias R20.2    Mood disorder (HCC) F39    Anemia D64.9    Debility R53.81       Measurements:  Wound 06/12/20 Perineum Medial (Active)   Dressing Status Other (Comment) 6/18/2020  8:23 AM   Dressing Changed Changed/New 6/12/2020  3:50 PM   Wound Cleansed Soap and water 6/12/2020  8:30 AM   Wound Assessment Red;Pink 6/18/2020  8:23 AM   Drainage Amount Scant 6/18/2020  8:23 AM   Drainage Description Serosanguinous 6/18/2020  8:23 AM   Odor None 6/18/2020  8:23 AM   Number of days: 13       Wound 06/25/20 Foot Dorsal;Left (Active)   Wound Image   6/25/2020  2:11 PM   Wound Traumatic 6/25/2020  2:11 PM   Dressing Status Dry; Intact 6/25/2020  2:11 PM   Dressing Changed Changed/New 6/25/2020  2:11 PM   Dressing/Treatment Betadine swabs 6/25/2020  2:11 PM   Dressing Change Due 06/26/20 6/25/2020  2:11 PM   Wound Length (cm) 1.7 cm 6/25/2020  2:11 PM   Wound Width (cm) 1 cm 6/25/2020  2:11 PM   Wound Depth (cm) 0 cm 6/25/2020  2:11 PM   Wound Surface Area (cm^2) 1.7 cm^2 6/25/2020  2:11 PM   Wound Volume (cm^3) 0 cm^3 6/25/2020  2:11 PM   Wound Assessment Dry;Brown;Yellow 6/25/2020  2:11 PM   Drainage Amount None 6/25/2020  2:11 PM   Odor None 6/25/2020  2:11 PM   Margins Attached edges; Defined edges 6/25/2020  2:11 PM   Aretha-wound Assessment Dry; Intact; Swelling 6/25/2020  2:11 PM   Yellow%Wound Bed 10 6/25/2020  2:11 PM   Other%Wound Bed 90 6/25/2020  2:11 PM   Culture Taken No 6/25/2020  2:11 PM   Number of days: 0    L Foot Dorsal:         Wound 06/25/20 Foot Dorsal;Right (Active)   Wound Image   6/25/2020  2:11 PM   Wound Traumatic 6/25/2020  2:11 PM   Dressing Status Dry; Intact 6/25/2020  2:11 PM   Dressing Changed Changed/New 6/25/2020  2:11 PM   Dressing/Treatment Barrier film 6/25/2020  2:11 PM   Dressing Change Due 06/26/20 6/25/2020  2:11 PM   Wound Length (cm) 1.5 cm 6/25/2020  2:11 PM   Wound Width (cm) 1.5 cm 6/25/2020  2:11 PM   Wound Depth (cm) 0 cm 6/25/2020  2:11 PM   Wound Surface Area (cm^2) 2.25 cm^2 6/25/2020  2:11 PM   Wound Volume (cm^3) 0 cm^3 6/25/2020  2:11 PM   Wound Assessment Fluid filled blister 6/25/2020  2:11 PM   Drainage Amount None 6/25/2020  2:11 PM   Odor None 6/25/2020  2:11 PM   Margins Attached edges 6/25/2020  2:11 PM   Aretha-wound Assessment Dry; Swelling 6/25/2020  2:11 PM   Culture Taken No 6/25/2020  2:11 PM   Number of days: 0   R Foot Dorsal:      Response to treatment:  Well tolerated by patient. Pain Assessment:  Severity:  0 / 10  Quality of pain: N/A  Wound Pain Timing/Severity: none  Premedicated: No    Plan   Plan of Care: Wound 06/25/20 Foot Dorsal;Left-Dressing/Treatment: Betadine swabs  Wound 06/25/20 Foot Dorsal;Right-Dressing/Treatment: Barrier film   Recommendation: R Foot Dorsal - non sting barrier wipe over fluid filled blister daily. L Foot Dorsal - paint wound with betadine daily. Bilateral Feet - wash with foamy cleanser; pat dry; apply moisturizing lotion daily.   Call Wound Care for deterioration [phone]; pager [phone]    Specialty Bed Required : No   [] Low Air Loss   [] Pressure Redistribution  [] Fluid Immersion  [] Bariatric  [] Total Pressure Relief  [] Other:     Current Diet: DIET GENERAL; Dysphagia Soft and Bite-Sized;  Lactose Controlled  Dietary Nutrition Supplements: Low Calorie High Protein Supplement  Dietician consult:  No    Discharge Plan:  Placement for patient upon discharge: home with support    Patient appropriate for Outpatient [address] [place] [address] Road: No    Referrals:  []   [] [facility]  [] Supplies  [] Other    Patient/Caregiver Teaching:  Level of patient/caregiver understanding able to:   [] Indicates understanding       [x] Needs reinforcement  [] Unsuccessful      [] Verbal Understanding  [] Demonstrated understanding       [] No evidence of learning  [] Refused teaching         [] N/A       Electronically signed by Ellison Lundborg, RN, CWOCN on 6/25/2020 at 2:14 PM

## 2020-06-25 NOTE — PROGRESS NOTES
Speech Language Pathology  Facility/Department: [id number] [place] [address]  Initial Speech/Language/Cognitive Assessment    NAME: Ramo Brennan  : 2000   MRN: [id number]  ADMISSION DATE: 2020  ADMITTING DIAGNOSIS: has Obesity; Hyponatremia; Moderate protein-calorie malnutrition ([facility] Hypokalemia; Syncope; Right atrial mass; Dysphagia; Other specified hypothyroidism; Severe malnutrition ([facility] Abnormal finding on echocardiogram; Tachycardia; Abnormal CT of the chest; E. coli UTI; Oral thrush; Paresthesias; Mood disorder ([facility] Anemia; and Debility on their problem list.  DATE ONSET: Pt admitted to [address] [place] [address] on 20    Date of Eval: 2020   Evaluating Therapist: ALBA Lynn    RECENT RESULTS  CT OF HEAD/MRI (6/15/20): Impression   No pituitary mass identified. Primary Complaint: Pt denies speech/lang/cog deficits, reports dysphagia resolved    Pain:  Pain Assessment: No c/o pain    Assessment:    Pt pleasant and cooperative throughout evaluation, flat affect. She reported that she lives at home with her sister, sister's boyfriend, niece, and her dad. The pt was not working PTA, was an active , although she reported that she was \"not driving much\". She has a high school education per report. She was not taking any medications PTA and was managing her own finances. Pt was administered the Woodman Cognitive Assessment (MoCA) this date, scoring a 26/30 on the MoCA with a score of 26 or greater considered WNL. She independently recalled 4/5 words after ~5 minute delay, 5/5 given min cues. She had difficulty completing visuospatial task (copying cube) and higher-level divergent naming. Pt denies speech/lang/cog deficits and reports that she is at her baseline. Further ST not indicated at this time; please re-refer ST if changes occur. Swallow screen completed this date.   Pt currently on a Dysphagia III (soft and bite sized) diet with thin liquids, she denies dysphagia with meals / meds. Pt previously seen by ST at [facility] for dysphagia (most recently on 6/15/20) and was discharged from [address] [place] [address]  d/t continued diet tolerance. Pt also followed by GI at [facility]. She denies further globus sensation, reports improving appetite. Pt observed with thin liquid trials via cup during evaluation with grossly timely swallow initiation and no overt s/s of aspiration/penetration throughout. Formal BSE not indicated at this time; will complete in the future as clinically indicated. Recommendations:  Requires SLP Intervention: No  Duration/Frequency of Treatment: Further ST not indicated at this time; please re-refer if changes occur  D/C Recommendations: To be determined(Per PT/OT recs)  Referral To: GI;Dietician    Plan:   Goals: Further ST not indicated at this time; please re-refer ST if changes occur      Patient/family involved in developing goals and treatment plan: Yes    Subjective:  General  Chart Reviewed: Yes  Patient assessed for rehabilitation services?: Yes  Family / Caregiver Present: No  General Comment  Comments: Pt admitted d/t debility. She has a hx of dysphagia, failure to thrive / malnutrition, mood disorder  Subjective  Subjective: Pt pleasant and cooperative throughout evaluation. Social/Functional History  Lives With: Family  Active : Yes(However, pt stated that she has not been driving much recently)  Education: She reported finishing high school  Occupation: (Pt currently not working)  Additional Comments: Pt reported that she currently does not take any medications at home and that she manages her own finances. Vision  Vision: Within Functional Limits  Hearing  Hearing: Within functional limits           Objective:     Oral/Motor  Oral Motor:  Within functional limits    Auditory Comprehension  Comprehension: Within Functional Limits         Expression  Primary Mode of Expression: Verbal    Verbal Expression  Verbal Expression: Within functional limits  Divergent: Mild  Effective Techniques: Provide extra time    Written Expression  Written Expression: Within Functional Limits    Motor Speech  Motor Speech: Within Functional Limits    Pragmatics/Social Functioning  Pragmatics: Exceptions to [facility]  Monotone: Moderate    Cognition:      Orientation  Overall Orientation Status: Within Functional Limits  Attention  Attention: Within Functional Limits  Memory  Memory: Within Funtional Limits  Problem Solving  Problem Solving: Within Functional Limits  Numeric Reasoning  Numeric Reasoning: Within Functional Limits  Abstract Reasoning  Abstract Reasoning: Within Functional Limits  Divergent Thinking: Mild(Required increased processing time)  Safety/Judgement  Safety/Judgement: Within Functional Limits       Prognosis:  Speech Therapy Prognosis  Prognosis: Good  Prognosis Considerations: Age;Participation Level; Potential;Family/Community Support;Previous Level of Function;Severity of Impairments  Individuals consulted  Consulted and agree with results and recommendations: Patient    Education:  Patient Education: Pt educated on role of ST, reason for referral, assessment results and recommendations. Patient Education Response: Verbalizes understanding  Safety Devices in place: Yes  Type of devices: Call light within reach; Bed alarm in place; Left in bed    Therapy Time:   Individual Concurrent Group Co-treatment   Time In 1230         Time Out 1300         Minutes 30             30 minutes; eval speech sound lang comp    RICHARD Medina  Speech-language pathologist  [id number]    n

## 2020-06-25 NOTE — CARE COORDINATION
Patient welcomed to unit. Role of  explained. Patient states intent is to return to home setting where she lives with family. Patient denies suicidal ideation, plan or intent at this time.      Shine Vazquez, REBECA

## 2020-06-25 NOTE — PLAN OF CARE
Problem: Nutrition  Goal: Optimal nutrition therapy  Outcome: Ongoing  Note: Nutrition Problem: Moderate malnutrition  Intervention: Food and/or Nutrient Delivery: Continue current diet, Continue current ONS  Nutritional Goals:  Tolerate diet and have po intakes 50% or greater during ARU stay

## 2020-06-25 NOTE — H&P
Department of Physical Medicine & Rehabilitation  History & Physical      Patient Identification:  Kajta Arellano  : 2000  Admit date: 2020   Attending provider: Anthony Estrada DO        Primary care provider: No primary care provider on file. Chief Complaint: Debility    History of Present Illness/Hospital Course: The patient is a 22 y.o. female with PMH below, presents with diffuse numbness, generalized weakness, inability to ambulate, light headedness, difficulty swallowing, possible syncope, unplanned weight loss. EMS was summoned to home where she says she lives with several family members they were reportedly asked to wait outside while occupants of the house carried the pt out to them. She had not been eating well since January which prompted the EMS call. She had Also been spending increasing amount of time in bed recently.     Due to this issues and being unable to swallow she apparently has been unable to walk bc she is too weak. She says that when she was being brought to the door to she thinks she might have \"passed out. \"  However, she was still in bed at the time and did not fall. She was diagnosed with depression and was started on paxil and admitted to [facility]. She has been recovering well there but continue to be weak and unable to perform all ADLs. She was referred to [facility] for functional recover. Prior Level of Function:  Independent for mobility, ADLs, and IADLs    Current Level of Function:  Mod assist     Pertinent Social History:  Support: family at home  Home set-up[place] house in [place]- steps to enter but lives on first floor. No past medical history on file. Fall in past year: Yes    No past surgical history on file.   Major Surgery in past 100 days: No    Family History   Problem Relation Age of Onset    High Blood Pressure Father     High Cholesterol Father     Diabetes Sister        Social History     Socioeconomic History    Marital status: Single     Spouse name: Not on file    Number of children: 0    Years of education: 6    Highest education level: Not on file   Occupational History     Employer: UNEMPLOYED   Social Needs    Financial resource strain: Not on file    Food insecurity     Worry: Not on file     Inability: Not on file   [employer] needs     Medical: Not on file     Non-medical: Not on file   Tobacco Use    Smoking status: Never Smoker    Smokeless tobacco: Never Used   Substance and Sexual Activity    Alcohol use: No    Drug use: Never    Sexual activity: Not on file   Lifestyle    Physical activity     Days per week: Not on file     Minutes per session: Not on file    Stress: Not on file   Relationships    Social connections     Talks on phone: Not on file     Gets together: Not on file     Attends Yarsani service: Not on file     Active member of club or organization: Not on file     Attends meetings of clubs or organizations: Not on file     Relationship status: Not on file    Intimate partner violence     Fear of current or ex partner: Not on file     Emotionally abused: Not on file     Physically abused: Not on file     Forced sexual activity: Not on file   Other Topics Concern    Not on file   Social History Narrative    Not on file       Allergies   Allergen Reactions    Lactose Intolerance (Gi)          Current Facility-Administered Medications   Medication Dose Route Frequency Provider Last Rate Last Dose    acetaminophen (TYLENOL) tablet 650 mg  650 mg Oral Q4H PRN Ramesh Angeles, DO        bisacodyl (DULCOLAX) EC tablet 5 mg  5 mg Oral Daily Ramesh Angeles DO        enoxaparin (LOVENOX) injection 40 mg  40 mg Subcutaneous Daily Ramesh Angeles DO   40 mg at 06/25/20 0934    polyethylene glycol (GLYCOLAX) packet 17 g  17 g Oral Daily PRN Ramesh Angeles DO        aluminum & magnesium hydroxide-simethicone (MAALOX) 200-200-20 MG/5ML suspension 30 mL  30 mL Oral Q6H PRN Adriana Bold Heis, DO        hydrOXYzine (VISTARIL) capsule 25 mg  25 mg Oral TID PRN Ramesh GILLIAM Heis, DO        magnesium hydroxide (MILK OF MAGNESIA) 400 MG/5ML suspension 30 mL  30 mL Oral Daily PRN Ramesh GILLIAM Heis, DO        traZODone (DESYREL) tablet 50 mg  50 mg Oral Nightly PRN Ramesh GILLIAM Heis, DO        dicyclomine (BENTYL) capsule 10 mg  10 mg Oral BID AC Ramesh GILLIAM Heis, DO   10 mg at 82/60/05 2750    folic acid (FOLVITE) tablet 1 mg  1 mg Oral Daily Ramesh L Heis, DO   1 mg at 06/25/20 0934    ibuprofen (ADVIL;MOTRIN) tablet 400 mg  400 mg Oral Q6H PRN Ramesh GILLIAM Heis, DO        lactobacillus (CULTURELLE) capsule 1 capsule  1 capsule Oral Daily with breakfast Ramesh Polancois, DO   1 capsule at 06/25/20 0934    levothyroxine (SYNTHROID) tablet 50 mcg  50 mcg Oral Daily Ramesh GILLIAM Heis, DO   50 mcg at 06/25/20 0608    magnesium oxide (MAG-OX) tablet 400 mg  400 mg Oral BID Ramesh L Heis, DO   400 mg at 06/25/20 9071    metoprolol tartrate (LOPRESSOR) tablet 12.5 mg  12.5 mg Oral BID Ramesh GILLIAM Heis, DO   12.5 mg at 06/25/20 4428    PARoxetine (PAXIL) tablet 20 mg  20 mg Oral Nightly Ramesh Polancois, DO   20 mg at 06/24/20 2048    [START ON 7/1/2020] vitamin D (ERGOCALCIFEROL) capsule 50,000 Units  50,000 Units Oral Weekly Ramesh Angeles, DO             REVIEW OF SYSTEMS:   CONSTITUTIONAL: negative for fevers, chills, diaphoresis, appetite change, night sweats, unexpected weight change, +fatigue. EYES: negative for blurred vision, eye discharge, visual disturbance and icterus. HEENT: negative for hearing loss, tinnitus, ear drainage, sinus pressure, nasal congestion, epistaxis and snoring. RESPIRATORY: Negative for hemoptysis, cough, sputum production. CARDIOVASCULAR: negative for chest pain, palpitations, exertional chest pressure/discomfort, syncope, edema   GASTROINTESTINAL: negative for nausea, vomiting, diarrhea, blood in stool, abdominal pain, constipation.    GENITOURINARY: negative for frequency, blue) intact. Attention intact (months of year in reverse). -Language: Speech fluent, repetition and naming intact  -Cranial nerves: VFF, PERRL, EOMI, Facial sensation intact, Face symmetric, Hearing intact, Palate elevation symmetric, Shoulder shrug intact. Tongue midline.   -Sensation intact to light touch. -Motor examination reveals normal strength in all four limbs diffusely.   -No abnormalities with finger/nose noted. -Reflexes 2+ and symmetric. Negative Elyssa  Psych: Stable mood, normal judgement, normal affect     Lab Results   Component Value Date    WBC 6.1 06/23/2020    HGB 8.2 (L) 06/23/2020    HCT 25.0 (L) 06/23/2020    .4 (H) 06/23/2020     (H) 06/23/2020     Lab Results   Component Value Date    INR 1.03 06/15/2020    INR 1.03 06/14/2020    INR 1.06 06/13/2020    PROTIME 12.0 06/15/2020    PROTIME 12.0 06/14/2020    PROTIME 12.3 06/13/2020     Lab Results   Component Value Date    CREATININE <0.5 (L) 06/23/2020    BUN 6 (L) 06/23/2020     06/23/2020    K 3.5 06/23/2020     06/23/2020    CO2 27 06/23/2020     Lab Results   Component Value Date    ALT 46 (H) 06/23/2020    AST 36 06/23/2020    ALKPHOS 101 06/23/2020    BILITOT 0.4 06/23/2020         No orders to display         The above laboratory data have been reviewed. The above imaging data have been reviewed. The above medical testing have been reviewed. Body mass index is 26.63 kg/m². POST ADMISSION PHYSICIAN EVALUATION  The patient has agreed to being admitted to our comprehensive inpatient rehabilitation facility and can tolerate the intensity of service consisting of at least:  --180 minutes of therapy a day, 5 out of 7 days a week. OR  --15 hours of intensive therapy within a 7 consecutive day period.      The patient/family has a good understanding of our discharge process and will benefit from an interdisciplinary inpatient rehabilitation program. The patient has potential to make improvement and is in need of at least two of the following multidisciplinary therapies including but not limited to physical, occupational, respiratory, and speech, nutritional services, wound care, and prosthetics and orthotics. Given the patients complex condition and risk of further medical complications, rehabilitation services cannot be safely provided at a lower level of care such as a skilled nursing facility. All of the goals listed below were reviewed with the patient and he/she is in agreement. I have compared the patients medical and functional status at the time of the preadmission screening and the same on this date, and there are no significant changes. By signing this document, I acknowledge that I have personally performed a full physical examination on this patient within 24 hours of admission to this inpatient rehabilitation facility and have determined the patient to be able to tolerate the above course of treatment at an intensive level for a reasonable period of time. I will be completing a detailed individualized  Plan of Care for this patient by day four of the patients stay based upon the Preadmission Screen, this Post-Admission Evaluation, and the therapy evaluations. Barriers: Decreased functional mobility, medical comorbidities  Services Required: PT, OT  Goals: mod I  Prognosis: Good  Anticipated Dispo: home  ELOS: 10 days     Rehabilitation Diagnosis:   16.0, Debility (non-cardiac, non-pulmonary      Assessment and Plan:  Debility: likely due to prolonged bed rest/depression- continue paxil- P/OT    Depression- Continue Paxil daily- Post psych consult    Dysphagia- Improved- will monitor in therapy- SLP, bite sized diet    Hypothyroid- continue synthroid 50mcg daily       Impairments: Decreased functional mobility, Decreased ADLs    Bladder - high risk retention - Monitor PVRs, SC prn >300cc    Bowel - high risk constipation - colace BID, PRN miralax and MoM. follow bowel movements.  Enema or suppository if needed.      Safety - fall precautions    PPx  DVT: lovenox  GI: pantoprazole    FULL CODE    CAROL ArevaloP.H  PM&R  6/25/2020  2:51 PM

## 2020-06-25 NOTE — PROGRESS NOTES
Yes  Additional Pertinent Hx: Pt admitted to [facility] for possible syncopal episode. Pt c/o difficulty swallow resulting in 50lb wt loss, inability to ambulate with pt spending much of the day in bed, and c/o feeling \"numb all over\". Pt treated for severe hyponatremia, anemia and depression. Referring Practitioner: MALKA Angeles DO  Diagnosis: debility  General Comment  Comments: RN approved therapy      Social/Functional History  Social/Functional History  Lives With: Family  Type of Home: House  Home Layout: Two level, Laundry in basement, Able to Live on Main level with bedroom/bathroom  Home Access: Stairs to enter with rails  Entrance Stairs - Number of Steps: 2-3  Entrance Stairs - Rails: Both  Bathroom Shower/Tub: Tub/Shower unit  Bathroom Toilet: Standard  Home Equipment: (no DME)  Receives Help From: Family  ADL Assistance: Independent  Homemaking Assistance: Independent  Homemaking Responsibilities: Yes  Ambulation Assistance: Independent  Transfer Assistance: Independent  Active : Yes(However, pt stated that she has not been driving much recently)  Education: She reported finishing high school  Occupation: (Pt currently not working)  Leisure & Hobbies: hanging out with family, tv  Additional Comments: Pt reported that she currently does not take any medications at home and that she manages her own finances. Pt reports that in order to get to BR recently that she has been \"furniture walking\". Objective   Vision: Within Functional Limits  Hearing: Within functional limits    Orientation  Overall Orientation Status: Within Normal Limits     Balance  Sitting Balance: Stand by assistance  Standing Balance: Minimal assistance(RW)  Standing Balance  Activity: Performed standing activities at table with CGA with RW for balance to complete puzzle. Pt required 1 seated rest break d/t pain in back of legs. Comment: Performed Sci-Fit x7 min with 2 rest breaks on level 3 from w/c level.    Functional Mobility  Functional - Mobility Device: Rolling Walker  Activity: To/from bathroom  Assist Level: Minimal assistance  Toilet Transfers  Toilet - Technique: Ambulating(RW)  Equipment Used: Grab bars  Toilet Transfer: Minimal assistance  ADL  Feeding: Setup  Grooming: Stand by assistance(seated in w/c, unable to safely stand at sink)  UE Bathing: Supervision(Pt refused shower today but agreed to sponge bath while seated. )  LE Bathing: Minimal assistance  UE Dressing: Stand by assistance  LE Dressing: Minimal assistance(for standing balance to pull up brief and pants )  Toileting: Minimal assistance(for standing balance)  Tone RUE  RUE Tone: Normotonic  Tone LUE  LUE Tone: Normotonic  Coordination  Movements Are Fluid And Coordinated: No  Coordination and Movement description: Decreased speed;Decreased accuracy; Right UE;Left UE  Quality of Movement Other  Comment: 9 Hole Peg:  R 30.55 and L 34.5     Bed mobility  Supine to Sit: Independent  Sit to Supine: Independent  Transfers  Stand Pivot Transfers: Minimal assistance(RW)  Sit to stand: Minimal assistance  Stand to sit: Minimal assistance     Cognition  Overall Cognitive Status: Exceptions  Arousal/Alertness: Delayed responses to stimuli  Following Commands:  Follows multistep commands consistently  Initiation: Requires cues for some   Type of ROM/Therapeutic Exercise  Type of ROM/Therapeutic Exercise: Free weights  Comment: Performed UE exer while seated in w/c with 2lb wt  Exercises  Shoulder Flexion: 15x  Horizontal ABduction: 15x  Horizontal ADduction: 15x  Elbow Flexion: 15x  Elbow Extension: 15x  Supination: 15x  Pronation: 15x   LUE AROM (degrees)  LUE AROM : WFL  RUE AROM (degrees)  RUE AROM : WFL  LUE Strength  LUE Strength Comment: 3+/5-4-/5  RUE Strength  RUE Strength Comment: 3+/5-4-/5       Plan   Plan  Times per week: 5/7 days/week   Current Treatment Recommendations: Self-Care / ADL, Strengthening, Functional Mobility Training, Endurance Training, Balance Training, Safety Education & Training, Equipment Evaluation, Education, & procurement, Patient/Caregiver Education & Training     Goals  Short term goals  Time Frame for Short term goals: in 5 days   Short term goal 1: Perform grooming independently  Short term goal 2: Perform UE dressing independently  Short term goal 3: Perform toilet transfer with Mod I  Long term goals  Time Frame for Long term goals : in 9 days (7/02)  Long term goal 1: Perform UE/LE bathing with s/u while seated on shower bench  Long term goal 2: Perform LE dressing Mod I  Long term goal 3: Perform tub/shower transfer with supervision  Long term goal 4: Perform item retrieval with Mod I  Patient Goals   Patient goals : \"Start walking again\"      Therapy Time   Individual Concurrent Group Co-treatment   Time In 1400         Time Out 1530         Minutes 90         Timed Code Treatment Minutes: 75 Minutes(15 min eval )     Prashant Ambriz OT

## 2020-06-26 LAB
ANION GAP SERPL CALCULATED.3IONS-SCNC: 9 MMOL/L (ref 3–16)
BASOPHILS ABSOLUTE: 0 K/UL (ref 0–0.2)
BASOPHILS RELATIVE PERCENT: 0.7 %
BUN BLDV-MCNC: 6 MG/DL (ref 7–20)
CALCIUM SERPL-MCNC: 8.9 MG/DL (ref 8.3–10.6)
CHLORIDE BLD-SCNC: 106 MMOL/L (ref 99–110)
CO2: 27 MMOL/L (ref 21–32)
CREAT SERPL-MCNC: <0.5 MG/DL (ref 0.6–1.1)
EOSINOPHILS ABSOLUTE: 0.1 K/UL (ref 0–0.6)
EOSINOPHILS RELATIVE PERCENT: 1.6 %
GFR AFRICAN AMERICAN: >60
GFR NON-AFRICAN AMERICAN: >60
GLUCOSE BLD-MCNC: 87 MG/DL (ref 70–99)
HCT VFR BLD CALC: 28.7 % (ref 36–48)
HEMOGLOBIN: 9.6 G/DL (ref 12–16)
LYMPHOCYTES ABSOLUTE: 2.1 K/UL (ref 1–5.1)
LYMPHOCYTES RELATIVE PERCENT: 30.8 %
MAGNESIUM: 2.1 MG/DL (ref 1.8–2.4)
MCH RBC QN AUTO: 34.5 PG (ref 26–34)
MCHC RBC AUTO-ENTMCNC: 33.5 G/DL (ref 31–36)
MCV RBC AUTO: 102.9 FL (ref 80–100)
MONOCYTES ABSOLUTE: 0.5 K/UL (ref 0–1.3)
MONOCYTES RELATIVE PERCENT: 7.4 %
NEUTROPHILS ABSOLUTE: 4 K/UL (ref 1.7–7.7)
NEUTROPHILS RELATIVE PERCENT: 59.5 %
PDW BLD-RTO: 18.5 % (ref 12.4–15.4)
PLATELET # BLD: 667 K/UL (ref 135–450)
PMV BLD AUTO: 6.2 FL (ref 5–10.5)
POTASSIUM REFLEX MAGNESIUM: 3.5 MMOL/L (ref 3.5–5.1)
RBC # BLD: 2.79 M/UL (ref 4–5.2)
SODIUM BLD-SCNC: 142 MMOL/L (ref 136–145)
WBC # BLD: 6.7 K/UL (ref 4–11)

## 2020-06-26 PROCEDURE — 80048 BASIC METABOLIC PNL TOTAL CA: CPT

## 2020-06-26 PROCEDURE — 1280000000 HC REHAB R&B

## 2020-06-26 PROCEDURE — 36415 COLL VENOUS BLD VENIPUNCTURE: CPT

## 2020-06-26 PROCEDURE — 97530 THERAPEUTIC ACTIVITIES: CPT

## 2020-06-26 PROCEDURE — 97110 THERAPEUTIC EXERCISES: CPT

## 2020-06-26 PROCEDURE — 85025 COMPLETE CBC W/AUTO DIFF WBC: CPT

## 2020-06-26 PROCEDURE — 6370000000 HC RX 637 (ALT 250 FOR IP): Performed by: PHYSICAL MEDICINE & REHABILITATION

## 2020-06-26 PROCEDURE — 97116 GAIT TRAINING THERAPY: CPT

## 2020-06-26 PROCEDURE — 97112 NEUROMUSCULAR REEDUCATION: CPT

## 2020-06-26 PROCEDURE — 97535 SELF CARE MNGMENT TRAINING: CPT

## 2020-06-26 PROCEDURE — 6360000002 HC RX W HCPCS: Performed by: PHYSICAL MEDICINE & REHABILITATION

## 2020-06-26 PROCEDURE — 83735 ASSAY OF MAGNESIUM: CPT

## 2020-06-26 RX ADMIN — MAGNESIUM GLUCONATE 500 MG ORAL TABLET 400 MG: 500 TABLET ORAL at 08:34

## 2020-06-26 RX ADMIN — LEVOTHYROXINE SODIUM 50 MCG: 0.05 TABLET ORAL at 06:40

## 2020-06-26 RX ADMIN — PANTOPRAZOLE SODIUM 40 MG: 40 TABLET, DELAYED RELEASE ORAL at 06:40

## 2020-06-26 RX ADMIN — METOPROLOL TARTRATE 12.5 MG: 25 TABLET, FILM COATED ORAL at 08:35

## 2020-06-26 RX ADMIN — ALUMINUM HYDROXIDE, MAGNESIUM HYDROXIDE, AND SIMETHICONE 30 ML: 200; 200; 20 SUSPENSION ORAL at 08:35

## 2020-06-26 RX ADMIN — PAROXETINE HYDROCHLORIDE 20 MG: 20 TABLET, FILM COATED ORAL at 21:15

## 2020-06-26 RX ADMIN — MAGNESIUM GLUCONATE 500 MG ORAL TABLET 400 MG: 500 TABLET ORAL at 21:14

## 2020-06-26 RX ADMIN — DICYCLOMINE HYDROCHLORIDE 10 MG: 10 CAPSULE ORAL at 16:17

## 2020-06-26 RX ADMIN — BISACODYL 5 MG: 5 TABLET, COATED ORAL at 08:34

## 2020-06-26 RX ADMIN — Medication 1 CAPSULE: at 08:34

## 2020-06-26 RX ADMIN — POLYETHYLENE GLYCOL 3350 17 G: 17 POWDER, FOR SOLUTION ORAL at 08:35

## 2020-06-26 RX ADMIN — ENOXAPARIN SODIUM 40 MG: 40 INJECTION SUBCUTANEOUS at 08:35

## 2020-06-26 RX ADMIN — DICYCLOMINE HYDROCHLORIDE 10 MG: 10 CAPSULE ORAL at 06:40

## 2020-06-26 RX ADMIN — FOLIC ACID 1 MG: 1 TABLET ORAL at 08:34

## 2020-06-26 ASSESSMENT — PAIN SCALES - GENERAL
PAINLEVEL_OUTOF10: 0
PAINLEVEL_OUTOF10: 0

## 2020-06-26 NOTE — PROGRESS NOTES
Occupational Therapy  Facility/Department: [facility]  Daily Treatment Note  NAME: Jm Lemons  : 2000  MRN: [id number]    Date of Service: 2020    Discharge Recommendations:  24 hour supervision or assist, Home with Home health OT     Assessment   Performance deficits / Impairments: Decreased functional mobility ; Decreased ADL status; Decreased safe awareness;Decreased balance;Decreased endurance;Decreased high-level IADLs;Decreased coordination;Decreased strength;Decreased posture  Assessment: Pt seen for ADLs, ther exercises and ther activity this am.  Pt lacks LE strength for sit to stand transitions. LE is unstable as she comes to upright position. She requires min A for sit to stand and relies heavily on her UE for support. Min A provided for LE ADLs due to impaired standing balance. In sitting, pt is able to perform UE ADLs with Supervision/set-up. Cont OT. Treatment Diagnosis: debility  Prognosis: Good  OT Education: OT Role;ADL Adaptive Strategies; Plan of Care;Transfer Training;Energy Conservation;Equipment  REQUIRES OT FOLLOW UP: Yes  Activity Tolerance  Activity Tolerance: Patient Tolerated treatment well  Safety Devices  Type of devices: Nurse notified;Gait belt;Call light within reach; Left in bed;Bed alarm in place       Patient Diagnosis(es): There were no encounter diagnoses. has no past medical history on file. has no past surgical history on file. Restrictions  Restrictions/Precautions  Restrictions/Precautions: Fall Risk, Up as Tolerated  Position Activity Restriction  Other position/activity restrictions: ambulate pt, Avasys  Subjective   General  Chart Reviewed: Yes, History and Physical, Orders  Patient assessed for rehabilitation services?: Yes  Additional Pertinent Hx: Pt admitted to [facility] for possible syncopal episode.  Pt c/o difficulty swallow resulting in 50lb wt loss, inability to ambulate with pt spending much of the day in bed, and c/o feeling \"numb all over\". Pt treated for severe hyponatremia, anemia and depression. Referring Practitioner: MALKA Angeles DO  Diagnosis: debility  General Comment  Comments: RN approved therapy       Orientation   WNL  Objective    ADL  Grooming: Setup;Supervision(seated in chair at sink to brush hair and teeth )  UE Bathing: Supervision(shower while seated)  LE Bathing: Minimal assistance  UE Dressing: Setup(while seated)  LE Dressing: Minimal assistance(for standing balance when pulling up pants)  Additional Comments: Unsteady when standing to pull up pants        Balance  Sitting Balance: Supervision  Standing Balance: Minimal assistance(RW/grab bar)  Standing Balance  Activity: Performed standing activities with wall crossword puzzle with RW and CGA/min A x3 min x2. She continues to report muscle discomfort in lower legs when standing. Shower Transfers  Shower - Transfer From: Agata Orts - Transfer Type: To and From  Shower - Transfer To: Transfer tub bench(grab bar)  Shower Transfers: Minimal assistance     Transfers  Stand Pivot Transfers: Minimal assistance(shower chair to w/c )  Sit to stand: Minimal assistance  Stand to sit: Minimal assistance        Coordination  Gross Motor: Performed ball toss in seated position with 2lb wrist weights on each arm 20 reps x2  Fine Motor: Performed fine motor activities with board game and small pegs, provided pt with theraputty with small pegs inside to improve manual dexterity and strength       Cognition  Arousal/Alertness: Delayed responses to stimuli  Following Commands:  Follows multistep commands consistently  Problem Solving: Assistance required to correct errors made  Initiation: Requires cues for some      Type of ROM/Therapeutic Exercise  Type of ROM/Therapeutic Exercise: Free weights  Comment: Performed with wrist weights and hand weights (2&3lb)       Plan   Plan  Times per week: 5/7 days/week   Current Treatment Recommendations: Self-Care / ADL, Strengthening, Functional Mobility Training, Endurance Training, Balance Training, Safety Education & Training, Equipment Evaluation, Education, & procurement, Patient/Caregiver Education & Training     Goals  Short term goals  Time Frame for Short term goals: in 5 days   Short term goal 1: Perform grooming independently  Short term goal 2: Perform UE dressing independently  Short term goal 3: Perform toilet transfer with Mod I  Long term goals  Time Frame for Long term goals : in 9 days (7/02)  Long term goal 1: Perform UE/LE bathing with s/u while seated on shower bench  Long term goal 2: Perform LE dressing Mod I  Long term goal 3: Perform tub/shower transfer with supervision  Long term goal 4: Perform item retrieval with Mod I  Patient Goals   Patient goals : \"Start walking again\"      Therapy Time   Individual Concurrent Group Co-treatment   Time In 0900         Time Out 1030         Minutes 90         Timed Code Treatment Minutes: [id number] OT

## 2020-06-26 NOTE — PROGRESS NOTES
Department of Physical Medicine & Rehabilitation  Progress Note    Patient Identification:  Stephanie Chris  [id number]  : 2000  Admit date: 2020    Chief Complaint: debility    Subjective:   No acute events overnight. Patient seen this am. Feels well. ROS: No f/c, n/v, cp     Objective:  Patient Vitals for the past 24 hrs:   BP Temp Temp src Pulse Resp SpO2   20 0830 103/68 97.5 °F (36.4 °C) Oral 103 18 99 %   20 2145 112/67 -- -- 98 -- --   20 2110 102/68 -- -- 98 -- --   20 1950 102/68 98 °F (36.7 °C) Oral 98 18 100 %     Const: Alert. No distress, pleasant. HEENT: Normocephalic, atraumatic. Normal sclera/conjunctiva. MMM. CV: Regular rate and rhythm. Resp: No respiratory distress. Lungs CTAB. Abd: Soft, nontender, nondistended, NABS+   Ext: No edema. Neuro: Alert, oriented, appropriately interactive. Psych: Cooperative, appropriate mood and affect    Laboratory data: Available via EMR.    Last 24 hour lab  Recent Results (from the past 24 hour(s))   Basic Metabolic Panel w/ Reflex to MG    Collection Time: 20  7:23 AM   Result Value Ref Range    Sodium 142 136 - 145 mmol/L    Potassium reflex Magnesium 3.5 3.5 - 5.1 mmol/L    Chloride 106 99 - 110 mmol/L    CO2 27 21 - 32 mmol/L    Anion Gap 9 3 - 16    Glucose 87 70 - 99 mg/dL    BUN 6 (L) 7 - 20 mg/dL    CREATININE <0.5 (L) 0.6 - 1.1 mg/dL    GFR Non-African American >60 >60    GFR African American >60 >60    Calcium 8.9 8.3 - 10.6 mg/dL   CBC auto differential    Collection Time: 20  7:23 AM   Result Value Ref Range    WBC 6.7 4.0 - 11.0 K/uL    RBC 2.79 (L) 4.00 - 5.20 M/uL    Hemoglobin 9.6 (L) 12.0 - 16.0 g/dL    Hematocrit 28.7 (L) 36.0 - 48.0 %    .9 (H) 80.0 - 100.0 fL    MCH 34.5 (H) 26.0 - 34.0 pg    MCHC 33.5 31.0 - 36.0 g/dL    RDW 18.5 (H) 12.4 - 15.4 %    Platelets 333 (H) 162 - 450 K/uL    MPV 6.2 5.0 - 10.5 fL    Neutrophils % 59.5 %    Lymphocytes % 30.8 % Monocytes % 7.4 %    Eosinophils % 1.6 %    Basophils % 0.7 %    Neutrophils Absolute 4.0 1.7 - 7.7 K/uL    Lymphocytes Absolute 2.1 1.0 - 5.1 K/uL    Monocytes Absolute 0.5 0.0 - 1.3 K/uL    Eosinophils Absolute 0.1 0.0 - 0.6 K/uL    Basophils Absolute 0.0 0.0 - 0.2 K/uL   Magnesium    Collection Time: 06/26/20  7:23 AM   Result Value Ref Range    Magnesium 2.10 1.80 - 2.40 mg/dL       Therapy progress:  PT  Position Activity Restriction  Other position/activity restrictions: ambulate pt, Avasys  Objective     Sit to Stand: Minimal Assistance, Contact guard assistance  Stand to sit: Minimal Assistance, Contact guard assistance  Bed to Chair: Minimal assistance  Device: Rolling Walker  Assistance: Contact guard assistance  Distance: 8 ft x2, 22 ft x2, 10 ft x2, 200 ft  OT  PT Equipment Recommendations  Equipment Needed: Yes  Mobility Devices: Reddy Skip: Rolling  Toilet - Technique: Ambulating(RW)  Equipment Used: Grab bars  Assessment        SLP          Body mass index is 26.63 kg/m².     Assessment and Plan:  Debility: likely due to prolonged bed rest/depression- continue paxil- P/OT     Depression- Continue Paxil daily- Post psych consult     Dysphagia- Improved- will monitor in therapy- SLP, bite sized diet     Hypothyroid- continue synthroid 50mcg daily        Rehab Progress: Improving  Anticipated Dispo: home  Services/DME: TBD  ELOS: 10 days       CAROL SwannP.H  PM&R  6/26/2020  1:31 PM

## 2020-06-26 NOTE — PROGRESS NOTES
Physical Therapy  Facility/Department: [facility]  Daily Treatment Note  NAME: Nicole Carrillo  : 2000  MRN: [id number]    Date of Service: 2020    Discharge Recommendations:  Home with assist PRN, Home with Home health PT   PT Equipment Recommendations  Equipment Needed: Yes  Mobility Devices: Hollie Porter: Rolling    Assessment   Body structures, Functions, Activity limitations: Decreased functional mobility ; Decreased strength;Decreased endurance;Decreased balance  Assessment: Pt tolerated therapy well this date. Participated in gait training, transfer training, and balance/coordination activities. Progressing to CGA for standing but limited d/t decreased BLE strength and pelvic stability. Decreased proprioception and weight shift with standing balance activities. Will continue to progress per POC. Treatment Diagnosis: debility  Prognosis: Good  Decision Making: Low Complexity  PT Education: Goals; Functional Mobility Training;PT Role;Transfer Training; Adaptive Device Training;Plan of Care;Equipment;Gait Training;General Safety  Patient Education: pt  verbalized understanding  Barriers to Learning: None  REQUIRES PT FOLLOW UP: Yes  Activity Tolerance  Activity Tolerance: Patient Tolerated treatment well;Patient limited by fatigue     Patient Diagnosis(es): There were no encounter diagnoses. has no past medical history on file. has no past surgical history on file. Restrictions  Restrictions/Precautions  Restrictions/Precautions: Fall Risk, Up as Tolerated  Position Activity Restriction  Other position/activity restrictions: ambulate ptFadi  Subjective   General  Chart Reviewed: Yes  Response To Previous Treatment: Patient with no complaints from previous session.   Family / Caregiver Present: No  Referring Practitioner: Baird Schwab Heis  Subjective  Subjective: Patient agreeable to therapy  General Comment  Comments: Patient resting supine in bed upon therapist arrival.  Pain Screening  Patient Currently in Pain: Denies       Objective   Bed mobility  Supine to Sit: Independent     Transfers  Sit to Stand: Minimal Assistance;Contact guard assistance  Stand to sit: Minimal Assistance;Contact guard assistance  Comment: Sit<>Stand from EOB, toilet, and chair with decreased BLE strength and proprioception noted. Progressing to [address] for standing by end of session. Does require min-CGA for sitting d/t poor eccentric control     Ambulation  Surface: level tile  Device: Rolling Walker  Assistance: Contact guard assistance  Gait Deviations: Slow Mariah  Distance: 10 ft x 2 + 155 ft  Comments: Cues for posture. Step throughout gait with circumduction bilaterally. Decreased eccentric control bilaterally during swing phase. Neuromuscular Education  Neuromuscular Comments: Standing alternating toe taps to 2\" step 3x10 with RW and CGA. Progressing to alternating tapping cones 2 x 10 with RW and SBA. Done to promote BLE coordination and proprioception. Dysmetric noted with decreased accuracy but good balance when progressing to tapping cones. Goals  Short term goals  Time Frame for Short term goals: 5 days  Short term goal 1: Patient will complete sit<>stand with SBA and LRAD. Short term goal 2: Patient will ambulate 150 ft with SBA and LRAD. Short term goal 3: Patient will ascend/descend 4 stairs with SBA and B HR  Short term goal 4: Patient will demonstrate tolerate 20 reps of B LE exercises. Short term goal 5: Patient will  an object from the floor with SBA and LRAD. Long term goals  Time Frame for Long term goals : 10 days  Long term goal 1: Patient will complete sit<>stand with MI and LRAD. Long term goal 2: Patient will ambulate 300 ft with MI and LRAD. Long term goal 3: Patient will ascend/descend 4 stairs with MI and B HR. Long term goal 4: Patient will be independent with HEP.   Long term goal 5: Patient will  an object from the floor with MI and LRAD.  Patient Goals   Patient goals : to get back to walking normally    Plan    Plan  Times per week: 5 out ot 7 days  Times per day: Daily  Current Treatment Recommendations: Strengthening, Transfer Training, Endurance Training, Balance Training, Gait Training, Functional Mobility Training, Stair training, Safety Education & Training  Safety Devices  Type of devices:  All fall risk precautions in place, Call light within reach, Chair alarm in place, Gait belt, Patient at risk for falls, Nurse notified, Telesitter in use, Left in chair     Therapy Time   Individual Concurrent Group Co-treatment   Time In 0800         Time Out 0830         Minutes 30         Timed Code Treatment Minutes: 10 [facility], [address] [facility]

## 2020-06-26 NOTE — PROGRESS NOTES
file.    Restrictions  Restrictions/Precautions  Restrictions/Precautions: Fall Risk, Up as Tolerated  Position Activity Restriction  Other position/activity restrictions: ambulate pt, Avasys  Subjective   General  Chart Reviewed: Yes  Response To Previous Treatment: Patient with no complaints from previous session.   Family / Caregiver Present: No  Referring Practitioner: Daily Angeles  Pain Screening  Patient Currently in Pain: Denies  Vital Signs  Patient Currently in Pain: Denies       Orientation  Orientation  Overall Orientation Status: Within Normal Limits  Cognition      Objective   Bed mobility  Supine to Sit: Independent  Sit to Supine: Independent  Transfers  Sit to Stand: Contact guard assistance(with RW)  Stand to sit: Contact guard assistance(with RW)  Ambulation  Ambulation?: Yes  Ambulation 1  Surface: level tile  Device: Rolling Walker  Assistance: Contact guard assistance  Gait Deviations: Slow Mariah;Decreased step length;Decreased step height  Distance: 1x 160 ft, 1x 180 ft   Stairs/Curb  Stairs?: Yes  Stairs  # Steps : 4  Stairs Height: 6\"  Rails: Bilateral  Device: No Device  Assistance: Contact guard assistance  Comment: 2x 4 reps         Exercises  Hip Flexion: 1x 15 BLE seated marches  Knee Long Arc Quad: 1x 15 BLE  Ankle Pumps: 1x 20 BLE with education to complete to assist with BLE swelling  Other exercises  Other exercises?: Yes  Other exercises 3: 1x 60 seconds standing marches in place with BUE support at // bars and CGA with cues for upright posture  Other exercises 4: 1x 60 ft lateral stepping, alternating leading with L and R foot, with cues required for leading foot orientation and upright posture at // bars with BUE support and CGA for safety  Other exercises 5: 1x 60 seconds standing hamstring curls with cues for upright posture and technique at // bars with BUE support and CGA for safety  Other exercises 6: 1x 15 forward step ups onto [employer]ire[employer] with BUE progressing to unilateral UE support at // bars with CGA for safety  Other exercises 7: 1x 15 each LE leading lateral step ups onto [employer] with BUE at // bars with CGA for safety  Other exercises 8: 2x 10 reps of sit<>stands with RW and CGA with seated rest between sets                             Goals  Short term goals  Time Frame for Short term goals: 5 days  Short term goal 1: Patient will complete sit<>stand with SBA and LRAD. Short term goal 2: Patient will ambulate 150 ft with SBA and LRAD. Short term goal 3: Patient will ascend/descend 4 stairs with SBA and B HR  Short term goal 4: Patient will demonstrate tolerate 20 reps of B LE exercises. Short term goal 5: Patient will  an object from the floor with SBA and LRAD. Long term goals  Time Frame for Long term goals : 10 days  Long term goal 1: Patient will complete sit<>stand with MI and LRAD. Long term goal 2: Patient will ambulate 300 ft with MI and LRAD. Long term goal 3: Patient will ascend/descend 4 stairs with MI and B HR. Long term goal 4: Patient will be independent with HEP. Long term goal 5: Patient will  an object from the floor with MI and LRAD. Patient Goals   Patient goals : to get back to walking normally    Plan    Plan  Times per week: 5 out ot 7 days  Times per day: Daily  Current Treatment Recommendations: Strengthening, Transfer Training, Endurance Training, Balance Training, Gait Training, Functional Mobility Training, Stair training, Safety Education & Training, Patient/Caregiver Education & Training  Safety Devices  Type of devices:  All fall risk precautions in place, Call light within reach, Gait belt, Patient at risk for falls, Nurse notified, Tara Valadez in use, Bed alarm in place, Left in bed  Restraints  Initially in place: No     Therapy Time   Individual Concurrent Group Co-treatment   Time In 1330         Time Out 1430         Minutes 60         Timed Code Treatment Minutes: [address], PT

## 2020-06-27 PROCEDURE — 97116 GAIT TRAINING THERAPY: CPT

## 2020-06-27 PROCEDURE — 97110 THERAPEUTIC EXERCISES: CPT

## 2020-06-27 PROCEDURE — 97530 THERAPEUTIC ACTIVITIES: CPT

## 2020-06-27 PROCEDURE — 97535 SELF CARE MNGMENT TRAINING: CPT

## 2020-06-27 PROCEDURE — 6360000002 HC RX W HCPCS: Performed by: PHYSICAL MEDICINE & REHABILITATION

## 2020-06-27 PROCEDURE — 1280000000 HC REHAB R&B

## 2020-06-27 PROCEDURE — 6370000000 HC RX 637 (ALT 250 FOR IP): Performed by: PHYSICAL MEDICINE & REHABILITATION

## 2020-06-27 RX ADMIN — PANTOPRAZOLE SODIUM 40 MG: 40 TABLET, DELAYED RELEASE ORAL at 06:02

## 2020-06-27 RX ADMIN — METOPROLOL TARTRATE 12.5 MG: 25 TABLET, FILM COATED ORAL at 09:02

## 2020-06-27 RX ADMIN — PAROXETINE HYDROCHLORIDE 20 MG: 20 TABLET, FILM COATED ORAL at 21:01

## 2020-06-27 RX ADMIN — MAGNESIUM GLUCONATE 500 MG ORAL TABLET 400 MG: 500 TABLET ORAL at 21:01

## 2020-06-27 RX ADMIN — Medication 1 CAPSULE: at 09:02

## 2020-06-27 RX ADMIN — DICYCLOMINE HYDROCHLORIDE 10 MG: 10 CAPSULE ORAL at 06:03

## 2020-06-27 RX ADMIN — FOLIC ACID 1 MG: 1 TABLET ORAL at 09:02

## 2020-06-27 RX ADMIN — LEVOTHYROXINE SODIUM 50 MCG: 0.05 TABLET ORAL at 06:03

## 2020-06-27 RX ADMIN — MAGNESIUM GLUCONATE 500 MG ORAL TABLET 400 MG: 500 TABLET ORAL at 09:02

## 2020-06-27 RX ADMIN — DICYCLOMINE HYDROCHLORIDE 10 MG: 10 CAPSULE ORAL at 17:34

## 2020-06-27 RX ADMIN — ENOXAPARIN SODIUM 40 MG: 40 INJECTION SUBCUTANEOUS at 09:03

## 2020-06-27 ASSESSMENT — PAIN SCALES - WONG BAKER: WONGBAKER_NUMERICALRESPONSE: 2

## 2020-06-27 ASSESSMENT — PAIN SCALES - GENERAL: PAINLEVEL_OUTOF10: 0

## 2020-06-27 ASSESSMENT — PAIN DESCRIPTION - LOCATION: LOCATION: FOOT

## 2020-06-27 ASSESSMENT — PAIN DESCRIPTION - ORIENTATION: ORIENTATION: RIGHT

## 2020-06-27 ASSESSMENT — PAIN DESCRIPTION - DESCRIPTORS: DESCRIPTORS: DISCOMFORT

## 2020-06-27 NOTE — PROGRESS NOTES
Department of Physical Medicine & Rehabilitation  Progress Note    Patient Identification:  Ramo Brennan  [id number]  : 2000  Admit date: 2020    Chief Complaint: debility    Subjective:   Patient up in chair working w/ PT  No new complaints   No events noted overnight      ROS: No f/c, n/v, cp     Objective:  Patient Vitals for the past 24 hrs:   BP Temp Temp src Pulse Resp SpO2   20 0745 112/73 98 °F (36.7 °C) Oral 94 20 100 %   20 2100 100/67 98.3 °F (36.8 °C) Oral 93 18 95 %   20 2055 98/66 -- -- -- -- --     Const: Alert. No distress, pleasant. HEENT: Normocephalic, atraumatic. Normal sclera/conjunctiva. MMM. CV: Regular rate and rhythm. Resp: No respiratory distress. Lungs CTAB. Abd: Soft, nontender, nondistended, NABS+   Ext: Trace edema. Neuro: Alert, oriented, appropriately interactive. Psych: Cooperative, appropriate mood and affect    Laboratory data: Available via EMR. Last 24 hour lab  No results found for this or any previous visit (from the past 24 hour(s)). Therapy progress:  PT  Position Activity Restriction  Other position/activity restrictions: ambulate pt, , diet-dysphagia soft and bite-sized  Objective     Sit to Stand: Contact guard assistance(with RW )  Stand to sit: Contact guard assistance(with RW)  Bed to Chair: Minimal assistance  Device: Rolling Walker  Assistance: Contact guard assistance  Distance: 2x 165 ft   OT  PT Equipment Recommendations  Equipment Needed: Yes  Mobility Devices: [place] Poisson: Rolling  Toilet - Technique: Ambulating(CGA with RW)  Equipment Used: Grab bars  Toilet Transfers Comments: increased effort off low toilet/std. toilet with grab bar on Right  Assessment        SLP          Body mass index is 26.63 kg/m².     Assessment and Plan:  Debility: likely due to prolonged bed rest/depression- continue paxil- P/OT     Depression- Continue Paxil daily- Post psych consult     Dysphagia- Improved- will monitor in therapy- SLP, bite sized diet    Tachycardia - Metoprolol - decreased to 6.25mg BID d/t low BP     Hypothyroid- continue synthroid 50mcg daily      RAMIREZ Ferris - CNP    6/27/2020  2:55 PM    The patient was seen in conjunction with the nurse practitioner with independent history, evaluation and examination performed on the same day as her encounter. I agree with the note which has been adjusted to reflect my findings. I have had face to face contact with the patient and performed a substantive portion of the E/M visit. Yoshi Motley MD 7/1/2020, 8:52 AM

## 2020-06-27 NOTE — PROGRESS NOTES
Physical Therapy  Facility/Department: [facility]  Daily Treatment Note  NAME: Nicola Fitch  : 2000  MRN: [id number]    Date of Service: 2020    Discharge Recommendations:  Home with assist PRN, Home with Home health PT   PT Equipment Recommendations  Equipment Needed: Yes  Mobility Devices: Viviann Lingon: Rolling    Assessment    Body structures, Functions, Activity limitations: Decreased functional mobility ; Decreased strength;Decreased endurance;Decreased balance;Decreased posture; Increased pain  Assessment: Patient seen for gait and LE strengthening. Pt in bed, agreeable to therapy. Patient completed transfers with CGA and RW. Pt ambulates with CGA and RW up to 165 ft with frequent cues for posture and pt demonstrating toeing out sebastien. Pt demonstrates step-to pattern initially and progresses to step-through gait sequence. Pt completed exercises to improve postural muscle strength, BLE strength, and balance with cues for technique. Pt will continue to benefit from skilled PT in ARU. Treatment Diagnosis: debility  Prognosis: Good  Decision Making: Low Complexity  PT Education: Goals; Functional Mobility Training;PT Role;Transfer Training; Adaptive Device Training;Plan of Care;Equipment;Gait Training;General Safety; Disease Specific Education  Patient Education: pt  verbalized understanding  Barriers to Learning: None  REQUIRES PT FOLLOW UP: Yes  Activity Tolerance  Activity Tolerance: Patient Tolerated treatment well;Patient limited by fatigue;Patient limited by endurance     Patient Diagnosis(es): There were no encounter diagnoses. has no past medical history on file. has no past surgical history on file.     Restrictions  Restrictions/Precautions  Restrictions/Precautions: Fall Risk, Up as Tolerated  Position Activity Restriction  Other position/activity restrictions: ambulate pt, Avasys, diet-dysphagia soft and bite-sized  Subjective   General  Chart Reviewed: Yes  Response To stepping forward and back over cone with cues for increased hip flexion, upright posture, and CGA for safety at // bars with BUE support   Other exercises 13: 1x 15 scapular squeezes with cues for posture and technique  Other exercises 14: 1x 15 modified sit ups with wedge with cues for TA contraction                      Addendum Second Session:  Pt sitting up in chair at beginning and end of session. Pt denies pain. Transfers: CGA with RW    Gait: 2x 125 ft with RW and CGA with cues for posture. Pt demonstrates step through sequence with toe out sebastien    Exercises:  -Pt ambulated around gym with RW and CGA and practiced bending over and picking up objects from floor with cones and bean bags placed on chairs, stools, and steps of varying heights without LOB. Pt completed 2 bouts to increase level of challenge on 2nd bout. Pt required cues for management of RW when picking up items.  -Pt completed 2x 1 min standing at RW and bending forward to  bean bags slightly elevated from floor level with bucket. Pt completed 1st round reaching with RUE and tossing to target upon standing with LUE support on RW. Following seated rest break, pt completed 2nd round reaching for bean bags with LUE and switching over to R hand for tossing to target with LUE support on RW. Pt completed without LOB. Goals  Short term goals  Time Frame for Short term goals: 5 days  Short term goal 1: Patient will complete sit<>stand with SBA and LRAD. Short term goal 2: Patient will ambulate 150 ft with SBA and LRAD. Short term goal 3: Patient will ascend/descend 4 stairs with SBA and B HR  Short term goal 4: Patient will demonstrate tolerate 20 reps of B LE exercises. Short term goal 5: Patient will  an object from the floor with SBA and LRAD. Long term goals  Time Frame for Long term goals : 10 days  Long term goal 1: Patient will complete sit<>stand with MI and LRAD.   Long term goal 2: Patient will ambulate 300 ft with MI and LRAD. Long term goal 3: Patient will ascend/descend 4 stairs with MI and B HR. Long term goal 4: Patient will be independent with HEP. Long term goal 5: Patient will  an object from the floor with MI and LRAD. Patient Goals   Patient goals : to get back to walking normally    Plan    Plan  Times per week: 5 out ot 7 days  Times per day: Daily  Current Treatment Recommendations: Strengthening, Transfer Training, Endurance Training, Balance Training, Gait Training, Functional Mobility Training, Stair training, Safety Education & Training, Patient/Caregiver Education & Training  Safety Devices  Type of devices:  All fall risk precautions in place, Call light within reach, Gait belt, Patient at risk for falls, Nurse notified, Telesitter in use, Chair alarm in place, Left in chair  Restraints  Initially in place: No     Therapy Time   Individual Concurrent Group Co-treatment   Time In 0800         Time Out 0900         Minutes 60         Timed Code Treatment Minutes: Melye 6   Time In 1000         Time Out 1030         Minutes 30           Total Treatment Time: 90 minutes    Otilia Rabago, PT

## 2020-06-28 PROCEDURE — 1280000000 HC REHAB R&B

## 2020-06-28 PROCEDURE — 6370000000 HC RX 637 (ALT 250 FOR IP): Performed by: PHYSICAL MEDICINE & REHABILITATION

## 2020-06-28 PROCEDURE — 6360000002 HC RX W HCPCS: Performed by: PHYSICAL MEDICINE & REHABILITATION

## 2020-06-28 PROCEDURE — 6370000000 HC RX 637 (ALT 250 FOR IP): Performed by: NURSE PRACTITIONER

## 2020-06-28 RX ADMIN — DICYCLOMINE HYDROCHLORIDE 10 MG: 10 CAPSULE ORAL at 06:13

## 2020-06-28 RX ADMIN — METOPROLOL TARTRATE 6.25 MG: 25 TABLET, FILM COATED ORAL at 11:19

## 2020-06-28 RX ADMIN — LEVOTHYROXINE SODIUM 50 MCG: 0.05 TABLET ORAL at 06:13

## 2020-06-28 RX ADMIN — PAROXETINE HYDROCHLORIDE 20 MG: 20 TABLET, FILM COATED ORAL at 21:05

## 2020-06-28 RX ADMIN — PANTOPRAZOLE SODIUM 40 MG: 40 TABLET, DELAYED RELEASE ORAL at 06:13

## 2020-06-28 RX ADMIN — MAGNESIUM GLUCONATE 500 MG ORAL TABLET 400 MG: 500 TABLET ORAL at 21:05

## 2020-06-28 RX ADMIN — Medication 1 CAPSULE: at 11:20

## 2020-06-28 RX ADMIN — ENOXAPARIN SODIUM 40 MG: 40 INJECTION SUBCUTANEOUS at 11:18

## 2020-06-28 RX ADMIN — DICYCLOMINE HYDROCHLORIDE 10 MG: 10 CAPSULE ORAL at 16:59

## 2020-06-28 RX ADMIN — METOPROLOL TARTRATE 6.25 MG: 25 TABLET, FILM COATED ORAL at 21:05

## 2020-06-28 RX ADMIN — BISACODYL 5 MG: 5 TABLET, COATED ORAL at 11:20

## 2020-06-28 RX ADMIN — FOLIC ACID 1 MG: 1 TABLET ORAL at 11:19

## 2020-06-28 RX ADMIN — MAGNESIUM GLUCONATE 500 MG ORAL TABLET 400 MG: 500 TABLET ORAL at 11:19

## 2020-06-28 ASSESSMENT — PAIN SCALES - GENERAL: PAINLEVEL_OUTOF10: 0

## 2020-06-28 NOTE — PROGRESS NOTES
Department of Physical Medicine & Rehabilitation  Progress Note    Patient Identification:  Jose Silva  [id number]  : 2000  Admit date: 2020    Chief Complaint: debility    Subjective:   Patient lying in bed this afternoon resting, No new complaints   No events noted overnight    ROS: No f/c, n/v, cp     Objective:  Patient Vitals for the past 24 hrs:   BP Temp Temp src Pulse Resp SpO2 Weight   20 1100 115/76 98.3 °F (36.8 °C) Oral 98 20 100 % --   20 0440 -- -- -- -- -- -- 153 lb 14.4 oz (69.8 kg)   20 2045 (!) 92/58 97.7 °F (36.5 °C) Oral 88 18 100 % --     Const: Alert. No distress, pleasant. HEENT: Normocephalic, atraumatic. Normal sclera/conjunctiva. MMM. CV: Regular rate and rhythm. Resp: No respiratory distress. Lungs CTAB. Abd: Soft, nontender, nondistended, NABS+   Ext: Trace edema. Neuro: Alert, oriented, appropriately interactive. Psych: Cooperative, appropriate mood and affect    Laboratory data: Available via EMR. Last 24 hour lab  No results found for this or any previous visit (from the past 24 hour(s)). Therapy progress:  PT  Position Activity Restriction  Other position/activity restrictions: ambulate pt, , diet-dysphagia soft and bite-sized  Objective     Sit to Stand: Contact guard assistance(with RW )  Stand to sit: Contact guard assistance(with RW)  Bed to Chair: Minimal assistance  Device: Rolling Walker  Assistance: Contact guard assistance  Distance: 2x 165 ft   OT  PT Equipment Recommendations  Equipment Needed: Yes  Mobility Devices: [place]: Rolling  Toilet - Technique: Ambulating(CGA with RW)  Equipment Used: Grab bars  Toilet Transfers Comments: increased effort off low toilet/std. toilet with grab bar on Right  Assessment        SLP          Body mass index is 25.61 kg/m².     Assessment and Plan:  Debility: likely due to prolonged bed rest/depression- continue paxil- P/OT     Depression- Continue Paxil daily- Post psych consult     Dysphagia- Improved- will monitor in therapy- SLP, bite sized diet    Tachycardia - Metoprolol - decreased to 6.25mg BID on 6/27 d/t low BP     Hypothyroid- continue synthroid 50mcg daily      RAMIREZ Fleming - CNP    6/28/2020  2:09 PM    The patient was seen in conjunction with the nurse practitioner with independent history, evaluation and examination performed on the same day as her encounter. I agree with the note which has been adjusted to reflect my findings. I have had face to face contact with the patient and performed a substantive portion of the E/M visit. Yoshi Zhu MD 7/1/2020, 8:52 AM

## 2020-06-29 LAB
ANION GAP SERPL CALCULATED.3IONS-SCNC: 11 MMOL/L (ref 3–16)
BASOPHILS ABSOLUTE: 0.1 K/UL (ref 0–0.2)
BASOPHILS RELATIVE PERCENT: 0.9 %
BUN BLDV-MCNC: 9 MG/DL (ref 7–20)
CALCIUM SERPL-MCNC: 9.3 MG/DL (ref 8.3–10.6)
CHLORIDE BLD-SCNC: 107 MMOL/L (ref 99–110)
CO2: 25 MMOL/L (ref 21–32)
CREAT SERPL-MCNC: 0.6 MG/DL (ref 0.6–1.1)
EOSINOPHILS ABSOLUTE: 0.2 K/UL (ref 0–0.6)
EOSINOPHILS RELATIVE PERCENT: 2.6 %
GFR AFRICAN AMERICAN: >60
GFR NON-AFRICAN AMERICAN: >60
GLUCOSE BLD-MCNC: 90 MG/DL (ref 70–99)
HCT VFR BLD CALC: 32.4 % (ref 36–48)
HEMOGLOBIN: 10.5 G/DL (ref 12–16)
LYMPHOCYTES ABSOLUTE: 1.8 K/UL (ref 1–5.1)
LYMPHOCYTES RELATIVE PERCENT: 25.4 %
MCH RBC QN AUTO: 33 PG (ref 26–34)
MCHC RBC AUTO-ENTMCNC: 32.5 G/DL (ref 31–36)
MCV RBC AUTO: 101.5 FL (ref 80–100)
MONOCYTES ABSOLUTE: 0.4 K/UL (ref 0–1.3)
MONOCYTES RELATIVE PERCENT: 6.3 %
NEUTROPHILS ABSOLUTE: 4.6 K/UL (ref 1.7–7.7)
NEUTROPHILS RELATIVE PERCENT: 64.8 %
PDW BLD-RTO: 17.2 % (ref 12.4–15.4)
PLATELET # BLD: 653 K/UL (ref 135–450)
PMV BLD AUTO: 6.4 FL (ref 5–10.5)
POTASSIUM REFLEX MAGNESIUM: 3.7 MMOL/L (ref 3.5–5.1)
RBC # BLD: 3.19 M/UL (ref 4–5.2)
SODIUM BLD-SCNC: 143 MMOL/L (ref 136–145)
WBC # BLD: 7.1 K/UL (ref 4–11)

## 2020-06-29 PROCEDURE — 1280000000 HC REHAB R&B

## 2020-06-29 PROCEDURE — 97530 THERAPEUTIC ACTIVITIES: CPT

## 2020-06-29 PROCEDURE — 97116 GAIT TRAINING THERAPY: CPT

## 2020-06-29 PROCEDURE — 97110 THERAPEUTIC EXERCISES: CPT

## 2020-06-29 PROCEDURE — 6360000002 HC RX W HCPCS: Performed by: PHYSICAL MEDICINE & REHABILITATION

## 2020-06-29 PROCEDURE — 85025 COMPLETE CBC W/AUTO DIFF WBC: CPT

## 2020-06-29 PROCEDURE — 80048 BASIC METABOLIC PNL TOTAL CA: CPT

## 2020-06-29 PROCEDURE — 6370000000 HC RX 637 (ALT 250 FOR IP): Performed by: PHYSICAL MEDICINE & REHABILITATION

## 2020-06-29 PROCEDURE — 36415 COLL VENOUS BLD VENIPUNCTURE: CPT

## 2020-06-29 PROCEDURE — 97535 SELF CARE MNGMENT TRAINING: CPT

## 2020-06-29 RX ADMIN — PANTOPRAZOLE SODIUM 40 MG: 40 TABLET, DELAYED RELEASE ORAL at 06:35

## 2020-06-29 RX ADMIN — Medication 1 CAPSULE: at 09:22

## 2020-06-29 RX ADMIN — DICYCLOMINE HYDROCHLORIDE 10 MG: 10 CAPSULE ORAL at 06:35

## 2020-06-29 RX ADMIN — LEVOTHYROXINE SODIUM 50 MCG: 0.05 TABLET ORAL at 06:35

## 2020-06-29 RX ADMIN — FOLIC ACID 1 MG: 1 TABLET ORAL at 09:22

## 2020-06-29 RX ADMIN — DICYCLOMINE HYDROCHLORIDE 10 MG: 10 CAPSULE ORAL at 15:08

## 2020-06-29 RX ADMIN — MAGNESIUM GLUCONATE 500 MG ORAL TABLET 400 MG: 500 TABLET ORAL at 20:34

## 2020-06-29 RX ADMIN — METOPROLOL TARTRATE 6.25 MG: 25 TABLET, FILM COATED ORAL at 20:34

## 2020-06-29 RX ADMIN — MAGNESIUM GLUCONATE 500 MG ORAL TABLET 400 MG: 500 TABLET ORAL at 09:22

## 2020-06-29 RX ADMIN — PAROXETINE HYDROCHLORIDE 20 MG: 20 TABLET, FILM COATED ORAL at 20:34

## 2020-06-29 RX ADMIN — ENOXAPARIN SODIUM 40 MG: 40 INJECTION SUBCUTANEOUS at 09:22

## 2020-06-29 RX ADMIN — METOPROLOL TARTRATE 6.25 MG: 25 TABLET, FILM COATED ORAL at 09:22

## 2020-06-29 RX ADMIN — BISACODYL 5 MG: 5 TABLET, COATED ORAL at 09:22

## 2020-06-29 ASSESSMENT — PAIN SCALES - GENERAL
PAINLEVEL_OUTOF10: 0
PAINLEVEL_OUTOF10: 0

## 2020-06-29 NOTE — PROGRESS NOTES
Department of Physical Medicine & Rehabilitation  Progress Note    Patient Identification:  Ramo Brennan  [id number]  : 2000  Admit date: 2020    Chief Complaint: debility    Subjective:   Patient seen this morning. She states she did well over the weekend. She is feeling better this morning. Repeat labs are still pending this morning. She feels she is getting stronger with her therapies. ROS: No f/c, n/v, cp     Objective:  Patient Vitals for the past 24 hrs:   BP Temp Temp src Pulse Resp SpO2   20 2100 108/73 98.1 °F (36.7 °C) Oral 81 18 100 %   20 1100 115/76 98.3 °F (36.8 °C) Oral 98 20 100 %     Const: Alert. No distress, pleasant. HEENT: Normocephalic, atraumatic. Normal sclera/conjunctiva. MMM. CV: Regular rate and rhythm. Resp: No respiratory distress. Lungs CTAB. Abd: Soft, nontender, nondistended, NABS+   Ext: Trace edema. Neuro: Alert, oriented, appropriately interactive. Psych: Cooperative, appropriate mood and affect    Laboratory data: Available via EMR. Last 24 hour lab  No results found for this or any previous visit (from the past 24 hour(s)). Therapy progress:  PT  Position Activity Restriction  Other position/activity restrictions: ambulate pt, , diet-dysphagia soft and bite-sized  Objective     Sit to Stand: Contact guard assistance(with RW )  Stand to sit: Contact guard assistance(with RW)  Bed to Chair: Minimal assistance  Device: Rolling Walker  Assistance: Contact guard assistance  Distance: 2x 165 ft   OT  PT Equipment Recommendations  Equipment Needed: Yes  Mobility Devices: [place] Poisson: Rolling  Toilet - Technique: Ambulating(CGA with RW)  Equipment Used: Grab bars  Toilet Transfers Comments: increased effort off low toilet/std. toilet with grab bar on Right                  Body mass index is 25.61 kg/m².        Assessment and Plan:      Debility: likely due to prolonged bed rest/depression- continue paxil- P/OT     Depression- Continue Paxil daily- Post psych consult     Dysphagia- Improved- will monitor in therapy- SLP, bite sized diet    Tachycardia - Metoprolol - decreased to 6.25mg BID on 6/27 d/t low BP     Hypothyroid- continue synthroid 50mcg daily        Janak Bland MD    6/29/2020  7:52 AM

## 2020-06-29 NOTE — PATIENT CARE CONFERENCE
[facility]  Inpatient Rehabilitation  Weekly Team Conference Note    Date: 2020  Patient Name: Stephanie Perez        MRN: [id number]    : 2000  (22 y.o.)  Gender: female   Referring Practitioner: Yoandy Angeles  Diagnosis: debility      Interventions to be utilized toward barriers to discharge, per discipline:  [address] observed barriers to dc: Cognitive deficit, Depression, Incontinence of bowel, Incontinence of bladder and Wound Care  Nursing interventions: Bowel and bladder training, wound care to sebastien feet  Family Education: No  Fall Risk:  Yes      Physical therapy observed barriers to dc:    Baseline: indep, lives with family, [address] one level home   Current level: SBA for transfers, ambulated 425 ft with RW and SBA; CGA to min for 8 stairs with bilateral rails   Barriers to DC: balance, generalized weakness   Needs in order to achieve dc home/next level of care: Supervision for household and community distances. Supervision to negotiate 3 stairs      Physical therapy interventions:   Current Treatment Recommendations: Strengthening, Transfer Training, Endurance Training, Balance Training, Gait Training, Functional Mobility Training, Stair training, Safety Education & Training, Patient/Caregiver Education & Training      PHYSICAL THERAPY  PT Equipment Recommendations  Equipment Needed: Yes  Mobility Devices: [place] Balk: Rolling    Assessment: Pt able to improve to SBA with sit to/from stand [place] [facility] [place] even progressed to SBA with ambulation at RW up to 425 ft. Pt required CGA to ascend stairs and Sandie to descend. Worked on sit<>stand from chair without use of UE's, required mod A from therapist. Pt demonstrated decreased LE muscle control (especially quad control) with stair managment. Targeted LE strength and muscular control/endurance with various sitting and standing exercises this date.   Pt will continue to benefit from skilled PT in ARU.      Occupational therapy observed barriers to dc:    Baseline: IND with ADL/IADL, mobility/transfers without device; Does not work but watches her 35 year old niece   Current level: SBA-Min A dynamic standing, functional mobility/transfers, BADL   Barriers to DC: balance, generalized weakness    Needs in order to achieve dc home/next level of care: Mod I/Ind transfers and household mobility, BADL; lives with family, so will have assist for IADL as needed; could benefit from occupational exploration    Occupational Therapy interventions:  Current Treatment Recommendations: Self-Care / ADL, Strengthening, Functional Mobility Training, Endurance Training, Balance Training, Safety Education & Training, Equipment Evaluation, Education, & procurement, Patient/Caregiver Education & Training, Gait Training      OCCUPATIONAL THERAPY  Assessment: Pt continues to be agreeable to treatment, improving with level of assist for functional transfers, continues to demonstrate weakness for transfers UE/LE as well as in hands, with hypermobility noted at joints. Pt verbalized understanding of education provided this session, stating only person goal is to get stronger, with no further occupational exploration goal stated. Continue OT tx. SPEECH THERAPY   Eval completed as ordered at time of admission. No further ST services warranted. See evaluation for details. NUTRITION  Weight: 153 lb 14.4 oz (69.8 kg) / Body mass index is 25.61 kg/m². Diet Order: DIET GENERAL; Dysphagia Soft and Bite-Sized; Lactose Controlled  Dietary Nutrition Supplements: Low Calorie High Protein Supplement  PO Meals Eaten (%): 76 - 100%  Education: Declined      CASE MANAGEMENT  Assessment: Lives in the community in a two story home with father, sister, sister's boyfriend, minor niece and 7 dogs that share space with patient (husky, pitbull, boxer and four Chihuahuas. Father is a  and works during the week.  Patient states sister is back to work and she watches 3year old niece. No equipment noted. PCP: No PCP noted at this time. Pharmacy: [address] [place] [address] [phone]  Home Health: to be determined    Interdisciplinary Goals:   1.) Pt will complete LE dressing with setup/supervision  2.) Pt will ambulate to/from bathroom with supervision and RW.  3.)Patient and family will understand fall preventions    Discharge Plan   Estimated discharge date: 7/3/2020  Destination: outpatient therapies  Pass:No  Services at Discharge: Outpatient Physical Therapy, Occupational Therapy and Nursing  Equipment at Discharge: Tub transfer bench, Rolling walker. Team Members Present at Conference:  : Shine JOSE LCSW   Occupational Therapist: Joao Delgado, OTR/L  Physical Therapist: Maxine Ball PT  Speech Therapist: N/A  Nurse:Sima Cardona RN  Dietician: Kennedy Moritz, RDN, LD  Psychiatry: N/A    Family members present at conference: N/A      I led this team conference and I approve the established interdisciplinary plan of care as documented within the medical record of [facility].     MD: Dr. Francine Nicole

## 2020-06-29 NOTE — PROGRESS NOTES
Occupational Therapy  Facility/Department: [facility]  Daily Treatment Note  NAME: Galvin Babinski  : 2000  MRN: [id number]    Date of Service: 2020    Discharge Recommendations:  24 hour supervision or assist, Home with Home health OT  OT Equipment Recommendations  Other: CTA need for tub transfer bench     Assessment   Performance deficits / Impairments: Decreased functional mobility ; Decreased ADL status; Decreased safe awareness;Decreased balance;Decreased endurance;Decreased high-level IADLs;Decreased coordination;Decreased strength;Decreased posture  Assessment: Pt continues to be agreeable to treatment, improving with level of assist for functional transfers, continues to demonstrate weakness for transfers UE/LE as well as in hands, with hypermobility noted at joints. Pt verbalized understanding of education provided this session, stating only person goal is to get stronger, with no further occupational exploration goal stated. Continue OT tx.   OT Education: OT Role;ADL Adaptive Strategies; Plan of Care;Transfer Training;Energy Conservation;Equipment  Patient Education: importance of routine, mobility   REQUIRES OT FOLLOW UP: Yes  Activity Tolerance  Activity Tolerance: Patient Tolerated treatment well  Safety Devices  Safety Devices in place: Yes  Type of devices: Call light within reach; Left in chair;Chair alarm in place;Gait belt         Patient Diagnosis(es): There were no encounter diagnoses. has no past medical history on file. has no past surgical history on file. Restrictions  Restrictions/Precautions  Restrictions/Precautions: Fall Risk, Up as Tolerated  Position Activity Restriction  Other position/activity restrictions: ambulate pt  Subjective   General  Chart Reviewed: Yes  Patient assessed for rehabilitation services?: Yes  Additional Pertinent Hx: Pt admitted to [facility] for possible syncopal episode.  Pt c/o difficulty swallow resulting in 50lb wt loss, inability to ambulate with pt spending much of the day in bed, and c/o feeling \"numb all over\". Pt treated for severe hyponatremia, anemia and depression. Family / Caregiver Present: No  Referring Practitioner: MALKA Angeles DO  Diagnosis: debility  Subjective  Subjective: Pt in bed on arrival, pleasant and agreeable to treatment   General Comment  Comments: Pt resting in bed, agreeable to OT  Vital Signs  Patient Currently in Pain: Denies   Orientation  Orientation  Overall Orientation Status: Within Functional Limits  Objective    ADL  Grooming: Stand by assistance;Setup(in stance at sink)  LE Dressing: Stand by assistance;Setup  Toileting: Stand by assistance        Balance  Sitting Balance: Modified independent   Standing Balance: Stand by assistance  Standing Balance  Time: >4 minutes, 2-3 minutes, 1-2 minutes over multiple trials   Activity: mobility, standing ADL   Functional Mobility  Functional - Mobility Device: Rolling Walker  Activity: To/from bathroom; To/From therapy gym; Other  Assist Level: Contact guard assistance(to SBA)  Toilet Transfers  Toilet - Technique: Ambulating(RW)  Equipment Used: Standard toilet(R grab bar, pt reports toilet at home is higher and has stable object nearby to use for UE support)  Toilet Transfer: Contact guard assistance  Bed mobility  Supine to Sit: Independent  Sit to Supine: Unable to assess(pt left up in chair)  Transfers  Sit to stand: Contact guard assistance;Stand by assistance  Stand to sit: Stand by assistance        Coordination  Fine Motor: Issued written HEP for red theraputty, with pt demonstrating understanding of all exercises (fingertip pinch, full grasp, power , finger spread, finger scissors, individual finger extension)     Cognition  Overall Cognitive Status: (pleasant and cooperative)  Following Commands:  Follows multistep commands consistently  Attention Span: Attends with cues to redirect  Problem Solving: Assistance required to generate solutions  Initiation: Does not require cues  Sequencing: Does not require cues        Type of ROM/Therapeutic Exercise  Type of ROM/Therapeutic Exercise: Medicine Ball;AROM  Comment: BUE seated, 2kg medicine ball   Exercises  Scapular Protraction: x15 no resistance   Scapular Retraction: x15 no resistance   Shoulder Depression: x20 no resistance   Shoulder Elevation: x20 no resistance   Shoulder Flexion: x10 to 90*  Elbow Flexion: x25  Elbow Extension: x25  Grasp/Release: x15  Other: x15 chest press, x10 steering wheels; EZ exerboard: x2 lateral key pinch, x3 individual finger extension, x5 three-jaw holland, x4 supination/pronation, x4 wrist flexion/ext, x6 full finger flexion/ext         Second session:  Pt in bed on arrival, denied pain, pleasant and agreeable to treatment. Pt ambulated to/from [facility] and therapy gym, and back to room with one seated rest break each trial, and seated rest break between rounds of bean bag toss. Pt completed supine to sit and sit to supine with IND this session. Pt left in bed with needs in reach. Gross motor coordination: Fair for tossing bean bags to target in stance over 3 minutes at a time. Pt retrieved some bags from floor level with RW with and without use of reacher, with 2 self-corrected LOB during activity.  Pt required SBA-CGA during activity, also verbalized understanding of safe reaching techniques educated on with use of RW     Plan   Plan  Times per week: 5/7 days/week   Current Treatment Recommendations: Self-Care / ADL, Strengthening, Functional Mobility Training, Endurance Training, Balance Training, Safety Education & Training, Equipment Evaluation, Education, & procurement, Patient/Caregiver Education & Training, Gait Training    Goals  Short term goals  Time Frame for Short term goals: in 5 days   Short term goal 1: Perform grooming independently  Short term goal 2: Perform UE dressing independently  Short term goal 3: Perform toilet transfer with Mod I  Long term goals  Time

## 2020-06-29 NOTE — CARE COORDINATION
Patient rounds; patient working with therapy at time of rounds. Patient states she feels therapy is going well. Denies needs/concerns at this time.  will continue to follow and offer supports as needed.      REBECA Foley

## 2020-06-29 NOTE — PLAN OF CARE
Problem: Skin Integrity:  Goal: Will show no infection signs and symptoms  Description: Will show no infection signs and symptoms  Outcome: Ongoing  Goal: Absence of new skin breakdown  Description: Absence of new skin breakdown  Outcome: Ongoing     Problem: Infection:  Goal: Will remain free from infection  Description: Will remain free from infection  Outcome: Ongoing     Problem: Safety:  Goal: Free from accidental physical injury  Description: Free from accidental physical injury  Outcome: Ongoing     Problem: Daily Care:  Goal: Daily care needs are met  Description: Daily care needs are met  Outcome: Ongoing     Problem: Pain:  Goal: Patient's pain/discomfort is manageable  Description: Patient's pain/discomfort is manageable  Outcome: Ongoing     Problem: Skin Integrity:  Goal: Skin integrity will stabilize  Description: Skin integrity will stabilize  Outcome: Ongoing     Problem: Falls - Risk of:  Goal: Will remain free from falls  Description: Will remain free from falls  Outcome: Ongoing

## 2020-06-29 NOTE — PROGRESS NOTES
Nutrition Assessment    Type and Reason for Visit: Reassess    Nutrition Recommendations:   1. Continue general, soft and bite sized diet  2. Diet texture/liquid level per SLP recommendations  3. Continue Ensure High Protein ONS  4. Encourage po intakes  5. Monitor po intakes, nutrition adequacy, weights, pertinent labs, BMs    Nutrition Assessment: Follow up: Pt improving from a nutritional standpoint AEB po intakes increasing. Po intakes % per EMR. Pt continues on a soft and bite sized diet with Ensure High Protein ONS. Tolerating diet. SLP continues to follow. Will continue current diet and ONS. Encourage po intakes. Malnutrition Assessment:  · Malnutrition Status: Meets the criteria for moderate malnutrition  · Context: Social or environmental circumstances  · Findings of the 6 clinical characteristics of malnutrition (Minimum of 2 out of 6 clinical characteristics is required to make the diagnosis of moderate or severe Protein Calorie Malnutrition based on AND/ASPEN Guidelines):  1. Energy Intake-Less than or equal to 75% of estimated energy requirement, Greater than or equal to 3 months    2. Weight Loss-Unable to assess,    3. Fat Loss-Unable to assess,    4. Muscle Loss-Unable to assess,    5. Fluid Accumulation-Mild fluid accumulation, Extremities  6.  Strength-Not measured    Nutrition Risk Level: Moderate    Nutrient Needs:  · Estimated Daily Total Kcal: 1253-1529  · Estimated Daily Protein (g): 73-87  · Estimated Daily Total Fluid (ml/day): 1 ml/kcal    Nutrition Diagnosis:   · Problem: Moderate malnutrition  · Etiology: related to Insufficient energy/nutrient consumption     Signs and symptoms:  as evidenced by Diet history of poor intake, Presence of wounds    Objective Information:  · Nutrition-Focused Physical Findings: Missing teeth.  +1 pitting BLE Edema  · Wound Type: Multiple  · Current Nutrition Therapies:  · Oral Diet Orders: General, Dysphagia  Soft and Bite-Sized (Dysphagia 3)   · Oral Diet intake: %  · Oral Nutrition Supplement (ONS) Orders: Low Calorie High Protein Supplement  · ONS intake: Unable to assess  · Anthropometric Measures:  · Ht: 5' 5\" (165.1 cm)   · Current Body Wt: 153 lb 14.4 oz (69.8 kg)  · % Weight Change:  ,  JESSICA  · Ideal Body Wt: 125 lb (56.7 kg)   · BMI Classification: BMI 25.0 - 29.9 Overweight    Nutrition Interventions:   Continue current diet, Continue current ONS  Continued Inpatient Monitoring, Speech Therapy    Nutrition Evaluation:   · Evaluation: Progressing toward goals   · Goals:  Tolerate diet and have po intakes 50% or greater during ARU stay    · Monitoring: Meal Intake, Supplement Intake, Diet Tolerance, Nutrition Progression, Weight, Pertinent Labs, Chewing/Swallowing      Electronically signed by Stephon Greene RD, LD on 6/29/20 at 12:20 PM EDT    Contact Number: Office: [phone]; [address]: [phone]

## 2020-06-30 PROCEDURE — 6360000002 HC RX W HCPCS: Performed by: PHYSICAL MEDICINE & REHABILITATION

## 2020-06-30 PROCEDURE — 97535 SELF CARE MNGMENT TRAINING: CPT

## 2020-06-30 PROCEDURE — 97530 THERAPEUTIC ACTIVITIES: CPT

## 2020-06-30 PROCEDURE — 6370000000 HC RX 637 (ALT 250 FOR IP): Performed by: PHYSICAL MEDICINE & REHABILITATION

## 2020-06-30 PROCEDURE — 97110 THERAPEUTIC EXERCISES: CPT

## 2020-06-30 PROCEDURE — 1280000000 HC REHAB R&B

## 2020-06-30 PROCEDURE — 97116 GAIT TRAINING THERAPY: CPT

## 2020-06-30 RX ADMIN — MAGNESIUM GLUCONATE 500 MG ORAL TABLET 400 MG: 500 TABLET ORAL at 20:39

## 2020-06-30 RX ADMIN — ENOXAPARIN SODIUM 40 MG: 40 INJECTION SUBCUTANEOUS at 08:46

## 2020-06-30 RX ADMIN — DICYCLOMINE HYDROCHLORIDE 10 MG: 10 CAPSULE ORAL at 15:01

## 2020-06-30 RX ADMIN — LEVOTHYROXINE SODIUM 50 MCG: 0.05 TABLET ORAL at 06:36

## 2020-06-30 RX ADMIN — BISACODYL 5 MG: 5 TABLET, COATED ORAL at 08:45

## 2020-06-30 RX ADMIN — METOPROLOL TARTRATE 6.25 MG: 25 TABLET, FILM COATED ORAL at 08:45

## 2020-06-30 RX ADMIN — METOPROLOL TARTRATE 6.25 MG: 25 TABLET, FILM COATED ORAL at 20:39

## 2020-06-30 RX ADMIN — DICYCLOMINE HYDROCHLORIDE 10 MG: 10 CAPSULE ORAL at 06:36

## 2020-06-30 RX ADMIN — MAGNESIUM GLUCONATE 500 MG ORAL TABLET 400 MG: 500 TABLET ORAL at 08:45

## 2020-06-30 RX ADMIN — PANTOPRAZOLE SODIUM 40 MG: 40 TABLET, DELAYED RELEASE ORAL at 06:36

## 2020-06-30 RX ADMIN — PAROXETINE HYDROCHLORIDE 20 MG: 20 TABLET, FILM COATED ORAL at 20:39

## 2020-06-30 RX ADMIN — Medication 1 CAPSULE: at 08:45

## 2020-06-30 RX ADMIN — FOLIC ACID 1 MG: 1 TABLET ORAL at 08:45

## 2020-06-30 ASSESSMENT — PAIN SCALES - GENERAL
PAINLEVEL_OUTOF10: 0
PAINLEVEL_OUTOF10: 0

## 2020-06-30 NOTE — PROGRESS NOTES
12.4 - 15.4 %    Platelets 020 (H) 428 - 450 K/uL    MPV 6.4 5.0 - 10.5 fL    Neutrophils % 64.8 %    Lymphocytes % 25.4 %    Monocytes % 6.3 %    Eosinophils % 2.6 %    Basophils % 0.9 %    Neutrophils Absolute 4.6 1.7 - 7.7 K/uL    Lymphocytes Absolute 1.8 1.0 - 5.1 K/uL    Monocytes Absolute 0.4 0.0 - 1.3 K/uL    Eosinophils Absolute 0.2 0.0 - 0.6 K/uL    Basophils Absolute 0.1 0.0 - 0.2 K/uL       Therapy progress:  PT  Position Activity Restriction  Other position/activity restrictions: ambulate pt  Objective     Sit to Stand: Stand by assistance(to RW)  Stand to sit: Stand by assistance(to RW)  Bed to Chair: Minimal assistance  Device: Rolling Walker  Assistance: Stand by assistance(CGA progressed to SBA)  Distance: 165 ft + 425 ft  OT  PT Equipment Recommendations  Equipment Needed: Yes  Mobility Devices: Virginia Magen: Rolling  Toilet - Technique: Ambulating(RW)  Equipment Used: Standard toilet(R grab bar, pt reports toilet at home is higher and has stable object nearby to use for UE support)  Toilet Transfers Comments: increased effort off low toilet/std. toilet with grab bar on Right                  Body mass index is 25.61 kg/m².        Assessment and Plan:      Debility: likely due to prolonged bed rest/depression- continue paxil- P/OT     Depression- Continue Paxil daily- Post psych consult     Dysphagia- Improved- will monitor in therapy- SLP, bite sized diet    Tachycardia - Metoprolol - decreased to 6.25mg BID on 6/27 d/t low BP     Hypothyroid- continue synthroid 50mcg daily        Janak Bland MD    6/30/2020  9:24 AM

## 2020-06-30 NOTE — PROGRESS NOTES
Physical Therapy  Facility/Department: [facility]  Daily Treatment Note  NAME: Ramo Brennan  : 2000  MRN: [id number]    Date of Service: 2020    Discharge Recommendations:  Home with assist PRN, Home with Home health PT   PT Equipment Recommendations  Equipment Needed: Yes  Mobility Devices: Sprague Poisson: Rolling    Assessment   Body structures, Functions, Activity limitations: Decreased functional mobility ; Decreased strength;Decreased endurance;Decreased balance;Decreased posture; Increased pain  Assessment: Pt able to improve to SBA with sit to/from stand transfers at [facility] and even progressed to SBA with ambulation at  up to 425 ft. Pt required CGA to ascend stairs and Sandie to descend. Worked on sit<>stand from chair without use of UE's, required mod A from therapist. Pt demonstrated decreased LE muscle control (especially quad control) with stair managment. Targeted LE strength and muscular control/endurance with various sitting and standing exercises this date. Pt will continue to benefit from skilled PT in ARU. Treatment Diagnosis: debility  Prognosis: Good  Decision Making: Low Complexity  PT Education: Goals; Functional Mobility Training;PT Role;Transfer Training; Adaptive Device Training;Plan of Care;Equipment;Gait Training;General Safety; Disease Specific Education  Patient Education: pt  verbalized understanding  Barriers to Learning: None  REQUIRES PT FOLLOW UP: Yes  Activity Tolerance  Activity Tolerance: Patient Tolerated treatment well;Patient limited by fatigue     Patient Diagnosis(es): There were no encounter diagnoses. has no past medical history on file. has no past surgical history on file.     Restrictions  Restrictions/Precautions  Restrictions/Precautions: Fall Risk, Up as Tolerated  Position Activity Restriction  Other position/activity restrictions: ambulate pt  Subjective   General  Chart Reviewed: Yes  Response To Previous Treatment: Patient reporting fatigue but able to participate.(requests to split PT sessions in the future due to fatigue)  Referring Practitioner: Georgina Angeles  Subjective  Subjective: pt agreeable to therapy  General Comment  Comments: Patient up in chair upon therapist arrival.  Pain Screening  Patient Currently in Pain: Denies  Vital Signs  Patient Currently in Pain: Denies       Orientation  Orientation  Overall Orientation Status: Within Normal Limits        Objective   Bed mobility  Supine to Sit: Unable to assess  Sit to Supine: Unable to assess  Scooting: Independent(to scoot forward and back in chair)  Transfers  Sit to Stand: Stand by assistance(to RW)  Stand to sit: Stand by assistance  Bed to Chair: Unable to assess(pt up in joyce upon entry and at EOS)  Comment: sit<>stand x 10 trials without UE assist with mod A; sit<>stand with UE assist with SBA; sit<>stand from toilet with SBA and use of grab bar  Ambulation  Ambulation?: Yes  Ambulation 1  Surface: level tile  Device: Rolling Walker  Assistance: Stand by assistance(CGA progressed to SBA)  Gait Deviations: Slow Mariah;Decreased step length;Decreased step height(decreased heel strike, mild B foot drop)  Distance: 150 ft x 2 plus 425 ft  Ambulation 2  Surface - 2: carpet  Device 2: Rolling Walker  Assistance 2: Minimal assistance  Gait Deviations: Slow Mariah  Distance: 10 ft  Comments: decreased ankle DF thom on LLE, forward flexed at hips with B knee hyperextension during stance phase  Stairs  # Steps : 8  Stairs Height: 6\"  Rails: Bilateral  Assistance: Contact guard assistance;Minimal assistance  Comment: Note increased use of UE to ascend/descend, note weak LE control.      Balance  Posture: Fair  Standing - Static: Fair;+  Standing - Dynamic: Fair;+  Exercises  Hamstring Sets: seated: 2 x 15 BLE  and green Tband  Gluteal Sets: x 25  Hip Flexion: 2 x 15 BLE seated marches 2#  Hip Abduction: seated: 3 x 10 BLE green Tband: seated hip add with pillow squeeze 3 x 10  Knee Long Arc Quad: 2 x 15 BLE 2#  Ankle Pumps: 2 x 15 BLE 2# plus 3 x 10 DF and PF with yellow tband plus 3 x 20 seconds claf stretch with sheet  Comments: sit<>stand x 10 with UE use and x 10 reps with mod A without use of UE's; Other exercises  Other exercises 1: step ups at 6 inch x 10 B in parallel bars with heavy use of UE's with SBA  Other exercises 2: sit<>stand 2 x 5 reps with SBA and heavy use of UE's on chair chinedu; x 10 sit<>stand with mod A without UE use                          Goals  Short term goals  Time Frame for Short term goals: 5 days  Short term goal 1: Patient will complete sit<>stand with SBA and LRAD. - goal met at [facility] 6/29  Short term goal 2: Patient will ambulate 150 ft with SBA and LRAD. - goal met at [facility] 6/[place]  Short term goal 3: Patient will ascend/descend 4 stairs with SBA and B HR - not met, requires [facility] 6/29  Short term goal 4: Patient will demonstrate tolerate 20 reps of B LE exercises. - goal met at [facility] 6/[place]  Short term goal 5: Patient will  an object from the floor with SBA and LRAD. Long term goals  Time Frame for Long term goals : 10 days  Long term goal 1: Patient will complete sit<>stand with MI and LRAD. Long term goal 2: Patient will ambulate 300 ft with MI and LRAD. Long term goal 3: Patient will ascend/descend 4 stairs with MI and B HR. Long term goal 4: Patient will be independent with HEP. Long term goal 5: Patient will  an object from the floor with MI and LRAD. Patient Goals   Patient goals : to get back to walking normally    Plan    Plan  Times per week: 5 out ot 7 days  Times per day: Daily  Current Treatment Recommendations: Strengthening, Transfer Training, Endurance Training, Balance Training, Gait Training, Functional Mobility Training, Stair training, Safety Education & Training, Patient/Caregiver Education & Training  Safety Devices  Type of devices:  All fall risk precautions in place, Call light within reach, Gait belt, Patient at risk for falls, Nurse notified, Chair alarm in place, Left in chair  Restraints  Initially in place: No     Therapy Time   Individual Concurrent Group Co-treatment   Time In 1000         Time Out 1130         Minutes 90         Timed Code Treatment Minutes: [address] [facility], PT

## 2020-06-30 NOTE — PROGRESS NOTES
Occupational Therapy  Facility/Department: [facility]  Daily Treatment Note  NAME: Ramo Brennan  : 2000  MRN: [id number]    Date of Service: 2020    Discharge Recommendations:  24 hour supervision or assist, Home with Home health OT  OT Equipment Recommendations  Other: CTA need for tub transfer bench     Assessment   Performance deficits / Impairments: Decreased functional mobility ; Decreased ADL status; Decreased safe awareness;Decreased balance;Decreased endurance;Decreased high-level IADLs;Decreased coordination;Decreased strength;Decreased posture  Assessment: Pt continues to be pleasant and agreeable to treatment, improving with level of assist for LE and standing ADL tasks this date, but continues to be limited by endurance and generalized weakness. Pt with good tolerance of seated exercises without resistance in first session. Continue OT tx.  OT Education: OT Role;ADL Adaptive Strategies; Plan of Care;Transfer Training;Energy Conservation;Equipment;Home Exercise Program  Patient Education: importance of mobility   REQUIRES OT FOLLOW UP: Yes  Activity Tolerance  Activity Tolerance: Patient Tolerated treatment well  Activity tolerance comment: Pt /73 (), spo2 99% seated up in chair following ADL/exercises   Safety Devices  Safety Devices in place: Yes  Type of devices: Call light within reach; Left in chair;Chair alarm in place;Gait belt;Nurse notified         Patient Diagnosis(es): There were no encounter diagnoses. has no past medical history on file. has no past surgical history on file. Restrictions  Restrictions/Precautions  Restrictions/Precautions: Fall Risk, Up as Tolerated  Position Activity Restriction  Other position/activity restrictions: ambulate pt  Subjective   General  Chart Reviewed: Yes  Patient assessed for rehabilitation services?: Yes  Additional Pertinent Hx: Pt admitted to [facility] for possible syncopal episode.  Pt c/o difficulty swallow resulting in 50lb wt loss, inability to ambulate with pt spending much of the day in bed, and c/o feeling \"numb all over\". Pt treated for severe hyponatremia, anemia and depression. Family / Caregiver Present: No  Referring Practitioner: MALKA Angeles DO  Diagnosis: debility  Subjective  Subjective: Pt in bed on arrival, saturated in urine, reporting was able to feel she needed to go, but did not have assistance in time - pt educated on importance of toileting schedule and methods of calling out for staff in these situations with pt verbalizing understanding and RN reinforcing education; requesting to shower   General Comment  Comments: Pt resting in bed, agreeable to OT  Vital Signs  Patient Currently in Pain: Denies   Orientation  Orientation  Overall Orientation Status: Within Functional Limits  Objective    ADL  Feeding: Independent  Grooming: Setup;Supervision(in stance at sink)  UE Bathing: Setup(seated)  LE Bathing: Setup; Increased time to complete(seated)  UE Dressing: Setup(seated)  LE Dressing: Stand by assistance;Setup; Increased time to complete  Toileting: Stand by assistance        Balance  Sitting Balance: Modified independent   Standing Balance: Supervision  Standing Balance  Time: >3 minutes, 1-2 minutes, 2-3 minutes over multiple trials  Activity: mobility, standing ADL   Functional Mobility  Functional - Mobility Device: Rolling Walker  Activity: To/from bathroom; Other  Assist Level: Stand by assistance  Toilet Transfers  Toilet - Technique: Ambulating(RW)  Equipment Used: Standard toilet(use of R grab bar)  Toilet Transfer: Stand by assistance  Shower Transfers  Shower - Transfer From: Mpayy - Transfer Type: To and From  Shower - Transfer To:  Shower seat with back  Shower Transfers: Stand by assistance  Bed mobility  Supine to Sit: Independent  Sit to Supine: Unable to assess(pt left up in chair)  Scooting: Independent  Transfers  Sit to stand: Stand by assistance  Stand to sit: Stand by assistance Cognition  Overall Cognitive Status: (pleasant, cooperative)  Following Commands: Follows multistep commands consistently  Attention Span: Attends with cues to redirect  Problem Solving: Assistance required to generate solutions  Initiation: Requires cues for some         Exercises  Scapular Protraction: x15 no resistance   Scapular Retraction: x15 no resistance   Chair Push-ups: x10  Other: x20 partial sit ups, x3 gentle cervical rotation, x10 full grasp 3#, x3 individual fingers 3#          Plan   Plan  Times per week: 5/7 days/week   Current Treatment Recommendations: Self-Care / ADL, Strengthening, Functional Mobility Training, Endurance Training, Balance Training, Safety Education & Training, Equipment Evaluation, Education, & procurement, Patient/Caregiver Education & Training, Gait Training    Second session:  Pt up in chair on arrival, pleasant and agreeable to treatment, requesting to use restroom. Pt completed functional mobility/transfers with SBA and RW this session, including toileting transfer. Pt progressing to Supervision for toileting and for grooming in stance at sink. Pt with fair tolerance of UE exercises, and completed community distance to/from therapy gym and out to healing garden without rest breaks. Pt left up in chair with needs in reach and PCA entering to change pt's bed and assist pt back to bed.      Exercises: BUE seated, 2#  x15 scapular retraction/protraction  x25 elbow flexion/ext  x30 wrist flexion/ext no resistance   x20 supination/pronation  Shoulder flexion x10  x15 overhead tricep extension   x15 horizontal abduction/adduction    Goals  Short term goals  Time Frame for Short term goals: in 5 days   Short term goal 1: Perform grooming independently  Short term goal 2: Perform UE dressing independently  Short term goal 3: Perform toilet transfer with Mod I  Long term goals  Time Frame for Long term goals : in 9 days (7/02)  Long term goal 1: Perform UE/LE bathing with s/u while seated on shower bench  Long term goal 2: Perform LE dressing Mod I  Long term goal 3: Perform tub/shower transfer with supervision  Long term goal 4: Perform item retrieval with Mod I  Patient Goals   Patient goals : \"Start walking again\"        Therapy Time   Individual Concurrent Group Co-treatment   Time In 0800         Time Out 0900         Minutes 60         Timed Code Treatment Minutes: 60 Minutes     Second Session Therapy Time:   Individual Concurrent Group Co-treatment   Time In 1230         Time Out 1300         Minutes 30           Timed Code Treatment Minutes:  30    Total Treatment Minutes:  90     Jeanne Min OTR/L

## 2020-06-30 NOTE — PLAN OF CARE
Problem: Skin Integrity:  Goal: Will show no infection signs and symptoms  Description: Will show no infection signs and symptoms  Outcome: Ongoing     Problem: Infection:  Goal: Will remain free from infection  Description: Will remain free from infection  Outcome: Ongoing     Problem: Safety:  Goal: Free from accidental physical injury  Description: Free from accidental physical injury  Outcome: Ongoing  Goal: Free from intentional harm  Description: Free from intentional harm  Outcome: Ongoing     Problem: Daily Care:  Goal: Daily care needs are met  Description: Daily care needs are met  Outcome: Ongoing     Problem: Pain:  Goal: Patient's pain/discomfort is manageable  Description: Patient's pain/discomfort is manageable  Outcome: Ongoing     Problem: Skin Integrity:  Goal: Skin integrity will stabilize  Description: Skin integrity will stabilize  Outcome: Ongoing

## 2020-07-01 LAB
ANION GAP SERPL CALCULATED.3IONS-SCNC: 8 MMOL/L (ref 3–16)
BASOPHILS ABSOLUTE: 0 K/UL (ref 0–0.2)
BASOPHILS RELATIVE PERCENT: 0.6 %
BUN BLDV-MCNC: 6 MG/DL (ref 7–20)
CALCIUM SERPL-MCNC: 9.1 MG/DL (ref 8.3–10.6)
CHLORIDE BLD-SCNC: 106 MMOL/L (ref 99–110)
CO2: 26 MMOL/L (ref 21–32)
CREAT SERPL-MCNC: <0.5 MG/DL (ref 0.6–1.1)
EOSINOPHILS ABSOLUTE: 0.2 K/UL (ref 0–0.6)
EOSINOPHILS RELATIVE PERCENT: 3.3 %
GFR AFRICAN AMERICAN: >60
GFR NON-AFRICAN AMERICAN: >60
GLUCOSE BLD-MCNC: 85 MG/DL (ref 70–99)
HCT VFR BLD CALC: 32.4 % (ref 36–48)
HEMOGLOBIN: 10.4 G/DL (ref 12–16)
LYMPHOCYTES ABSOLUTE: 2.5 K/UL (ref 1–5.1)
LYMPHOCYTES RELATIVE PERCENT: 36.5 %
MCH RBC QN AUTO: 32.8 PG (ref 26–34)
MCHC RBC AUTO-ENTMCNC: 32.2 G/DL (ref 31–36)
MCV RBC AUTO: 101.8 FL (ref 80–100)
MONOCYTES ABSOLUTE: 0.5 K/UL (ref 0–1.3)
MONOCYTES RELATIVE PERCENT: 7.3 %
NEUTROPHILS ABSOLUTE: 3.6 K/UL (ref 1.7–7.7)
NEUTROPHILS RELATIVE PERCENT: 52.3 %
PDW BLD-RTO: 16.7 % (ref 12.4–15.4)
PLATELET # BLD: 497 K/UL (ref 135–450)
PMV BLD AUTO: 6.5 FL (ref 5–10.5)
POTASSIUM REFLEX MAGNESIUM: 3.7 MMOL/L (ref 3.5–5.1)
RBC # BLD: 3.18 M/UL (ref 4–5.2)
SODIUM BLD-SCNC: 140 MMOL/L (ref 136–145)
WBC # BLD: 6.9 K/UL (ref 4–11)

## 2020-07-01 PROCEDURE — 6370000000 HC RX 637 (ALT 250 FOR IP): Performed by: PHYSICAL MEDICINE & REHABILITATION

## 2020-07-01 PROCEDURE — 97116 GAIT TRAINING THERAPY: CPT

## 2020-07-01 PROCEDURE — 84443 ASSAY THYROID STIM HORMONE: CPT

## 2020-07-01 PROCEDURE — 6360000002 HC RX W HCPCS: Performed by: PHYSICAL MEDICINE & REHABILITATION

## 2020-07-01 PROCEDURE — 80048 BASIC METABOLIC PNL TOTAL CA: CPT

## 2020-07-01 PROCEDURE — 36415 COLL VENOUS BLD VENIPUNCTURE: CPT

## 2020-07-01 PROCEDURE — 1280000000 HC REHAB R&B

## 2020-07-01 PROCEDURE — 97530 THERAPEUTIC ACTIVITIES: CPT

## 2020-07-01 PROCEDURE — 97535 SELF CARE MNGMENT TRAINING: CPT

## 2020-07-01 PROCEDURE — 97110 THERAPEUTIC EXERCISES: CPT

## 2020-07-01 PROCEDURE — 85025 COMPLETE CBC W/AUTO DIFF WBC: CPT

## 2020-07-01 RX ADMIN — METOPROLOL TARTRATE 6.25 MG: 25 TABLET, FILM COATED ORAL at 08:09

## 2020-07-01 RX ADMIN — DICYCLOMINE HYDROCHLORIDE 10 MG: 10 CAPSULE ORAL at 06:06

## 2020-07-01 RX ADMIN — LEVOTHYROXINE SODIUM 50 MCG: 0.05 TABLET ORAL at 06:06

## 2020-07-01 RX ADMIN — FOLIC ACID 1 MG: 1 TABLET ORAL at 08:09

## 2020-07-01 RX ADMIN — PANTOPRAZOLE SODIUM 40 MG: 40 TABLET, DELAYED RELEASE ORAL at 06:06

## 2020-07-01 RX ADMIN — Medication 1 CAPSULE: at 08:09

## 2020-07-01 RX ADMIN — MAGNESIUM GLUCONATE 500 MG ORAL TABLET 400 MG: 500 TABLET ORAL at 21:26

## 2020-07-01 RX ADMIN — DICYCLOMINE HYDROCHLORIDE 10 MG: 10 CAPSULE ORAL at 16:00

## 2020-07-01 RX ADMIN — BISACODYL 5 MG: 5 TABLET, COATED ORAL at 08:09

## 2020-07-01 RX ADMIN — MAGNESIUM GLUCONATE 500 MG ORAL TABLET 400 MG: 500 TABLET ORAL at 08:10

## 2020-07-01 RX ADMIN — PAROXETINE HYDROCHLORIDE 20 MG: 20 TABLET, FILM COATED ORAL at 21:26

## 2020-07-01 RX ADMIN — ERGOCALCIFEROL 50000 UNITS: 1.25 CAPSULE ORAL at 08:09

## 2020-07-01 RX ADMIN — ENOXAPARIN SODIUM 40 MG: 40 INJECTION SUBCUTANEOUS at 08:10

## 2020-07-01 ASSESSMENT — PAIN SCALES - GENERAL
PAINLEVEL_OUTOF10: 0
PAINLEVEL_OUTOF10: 0

## 2020-07-01 NOTE — PROGRESS NOTES
Occupational Therapy  Facility/Department: [facility]  Daily Treatment Note  NAME: Ramo Brennan  : 2000  MRN: [id number]    Date of Service: 2020    Discharge Recommendations:  24 hour supervision or assist, Outpatient OT  OT Equipment Recommendations  Equipment Needed: Yes  Mobility Devices: ADL Assistive Devices  Other: Pt would benefit from use of a tub-transfer bench at d/c to increase safety/independence with bathing/dressing tasks at d/c. Assessment   Performance deficits / Impairments: Decreased functional mobility ; Decreased ADL status; Decreased safe awareness;Decreased balance;Decreased endurance;Decreased high-level IADLs;Decreased coordination;Decreased strength;Decreased posture  Assessment: Pt continues to be pleasant and agreeable to treatment, improving with level of assist for standing ADL and functional transfers tasks this date, continues to be limited by endurance and generalized weakness. Pt with fair tolerance of seated and mat exercises in first session. Continue OT tx.  OT Education: OT Role;ADL Adaptive Strategies; Plan of Care;Transfer Training;Energy Conservation;Equipment;Home Exercise Program  Patient Education: importance of mobility   REQUIRES OT FOLLOW UP: Yes  Activity Tolerance  Activity Tolerance: Patient Tolerated treatment well  Safety Devices  Safety Devices in place: Yes  Type of devices: Call light within reach; Left in chair;Chair alarm in place;Gait belt;Nurse notified         Patient Diagnosis(es): There were no encounter diagnoses. has no past medical history on file. has no past surgical history on file.     Restrictions  Restrictions/Precautions  Restrictions/Precautions: Fall Risk, Up as Tolerated  Position Activity Restriction  Other position/activity restrictions: ambulate pt  Subjective   General  Chart Reviewed: Yes  Patient assessed for rehabilitation services?: Yes  Additional Pertinent Hx: Pt admitted to [facility] for possible syncopal x25  Elbow Extension: x25  Other: Attempted bird dog x2 in quadruped, x10 alternating UEs, then x10 alternating LEs d/t difficulty with UE/LE at the same time; x10 + 20 heel touches in hooklying, x10 + 10 partial push ups; 4# medicine ball: x30 trunk rotation, x10 steering wheels, x15 2x overhead tricep pulls; x20 seconds each LE hamstring stretch in sitting    Second session:  Pt up in chair on arrival, pleasant and agreeable to treatment. Pt completed functional transfers with Supervision-Mod I this session, and brief mobility with Supervision. Pt completed reaching task grasping clothespins of different resistances and placing on vertical donell in stance with moments of UE support at tabletop with SBA-CGA, with good recall of 4-step instructions for task. Pt also completed assembly of donell in stance as bimanual task, with one balance falter but self-corrected. Pt asking appropriate questions about d/c this date, and verbalized understanding of education provided. Pt also verbalized understanding of equipment recommendation, bringing up community re-entry and verbalizing understanding of education on that topic after discussion. Encouraged pt to write down further questions to address in therapy sessions next date. Pt also expressing goal of being able to play soccer or basketball recreationally again in the future - given encouragement for occupational exploration as role of OT, with pt verbalizing understanding. Pt motivated to participate in exercise, and requesting multiple HEPs for d/c. Plan to issue HEPs and review next date. Pt left up in chair with needs in reach, alarm activated.        Plan   Plan  Times per week: 5/7 days/week   Current Treatment Recommendations: Self-Care / ADL, Strengthening, Functional Mobility Training, Endurance Training, Balance Training, Safety Education & Training, Equipment Evaluation, Education, & procurement, Patient/Caregiver Education & Training, Gait Training    Goals  Short term goals  Time Frame for Short term goals: in 5 days   Short term goal 1: Perform grooming independently - GOAL MET 7/1  Short term goal 2: Perform UE dressing independently  Short term goal 3: Perform toilet transfer with Mod I  Long term goals  Time Frame for Long term goals : in 9 days (7/02)  Long term goal 1: Perform UE/LE bathing with s/u while seated on shower bench - GOAL MET 6/30  Long term goal 2: Perform LE dressing Mod I  Long term goal 3: Perform tub/shower transfer with supervision  Long term goal 4: Perform item retrieval with Mod I  Patient Goals   Patient goals : \"Start walking again\"        Therapy Time   Individual Concurrent Group Co-treatment   Time In 0900         Time Out 1000         Minutes 60         Timed Code Treatment Minutes: 60 Minutes     Second Session Therapy Time:   Individual Concurrent Group Co-treatment   Time In 1330         Time Out 1400         Minutes 30           Timed Code Treatment Minutes:  30    Total Treatment Minutes:  90    Curlee Kamaljit, OTR/L

## 2020-07-01 NOTE — CARE COORDINATION
Writer met with patient to discuss disposition of care conference with interdisciplinary team on 7/1/2020              Discussed:  Recommendations for outpatient therapy and equipment. Patient in agreement. Patient would like referral for mental health services as well. Recommendations: Home with outpatient PT/OT to [facility]. Rolling walker, tub transfer bench. Anticipated discharge date: 7/3/2020    Patient verbalized understanding of recommendations and anticipated discharge date.      REBECA Foley

## 2020-07-01 NOTE — PROGRESS NOTES
Department of Physical Medicine & Rehabilitation  Progress Note    Patient Identification:  Prince Ibanez  [id number]  : 2000  Admit date: 2020    Chief Complaint: debility    Subjective: Pt seen this AM. Patient was discussed this morning in rehab conference with the entire rehab team that includes PT, OT, nursing, dietary, speech and rehab unit management to determine patient's progress and when patient will be ready for discharge.  We think patient will be ready for discharge to home  In 2 days, on Friday. We will have her continue with outpatient PT and OT after discharge. She states she did better in her therapies yesterday, and is walking over 400 feet with PT. Mira Amandat She does not have any pain complaints anywhere. ROS: No f/c, n/v, cp     Objective:  Patient Vitals for the past 24 hrs:   BP Temp Temp src Pulse Resp SpO2   205 100/63 98.1 °F (36.7 °C) Oral 91 18 100 %   20 0840 113/73 97.6 °F (36.4 °C) Oral 109 18 100 %     Const: Alert. No distress, pleasant. HEENT: Normocephalic, atraumatic. Normal sclera/conjunctiva. MMM. CV: Regular rate and rhythm. Resp: No respiratory distress. Lungs CTAB. Abd: Soft, nontender, nondistended, NABS+   Ext: Trace edema. Neuro: Alert, oriented, appropriately interactive. Psych: Cooperative, appropriate mood and affect    Laboratory data: Available via EMR.    Last 24 hour lab  Recent Results (from the past 24 hour(s))   Basic Metabolic Panel w/ Reflex to MG    Collection Time: 20  6:23 AM   Result Value Ref Range    Sodium 140 136 - 145 mmol/L    Potassium reflex Magnesium 3.7 3.5 - 5.1 mmol/L    Chloride 106 99 - 110 mmol/L    CO2 26 21 - 32 mmol/L    Anion Gap 8 3 - 16    Glucose 85 70 - 99 mg/dL    BUN 6 (L) 7 - 20 mg/dL    CREATININE <0.5 (L) 0.6 - 1.1 mg/dL    GFR Non-African American >60 >60    GFR African American >60 >60    Calcium 9.1 8.3 - 10.6 mg/dL   CBC auto differential    Collection Time: 20 6: 23 AM   Result Value Ref Range    WBC 6.9 4.0 - 11.0 K/uL    RBC 3.18 (L) 4.00 - 5.20 M/uL    Hemoglobin 10.4 (L) 12.0 - 16.0 g/dL    Hematocrit 32.4 (L) 36.0 - 48.0 %    .8 (H) 80.0 - 100.0 fL    MCH 32.8 26.0 - 34.0 pg    MCHC 32.2 31.0 - 36.0 g/dL    RDW 16.7 (H) 12.4 - 15.4 %    Platelets 012 (H) 707 - 450 K/uL    MPV 6.5 5.0 - 10.5 fL    Neutrophils % 52.3 %    Lymphocytes % 36.5 %    Monocytes % 7.3 %    Eosinophils % 3.3 %    Basophils % 0.6 %    Neutrophils Absolute 3.6 1.7 - 7.7 K/uL    Lymphocytes Absolute 2.5 1.0 - 5.1 K/uL    Monocytes Absolute 0.5 0.0 - 1.3 K/uL    Eosinophils Absolute 0.2 0.0 - 0.6 K/uL    Basophils Absolute 0.0 0.0 - 0.2 K/uL       Therapy progress:  PT  Position Activity Restriction  Other position/activity restrictions: ambulate pt  Objective     Sit to Stand: Stand by assistance(to RW)  Stand to sit: Stand by assistance  Bed to Chair: Unable to assess(pt up in joyce upon entry and at EOS)  Device: [address]: Stand by assistance(CGA progressed to SBA)  Distance: 150 ft x 2 plus 425 ft  OT  PT Equipment Recommendations  Equipment Needed: Yes  Mobility Devices: Altamese Hacker: Rolling  Toilet - Technique: Ambulating(RW)  Equipment Used: Standard toilet(use of R grab bar)  Toilet Transfers Comments: increased effort off low toilet/std. toilet with grab bar on Right                  Body mass index is 25.61 kg/m².        Assessment and Plan:      Debility: likely due to prolonged bed rest/depression- continue paxil- P/OT     Depression- Continue Paxil daily- Post psych consult     Dysphagia- Improved- will monitor in therapy- SLP, bite sized diet    Tachycardia - Metoprolol - decreased to 6.25mg BID on 6/27 d/t low BP     Hypothyroid- continue synthroid 50mcg daily        Fortunato Bauer MD    7/1/2020  7:22 AM

## 2020-07-01 NOTE — PROGRESS NOTES
file.    Restrictions  Restrictions/Precautions  Restrictions/Precautions: Fall Risk, Up as Tolerated  Position Activity Restriction  Other position/activity restrictions: ambulate pt  Subjective   General  Response To Previous Treatment: Patient with no complaints from previous session. Family / Caregiver Present: No  Referring Practitioner: Nidia Angeles  Subjective  Subjective: pt agreeable to therapy  General Comment  Comments: Patient up in chair upon therapist arrival.  Pain Screening  Patient Currently in Pain: Denies  Vital Signs  Patient Currently in Pain: Denies       Cognition   Cognition  Overall Cognitive Status: WFL  Arousal/Alertness: Appropriate responses to stimuli  Following Commands: Follows multistep commands consistently  Problem Solving: Assistance required to generate solutions  Initiation: Does not require cues  Sequencing: Does not require cues  Objective      Transfers  Sit to Stand: Supervision;Modified independent(at RW, from chair and toilet; progressing to mod I)  Stand to sit: Supervision;Modified independent(at RW, to chair and toilet; progressing to mod I)  Ambulation  Ambulation?: Yes  Ambulation 1  Surface: level tile  Device: Rolling Walker  Assistance: Supervision;Modified Independent(progressing to mod I)  Gait Deviations: Slow Mariah;Decreased step length;Decreased step height(decreased heel strike (B foot drop))  Distance: 160 ft + 160 ft + 410 ft  Comments: Also targeted another 140 ft with 1 UE support at irvin rail and intermittent no UE support. Pt required CGA up to one episode min A when fatigued and pt crossed midline with LE and sustained LOB requiring min A from therapist to recover.   Ambulation 2  Surface - 2: level tile  Device 2: Parallel Bars(U UE, switching R and L UE)  Assistance 2: Contact guard assistance;Stand by assistance  Gait Deviations: Slow Mariah;Decreased step length;Decreased step height  Distance: 48 ft x2  Comments: decreased ankle DF thom on LLE, with B knee hyperextension during stance phase  Ambulation 3  Surface - 3: level tile  Device 3: No device(no UE support in // bars)  Assistance 3: Contact guard assistance;Minimal assistance  Quality of Gait 3: increased unsteadiness  Distance: 24 ft  Comments: occasional UE light touch to bars for balance/stability  Stairs  # Steps : 6  Stairs Height: 4\"  Rails: Bilateral  Assistance: Stand by assistance;Contact guard assistance(emerging SBA)  Comment: Attempted 1 step with 1 HR and required min A        Exercises  Hip Flexion: 2 x 20 BLE seated marches   Ankle Pumps: sitting ankle DF with varying knee angles 3 x 20 BLE, unable/trace DF with ankles directly under knees  Other exercises  Other exercises 1: standing TKE at RW w/ green Tband resistance, x 20 BLE  Other exercises 2: mini squats at RW 2 x 10, tactile support to ensure proper form  Other exercises 3: squat hold at // bar, 1 x 10 with 5 count hold  Other exercises 4: 1 x 5 backward step up then forward step down from 4\" step with B HR at stairs  Other exercises 5: grapevine in // bars x 48 ft                           Goals  Short term goals  Time Frame for Short term goals: 5 days  Short term goal 1: Patient will complete sit<>stand with SBA and LRAD. - goal met at [facility] 6/29  Short term goal 2: Patient will ambulate 150 ft with SBA and LRAD. - goal met at [facility] 6/29  Short term goal 3: Patient will ascend/descend 4 stairs with SBA and B HR - not met, requires CGA 7/1  Short term goal 4: Patient will demonstrate tolerate 20 reps of B LE exercises. - goal met at [facility] 6/29  Short term goal 5: Patient will  an object from the floor with SBA and LRAD. Long term goals  Time Frame for Long term goals : 10 days  Long term goal 1: Patient will complete sit<>stand with MI and LRAD. Long term goal 2: Patient will ambulate 300 ft with MI and LRAD. Long term goal 3: Patient will ascend/descend 4 stairs with MI and B HR.   Long term goal 4: Patient will be independent with HEP. Long term goal 5: Patient will  an object from the floor with MI and LRAD. Patient Goals   Patient goals : to get back to walking normally    Plan    Plan  Times per week: 5 out ot 7 days  Times per day: Daily  Current Treatment Recommendations: Strengthening, Transfer Training, Endurance Training, Balance Training, Gait Training, Functional Mobility Training, Stair training, Safety Education & Training, Patient/Caregiver Education & Training  Safety Devices  Type of devices:  All fall risk precautions in place, Call light within reach, Gait belt, Nurse notified, Left in chair  Restraints  Initially in place: No     Therapy Time   Individual Concurrent Group Co-treatment   Time In 1030         Time Out 1130         Minutes 60         Timed Code Treatment Minutes: [address], PT    Second Session Therapy Time:   Individual Concurrent Group Co-treatment   Time In 1230         Time Out 1300         Minutes 30             Total Treatment Minutes:  [address], PT, DPT

## 2020-07-02 LAB — TSH SERPL DL<=0.05 MIU/L-ACNC: 4.59 UIU/ML (ref 0.27–4.2)

## 2020-07-02 PROCEDURE — 6360000002 HC RX W HCPCS: Performed by: PHYSICAL MEDICINE & REHABILITATION

## 2020-07-02 PROCEDURE — 1280000000 HC REHAB R&B

## 2020-07-02 PROCEDURE — 6370000000 HC RX 637 (ALT 250 FOR IP): Performed by: PHYSICAL MEDICINE & REHABILITATION

## 2020-07-02 PROCEDURE — 97530 THERAPEUTIC ACTIVITIES: CPT

## 2020-07-02 PROCEDURE — 97110 THERAPEUTIC EXERCISES: CPT

## 2020-07-02 PROCEDURE — 97535 SELF CARE MNGMENT TRAINING: CPT

## 2020-07-02 PROCEDURE — 97116 GAIT TRAINING THERAPY: CPT

## 2020-07-02 RX ORDER — PAROXETINE HYDROCHLORIDE 20 MG/1
20 TABLET, FILM COATED ORAL NIGHTLY
Qty: 30 TABLET | Refills: 3 | Status: SHIPPED | OUTPATIENT
Start: 2020-07-02 | End: 2020-10-21

## 2020-07-02 RX ORDER — LEVOTHYROXINE SODIUM 0.1 MG/1
100 TABLET ORAL DAILY
Status: DISCONTINUED | OUTPATIENT
Start: 2020-07-03 | End: 2020-07-03 | Stop reason: HOSPADM

## 2020-07-02 RX ORDER — PANTOPRAZOLE SODIUM 40 MG/1
40 TABLET, DELAYED RELEASE ORAL
Qty: 30 TABLET | Refills: 3 | Status: SHIPPED | OUTPATIENT
Start: 2020-07-03 | End: 2020-10-21

## 2020-07-02 RX ORDER — DICYCLOMINE HYDROCHLORIDE 10 MG/1
10 CAPSULE ORAL
Qty: 120 CAPSULE | Refills: 3 | Status: SHIPPED | OUTPATIENT
Start: 2020-07-02 | End: 2021-02-19 | Stop reason: SDUPTHER

## 2020-07-02 RX ORDER — ERGOCALCIFEROL 1.25 MG/1
50000 CAPSULE ORAL WEEKLY
Qty: 5 CAPSULE | Refills: 1 | Status: SHIPPED | OUTPATIENT
Start: 2020-07-08 | End: 2020-12-28 | Stop reason: SDUPTHER

## 2020-07-02 RX ORDER — LEVOTHYROXINE SODIUM 0.05 MG/1
50 TABLET ORAL DAILY
Status: DISCONTINUED | OUTPATIENT
Start: 2020-07-02 | End: 2020-07-02

## 2020-07-02 RX ORDER — FOLIC ACID 1 MG/1
1 TABLET ORAL DAILY
Qty: 30 TABLET | Refills: 3 | Status: SHIPPED | OUTPATIENT
Start: 2020-07-03 | End: 2020-12-28 | Stop reason: SDUPTHER

## 2020-07-02 RX ORDER — LEVOTHYROXINE SODIUM 0.1 MG/1
100 TABLET ORAL DAILY
Qty: 30 TABLET | Refills: 3 | Status: SHIPPED | OUTPATIENT
Start: 2020-07-03 | End: 2020-10-21

## 2020-07-02 RX ADMIN — BISACODYL 5 MG: 5 TABLET, COATED ORAL at 08:01

## 2020-07-02 RX ADMIN — DICYCLOMINE HYDROCHLORIDE 10 MG: 10 CAPSULE ORAL at 06:19

## 2020-07-02 RX ADMIN — PAROXETINE HYDROCHLORIDE 20 MG: 20 TABLET, FILM COATED ORAL at 21:12

## 2020-07-02 RX ADMIN — LEVOTHYROXINE SODIUM 50 MCG: 0.05 TABLET ORAL at 06:19

## 2020-07-02 RX ADMIN — MAGNESIUM GLUCONATE 500 MG ORAL TABLET 400 MG: 500 TABLET ORAL at 08:01

## 2020-07-02 RX ADMIN — METOPROLOL TARTRATE 6.25 MG: 25 TABLET, FILM COATED ORAL at 21:12

## 2020-07-02 RX ADMIN — ENOXAPARIN SODIUM 40 MG: 40 INJECTION SUBCUTANEOUS at 08:00

## 2020-07-02 RX ADMIN — METOPROLOL TARTRATE 6.25 MG: 25 TABLET, FILM COATED ORAL at 08:00

## 2020-07-02 RX ADMIN — FOLIC ACID 1 MG: 1 TABLET ORAL at 08:00

## 2020-07-02 RX ADMIN — Medication 1 CAPSULE: at 08:01

## 2020-07-02 RX ADMIN — DICYCLOMINE HYDROCHLORIDE 10 MG: 10 CAPSULE ORAL at 15:34

## 2020-07-02 RX ADMIN — PANTOPRAZOLE SODIUM 40 MG: 40 TABLET, DELAYED RELEASE ORAL at 06:19

## 2020-07-02 ASSESSMENT — PAIN SCALES - GENERAL
PAINLEVEL_OUTOF10: 0
PAINLEVEL_OUTOF10: 0

## 2020-07-02 NOTE — DISCHARGE INSTR - COC
Continuity of Care Form    Patient Name: Micki Duran   :  2000  MRN:  [id number]    Admit date:  2020  Discharge date:  ***    Code Status Order: Full Code   Advance Directives:   Advance Care Flowsheet Documentation     Date/Time Healthcare Directive Type of Healthcare Directive Copy in [address] Agent's Name Healthcare Agent's Phone Number    [phone]  No, patient does not have an advance directive for healthcare treatment -- -- -- -- --          Admitting Physician:  Mckenna Angeles DO  PCP: No primary care provider on file. Discharging Nurse: [facility] Unit/Room#: 0932/1606-04  Discharging Unit Phone Number: ***    Emergency Contact:   Extended Emergency Contact Information  Primary Emergency Contact: Valencia West  Address: [address] [place] [address], [address] [place] [address],[address]  Home Phone: [phone]  Relation: Parent    Past Surgical History:  No past surgical history on file.     Immunization History:   Immunization History   Administered Date(s) Administered    DTaP 2000, 1999, 03/15/1999, 2000, 2003    HPV Quadrivalent (Gardasil) 2010, 10/11/2010, 2011    Hepatitis B 2000, 2000, 03/15/1999    Hib, unspecified 1998, 1999, 03/15/1999, 2000    MMR 12/10/1999, 2003    Meningococcal MCV4P (Menactra) 2015    Polio IPV (IPOL) 1998, 1999, 2000, 2003    Tdap (Boostrix, Adacel) 2010    Varicella (Varivax) 2007, 2008       Active Problems:  Patient Active Problem List   Diagnosis Code    Obesity E66.9    Hyponatremia E87.1    Moderate malnutrition ([address] E44.0    Hypokalemia E87.6    Syncope R55    Right atrial mass I51.89    Dysphagia R13.10    Other specified hypothyroidism E03.8    Severe malnutrition (HCC) E43    Abnormal finding on echocardiogram R93.1    Tachycardia R00.0    Abnormal CT of the chest AM   Dressing/Treatment Barrier film 7/2/2020  8:32 AM   Wound Cleansed Wound cleanser 6/28/2020 11:00 AM   Dressing Change Due 07/02/20 7/2/2020  8:32 AM   Wound Length (cm) 1.7 cm 6/25/2020  2:11 PM   Wound Width (cm) 1 cm 6/25/2020  2:11 PM   Wound Depth (cm) 0 cm 6/25/2020  2:11 PM   Wound Surface Area (cm^2) 1.7 cm^2 6/25/2020  2:11 PM   Wound Volume (cm^3) 0 cm^3 6/25/2020  2:11 PM   Wound Assessment Clean;Dry; Intact; Black 7/2/2020  8:32 AM   Drainage Amount None 7/2/2020  8:32 AM   Odor None 7/2/2020  8:32 AM   Margins Attached edges; Defined edges 6/28/2020 11:00 AM   Aretha-wound Assessment Dry; Intact; Swelling 6/28/2020 11:00 AM   Yellow%Wound Bed 10 6/25/2020  2:11 PM   Other%Wound Bed 90 6/25/2020  2:11 PM   Culture Taken No 6/25/2020  2:11 PM   Number of days: 6       Wound 06/25/20 Foot Dorsal;Right (Active)   Wound Image   6/25/2020  2:11 PM   Wound Traumatic 7/1/2020  9:15 PM   Dressing Status Dry; Intact 7/1/2020  8:00 AM   Dressing Changed Changed/New 7/1/2020  8:00 AM   Dressing/Treatment Barrier film 7/2/2020  8:32 AM   Wound Cleansed Wound cleanser 6/28/2020 11:00 AM   Dressing Change Due 07/02/20 7/2/2020  8:32 AM   Wound Length (cm) 1.5 cm 6/25/2020  2:11 PM   Wound Width (cm) 1.5 cm 6/25/2020  2:11 PM   Wound Depth (cm) 0 cm 6/25/2020  2:11 PM   Wound Surface Area (cm^2) 2.25 cm^2 6/25/2020  2:11 PM   Wound Volume (cm^3) 0 cm^3 6/25/2020  2:11 PM   Wound Assessment Clean;Dry; Intact; Pink 7/2/2020  8:32 AM   Drainage Amount None 7/1/2020  8:00 AM   Odor None 6/25/2020  2:11 PM   Margins Attached edges 6/28/2020 11:00 AM   Aretha-wound Assessment Dry; Swelling 6/28/2020 11:00 AM   Culture Taken No 6/25/2020  2:11 PM   Number of days: 6        Elimination:  Continence:   · Bowel:  Yes  · Bladder: Yes  Urinary Catheter: None   Colostomy/Ileostomy/Ileal Conduit: No       Date of Last BM: 7/1/2020    Intake/Output Summary (Last 24 hours) at 7/2/2020 1224  Last data filed at 7/2/2020 0834  Gross per 24 hour Intake 480 ml   Output --   Net 480 ml     I/O last 3 completed shifts: In: 5 [P.O.:720]  Out: -     Safety Concerns:     ***    Impairments/Disabilities:      None    Nutrition Therapy:  Current Nutrition Therapy:   - Oral Diet:  General    Routes of Feeding: Oral  Liquids: Thin Liquids  Daily Fluid Restriction: no  Last Modified Barium Swallow with Video (Video Swallowing Test): not done    Treatments at the Time of Hospital Discharge:   Respiratory Treatments: ***  Oxygen Therapy:  is not on home oxygen therapy. Ventilator:    - No ventilator support    Rehab Therapies: Physical Therapy and Occupational Therapy  Weight Bearing Status/Restrictions: No weight bearing restirctions  Other Medical Equipment (for information only, NOT a DME order):  bath bench  Other Treatments: ***    Patient's personal belongings (please select all that are sent with patient):  ***    RN SIGNATURE:  Electronically signed by Ron Walker RN on 7/3/20 at 11:47 AM EDT    CASE MANAGEMENT/SOCIAL WORK SECTION    Inpatient Status Date: 6/24/2020    Readmission Risk Assessment Score: 11%    Discharging to Agency   · Name: [facility]  · Address: [address] [place] [address]  · Phone: [phone]  · Fax: [phone]    / signature: REBECA Connelly    PHYSICIAN SECTION    Prognosis: Good    Condition at Discharge: Stable    Rehab Potential (if transferring to Rehab): Good    Recommended Labs or Other Treatments After Discharge: outpatient PT, OT    Physician Certification: I certify the above information and transfer of Apryl Nelson  is necessary for the continuing treatment of the diagnosis listed and that she requires outpatient therapy for less 30 days.      Update Admission H&P: No change in H&P    PHYSICIAN SIGNATURE:  Electronically signed by Santosh Belcher MD on 7/2/20 at 12:25 PM EDT

## 2020-07-02 NOTE — PROGRESS NOTES
Physical Therapy  Discharge Summary    Name:Milagro Mario  NATALIA:[id number]  :2000  Treatment Diagnosis: debility  Diagnosis: debility    Restrictions/Precautions  Restrictions/Precautions: Fall Risk, Up as Tolerated           Position Activity Restriction  Other position/activity restrictions: ambulate pt     Goals:                  Short term goals  Time Frame for Short term goals: 5 days ()  Short term goal 1: Patient will complete sit<>stand with SBA and LRAD. - [place] [facility]   Short term goal 2: Patient will ambulate 150 ft with SBA and LRAD. - [place] [facility]   Short term goal 3: Patient will ascend/descend 4 stairs with SBA and B HR - GOAL MET   Short term goal 4: Patient will demonstrate tolerate 20 reps of B LE exercises. - [place] [facility]   Short term goal 5: Patient will  an object from the floor with SBA and LRAD. -- GOAL MET             Long term goals  Time Frame for Long term goals : 10 days ()  Long term goal 1: Patient will complete sit<>stand with MI and LRAD. [place] [facility]   Long term goal 2: Patient will ambulate 300 ft with MI and LRAD. -- GOAL MET   Long term goal 3: Patient will ascend/descend 4 stairs with MI and B HR. -- GOAL NOT MET, SBA   Long term goal 4: Patient will be independent with HEP. - GOAL MET   Long term goal 5: Patient will  an object from the floor with MI and LRAD. GOAL MET     Pt. Met 5/5 short term goals and 4/5 long term goals. Discharge PT IRF:    CARE Score: 6                                   Pt. Currently ambulates 300 feet with RW and mod I  Up/down 12 steps with left HR ascending and SBA  Up/down curb step with RW and SBA  Sit to/from stand with mod I  Bed mobility indep  Recommend RW in order to improve safety/balance at home  Pt. Safe to return home with assistance from family.    Provided pt. with BLE HEP  Electronically signed by Michael Salamanca, PT on 2020 at 3:19 PM

## 2020-07-02 NOTE — CARE COORDINATION
CASE MANAGEMENT DISCHARGE SUMMARY      Discharge to: Home with family    New Durable Medical Equipment ordered/agency: Rolling walker, tub transfer bench. [facility] [phone]    Transportation:    Family/car: Family to provide transportation to home via private vehicle.  time: 7/3/2020 @ 11a-12pm    Confirmed discharge plan with:     Patient: Discharge planning discussed with patient Kaz Vallejo. Family: Ms. Alex Fish to discuss discharge planning with family. Agency:  Referral made to [facility] for Outpatient therapies. RN: Nursing staff notified of discharge planning for RN follow up. Note: Discharging nurse to complete ABIMAEL, reconcile AVS, and place final copy with patient's discharge packet. RN to ensure that written prescriptions for  Level II medications are sent with patient as per protocol.     REBECA Foley

## 2020-07-02 NOTE — PROGRESS NOTES
Occupational Therapy  Facility/Department: [facility]  Daily Treatment Note  NAME: Bland Krabbe  : 2000  MRN: [id number]    Date of Service: 2020    Discharge Recommendations:  24 hour supervision or assist, Outpatient OT  OT Equipment Recommendations  Equipment Needed: Yes  Mobility Devices: ADL Assistive Devices  ADL Assistive Devices: Transfer Tub Bench  Other: Pt would benefit from use of a tub-transfer bench at d/c to increase safety/independence with bathing/dressing tasks at d/c. Assessment   Performance deficits / Impairments: Decreased functional mobility ; Decreased ADL status; Decreased balance;Decreased endurance;Decreased high-level IADLs;Decreased coordination;Decreased strength;Decreased posture  Assessment: Pt continues to be pleasant and agreeable to treatment, improving with level of assist for ADL tasks and functional transfers this date. Pt expresses no concerns for d/c home, stating motivated to continue working with OT/PT in outpatient setting to build further strength/endurance/balance in order to return to playing sports recreationally in the future. Continue OT tx.  OT Education: OT Role;ADL Adaptive Strategies; Plan of Care;Transfer Training;Energy Conservation;Equipment;Home Exercise Program  REQUIRES OT FOLLOW UP: Yes  Safety Devices  Safety Devices in place: Yes  Type of devices: Call light within reach; Left in chair;Gait belt;Nurse notified(no alarm on arrival, okay per RN)         Patient Diagnosis(es): There were no encounter diagnoses. has no past medical history on file. has no past surgical history on file. Restrictions  Restrictions/Precautions  Restrictions/Precautions: Fall Risk, Up as Tolerated  Position Activity Restriction  Other position/activity restrictions: ambulate pt  Subjective   General  Chart Reviewed: Yes  Patient assessed for rehabilitation services?: Yes  Additional Pertinent Hx: Pt admitted to [facility] for possible syncopal episode. bench  Tub - Technique: Ambulating  Tub Transfers: Modified independence  Tub Transfers Comments: SBA standing level with pt using wall for UE support and stability  Shower Transfers  Shower - Transfer From: Brooklynn Pesa - Transfer Type: To and From  Shower - Transfer To: Shower seat with back  Shower - Technique: Ambulating  Shower Transfers: Supervision  Bed mobility  Supine to Sit: Independent  Sit to Supine: Unable to assess(pt left up in chair)  Scooting: Independent  Transfers  Sit to stand: Modified independent  Stand to sit: Modified independent        Coordination  Fine Motor: Fair for FM tasks issued in FM HEP (in-hand manipulation, simulating peeling, use of smartphone)     Cognition  Overall Cognitive Status: WFL     Type of ROM/Therapeutic Exercise  Type of ROM/Therapeutic Exercise: AROM; Free weights; Resistive Bands  Comment: on Mat; Pt issued written UE ROM, theraband, and core HEPs, with pt demonstrating understanding of all exercises   Exercises  Scapular Protraction: x10  Scapular Retraction: x10  Shoulder Flexion: x5 4#  Shoulder ABduction: x5  Shoulder ADduction: x5 2#  Horizontal ABduction: x5  Horizontal ADduction: x5  Elbow Flexion: x10  Elbow Extension: x10  Supination: x5  Pronation: x5  Wrist Flexion: x5  Wrist Extension: x5  Grasp/Release: x5  Other: x10 chest press 2#, x5 overhead tricep extension 2#, x10 trunk rotation no weight, x5 sit to stands; x10 partial tricep dips sitting on mat Core exercises: Attempted bird dog x2 in quadruped with difficulty and support from therapist, x10 alternating UEs, then x10 alternating LEs; x10 each leg supine toe taps alternating; x10 each side lower trunk rotation in supine, x5 lower lifts in supine, x10 steffi cross sit ups each side to partial range, x10 [place] twist with LEs on mat;  Theraband (green): x10 chest press, x5 outward/upward/down jacome pulls, x10 shoulder diagonal pulls, x5 tricep extension seated, x5 shoulder flexion, x5 elbow flexion and shoulder flexion front raise        Plan   Plan  Times per week: 5/7 days/week   Current Treatment Recommendations: Self-Care / ADL, Strengthening, Functional Mobility Training, Endurance Training, Balance Training, Safety Education & Training, Equipment Evaluation, Education, & procurement, Patient/Caregiver Education & Training, Gait Training, Home Management Training    Goals  Short term goals  Time Frame for Short term goals: in 5 days   Short term goal 1: Perform grooming independently - GOAL MET 7/1  Short term goal 2: Perform UE dressing independently - GOAL MET 7/2  Short term goal 3: Perform toilet transfer with Mod I - GOAL MET 7/2  Long term goals  Time Frame for Long term goals : in 9 days (7/02)  Long term goal 1: Perform UE/LE bathing with s/u while seated on shower bench - GOAL MET 6/30  Long term goal 2: Perform LE dressing Mod I - GOAL MET 7/2  Long term goal 3: Perform tub/shower transfer with supervision - GOAL MET 7/2 (use of tub-transfer bench)  Long term goal 4: Perform item retrieval with Mod I - GOAL MET 7/2  Patient Goals   Patient goals : \"Start walking again\"        Therapy Time   Individual Concurrent Group Co-treatment   Time In 0900         Time Out 1040         Minutes 100         Timed Code Treatment Minutes: 100 Minutes       SHALONDA Kerr/L

## 2020-07-02 NOTE — PROGRESS NOTES
Occupational Therapy  Discharge Summary     Name:Milagro Alfaro  DMX:[id number]  :2000  Treatment Diagnosis: debility  Diagnosis: debility    Restrictions/Precautions  Restrictions/Precautions: Fall Risk, Up as Tolerated           Position Activity Restriction  Other position/activity restrictions: ambulate pt     Goals:   Short term goals  Time Frame for Short term goals: in 5 days   Short term goal 1: Perform grooming independently - GOAL MET   Short term goal 2: Perform UE dressing independently - GOAL MET 7/2  Short term goal 3: Perform toilet transfer with Mod I - GOAL MET 7/2   Long term goals  Time Frame for Long term goals : in 9 days ()  Long term goal 1: Perform UE/LE bathing with s/u while seated on shower bench - GOAL MET   Long term goal 2: Perform LE dressing Mod I - GOAL MET 7/2  Long term goal 3: Perform tub/shower transfer with supervision - GOAL MET 7/2 (use of tub-transfer bench)  Long term goal 4: Perform item retrieval with Mod I - GOAL MET 7/2    Pt. Met 3/3 short term goals and 4/4 long term goals. Current Functional Status:   ADL  Feeding: Independent  Grooming: Modified independent (in stance)  UE Bathing: Modified independent   LE Bathing: Modified independent   UE Dressing: Modified independent   LE Dressing: Modified independent , Increased time to complete  Toileting: Modified independent   Additional Comments: Pt able to gather and put away items needed for ADL Mod I level with RW     Bed mobility  Bridging: Independent  Rolling to Left: Independent  Rolling to Right: Independent  Supine to Sit: Independent  Sit to Supine: Unable to assess(pt left up in chair)  Scooting: Independent  Comment: pt in chair at start and returned to chair at end    Functional Transfers: Toilet Transfers  Toilet - Technique: Ambulating(RW)  Equipment Used: Standard toilet  Toilet Transfer: Modified independent  Toilet Transfers Comments: increased effort off low toilet/std. toilet with grab bar on Right    Tub Transfers  Tub - Transfer From: Walker  Tub - Transfer Type: To and From  Tub - Transfer To: Transfer tub bench  Tub - Technique: Ambulating  Tub Transfers: Modified independence  Tub Transfers Comments: SBA standing level with pt using wall for UE support and stability    Shower Transfers  Shower - Transfer From: Sylviamaryanne Covert - Transfer Type: To and From  Shower - Transfer To: Shower seat with back  Shower - Technique: Ambulating  Shower Transfers: Supervision         Functional Mobility  Functional - Mobility Device: Rolling Walker  Activity: Retrieve items, Transport items, To/from bathroom, Other, To/From therapy gym  Assist Level: Modified independent     Instrumental ADL's  Instrumental ADLs: Yes(Pt retrieved/prepared drink, and retrieved items from different levels in kitchen standing level with RW with Mod I this date)      Assessment:   Assessment: Pt continues to be pleasant and agreeable to treatment, improving with level of assist for ADL tasks and functional transfers this date. Pt expresses no concerns for d/c home, stating motivated to continue working with OT/PT in outpatient setting to build further strength/endurance/balance in order to return to playing sports recreationally in the future. Continue OT tx. Prognosis: Good     REQUIRES OT FOLLOW UP: Yes  Discharge Recommendations: 24 hour supervision or assist, Outpatient OT    Equipment Recommendations:  Tub transfer bench    Home Exercise Program  Provided Pt with theraband, theraputty, free weight, and core HEPs, with pt demonstrating understanding of all exercises.      Electronically signed by Antionette Cevallos OT on 7/2/2020 at 3:28 PM

## 2020-07-02 NOTE — PROGRESS NOTES
Department of Physical Medicine & Rehabilitation  Progress Note    Patient Identification:  Ramo Brennan  [id number]  : 2000  Admit date: 2020    Chief Complaint: debility    Subjective: Pt seen this AM. Patient was discussed yesterday in rehab conference with the entire rehab team, and we think patient will be ready for discharge to home TOMORROW. We will have her continue with outpatient PT and OT after discharge. She states she did better in her therapies yesterday, and is walking over 400 feet with PT. I will fill out her ABIMAEL and meds today for her discharge tomorrow. ROS: No f/c, n/v, cp     Objective:  Patient Vitals for the past 24 hrs:   BP Temp Temp src Pulse Resp SpO2   20 2115 98/69 97.9 °F (36.6 °C) Oral 87 18 96 %   20 0800 104/67 98 °F (36.7 °C) Oral 83 18 100 %     Const: Alert. No distress, pleasant. HEENT: Normocephalic, atraumatic. Normal sclera/conjunctiva. MMM. CV: Regular rate and rhythm. Resp: No respiratory distress. Lungs CTAB. Abd: Soft, nontender, nondistended, NABS+   Ext: Trace edema. Neuro: Alert, oriented, appropriately interactive. Psych: Cooperative, appropriate mood and affect    Laboratory data: Available via EMR. Last 24 hour lab  No results found for this or any previous visit (from the past 24 hour(s)).     Therapy progress:  PT  Position Activity Restriction  Other position/activity restrictions: ambulate pt  Objective     Sit to Stand: Supervision, Modified independent(at RW, from chair and toilet; progressing to mod I)  Stand to sit: Supervision, Modified independent(at RW, to chair and toilet; progressing to mod I)  Bed to Chair: Unable to assess(pt up in joyce upon entry and at EOS)  Device: [address]: Supervision, Modified Independent(progressing to mod I)  Distance: 160 ft + 160 ft + 410 ft  OT  PT Equipment Recommendations  Equipment Needed: Yes  Mobility Devices: [place]: Rolling  Toilet - Technique: Ambulating(RW)  Equipment Used: Standard toilet  Toilet Transfers Comments: increased effort off low toilet/std. toilet with grab bar on Right                  Body mass index is 25.61 kg/m².        Assessment and Plan:      Debility: likely due to prolonged bed rest/depression- continue paxil- P/OT     Depression- Continue Paxil daily- Post psych consult     Dysphagia- Improved- will monitor in therapy- SLP, bite sized diet    Tachycardia - Metoprolol - decreased to 6.25mg BID on 6/27 d/t low BP     Hypothyroid- continue synthroid 50mcg daily        Mari Rios MD    7/2/2020  7:18 AM

## 2020-07-02 NOTE — PLAN OF CARE
Problem: Skin Integrity:  Goal: Will show no infection signs and symptoms  Description: Will show no infection signs and symptoms  Outcome: Ongoing  Goal: Absence of new skin breakdown  Description: Absence of new skin breakdown  Outcome: Ongoing     Problem: Infection:  Goal: Will remain free from infection  Description: Will remain free from infection  Outcome: Ongoing     Problem: Safety:  Goal: Free from accidental physical injury  Description: Free from accidental physical injury  Outcome: Ongoing     Problem: Daily Care:  Goal: Daily care needs are met  Description: Daily care needs are met  Outcome: Ongoing     Problem: Pain:  Goal: Patient's pain/discomfort is manageable  Description: Patient's pain/discomfort is manageable  Outcome: Ongoing     Problem: Skin Integrity:  Goal: Skin integrity will stabilize  Description: Skin integrity will stabilize  Outcome: Ongoing     Problem: Falls - Risk of:  Goal: Will remain free from falls  Description: Will remain free from falls  7/1/2020 2349 by Manuel Hernandez RN  Outcome: Ongoing

## 2020-07-02 NOTE — PROGRESS NOTES
Physical Therapy  Facility/Department: [facility]  Daily Treatment Note  NAME: Angeline Lovett  : 2000  MRN: [id number]    Date of Service: 2020    Discharge Recommendations:  Home with assist PRN, Outpatient PT   PT Equipment Recommendations  Equipment Needed: Yes  Mobility Devices: Lady Simmer: Rolling    Assessment   Body structures, Functions, Activity limitations: Decreased functional mobility ; Decreased strength;Decreased endurance;Decreased balance;Decreased posture; Increased pain  Assessment: Pt tolerated therapy well this date. Mod I with mobility with RW. Pt also participated in floor recovery activity progressing to mod I with getting up/down from floor. Handouts of HEP given during morning session. Pt denies any concerns about mobility at home. Plan for safe d/c home tomorrow. Treatment Diagnosis: debility  Prognosis: Good  Decision Making: Low Complexity  PT Education: Goals; Functional Mobility Training;PT Role;Transfer Training; Adaptive Device Training;Plan of Care;Equipment;Gait Training;General Safety; Disease Specific Education;Home Exercise Program  Patient Education: pt  verbalized understanding  Barriers to Learning: None  REQUIRES PT FOLLOW UP: Yes  Activity Tolerance  Activity Tolerance: Patient Tolerated treatment well     Patient Diagnosis(es): There were no encounter diagnoses. has no past medical history on file. has no past surgical history on file. Restrictions  Restrictions/Precautions  Restrictions/Precautions: Fall Risk, Up as Tolerated  Position Activity Restriction  Other position/activity restrictions: ambulate pt  Subjective   General  Chart Reviewed: Yes  Response To Previous Treatment: Patient with no complaints from previous session.   Family / Caregiver Present: No  Referring Practitioner: Piyush Angeles  Subjective  Subjective: pt agreeable to therapy  General Comment  Comments: Pt up in chair on approach  Pain Screening  Patient Currently in Pain: Denies              Objective   Bed mobility  Rolling to Left: Independent  Rolling to Right: Independent  Supine to Sit: Independent  Sit to Supine: Independent     Transfers  Sit to Stand: Modified independent  Stand to sit: Modified independent  Bed to Chair: Modified independent(with RW)  Car Transfer: Modified independent     Ambulation  Ambulation?: Yes  Ambulation 1  Surface: level tile, carpet, outdoor surfaces  Device: Rolling Walker  Assistance: Mod I  Distance: 160 ft + 160 ft + 350 ft ft + 10 ft of carpet x 2 + 150 ft outdoor surfaces  Comments: Good balance with gait training over various surfaces with RW    Stairs  # Steps : 12  Stairs Height: 6\"  Rails: left ascending  Assistance: Stand by assistance  Comment: Pt negotiated 12 stairs sideways with left hand rail. SBA for safety with cues for technique    Curb step:  Pt negotiated 6\" curb step x 2 reps with RW and SBA. Cues for technique. Balance  Comments: Mod I to pick object up from ground with RW and reacher     Exercises  Step ups to 6\" step with RW and SBA x 10 BLE alternating          Floor Recovery  Pt educated in technique to lower to ground and get back up from floor to chair. Mat placed on floor and pt able to lower self to floor via tall kneeling and return to chair via tall kneeling facing chair to start. Pt completed x 4 reps progressing from CGA to mod I. Increased time required to return to standing position d/t decreased BLE strength. Goals  Short term goals  Time Frame for Short term goals: 5 days (6/28)  Short term goal 1: Patient will complete sit<>stand with SBA and LRAD. [place] [facility] [place]  Short term goal 2: Patient will ambulate 150 ft with SBA and LRAD. - [place] [facility] [place]  Short term goal 3: Patient will ascend/descend 4 stairs with SBA and B HR - GOAL MET 7/02  Short term goal 4: Patient will demonstrate tolerate 20 reps of B LE exercises.  - [place] [facility] [place]  Short term goal 5: Patient will  an object from the floor with SBA and LRAD. -- GOAL MET 7/02  Long term goals  Time Frame for Long term goals : 10 days (7/03)  Long term goal 1: Patient will complete sit<>stand with MI and LRAD. [place] [facility] [place]  Long term goal 2: Patient will ambulate 300 ft with MI and LRAD. -- GOAL MET 7/02  Long term goal 3: Patient will ascend/descend 4 stairs with MI and B HR. -- GOAL NOT MET, SBA   Long term goal 4: Patient will be independent with HEP. - GOAL MET 7/02  Long term goal 5: Patient will  an object from the floor with MI and LRAD. GOAL MET 7/02  Patient Goals   Patient goals : to get back to walking normally    Plan    Plan  Times per week: 5 out ot 7 days  Times per day: Daily  Current Treatment Recommendations: Strengthening, Transfer Training, Endurance Training, Balance Training, Gait Training, Functional Mobility Training, Stair training, Safety Education & Training, Patient/Caregiver Education & Training  Safety Devices  Type of devices:  All fall risk precautions in place, Call light within reach, Gait belt, Left in chair, Nurse notified  Restraints  Initially in place: No     Therapy Time   Individual Concurrent Group Co-treatment   Time In 1230         Time Out 1330         Minutes 60         Timed Code Treatment Minutes: [address] [facility], [address] [facility]

## 2020-07-02 NOTE — PROGRESS NOTES
Physical Therapy  Facility/Department: [facility]  Daily Treatment Note  NAME: Chelly Ford  : 2000  MRN: [id number]    Date of Service: 2020    Discharge Recommendations:  Home with assist PRN, Outpatient PT   PT Equipment Recommendations  Equipment Needed: Yes  Mobility Devices: Chay Jacques: Rolling    Assessment   Body structures, Functions, Activity limitations: Decreased functional mobility ; Decreased strength;Decreased endurance;Decreased balance;Decreased posture; Increased pain  Assessment: Pt is now mod I with transfers and ambulation at  on level tile. Provided pt with HEP handout for sitting and standing LE exercises and reviewed/demonstrated with pt. Pt able to perform without assist and verbalized understanding of all exercises. Treatment Diagnosis: debility  PT Education: Goals; Functional Mobility Training;PT Role;Transfer Training; Adaptive Device Training;Plan of Care;Equipment;Gait Training;General Safety; Disease Specific Education;Home Exercise Program  Patient Education: pt  verbalized understanding  Barriers to Learning: None  REQUIRES PT FOLLOW UP: Yes  Activity Tolerance  Activity Tolerance: Patient Tolerated treatment well     Patient Diagnosis(es): There were no encounter diagnoses. has no past medical history on file. has no past surgical history on file. Restrictions  Restrictions/Precautions  Restrictions/Precautions: Fall Risk, Up as Tolerated  Position Activity Restriction  Other position/activity restrictions: ambulate pt  Subjective   General  Chart Reviewed: Yes  Response To Previous Treatment: Patient with no complaints from previous session. Family / Caregiver Present: No  Referring Practitioner: Raf Angeles  Subjective  Subjective: pt agreeable to therapy  General Comment  Comments: Patient in bed upon arrival receiving meds from RN; RN okayed for therapy.   Pain Screening  Patient Currently in Pain: Denies  Vital Signs  Patient Currently in Pain: Denies       Cognition   Cognition  Overall Cognitive Status: WFL  Arousal/Alertness: Appropriate responses to stimuli  Following Commands: Follows multistep commands consistently  Attention Span: Appears intact  Initiation: Does not require cues  Sequencing: Does not require cues  Objective      Transfers  Sit to Stand: Modified independent(at RW)  Stand to sit: Modified independent(at RW)  Ambulation  Ambulation?: Yes  Ambulation 1  Surface: level tile  Device: Rolling Walker  Assistance: Modified Independent  Gait Deviations: Slow Mariah  Distance: 160 ft x 2     Balance  Sitting - Static: Good  Sitting - Dynamic: Good  Standing - Static: Good  Standing - Dynamic: Good;-  Exercises  Hamstring Sets: seated: 1x20, yellow Tband  Hip Flexion: 1 x 20 BLE  Hip Abduction: 1x20 B LE, green Tband; hip ADD ball squeeze 1 x 20  Knee Long Arc Quad: 1 x 20 BLE, yellow Tband  Ankle Pumps: 1 x 20 BLE  Other exercises  Other exercises 1: standing heel/toe raises 1x at // bars  Other exercises 2: standing HS curls at // bars 1 x 5  Other exercises 3: high stepping in // bars 1 x 5  Other exercises 4: standing hip abduction kicks at // bars 1x 5  Other exercises 5: standing hip extension at // bars 1x5  Other exercises 6: standing squats at // bars 1 x 8                       Goals  Short term goals  Time Frame for Short term goals: 5 days (6/28)  Short term goal 1: Patient will complete sit<>stand with SBA and LRAD. - goal met at [facility] 6/29  Short term goal 2: Patient will ambulate 150 ft with SBA and LRAD. - goal met at [facility] 6/29  Short term goal 3: Patient will ascend/descend 4 stairs with SBA and B HR - not met, requires CGA 7/1  Short term goal 4: Patient will demonstrate tolerate 20 reps of B LE exercises. - goal met at [facility] 6/29  Short term goal 5: Patient will  an object from the floor with SBA and LRAD.   Long term goals  Time Frame for Long term goals : 10 days (7/03)  Long term goal 1: Patient will complete sit<>stand with MI and LRAD. - met at [address] [place] [address] 7/2  Long term goal 2: Patient will ambulate 300 ft with MI and LRAD. Long term goal 3: Patient will ascend/descend 4 stairs with MI and B HR. Long term goal 4: Patient will be independent with HEP. - hand out provided todya, met 7/2  Long term goal 5: Patient will  an object from the floor with MI and LRAD. Patient Goals   Patient goals : to get back to walking normally    Plan    Plan  Times per week: 5 out ot 7 days  Times per day: Daily  Current Treatment Recommendations: Strengthening, Transfer Training, Endurance Training, Balance Training, Gait Training, Functional Mobility Training, Stair training, Safety Education & Training, Patient/Caregiver Education & Training  Safety Devices  Type of devices:  All fall risk precautions in place, Call light within reach, Gait belt, Nurse notified, Left in chair  Restraints  Initially in place: No     Therapy Time   Individual Concurrent Group Co-treatment   Time In 0800         Time Out 0830         Minutes 30         Timed Code Treatment Minutes: [address], PT

## 2020-07-03 VITALS
OXYGEN SATURATION: 100 % | WEIGHT: 153.9 LBS | HEART RATE: 98 BPM | SYSTOLIC BLOOD PRESSURE: 103 MMHG | TEMPERATURE: 97.7 F | BODY MASS INDEX: 25.64 KG/M2 | DIASTOLIC BLOOD PRESSURE: 67 MMHG | HEIGHT: 65 IN | RESPIRATION RATE: 18 BRPM

## 2020-07-03 LAB
ANION GAP SERPL CALCULATED.3IONS-SCNC: 8 MMOL/L (ref 3–16)
BASOPHILS ABSOLUTE: 0 K/UL (ref 0–0.2)
BASOPHILS RELATIVE PERCENT: 0.7 %
BUN BLDV-MCNC: 6 MG/DL (ref 7–20)
CALCIUM SERPL-MCNC: 9.1 MG/DL (ref 8.3–10.6)
CHLORIDE BLD-SCNC: 110 MMOL/L (ref 99–110)
CO2: 26 MMOL/L (ref 21–32)
CREAT SERPL-MCNC: <0.5 MG/DL (ref 0.6–1.1)
EOSINOPHILS ABSOLUTE: 0.3 K/UL (ref 0–0.6)
EOSINOPHILS RELATIVE PERCENT: 4.6 %
GFR AFRICAN AMERICAN: >60
GFR NON-AFRICAN AMERICAN: >60
GLUCOSE BLD-MCNC: 81 MG/DL (ref 70–99)
HCT VFR BLD CALC: 31.7 % (ref 36–48)
HEMOGLOBIN: 10.3 G/DL (ref 12–16)
LYMPHOCYTES ABSOLUTE: 2 K/UL (ref 1–5.1)
LYMPHOCYTES RELATIVE PERCENT: 31.4 %
MCH RBC QN AUTO: 32.6 PG (ref 26–34)
MCHC RBC AUTO-ENTMCNC: 32.6 G/DL (ref 31–36)
MCV RBC AUTO: 100.1 FL (ref 80–100)
MONOCYTES ABSOLUTE: 0.4 K/UL (ref 0–1.3)
MONOCYTES RELATIVE PERCENT: 6.7 %
NEUTROPHILS ABSOLUTE: 3.6 K/UL (ref 1.7–7.7)
NEUTROPHILS RELATIVE PERCENT: 56.6 %
PDW BLD-RTO: 16 % (ref 12.4–15.4)
PLATELET # BLD: 439 K/UL (ref 135–450)
PMV BLD AUTO: 6.9 FL (ref 5–10.5)
POTASSIUM REFLEX MAGNESIUM: 3.7 MMOL/L (ref 3.5–5.1)
RBC # BLD: 3.16 M/UL (ref 4–5.2)
SODIUM BLD-SCNC: 144 MMOL/L (ref 136–145)
WBC # BLD: 6.3 K/UL (ref 4–11)

## 2020-07-03 PROCEDURE — 80048 BASIC METABOLIC PNL TOTAL CA: CPT

## 2020-07-03 PROCEDURE — 36415 COLL VENOUS BLD VENIPUNCTURE: CPT

## 2020-07-03 PROCEDURE — 85025 COMPLETE CBC W/AUTO DIFF WBC: CPT

## 2020-07-03 PROCEDURE — 6370000000 HC RX 637 (ALT 250 FOR IP): Performed by: PHYSICAL MEDICINE & REHABILITATION

## 2020-07-03 PROCEDURE — 6360000002 HC RX W HCPCS: Performed by: PHYSICAL MEDICINE & REHABILITATION

## 2020-07-03 RX ADMIN — FOLIC ACID 1 MG: 1 TABLET ORAL at 08:35

## 2020-07-03 RX ADMIN — DICYCLOMINE HYDROCHLORIDE 10 MG: 10 CAPSULE ORAL at 06:10

## 2020-07-03 RX ADMIN — ENOXAPARIN SODIUM 40 MG: 40 INJECTION SUBCUTANEOUS at 08:35

## 2020-07-03 RX ADMIN — MAGNESIUM GLUCONATE 500 MG ORAL TABLET 400 MG: 500 TABLET ORAL at 08:35

## 2020-07-03 RX ADMIN — Medication 1 CAPSULE: at 08:35

## 2020-07-03 RX ADMIN — LEVOTHYROXINE SODIUM 100 MCG: 0.1 TABLET ORAL at 06:10

## 2020-07-03 RX ADMIN — METOPROLOL TARTRATE 6.25 MG: 25 TABLET, FILM COATED ORAL at 08:35

## 2020-07-03 RX ADMIN — PANTOPRAZOLE SODIUM 40 MG: 40 TABLET, DELAYED RELEASE ORAL at 06:10

## 2020-07-03 ASSESSMENT — PAIN SCALES - GENERAL: PAINLEVEL_OUTOF10: 0

## 2020-07-03 NOTE — PROGRESS NOTES
Nutrition Assessment    Type and Reason for Visit: Reassess    Nutrition Recommendations:   1. Continue soft and bite sized diet  2. Continue Ensure High Protein  3. Encourage po intakes  4. Monitor po intakes, nutrition adequacy, weights, pertinent labs, BMs    Nutrition Assessment: Follow up: Pt stable from a nutritional standpoint AEB continued po intakes of % per EMR. Plans to d/c to home today. No nutrition concerns at this time. Will continue current diet/ONS and monitor needs. Malnutrition Assessment:  · Malnutrition Status: Meets the criteria for moderate malnutrition  · Context: Social or environmental circumstances  · Findings of the 6 clinical characteristics of malnutrition (Minimum of 2 out of 6 clinical characteristics is required to make the diagnosis of moderate or severe Protein Calorie Malnutrition based on AND/ASPEN Guidelines):  1. Energy Intake-Less than or equal to 75% of estimated energy requirement, Greater than or equal to 3 months    2. Weight Loss-Unable to assess,    3. Fat Loss-Unable to assess,    4. Muscle Loss-Unable to assess,    5. Fluid Accumulation-Mild fluid accumulation, Extremities  6.  Strength-Not measured    Nutrition Risk Level: Moderate    Nutrient Needs:  · Estimated Daily Total Kcal: 0858-1826  · Estimated Daily Protein (g): 73-87  · Estimated Daily Total Fluid (ml/day): 1 ml/kcal    Nutrition Diagnosis:   · Problem: Moderate malnutrition  · Etiology: related to Insufficient energy/nutrient consumption     Signs and symptoms:  as evidenced by Diet history of poor intake, Presence of wounds    Objective Information:  · Nutrition-Focused Physical Findings: Missing teeth.  +1 pitting BLE Edema  · Wound Type: Multiple  · Current Nutrition Therapies:  · Oral Diet Orders: General, Dysphagia  Soft and Bite-Sized (Dysphagia 3)   · Oral Diet intake: 51-75%, %  · Oral Nutrition Supplement (ONS) Orders: Low Calorie High Protein Supplement  · ONS intake: Unable to assess  · Anthropometric Measures:  · Ht: 5' 5\" (165.1 cm)   · Current Body Wt: 153 lb 14.4 oz (69.8 kg)  · % Weight Change:  ,  JESSICA  · Ideal Body Wt: 125 lb (56.7 kg),  · BMI Classification: BMI 25.0 - 29.9 Overweight    Nutrition Interventions:   Continue current diet, Continue current ONS  Continued Inpatient Monitoring, Speech Therapy    Nutrition Evaluation:   · Evaluation: Progressing toward goals   · Goals:  Tolerate diet and have po intakes 50% or greater during ARU stay    · Monitoring: Meal Intake, Supplement Intake, Diet Tolerance, Nutrition Progression, Weight, Pertinent Labs, Chewing/Swallowing      Electronically signed by Rolly Nino RD, LD on 7/3/20 at 12:22 PM EDT    Contact Number: Office: [phone]; [address]: [address]

## 2020-07-03 NOTE — DISCHARGE SUMMARY
Department [facility]  Dr. Ryland Modi Discharge Summary     Patient Identification:  José Miguel Alejandre  : 2000  Admit date: 2020  Discharge date: 20   Attending provider: Damaso Angeles MD        Primary care provider: No primary care provider on file. Discharge Diagnoses:   Patient Active Problem List   Diagnosis    Obesity    Hyponatremia    Moderate malnutrition ([address]    Hypokalemia    Syncope    Right atrial mass    Dysphagia    Other specified hypothyroidism    Severe malnutrition (HCC)    Abnormal finding on echocardiogram    Tachycardia    Abnormal CT of the chest    E. coli UTI    Oral thrush    Paresthesias    Mood disorder (HCC)    Anemia    Debility         Discharge Functional Status:    Physical therapy:  Bed Mobility: Scooting: Independent  Transfers: Sit to Stand: Modified independent  Stand to sit: Modified independent  Bed to Chair: Modified independent(with RW)  Comment: Floor Recovery: pt educated in technique to lower to ground and get back up from floor to chair. Mat placed on floor and pt able to lower self to floor via tall kneeling and return to chair via tall kneeling facing chair to start. Pt completed x 4 reps progressing from CGA to mod I. Increased time required to return to standing position d/t decreased BLE strength., Ambulation 1  Surface: level tile  Device: Rolling Walker  Assistance: Modified Independent  Gait Deviations: Slow Mariah  Distance: 160 ft x 2  Comments: Also targeted another 140 ft with 1 UE support at irvin rail and intermittent no UE support. Pt required CGA up to one episode min A when fatigued and pt crossed midline with LE and sustained LOB requiring min A from therapist to recover. , Stairs  # Steps : 6  Stairs Height: 4\"  Rails: Bilateral  Device: No Device  Assistance: Stand by assistance, Contact guard assistance(emerging SBA)  Comment: Attempted 1 step with 1 HR and required min A  Mobility:  , PT Equipment Recommendations  Equipment Needed: Yes  Mobility Devices: Omi Profit: Rolling, Assessment: Pt tolerated therapy well this date. Mod I with mobility with RW. Pt also participated in floor recovery activity progressing to mod I with getting up/down from floor. Handouts of HEP given during morning session. Pt denies any concerns about mobility at home. Plan for safe d/c home tomorrow. Occupational therapy:  ,  , Assessment: Pt continues to be pleasant and agreeable to treatment, improving with level of assist for ADL tasks and functional transfers this date. Pt expresses no concerns for d/c home, stating motivated to continue working with OT/PT in outpatient setting to build further strength/endurance/balance in order to return to playing sports recreationally in the future. Continue OT tx. Speech therapy:     Swallow screen completed this date. Pt currently on a Dysphagia III (soft and bite sized) diet with thin liquids, she denies dysphagia with meals / meds. Pt previously seen by ST at [facility] for dysphagia (most recently on 6/15/20) and was discharged from [address] [place] [address]  d/t continued diet tolerance. Pt also followed by GI at [facility]. She denies further globus sensation, reports improving appetite. Pt observed with thin liquid trials via cup during evaluation with grossly timely swallow initiation and no overt s/s of aspiration/penetration throughout. Formal BSE not indicated at this time; will complete in the future as clinically indicated.        Inpatient Rehabilitation Course:   Jose Silva is a 22 y.o. female admitted to inpatient rehabilitation on 6/24/2020 for s/p debility, hypothyroidism. The patient participated in an aggressive multidisciplinary inpatient rehabilitation program involving 3 hours per day, 5 days per week of rehabilitation.  The patient is T 83 y.o. female with PMH below, presents with diffuse numbness, generalized weakness, inability to ambulate, light headedness, difficulty swallowing, possible syncope, unplanned weight loss. EMS was summoned to home where she says she lives with several family members they were reportedly asked to wait outside while occupants of the house carried the pt out to them. She had not been eating well since January which prompted the EMS call. She hadaAlso been spending increasing amount of time in bed recently.   Due to this issues and being unable to swallow she apparently has been unable to walk bc she is too weak.  She says that when she was being brought to the door to she thinks she might have \"passed out. \" Diana Joaquina, she was still in bed at the time and did not fall. She was diagnosed with depression and was started on paxil and admitted to [facility]. She has been recovering well there but continue to be weak and unable to perform all ADLs. She was also found to be hypothyroid, and was started on Synthroid replacement therapy. She was referred to [facility] for functional recover. After admission to the Rehab Unit, she made steady progress in her therapies as listed above under each therapy section. Her upper and lower extremity coordination and strength did improve in therapy, and she was starting to improve with her endurance and functional status as she  participated in her rehab therapies. Her stamina, balance, labs and blood pressure were monitored while on the rehabilitation unit. Patient is feeling better this morning, and thinks she is ready for discharge to home today. We will have her continue with outpatient PT and OT after discharge. She states she has made good progress in her therapies, and is getting back her independence. I have filled out her ABIMAEL and meds for her discharge today. repeat labs show TSH is still elevated, and I increased her dose of Synthroid at discharge. I also ordered for her to have equipment at home to include a rolling walker and tub transfer bench.   She will seek treatment from mental health services at [facility] at discharge, and we will set her up with a family doctor to see at discharge.           Condition at Discharge: Good    Significant Diagnostics:   Lab Results   Component Value Date     07/03/2020    K 3.7 07/03/2020     07/03/2020    BUN 6 (L) 07/03/2020    CREATININE <0.5 (L) 07/03/2020    GLUCOSE 81 07/03/2020    CALCIUM 9.1 07/03/2020     Lab Results   Component Value Date    WBC 6.3 07/03/2020    RBC 3.16 (L) 07/03/2020    HGB 10.3 (L) 07/03/2020    HCT 31.7 (L) 07/03/2020    .1 (H) 07/03/2020    MCH 32.6 07/03/2020    MCHC 32.6 07/03/2020    RDW 16.0 (H) 07/03/2020     07/03/2020    MPV 6.9 07/03/2020     Lab Results   Component Value Date    PROTIME 12.0 06/15/2020    INR 1.03 06/15/2020     Lab Results   Component Value Date    APTT 25.6 06/11/2020     Lab Results   Component Value Date    COLORU Yellow 06/11/2020    CLARITYU Clear 06/11/2020    GLUCOSEU Negative 06/11/2020    BILIRUBINUR MODERATE (A) 06/11/2020    KETUA Negative 06/11/2020    SPECGRAV 1.020 06/11/2020    BLOODU SMALL (A) 06/11/2020    PHUR 6.5 06/11/2020    PHUR 6.5 06/11/2020    PROTEINU 30 (A) 06/11/2020    UROBILINOGEN 2.0 (A) 06/11/2020    NITRU POSITIVE (A) 06/11/2020    LEUKOCYTESUR Negative 06/11/2020    LABMICR YES 06/11/2020    URRFLXCULT Not Indicated 06/11/2020    URINETYPE NotGiven 06/11/2020     Lab Results   Component Value Date    MUCUS 2+ (A) 06/11/2020    WBCUA 3-5 06/11/2020    BACTERIA 1+ (A) 06/11/2020     Lab Results   Component Value Date    LABURIN <10,000 CFU/ml  No further workup   06/11/2020    ORG Escherichia coli (A) 06/11/2020       Ct Head Wo Contrast    Result Date: 6/14/2020  EXAMINATION: CT OF THE HEAD WITHOUT CONTRAST  6/11/2020 5:53 pm TECHNIQUE: CT of the head was performed without the administration of intravenous contrast. Dose modulation, iterative reconstruction, and/or weight based adjustment of the mA/kV was utilized to reduce the radiation dose to as low as reasonably achievable. COMPARISON: None. HISTORY: ORDERING SYSTEM PROVIDED HISTORY: syncope TECHNOLOGIST PROVIDED HISTORY: Has a \"code stroke\" or \"stroke alert\" been called? ->No Reason for exam:->syncope Is the patient pregnant?->No Reason for Exam: syncope Acuity: Acute Type of Exam: Initial FINDINGS: BRAIN/VENTRICLES: The ventricular system is normal in appearance. No evidence of mass effect or midline shift. There is mild prominence of sulci overlying convexities of cerebral hemispheres. No significant area of abnormal attenuation of brain parenchyma is identified. No abnormal extra-axial fluid collections are identified. ORBITS: The visualized portion of the orbits demonstrate no acute abnormality. SINUSES: The visualized paranasal sinuses and mastoid air cells demonstrate no acute abnormality. SOFT TISSUES/SKULL:  No acute abnormality of the visualized skull or soft tissues. No evidence of acute intracranial abnormality. Fl Esophagram    Result Date: 6/15/2020  EXAMINATION: SINGLE CONTRAST ESOPHAGRAM 6/15/2020 HISTORY: ORDERING SYSTEM PROVIDED HISTORY: dysphagia TECHNOLOGIST PROVIDED HISTORY: Reason for exam:->dysphagia Reason for Exam: Dysphagia Acuity: Acute Type of Exam: Initial COMPARISON: None. TECHNIQUE: Multiple single contrast images of the esophagus and gastroesophageal junction were obtained following the oral administration of water soluble contrast FLUOROSCOPY DOSE AND TYPE OR TIME AND EXPOSURES: 51 seconds, 5 images FINDINGS: The patient is unable to stand or roll for this procedure. As result, all imaging was performed in DAVIS position. The swallowing mechanism is intact. Normal pharyngeal motility. There is a mild degree of esophageal dysmotility with some stasis of contrast.  No esophageal mucosal lesions are identified. There is a small sliding-type hiatal hernia.   Gastroesophageal reflux was not demonstrated, but provocative maneuvers Jennifer Ruiz,                                                           T, CS,   Ordering Physician Becky Mckenna MD  Interpreting        [facility]                                       Physician           Readers                                                           Dellia Klinefelter, MD  Procedure Type of Study:   Veins:Lower Extremities DVT Study, VASC EXTREMITY VENOUS DUPLEX BILATERAL. Vascular Sonographer Report  Indications for Study:Edema. Additional Indications:Bilateral foot and ankle swelling Venous Duplex Scan: B-mode imaging of the deep and superficial veins, with compression maneuvers, including color and Doppler spectral waveform analysis. Impressions Right Impression No evidence of deep or superficial venous thrombosis seen involving the lower extremity. Left Impression No evidence of deep or superficial venous thrombosis seen involving the lower extremity. Conclusions   Summary   There is no evidence of deep or superficial venous thrombosis involving the  lower extremities bilaterally. Signature   ------------------------------------------------------------------  Electronically signed by Stephen Meza MD (Interpreting  physician) on 06/12/2020 at 08:24 PM  ------------------------------------------------------------------  Patient Status:Routine. Study [facility] - V[facility] Lab. Technical Quality:Adequate visualization.   - Results were reported to:Ramona Adrian RN @ 3:15pm. Velocities are measured in cm/s ; Diameters are measured in mm Right Lower Extremities DVT Study Measurements Right 2D Measurements +------------------------+----------+---------------+----------+ ! Location                ! Visualized! Compressibility! Thrombosis! +------------------------+----------+---------------+----------+ ! Sapheno Femoral Junction! Yes       ! Yes            ! None      ! +------------------------+----------+---------------+----------+ ! GSV Thigh               ! Yes       ! Yes            ! None      ! +------------------------+----------+---------------+----------+ ! Common Femoral          !Yes       ! Yes            ! None      ! +------------------------+----------+---------------+----------+ ! Prox Femoral            !Yes       ! Yes            ! None      ! +------------------------+----------+---------------+----------+ ! Mid Femoral             !Yes       ! Yes            ! None      ! +------------------------+----------+---------------+----------+ ! Dist Femoral            !Yes       ! Yes            ! None      ! +------------------------+----------+---------------+----------+ ! Deep Femoral            !Yes       ! Yes            ! None      ! +------------------------+----------+---------------+----------+ ! Popliteal               !Yes       ! Yes            ! None      ! +------------------------+----------+---------------+----------+ ! GSV Below Knee          ! Yes       ! Yes            ! None      ! +------------------------+----------+---------------+----------+ ! Gastroc                 ! Yes       ! Yes            ! None      ! +------------------------+----------+---------------+----------+ ! Soleal                  !Yes       ! Yes            ! None      ! +------------------------+----------+---------------+----------+ ! PTV                     ! Yes       ! Yes            ! None      ! +------------------------+----------+---------------+----------+ ! Peroneal                !Yes       ! Yes            ! None      ! +------------------------+----------+---------------+----------+ ! GSV Calf                ! Yes       ! Yes            ! None      ! +------------------------+----------+---------------+----------+ ! SSV                     ! Yes       ! Yes            ! None      ! +------------------------+----------+---------------+----------+ Right Doppler Measurements +------------------------+------+------+------------+ ! Location                ! Signal!Reflux! Reflux (sec)! +------------------------+------+------+------------+ ! Sapheno Femoral Junction! Phasic! No    !            ! +------------------------+------+------+------------+ ! Common Femoral          !Phasic! No    !            ! +------------------------+------+------+------------+ ! Femoral                 !Phasic! No    !            ! +------------------------+------+------+------------+ ! Deep Femoral            !Phasic! No    !            ! +------------------------+------+------+------------+ ! Popliteal               !Phasic! No    !            ! +------------------------+------+------+------------+ Left Lower Extremities DVT Study Measurements Left 2D Measurements +------------------------+----------+---------------+----------+ ! Location                ! Visualized! Compressibility! Thrombosis! +------------------------+----------+---------------+----------+ ! Sapheno Femoral Junction! Yes       ! Yes            ! None      ! +------------------------+----------+---------------+----------+ ! GSV Thigh               ! Yes       ! Yes            ! None      ! +------------------------+----------+---------------+----------+ ! Common Femoral          !Yes       ! Yes            ! None      ! +------------------------+----------+---------------+----------+ ! Prox Femoral            !Yes       ! Yes            ! None      ! +------------------------+----------+---------------+----------+ ! Mid Femoral             !Yes       ! Yes            ! None      ! +------------------------+----------+---------------+----------+ ! Dist Femoral            !Yes       ! Yes            ! None      ! +------------------------+----------+---------------+----------+ ! Deep Femoral            !Yes       ! Yes            ! None      ! +------------------------+----------+---------------+----------+ ! Popliteal               !Yes       ! Yes            ! None      ! 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 When they are cold no swelling. Symptoms for 1 month Acuity: Acute Type of Exam: Ongoing FINDINGS: INTRACRANIAL STRUCTURES/VENTRICLES:  No acute abnormality is seen within the sellar/suprasellar regions. There is no acute infarct. No mass effect or midline shift. No evidence of an acute intracranial hemorrhage. The ventricles and sulci are normal in size and configuration. The normal signal voids within the major intracranial vessels appear maintained. No abnormal focus of enhancement is seen within the brain. ORBITS: The visualized portion of the orbits demonstrate no acute abnormality. SINUSES: The visualized paranasal sinuses and mastoid air cells are well aerated. BONES/SOFT TISSUES: The bone marrow signal intensity appears normal. The soft tissues demonstrate no acute abnormality. No pituitary mass identified. Patient Instructions: Follow-up visits: She will seek treatment from mental health services at [facility] at discharge, and we will set her up with a family doctor to see at discharge.       Discharge Medications:     Medication List      START taking these medications    dicyclomine 10 MG capsule  Commonly known as:  BENTYL  Take 1 capsule by mouth 2 times daily (before meals)     folic acid 1 MG tablet  Commonly known as:  FOLVITE  Take 1 tablet by mouth daily     magnesium oxide 400 (241.3 Mg) MG Tabs tablet  Commonly known as:  MAG-OX  Take 1 tablet by mouth daily     metoprolol tartrate 25 MG tablet  Commonly known as:  LOPRESSOR  Take 0.25 tablets by mouth 2 times daily     pantoprazole 40 MG tablet  Commonly known as:  PROTONIX  Take 1 tablet by mouth every morning (before breakfast)        CHANGE how you take these medications    levothyroxine 100 MCG tablet  Commonly known as:  SYNTHROID  Take 1 tablet by mouth Daily  What changed:    · medication strength  · how much to take        CONTINUE taking these medications    PARoxetine 20 MG tablet  Commonly known as:  PAXIL  Take 1 tablet by mouth nightly     vitamin D 1.25 MG (40519 UT) Caps capsule  Commonly known as:  ERGOCALCIFEROL  Take 1 capsule by mouth once a week  Start taking on:  July 8, 2020           Where to Get Your Medications      You can get these medications from any pharmacy    Bring a paper prescription for each of these medications  · dicyclomine 10 MG capsule  · folic acid 1 MG tablet  · levothyroxine 100 MCG tablet  · magnesium oxide 400 (241.3 Mg) MG Tabs tablet  · metoprolol tartrate 25 MG tablet  · pantoprazole 40 MG tablet  · PARoxetine 20 MG tablet  · vitamin D 1.25 MG (70566 UT) Caps capsule            I spent over 35 minutes on this discharge encounter between counseling, coordination of care, and medication reconciliation.         To comply with [facility] adri [id number]:   Discharge order placed in advance to facilitate patients discharge needs      Yamileth Chavez MD, 7/3/2020, 9:01 AM

## 2020-07-03 NOTE — PROGRESS NOTES
160 ft x 2  OT  PT Equipment Recommendations  Equipment Needed: Yes  Mobility Devices: [place]: Rolling  Toilet - Technique: Ambulating(RW)  Equipment Used: Standard toilet  Toilet Transfers Comments: increased effort off low toilet/std. toilet with grab bar on Right                  Body mass index is 25.61 kg/m². Assessment and Plan: Discharge to home today with outpatient therapy.        Debility: likely due to prolonged bed rest/depression- continue paxil- P/OT     Depression- Continue Paxil daily- Post psych consult     Dysphagia- Improved- will monitor in therapy- SLP, bite sized diet    Tachycardia - Metoprolol - decreased to 6.25mg BID on 6/27 d/t low BP     Hypothyroid- continue synthroid 50mcg daily        Kary Marquez MD    7/3/2020  7:37 AM

## 2020-07-03 NOTE — CARE COORDINATION
Nurse: Ravindra Reyes  Phone: [phone]  Fax: [phone]    Claudia Bergman  Member ID: [id number]  Auth#: [id number]    7/1: clinicals faxed for continued stay  7/3: discharge summary faxed.      REBECA Foley

## 2020-07-03 NOTE — CARE COORDINATION
Patient rounds; Ms. Hermila Aaron excited to be returning home. Attempt to establish psych MD and PCP, but offices closed. Patient aware that this will be established on Monday and appts made on her behalf.      REBECA Foley

## 2020-07-03 NOTE — PROGRESS NOTES
Discharge instructions reviewed with patient and sibling. All home medications have been reviewed, questions answered and patient verbalized understanding. Discharge instructions signed and copies given. Patient discharged home with belongings.

## 2020-07-06 ENCOUNTER — TELEPHONE (OUTPATIENT)
Dept: FAMILY MEDICINE CLINIC | Age: 20
End: 2020-07-06

## 2020-07-07 ENCOUNTER — TELEPHONE (OUTPATIENT)
Dept: FAMILY MEDICINE CLINIC | Age: 20
End: 2020-07-07

## 2020-07-10 ENCOUNTER — HOSPITAL ENCOUNTER (OUTPATIENT)
Dept: OCCUPATIONAL THERAPY | Age: 20
Setting detail: THERAPIES SERIES
Discharge: HOME | End: 2020-07-10
Payer: MEDICARE

## 2020-07-10 ENCOUNTER — HOSPITAL ENCOUNTER (OUTPATIENT)
Dept: PHYSICAL THERAPY | Age: 20
Setting detail: THERAPIES SERIES
Discharge: HOME | End: 2020-07-10
Payer: MEDICARE

## 2020-07-10 VITALS — SYSTOLIC BLOOD PRESSURE: 103 MMHG | DIASTOLIC BLOOD PRESSURE: 80 MMHG | HEART RATE: 95 BPM

## 2020-07-10 PROCEDURE — 97110 THERAPEUTIC EXERCISES: CPT

## 2020-07-10 PROCEDURE — 97161 PT EVAL LOW COMPLEX 20 MIN: CPT

## 2020-07-10 PROCEDURE — 97165 OT EVAL LOW COMPLEX 30 MIN: CPT

## 2020-07-10 ASSESSMENT — 9 HOLE PEG TEST
TESTTIME_SECONDS: 30
TEST_RESULT: FUNCTIONAL
TEST_RESULT: IMPAIRED
TESTTIME_SECONDS: 24

## 2020-07-10 NOTE — FLOWSHEET NOTE
Outpatient Occupational Therapy  Phone: [phone]            Fax: [phone]       ___________________________________________________________________________      Outpatient Occupational Therapy     [x] Daily Treatment Note   [] Progress Note   [] Discharge Note      Date:  7/10/2020    Patient Name:  Ramo Brennan         YOB: 2000    Medical Diagnosis and ICD 10:  Diagnosis: R53.81 debility, moderate malnutrition    Treatment Diagnosis:  Performance deficits / Impairments: Decreased functional mobility , Decreased ADL status, Decreased strength, Decreased endurance, Decreased sensation, Decreased balance, Decreased posture, Decreased high-level IADLs    Onset Date:  Onset Date: 06/11/20    Referring Physician and Referral Date:  Referring Practitioner: Dr. Jermain Carter, 7/2/20     Visits Allowed/Insurance/Certification information:   [place] [employer], 30 visits    Restrictions/Precautions: none     Plan of care sent to provider:    []Faxed  [x]Co-signature    (attempts: 1[] 2 []3[])        Plan of care signed:    []Yes date:            [x]No         Next Progress Note due:   8/21/2020    Visit# / total visits:  1/12    Plan for Next Session:  Putty, review HEP    Home Exercise Program: elbow flexion/extension, in addition to theraband exercises from rehab center    Access Code: [email or web address]   URL: [email or web address]   Date: 07/10/2020   Prepared by: Alisson Durant     Exercises   Seated Elbow Flexion with Resistance Under Foot - 30 reps - 1 sets - 2x daily - 7x weekly   Seated Elbow Flexion with Resistance - 30 reps - 1 sets - 2x daily - 7x weekly       Subjective: Patient agreeable to OT exercises, states she is currently doing exercises 1x/day.      Pain level: 0/10, tingling reported in right hand fingers, reports improving since it started 2 weeks ago   Objective:     Forearm pronations and finger to nose testing - WNL    Exercises:    Exercises in bold performed in laundry, sweeping with a broom) using modified methods PRN. Short term goal 3: Patient will report participation in once a week community outings with safe environmental precautions. Long term goals  Time Frame for Long term goals : 6 weeks  Long term goal 1: Patient will return to driving as medically cleared by MD.  Davina Vo term goal 2: Patient will report increased independence with picking up 332 to 3year old niece weighing approx. 24 lbs.   Patient Goals   Patient goals : \"to be able to get back to being 100%\"    Prognosis: [x]Good   []Fair   []Poor    Patient Requires Follow-up:  [x]Yes  []No    Plan: [x]Plan of care initiated     [x]Continue per plan of care (2x/week for 6 weeks) [] Alter current plan (see comments)    []Hold pending MD visit []Discharge    Time in: 1107  Time out: 1200    Timed Code Treatment Minutes:  25    Total Treatment Minutes:  53 with eval    Electronically signed by:  SHALONDA Katz/TAWANA

## 2020-07-10 NOTE — PROGRESS NOTES
Outpatient Occupational Therapy  Phone: [phone] Fax: [phone]     To: Referring Practitioner: Dr. Lulu Yan, 20      Patient: Caridad Jackson  : 2000  MRN: [id number]  Evaluation Date: 7/10/2020      Diagnosis Information:  Diagnosis: R53.81 debility, moderate malnutrition         Occupational Therapy Certification/Re-Certification Form  Dear Dr. Lulu Yan,  The following patient has been evaluated for occupational therapy services and for therapy to continue, insurance requires monthly physician review of the treatment plan. Please review the attached evaluation and/or summary of the patient's plan of care, and verify that you agree therapy should continue by signing the attached document and sending it back to our office. Plan of Care/Treatment to date:  [x] Therapeutic Exercise  []  Modalities:  [x] Therapeutic Activity   [] Ultrasound [] Electrical Stimulation   [] Total Motion Release   [] Fluidotherapy [] Kinesiotaping  [x]  Neuromuscular Re-education  [] Iontophoresis [] Coldpack/hotpack   [x]  Instruction in HEP    Other:  [x]  Manual Therapy     []   Dry needling             [x] ADL/Self Care  [x] IADL Training  [] LSVT BIG  [] Saebo  [] Splinting  [] Wheelchair Mobility    Frequency/Duration:  # Days per week: [] 1 day # Weeks: [] 1 week [] 5 weeks      [x] 2 days   [] 2 weeks [x] 6 weeks     [] 3 days   [] 3 weeks [] 7 weeks     [] 4 days   [] 4 weeks [] 8 weeks     [] 5 days   [] 10 weeks [] 12 weeks    Rehab Potential: [] Excellent [x] Good [] Fair  [] Poor     Electronically signed by:  Julio Glass      If you have any questions or concerns, please don't hesitate to call.   Thank you for your referral.      Physician Signature:________________________________Date:__________________  By signing above, therapists plan is approved by physician      Occupational Therapy  Occupational Therapy Initial Assessment  Date:  7/10/2020    Patient Name: Abner Brooks Responsibility: Primary  Ambulation Assistance: Independent  Transfer Assistance: Independent  Active : Yes  Mode of Transportation: Car  Type of occupation: no work experience  Leisure & Hobbies: hanging out with family, reading, writing, soccer  Additional Comments: patient has not returned to driving since being discharged from rehab center     Objective   Observation/Palpation  Posture: Fair  ADL  Feeding: Modified independent   Grooming: Modified independent   UE Bathing: Modified independent   LE Bathing: Modified independent   UE Dressing: Modified independent   LE Dressing: Modified independent   Toileting: Modified independent   Currently uses a RW for ADLs and shower chair for bathing  Tone RUE  RUE Tone: Normotonic  Tone LUE  LUE Tone: Normotonic  Coordination  Movements Are Fluid And Coordinated: Yes  Coordination and Movement description: Decreased speed  Quality of Movement Other  Comment: mild \"shaking\" noted with MMT bilaterally  Functional Activity Tolerance  Functional Activity Tolerance: Tolerates 10 - 20 min exercise with multiple rests  Additional Comments: reports needing multiple rests for activities at home and walking  Balance  Sitting Balance: Modified independent   Standing Balance: Modified independent   Functional Mobility  Functional - Mobility Device: Rolling Walker  Activity: To/From therapy gym  Assist Level: Modified independent   Bed mobility  Supine to Sit: Modified independent  Sit to Supine: Modified independent  Transfers  Sit to stand: Modified independent  Stand to sit: Modified independent  Vision - Basic Assessment  Prior Vision: No visual deficits  Patient Visual Report: No visual complaint reported. Visual Field Cut: No  Oculo Motor Control:  WNL  Cognition  Overall Cognitive Status: WFL  Cognition Comment: decreased affect, reports history of depression, reports no learning issues, slightly impulsive with MVPT testing  Perception  Overall Perceptual Status: Impaired  MVPT: 33/36, 1 error in visual discrimination, 1 error in figure ground, 1 error in visual closure, somewhat impulsive at times, answering before looking at all the answers  Sensation  Overall Sensation Status: Impaired  Additional Comments: tingling in fingertips, occasionally reported on radial side, occasionally reported on ulnar side, states it comes and goes (reports that it is getting a little better over the past 2 weeks), also demonstrates slight flexion in ring finger, able to identify light touch stimuli to both hands within 1 inch accuracy when identifying the right side, and within .5 inch accuracy when identifying the left     LUE AROM (degrees)  LUE AROM : WNL  LUE General AROM: rounded shoulders bilaterally  RUE AROM (degrees)  RUE AROM : WNL  RUE General AROM: rounded shoulders bilaterally  LUE Strength  Gross LUE Strength: Exceptions to [facility]  L Shoulder Flex: 3+/5  L Shoulder Ext: 3+/5  L Shoulder ABduction: 3+/5  L Shoulder ADduction: 3+/5  L Shoulder Int Rotation: 3+/5  L Shoulder Ext Rotation: 3+/5  L Shoulder Horiz ABduction: 3+/5  L Shoulder Horiz ADduction: 3+/5  L Elbow Flex: 4-/5  L Elbow Ext: 4-/5  L Forearm Pron: 3/5  L Forearm Sup: 3/5  L Wrist Flex: 3/5  L Wrist Ext: 3/5  L Wrist Radial Deviation: 3/5  L Wrist Ulnar Deviation: 3/5  RUE Strength  Gross RUE Strength: Exceptions to [facility]  R Shoulder Flex: 3+/5  R Shoulder Ext: 3+/5  R Shoulder ABduction: 3+/5  R Shoulder ADduction: 3+/5  R Shoulder Int Rotation: 4-/5  R Shoulder Ext Rotation: 4-/5  R Shoulder Horiz ABduction: 4-/5  R Shoulder Horiz ADduction: 4-/5  R Elbow Flex: 4-/5  R Elbow Ext: 4-/5  R Forearm Pron: 3/5  R Forearm Sup: 3/5  R Wrist Flex: 3/5  R Wrist Ext: 3/5  R Wrist Radial Deviation: 3/5  R Wrist Ulnar Deviation: 3/5     Hand Dominance  Hand Dominance: Right  Left Hand Strength -  (lbs)  Handle Setting 2: 9, 14, 14  Left Hand Strength - Pinch (lbs)  Lateral: 6, 8, 6  Tip: 3, 3, 3  Palmar 3 point: 5, 5, participation in once a week community outings with safe environmental precautions. Long term goals  Time Frame for Long term goals : 6 weeks  Long term goal 1: Patient will return to driving as medically cleared by MD. Gilson Pinzon term goal 2: Patient will report increased independence with picking up 332 to 3year old niece weighing approx. 24 lbs.   Patient Goals   Patient goals : \"to be able to get back to being 100%\"     Therapy Time   Individual Concurrent Group Co-treatment   Time In 1107         Time Out 1200         Minutes 53         Timed Code Treatment Minutes: 25 Minutes       [employer], OTR/L Clindamycin Counseling: I counseled the patient regarding use of clindamycin as an antibiotic for prophylactic and/or therapeutic purposes. Clindamycin is active against numerous classes of bacteria, including skin bacteria. Side effects may include nausea, diarrhea, gastrointestinal upset, rash, hives, yeast infections, and in rare cases, colitis.

## 2020-07-10 NOTE — PROGRESS NOTES
Physical Therapy  Initial Assessment  Date: 7/10/2020  Patient Name: Katja Arellano  MRN: [id number]  : 2000     Treatment Diagnosis: Other abnormalities of gait and balance    Subjective   General  Chart Reviewed: Yes  Patient assessed for rehabilitation services?: Yes  Family / Caregiver Present: No  Referring Practitioner: Estiven Steinberg   Diagnosis: R53.81 (ICD-10-CM) - Other malaise  PT Visit Information  PT Insurance Information: [place]   Total # of Visits Approved: 30(max)  Subjective  Subjective: Pt reported that about two months that she stopped eating and was not hungry. Pt reported that feet started swelling and then she stopped walking about 2 weeks before going to the hospital. Pt stated that her sister tried to help her up and she kept falling. Pt also passed out a couple times and realized she needed to the hosptial. Pt was told she had malnutrition. Pt was at [facility] where she had a little therapy. Pt stated that they worked with standing up and being on her feet. She was given a walker. Pt had been using walker ever since with no issues. Pt has no pain. Pt has some difficulty going up stairs. Pt has 4 BRANDON. Pt has stairs to get to the second floor and a basement. Pt lives on first floor. Pt has a tub shower. Pt has no trouble getting in to shower. Pt has had no issues with balance an also has a shower. Pt stated she has no issues with balance. Pt said if she walks for a long time her balance feels is off. Pt is able to walk about 30 minutes. Pt said she is able to stand for any amount of time. Pt likes to spend time with family, watch movies, and read. Pt has a neice and she would like to be able play with her. Objective     Observation/Palpation  Posture: Fair  Observation: Standing: mild ER L>R, pes planus, decreased knee extension. Ambulation with RW:  trendelenburg on R, decreased hipo extension, Decreased stance time on the R, decreased step length on the L. Ambulation without RW in // bars: occasional episodes of freezing with RLE. wide TIARA. decreased    Body Mechanics: Squat: quad dominant, knee valgus, tibial IR    PROM RLE (degrees)  RLE PROM: WFL  PROM LLE (degrees)  LLE PROM: WFL    Strength RLE  Strength RLE: Exception  R Hip Flexion: 3+/5  R Hip Extension: 3+/5  R Hip ABduction: 3+/5  R Hip Internal Rotation: 4-/5  R Hip External Rotation: 4-/5  R Knee Flexion: 4-/5  R Knee Extension: 4/5  R Ankle Dorsiflexion: 4/5  R Toe Extension: 4/5  Strength LLE  Strength LLE: Exception  L Hip Flexion: 3+/5  L Hip Extension: 3+/5  L Hip ABduction: 3+/5  L Hip Internal Rotation: 3+/5  L Hip External Rotation: 3+/5  L Knee Flexion: 4-/5  L Knee Extension: 4/5  L Ankle Dorsiflexion: 4-/5  L Toe Extension: 4-/5     Additional Measures  Flexibility: No tightness in hamstrings noted. Joaquina test (+ bilaterally)  Girth: swelling noted in BLE L>R  Special Tests: Clonus(-), Babinski (-), DTR: hyporeflexive bilateral patella and achilles. Sensation  Overall Sensation Status: WNL(BLE)  Bed mobility  Rolling to Left: Modified independent  Rolling to Right: Modified independent  Supine to Sit: Modified independent  Sit to Supine: Modified independent  Transfers  Sit to Stand: Modified independent  Stand to sit: Modified independent  Bed to Chair: Modified independent  Stand Pivot Transfers: Modified independent(RW)    Assessment   Conditions Requiring Skilled Therapeutic Intervention  Body structures, Functions, Activity limitations: Decreased functional mobility ; Decreased high-level IADLs;Decreased posture;Decreased ADL status; Decreased endurance;Decreased ROM; Decreased strength;Decreased balance; Increased pain  Assessment: Pt is a 23 y/o female that presents to [facility] after a decrease in strength, endurance, and ability to perform gait without AD.  Pt presents with impaired BLE strength, gait mechanics , muscle firing patterns, ability to perform functional mobility, and an increase in LE swelling. Pt would benefit from skilled physical therapy in order to improve impairments and progress toward previously stated goals. Treatment Diagnosis: Other abnormalities of gait and balance  Prognosis: Good  Decision Making: Low Complexity  REQUIRES PT FOLLOW UP: Yes         Plan   Plan  Times per week: 2x/week for 5 weeks  Plan weeks: 5 weeks  Current Treatment Recommendations: Strengthening, ADL/Self-care Training, Gait Training, Manual Therapy - Joint Manipulation, ROM, IADL Training, Stair training, Balance Training, Neuromuscular Re-education, Pain Management, Modalities, Functional Mobility Training, Endurance Training, Manual Therapy - Soft Tissue Mobilization, Home Exercise Program, Transfer Training    G-Code   LEFS: will be filled out nv    Goals  Long term goals  Time Frame for Long term goals : 5 weeks  Long term goal 1: Pt would be independent with HEP   Long term goal 2: Pt will improve BLE strength to 4/5 gross strength  Long term goal 3: Pt will ambulate with least restricitve AD with equal step length and no sign of RLE freezing. Long term goal 4: Pt will ascend/descend 12 steps with single hand rail and recirpocal pattern. Long term goal 5: Pt will will consistently demonstrate appropriate muscle firing pattern on BLE as measured by palpation        Therapy Time   Individual Concurrent Group Co-treatment   Time In  12:00         Time Out  12:45         Minutes  Total 45 min  Timed 10 min                 Malina Roca PT      Outpatient Physical Therapy  Phone: [phone]            Fax: [phone]          To:  Lynn Hdez                                                      From: Malina Roca, PT         Patient: Nicola Fitch                          : 2000  Diagnosis: Stroke                                                      Date: 7/10/2020  Treatment Diagnosis:  Other abnormalities of gait and Signature:________________________________Date:__________________  By signing above, therapists plan is approved by physician

## 2020-07-10 NOTE — FLOWSHEET NOTE
Physical Therapy Daily Treatment Note    Date:  7/10/2020    Patient Name:  Darryl Garduno    :  2000  MRN: [id number]  Restrictions/Precautions:    Medical/Treatment Diagnosis Information:  R53.81 (ICD-10-CM) - Other malaise  Insurance/Certification information:  PT Insurance Information: [place]   Physician Information:  Referring Practitioner: Christiano Cai of care signed (Y/N):  N  Visit# / total visits:  1/10     G-Code (if applicable):          LEFS: will be filled out nv    Medicare Cap (if applicable):   N/A= total amount used, updated 7/10/2020    Time in:  12:00      Timed Treatment: 10 min Total Treatment Time:    Time out: 12:45  ________________________________________________________________________________________    Pain Level:    /10  SUBJECTIVE:  See Initial Evaluation     OBJECTIVE: See Initial Evaluation     Exercise/Equipment Resistance/Repetitions Other comments     SLR x10B HEP     Clamshells x10B HEP     Bridges x10 HEP     LAQ x10 HEP                                                                                                                 Other Therapeutic Activities:  Pt was educated on POC, established goals, HEP, and facility policies     Manual Treatments:         Modalities:      Test/Measurements:  See Initial Evaluation        ASSESSMENT:    See Initial Evaluation      Treatment/Activity Tolerance:   [x]Patient tolerated treatment well [] Patient limited by fatique  []Patient limited by pain [] Patient limited by other medical complications  [] Other:     Goals:        Long term goals  Time Frame for Long term goals : 5 weeks  Long term goal 1: Pt would be independent with HEP   Long term goal 2: Pt will improve BLE strength to 4/5 gross strength  Long term goal 3: Pt will ambulate with least restricitve AD with equal step length and no sign of RLE freezing.    Long term goal 4: Pt will ascend/descend 12 steps with single hand rail and recirpocal

## 2020-07-13 ENCOUNTER — HOSPITAL ENCOUNTER (OUTPATIENT)
Dept: PHYSICAL THERAPY | Age: 20
Setting detail: THERAPIES SERIES
Discharge: HOME | End: 2020-07-13
Payer: MEDICARE

## 2020-07-13 ENCOUNTER — HOSPITAL ENCOUNTER (OUTPATIENT)
Dept: OCCUPATIONAL THERAPY | Age: 20
Setting detail: THERAPIES SERIES
Discharge: HOME | End: 2020-07-13
Payer: MEDICARE

## 2020-07-13 PROCEDURE — 97110 THERAPEUTIC EXERCISES: CPT

## 2020-07-13 PROCEDURE — 97112 NEUROMUSCULAR REEDUCATION: CPT

## 2020-07-13 NOTE — FLOWSHEET NOTE
Outpatient Occupational Therapy  Phone: [phone]            Fax: [phone]       ___________________________________________________________________________      Outpatient Occupational Therapy     [x] Daily Treatment Note   [] Progress Note   [] Discharge Note      Date:  7/13/2020    Patient Name:  Angeline Lovett         YOB: 2000    Medical Diagnosis and ICD 10:  Diagnosis: R53.81 debility, moderate malnutrition    Treatment Diagnosis:  Performance deficits / Impairments: Decreased functional mobility , Decreased ADL status, Decreased strength, Decreased endurance, Decreased sensation, Decreased balance, Decreased posture, Decreased high-level IADLs    Onset Date:  Onset Date: 06/11/20    Referring Physician and Referral Date:  Referring Practitioner: Dr. Sherie Bains, 7/2/20     Visits Allowed/Insurance/Certification information:   [place] [employer], 30 visits    Restrictions/Precautions: none     Plan of care sent to provider:    []Faxed  [x]Co-signature    (attempts: 1[x] 2 []3[])        Plan of care signed:    [x]Yes date: Dr. Sherie Bains 7/10/20            [x]No         Next Progress Note due:   8/21/2020    Visit# / total visits:  2/12    Plan for Next Session:  Putty, review HEP    Home Exercise Program: elbow flexion/extension, in addition to theraband exercises from rehab center    Access Code: [email or web address]   URL: [email or web address]   Date: 07/13/2020   Prepared by: Nicol Lucero     Program Notes   also complete bean in putty exercise, gripping with putty exercise, and pinching with putty exercise 3x/day     Exercises   Seated Elbow Flexion with Resistance Under Foot - 30 reps - 1 sets - 2x daily - 7x weekly   Standing Shoulder Row with Anchored Resistance - 10 reps - 3 sets - 2x daily - 7x weekly       Subjective: Patient agreeable to OT exercises, states she is currently doing exercises 1x/day.      Pain level: 0/10, tingling reported in right hand fingers, reports improving since it started 2 weeks ago     Objective:       Exercises:    Exercises in bold performed in department today. Items not bolded are carried forward from prior visits for continuity of the record.   Exercise/Equipment Resistance/Repetitions Other comments      ADL/IADL TRAINING                                                            NEUROMUSCULAR RE-EDU and THERAPEUTIC ACTIVITY     transfers Increased time and mod I with RW, cues for postural awareness                                                  THERAPEUTIC EXERCISE and MANUAL TECHNIQUES    Elbow flexion/extension With medium resistance theraband 10x2 with band under legs, reported RPE of 11    Also completed with theraband under feet, requires therapist cue to push feet into floor while lifting up arm in bicep curl, able to reach RPE of 11   Patient verbalizes understanding to increase hold on band with HEP to increase RPE for maximum progress    Patient verbalizes understanding to increase postural awareness with HEP for maximizing strength gains and joint protection    Increased time needed for exercise training   Scapular retraction in standing 10x3, theraband with medium resistance, RPE reported of 13    Core strengthening In tall kneel - 15x toss - 2# weighted ball  In tall kneel - 15x toss - 4# weighted ball  In tall kneel - 15x ball pass behind - 2# weighted ball  In tall kneel - 15x ball pass behind - 2# weighted ball  In tall kneel - green  stick - 20x,    Posterior shoulder strengthening Prone - 4# weighted ball elbow flexion 30x2  Prone - Scapular retraction with 2# BUE - 30x    Thera putty strengthening Gripping, pinching, bean  - review from rehab, no cues needed, patient verbalizes understanding to complete at home 3x/day                             MODALITIES                     SPLINTING                  Functional Outcome Measure:   [x]NA    Treatment/Activity Tolerance:    Patients response to treatment: [x]Patient tolerated treatment well []Patient limited by fatigue   []Patient limited by pain  []Patient limited by other medical complications   []Other:     Assessment:   Patient verbalizes understanding of HEP and postural awareness. Patient verbalizes understanding of new HEP to address to address scapular retraction and posterior shoulder strength. Patient continues to requires cues for postural awareness during UE HEP and activities. Goals  Short term goals  Time Frame for Short term goals: 3 weeks  Short term goal 1: Patient will be I with UE HEP with reported RPE via Ritesh Scale Rating of Perceived Exertion of 13+ (indicated as somewhat hard). Short term goal 2: Patient will report participation in homemaking (dishes, laundry, sweeping with a broom) using modified methods PRN. Short term goal 3: Patient will report participation in once a week community outings with safe environmental precautions. Long term goals  Time Frame for Long term goals : 6 weeks  Long term goal 1: Patient will return to driving as medically cleared by MD.  [employer] term goal 2: Patient will report increased independence with picking up 332 to 3year old niece weighing approx. 24 lbs.   Patient Goals   Patient goals : \"to be able to get back to being 100%\"    Prognosis: [x]Good   []Fair   []Poor    Patient Requires Follow-up:  [x]Yes  []No    Plan: []Plan of care initiated     [x]Continue per plan of care (2x/week for 6 weeks) [] Alter current plan (see comments)    []Hold pending MD visit []Discharge    Time in: 1115  Time out: 1157    Timed Code Treatment Minutes:  42    Total Treatment Minutes: 42    Electronically signed by:  SHALONDA Cote/TAWANA

## 2020-07-13 NOTE — FLOWSHEET NOTE
Physical Therapy Daily Treatment Note    Date:  2020    Patient Name:  Bland Krabbe    :  2000  MRN: [id number]  Restrictions/Precautions:    Medical/Treatment Diagnosis Information:  R53.81 (ICD-10-CM) - Other malaise  Insurance/Certification information:  PT Insurance Information: [place]   Physician Information:  Referring Practitioner: Samantha Huffman   Plan of care signed (Y/N):  N  Visit# / total visits:  2/10     G-Code (if applicable):          LEFS: 49/80    Medicare Cap (if applicable):   N/A= total amount used, updated 2020    Time in:  12:00      Timed Treatment: 45 min Total Treatment Time:  45 min  Time out: 12:45  ________________________________________________________________________________________    Pain Level:    0/10  SUBJECTIVE:  Pt reported that she is doing well and the exercises have going well. .    OBJECTIVE: See Initial Evaluation     Exercise/Equipment Resistance/Repetitions Other comments     SLR      Flexion:      abduction x12B  x10  x10 HEP     Clamshells x12B HEP     Bridges x12 HEP     LAQ x12 c 2# HEP     PKF c ball x10      Slant board 3x30\"      Heel raises     Mini Squat  Hip abduction  Hamstring curls x15B  x15B  x15B  x15B             FSU  Toe taps on step x10B 4\" step   1 min 4\" step     Balance:       Feet apart       Feet together      Semi-tandem  Foam Balance:      Feet apart       Feet together      Semi-tandem   1 min  1 min  1 min B    1 min  1 min   1 min B       Lateral walk 3 laps // bars                                                             Other Therapeutic Activities:    Manual Treatments:         Modalities:      Test/Measurements:  See Initial Evaluation        ASSESSMENT:    Pt tolerated treatment well as pt performed all od today`s standing TE and balance with no major LOB. Standing strengthening and balance activities initiated this date. Pt will progress strengthening and balance activities as pt can tolerate.  Pt demonstrated increased ability to ambulate unsupported in // bars this date with less hesitation. Pt ambulated with wide TIARA and decreased step length. Treatment/Activity Tolerance:   [x]Patient tolerated treatment well [] Patient limited by fatique  []Patient limited by pain [] Patient limited by other medical complications  [] Other:     Goals:        Long term goals  Time Frame for Long term goals : 5 weeks  Long term goal 1: Pt would be independent with HEP   Long term goal 2: Pt will improve BLE strength to 4/5 gross strength  Long term goal 3: Pt will ambulate with least restricitve AD with equal step length and no sign of RLE freezing. Long term goal 4: Pt will ascend/descend 12 steps with single hand rail and recirpocal pattern. Long term goal 5: Pt will will consistently demonstrate appropriate muscle firing pattern on BLE as measured by palpation      Plan: [x] Continue per plan of care [] Alter current plan (see comments)   [] Plan of care initiated [] Hold pending MD visit [] Discharge      Plan for Next Session:      Re-Certification Due Date:         Signature:   Bibi Cabrera PT

## 2020-07-17 ENCOUNTER — HOSPITAL ENCOUNTER (OUTPATIENT)
Dept: PHYSICAL THERAPY | Age: 20
Setting detail: THERAPIES SERIES
Discharge: HOME | End: 2020-07-17
Payer: MEDICARE

## 2020-07-17 ENCOUNTER — HOSPITAL ENCOUNTER (OUTPATIENT)
Dept: OCCUPATIONAL THERAPY | Age: 20
Setting detail: THERAPIES SERIES
Discharge: HOME | End: 2020-07-17
Payer: MEDICARE

## 2020-07-17 PROCEDURE — 97140 MANUAL THERAPY 1/> REGIONS: CPT

## 2020-07-17 PROCEDURE — 97112 NEUROMUSCULAR REEDUCATION: CPT

## 2020-07-17 PROCEDURE — 97110 THERAPEUTIC EXERCISES: CPT

## 2020-07-17 NOTE — FLOWSHEET NOTE
Outpatient Occupational Therapy  Phone: [phone]            Fax: [phone]       ___________________________________________________________________________      Outpatient Occupational Therapy     [x] Daily Treatment Note   [] Progress Note   [] Discharge Note      Date:  7/17/2020    Patient Name:  Rosa Andres         YOB: 2000    Medical Diagnosis and ICD 10:  Diagnosis: R53.81 debility, moderate malnutrition    Treatment Diagnosis:  Performance deficits / Impairments: Decreased functional mobility , Decreased ADL status, Decreased strength, Decreased endurance, Decreased sensation, Decreased balance, Decreased posture, Decreased high-level IADLs    Onset Date:  Onset Date: 06/11/20    Referring Physician and Referral Date:  Referring Practitioner: Dr. Hayes Almaraz, 7/2/20     Visits Allowed/Insurance/Certification information:   [employer], 30 visits    Restrictions/Precautions: none     Plan of care sent to provider:    []Faxed  [x]Co-signature    (attempts: 1[x] 2 []3[])        Plan of care signed:    [x]Yes date: Dr. Hayes Almaraz 7/10/20            []No         Next Progress Note due:   8/21/2020    Visit# / total visits:  3/12    Plan for Next Session:  Putty, review HEP    Home Exercise Program: elbow flexion/extension, lat pull downs, putty, in addition to theraband exercises from rehab center    Access Code: [email or web address]   URL: [email or web address]   Date: 07/13/2020   Prepared by: Emma Westbrook     Program Notes   also complete bean in putty exercise, gripping with putty exercise, and pinching with putty exercise 3x/day     Exercises   Seated Elbow Flexion with Resistance Under Foot - 30 reps - 1 sets - 2x daily - 7x weekly   Standing Shoulder Row with Anchored Resistance - 10 reps - 3 sets - 2x daily - 7x weekly       Subjective: Patient agreeable to OT exercises, states she is currently doing exercises 2x/day and was a little sore in her shoulders after last session but has improved. Patient also states she has done some sweeping at home and has been able to  her 25# niece. She also reports that she is working on coordinating returning to cooking in the kitchen. Patient reports that she plans to get out into the community this weekend with her family. Pain level: 0/10, tingling reported in right hand fingers     Objective:       Exercises:    Exercises in bold performed in department today. Items not bolded are carried forward from prior visits for continuity of the record.   Exercise/Equipment Resistance/Repetitions Other comments      ADL/IADL TRAINING                                                            NEUROMUSCULAR RE-EDU and THERAPEUTIC ACTIVITY     transfers Increased time and mod I with RW, cues for postural awareness                                                  THERAPEUTIC EXERCISE and MANUAL TECHNIQUES    Pull downs in standing With medium resistance - 20x - min cues   Patient verbalizes understanding to increase hold on band with HEP to increase RPE for maximum progress    Patient verbalizes understanding to increase postural awareness with HEP for maximizing strength gains and joint protection    Increased time needed for exercise training   Scapular retraction in standing 10x3, theraband with medium resistance, RPE reported of 13    Core strengthening Seated - hitting balloon with foam roll - 30x    Posterior shoulder strengthening Prone - 4# weighted ball elbow flexion 30x2  Prone - Scapular retraction with 2# BUE - 30x    Thera putty strengthening Gripping, pinching, bean  - review from rehab, no cues needed, patient verbalizes understanding to complete at home 3x/day    Foam roll 20x shoulder horizontal ab/adduction  20x scapular pro/retraction  20x shoulder flexion/extension  20x shoulder ab/adduction    Hand strengthening Blue  stick - frowns/smiles/twists 10x2 - less than full range, seated  Digiflex yellow/green - standing and alternating between hands 10x3              MODALITIES                     SPLINTING                  Functional Outcome Measure:   [x]NA    Treatment/Activity Tolerance:    Patients response to treatment:   [x]Patient tolerated treatment well []Patient limited by fatigue   []Patient limited by pain  []Patient limited by other medical complications   []Other:     Assessment:   Patient verbalizes understanding of HEP and postural awareness. Patient easily fatigues with core exercises that incorporate BUE in standing and sitting. Patient making progress in goals of homemaking. Goals  Short term goals  Time Frame for Short term goals: 3 weeks  Short term goal 1: Patient will be I with UE HEP with reported RPE via Ritesh Scale Rating of Perceived Exertion of 13+ (indicated as somewhat hard). Short term goal 2: Patient will report participation in homemaking (dishes, laundry, sweeping with a broom) using modified methods PRN. Short term goal 3: Patient will report participation in once a week community outings with safe environmental precautions. Long term goals  Time Frame for Long term goals : 6 weeks  Long term goal 1: Patient will return to driving as medically cleared by MD.  Ezra Borden term goal 2: Patient will report increased independence with picking up 332 to 3year old niece weighing approx. 24 lbs.  - GOAL MET 7/17/20  Patient Goals   Patient goals : \"to be able to get back to being 100%\"    Prognosis: [x]Good   []Fair   []Poor    Patient Requires Follow-up:  [x]Yes  []No    Plan: []Plan of care initiated     [x]Continue per plan of care (2x/week for 6 weeks) [] Alter current plan (see comments)    []Hold pending MD visit []Discharge    Time in: 1120  Time out: 1158    Timed Code Treatment Minutes:  38    Total Treatment Minutes: 38    Electronically signed by:  SHALONDA Turk/TAWANA

## 2020-07-17 NOTE — FLOWSHEET NOTE
Physical Therapy Daily Treatment Note    Date:  2020    Patient Name:  Rosa Andres    :  2000  MRN: [id number]  Restrictions/Precautions:    Medical/Treatment Diagnosis Information:  R53.81 (ICD-10-CM) - Other malaise  Insurance/Certification information:  PT Insurance Information: [place]   Physician Information:  Referring Practitioner: Sigmund Fothergill   Plan of care signed (Y/N):  N  Visit# / total visits:  3/10     G-Code (if applicable):          LEFS: 49/80    Medicare Cap (if applicable):   N/A= total amount used, updated 2020    Time in:  12:00      Timed Treatment: 45 min Total Treatment Time:  45 min  Time out: 12:45  ________________________________________________________________________________________    Pain Level:    0/10  SUBJECTIVE:  Pt reported that she is doing well and the exercises have going well. .    OBJECTIVE: See Initial Evaluation     Exercise/Equipment Resistance/Repetitions Other comments     SLR      Flexion:      abduction   x10 c 2#  x12 HEP     Clamshells x12B HEP     Bridges x12 HEP     LAQ x12 c 4# HEP     PKF c ball x12      Slant board 3x30\"      Heel raises     Mini Squat  Hip abduction  Hamstring curls x20B  x15B  x15B  x15B             FSU  Toe taps on step x15B 4\" step   1 min 4\" step     Balance:       Feet apart       Feet together      Semi-tandem  Foam Balance:      Feet apart       Feet together      Semi-tandem   1 min  1 min  1 min B    1 min  1 min   1 min B    1/2 foam roll  1 min     Balance Board  1 min balance ea direction        Lateral walk 3 laps // bars     Nu-step 5 min                                                       Other Therapeutic Activities:      Manual Treatments:         Modalities:      Test/Measurements:  See Initial Evaluation        ASSESSMENT:    Pt tolerated treatment well as pt performed all od today`s standing TE and balance with no major LOB. Balance activities progressed this date.  . Pt will progress strengthening and balance activities as pt can tolerate. Pt demonstrated increased ability to ambulate unsupported in // bars this date with less hesitation. Pt ambulated with wide TIARA and decreased stance time on the L     Treatment/Activity Tolerance:   [x]Patient tolerated treatment well [] Patient limited by fatique  []Patient limited by pain [] Patient limited by other medical complications  [] Other:     Goals:        Long term goals  Time Frame for Long term goals : 5 weeks  Long term goal 1: Pt would be independent with HEP   Long term goal 2: Pt will improve BLE strength to 4/5 gross strength  Long term goal 3: Pt will ambulate with least restricitve AD with equal step length and no sign of RLE freezing. Long term goal 4: Pt will ascend/descend 12 steps with single hand rail and recirpocal pattern. Long term goal 5: Pt will will consistently demonstrate appropriate muscle firing pattern on BLE as measured by palpation      Plan: [x] Continue per plan of care [] Alter current plan (see comments)   [] Plan of care initiated [] Hold pending MD visit [] Discharge      Plan for Next Session:      Re-Certification Due Date:         Signature:   Manjula Melendrez PT

## 2020-07-20 ENCOUNTER — HOSPITAL ENCOUNTER (OUTPATIENT)
Dept: OCCUPATIONAL THERAPY | Age: 20
Setting detail: THERAPIES SERIES
Discharge: HOME | End: 2020-07-20
Payer: MEDICARE

## 2020-07-20 ENCOUNTER — HOSPITAL ENCOUNTER (OUTPATIENT)
Dept: PHYSICAL THERAPY | Age: 20
Setting detail: THERAPIES SERIES
Discharge: HOME | End: 2020-07-20
Payer: MEDICARE

## 2020-07-20 PROCEDURE — 97140 MANUAL THERAPY 1/> REGIONS: CPT

## 2020-07-20 PROCEDURE — 97110 THERAPEUTIC EXERCISES: CPT

## 2020-07-20 PROCEDURE — 97530 THERAPEUTIC ACTIVITIES: CPT

## 2020-07-20 PROCEDURE — 97112 NEUROMUSCULAR REEDUCATION: CPT

## 2020-07-20 NOTE — FLOWSHEET NOTE
Outpatient Occupational Therapy  Phone: [phone]            Fax: [phone]       ___________________________________________________________________________      Outpatient Occupational Therapy     [x] Daily Treatment Note   [] Progress Note   [] Discharge Note      Date:  7/20/2020    Patient Name:  Neptali Waddell         YOB: 2000    Medical Diagnosis and ICD 10:  Diagnosis: R53.81 debility, moderate malnutrition    Treatment Diagnosis:  Performance deficits / Impairments: Decreased functional mobility , Decreased ADL status, Decreased strength, Decreased endurance, Decreased sensation, Decreased balance, Decreased posture, Decreased high-level IADLs    Onset Date:  Onset Date: 06/11/20    Referring Physician and Referral Date:  Referring Practitioner: Dr. Hardik Leiva, 7/2/20     Visits Allowed/Insurance/Certification information:   [employer] [employer], 30 visits    Restrictions/Precautions: none     Plan of care sent to provider:    []Faxed  [x]Co-signature    (attempts: 1[x] 2 []3[])        Plan of care signed:    [x]Yes date: Dr. Hardik Leiva 7/10/20            []No         Next Progress Note due:   8/21/2020    Visit# / total visits:  4/12    Plan for Next Session:  Putty, review HEP    Home Exercise Program:     Access Code: [email or web address]   URL: [email or web address]   Date: 07/20/2020   Prepared by: Kristopher Sexton     Program Notes   also complete bean in putty exercise, gripping with putty exercise, and pinching with putty exercise 3x/day     Exercises   Seated Elbow Flexion with Resistance Under Foot - 30 reps - 1 sets - 2x daily - 7x weekly   Standing Shoulder Row with Anchored Resistance - 10 reps - 2 sets - 2x daily - 7x weekly   Shoulder extension with resistance - Neutral - 10 reps - 2 sets - 1x daily - 7x weekly   Single Arm Scaption with Resistance - 10 reps - 2 sets - 1x daily - 7x weekly   Seated Shoulder Diagonal Pulls with Resistance - 10 reps - 2 sets - 1x daily - 7x weekly     Subjective: Patient states she went to the park over the weekend. She also reports that she has been able to return to doing the dishes some but not cooking due to decreased ingredients at home. Pain level: 0/10, tingling reported in right hand fingers     Objective:     O2 98%,  with walking for 3:47  O2 95%,  with standing with BUE activity 10 minutes    Exercises:    Exercises in bold performed in department today. Items not bolded are carried forward from prior visits for continuity of the record.   Exercise/Equipment Resistance/Repetitions Other comments      ADL/IADL TRAINING                                                            NEUROMUSCULAR RE-EDU and THERAPEUTIC ACTIVITY     transfers Increased time and mod I with RW, cues for postural awareness    Walking with shopping cart  3:47 seconds, reaching RPE of 12    Balloon batting 10:00 seconds, reaching RPE of 13                                        THERAPEUTIC EXERCISE and MANUAL TECHNIQUES    Theraband Shoulder horizontal abduction above 90 BUE - With medium resistance - 20x  At 90 shoulder horizontal abduction BUE at 90 With medium resistance - 20x - min cues  BUE shoulder flexion - 20x min cues     Patient verbalizes understanding to increase hold on band with HEP to increase RPE for maximum progress    Patient verbalizes understanding to increase postural awareness with HEP for maximizing strength gains and joint protection    Increased time needed for exercise training   Scapular retraction in standing 10x3, theraband with medium resistance, RPE reported of 13    Core strengthening Seated - hitting balloon with foam roll - 30x    Posterior shoulder strengthening Prone - 4# weighted ball elbow flexion 30x2  Prone - Scapular retraction with 2# BUE - 30x    Thera putty strengthening Gripping, pinching, bean  - review from rehab, no cues needed, patient verbalizes understanding to complete at home 3x/day Foam roll 20x shoulder horizontal ab/adduction  20x scapular pro/retraction  20x shoulder flexion/extension  20x shoulder ab/adduction    Hand strengthening Blue  stick - frowns/smiles/twists 10x2 - less than full range, seated  Digiflex yellow/green - standing and alternating between hands 10x3              MODALITIES                     SPLINTING                  Functional Outcome Measure:   [x]NA    Treatment/Activity Tolerance:    Patients response to treatment:   [x]Patient tolerated treatment well []Patient limited by fatigue   []Patient limited by pain  []Patient limited by other medical complications   []Other:     Assessment:   Patient verbalizes understanding of HEP and postural awareness. Patient able to complete BUE activity in standing for 10 minutes this date with RPE of 13 and vital signs WFL. Goals  Short term goals  Time Frame for Short term goals: 3 weeks  Short term goal 1: Patient will be I with UE HEP with reported RPE via Ritesh Scale Rating of Perceived Exertion of 13+ (indicated as somewhat hard). Short term goal 2: Patient will report participation in homemaking (dishes, laundry, sweeping with a broom) using modified methods PRN. Short term goal 3: Patient will report participation in once a week community outings with safe environmental precautions. - GOAL MET 7/20/20  Long term goals  Time Frame for Long term goals : 6 weeks  Long term goal 1: Patient will return to driving as medically cleared by MD.  Nazia Bur term goal 2: Patient will report increased independence with picking up 332 to 3year old niece weighing approx. 24 lbs.  - GOAL MET 7/17/20  Patient Goals   Patient goals : \"to be able to get back to being 100%\"    Prognosis: [x]Good   []Fair   []Poor    Patient Requires Follow-up:  [x]Yes  []No    Plan: []Plan of care initiated     [x]Continue per plan of care (2x/week for 6 weeks) [] Alter current plan (see comments)    []Hold pending MD visit []Discharge    Time in:

## 2020-07-20 NOTE — FLOWSHEET NOTE
Physical Therapy Daily Treatment Note    Date:  2020    Patient Name:  Danae Anand    :  2000  MRN: [id number]  Restrictions/Precautions:    Medical/Treatment Diagnosis Information:  R53.81 (ICD-10-CM) - Other malaise  Insurance/Certification information:  PT Insurance Information: [place]   Physician Information:  Referring Practitioner: Elbert Quick   Plan of care signed (Y/N):  N  Visit# / total visits:  3/10     G-Code (if applicable):          LEFS: 49/80    Medicare Cap (if applicable):   N/A= total amount used, updated 2020    Time in:  10:30     Timed Treatment: 45 min Total Treatment Time:  45 min  Time out: 11:15  ________________________________________________________________________________________    Pain Level:    0/10  SUBJECTIVE:  Pt reported that she continues to have no pain     OBJECTIVE: See Initial Evaluation     Exercise/Equipment Resistance/Repetitions Other comments     SLR      Flexion:      abduction     x12 HEP     Clamshells  HEP     Bridges x12 HEP     LAQ x15 c 4# HEP     PKF c ball     PHE   x15B       Slant board 3x30\"      Heel raises     Mini Squat  Hip abduction  Hamstring curls x20B  x15B  x15B  x15B      marching 1 min       FSU  Toe taps on step x15B 6\" step   1 min 6\" step     Balance:       Feet apart       Feet together       tandem  Foam Balance:      Feet apart       Feet together      Semi-tandem       1 min B    1 min  1 min   1 min B    1/2 foam roll  1 min     Balance Board  1 min balance ea direction        Lateral walk 5 laps // bars   Ambulation 60 ft      Nu-step 5 min                                                       Other Therapeutic Activities:      Manual Treatments:         Modalities:      Test/Measurements:  See Initial Evaluation        ASSESSMENT:    Pt tolerated treatment well as pt performed all od today`s standing TE and balance with no major LOB. Balance activities progressed this date.  . Gait training with no AD initiate this date. Pt demonstrated decreased stance time on the R and decreased step length on the L. No overt LOB noted. Pt demonstrated increased ability to ambulate unsupported in // bars this date with less hesitation. Treatment/Activity Tolerance:   [x]Patient tolerated treatment well [] Patient limited by fatique  []Patient limited by pain [] Patient limited by other medical complications  [] Other:     Goals:        Long term goals  Time Frame for Long term goals : 5 weeks  Long term goal 1: Pt would be independent with HEP   Long term goal 2: Pt will improve BLE strength to 4/5 gross strength  Long term goal 3: Pt will ambulate with least restricitve AD with equal step length and no sign of RLE freezing. Long term goal 4: Pt will ascend/descend 12 steps with single hand rail and recirpocal pattern. Long term goal 5: Pt will will consistently demonstrate appropriate muscle firing pattern on BLE as measured by palpation      Plan: [x] Continue per plan of care [] Alter current plan (see comments)   [] Plan of care initiated [] Hold pending MD visit [] Discharge      Plan for Next Session:      Re-Certification Due Date:         Signature:   Maikol Kwon , PT

## 2020-07-24 ENCOUNTER — HOSPITAL ENCOUNTER (OUTPATIENT)
Dept: PHYSICAL THERAPY | Age: 20
Setting detail: THERAPIES SERIES
Discharge: HOME | End: 2020-07-24
Payer: MEDICARE

## 2020-07-24 ENCOUNTER — HOSPITAL ENCOUNTER (OUTPATIENT)
Dept: OCCUPATIONAL THERAPY | Age: 20
Setting detail: THERAPIES SERIES
Discharge: HOME | End: 2020-07-24
Payer: MEDICARE

## 2020-07-24 PROCEDURE — 97110 THERAPEUTIC EXERCISES: CPT

## 2020-07-24 PROCEDURE — 97112 NEUROMUSCULAR REEDUCATION: CPT

## 2020-07-24 NOTE — FLOWSHEET NOTE
Physical Therapy Daily Treatment Note    Date:  2020    Patient Name:  Ramo Brennan    :  2000  MRN: [id number]  Restrictions/Precautions:    Medical/Treatment Diagnosis Information:  R53.81 (ICD-10-CM) - Other malaise  Insurance/Certification information:  PT Insurance Information: [place]   Physician Information:  Referring Practitioner: Armando Bowers   Plan of care signed (Y/N):  N  Visit# / total visits:  5/10     G-Code (if applicable):          LEFS: 49/80    Medicare Cap (if applicable):   N/A= total amount used, updated 2020    Time in:  11:58    Timed Treatment: 47 min Total Treatment Time:  47 min  Time out: 12:45  ________________________________________________________________________________________    Pain Level:    0/10  SUBJECTIVE:  Pt reported that she continues to have no pain. Pt reports that she has been walking more without the walker. Pt stated that she has not had any LOB or falls.  Pt said she is careful at first bu     OBJECTIVE: See Initial Evaluation     Exercise/Equipment Resistance/Repetitions Other comments     SLR      Flexion:      abduction   x15  x15 HEP     Clamshells  HEP     Bridges x15 HEP     LAQ x15 c 4# HEP     PKF c ball     PHE   x15B       Slant board 3x30\"      Heel raises     Mini Squat  Hip abduction  Hamstring curls x20B  x15B  x15B  x15B      marching 1 min       FSU  Toe taps on step x10B 6\" step   1 min 6\" step     Balance:       Feet apart       Feet together       tandem  Foam Balance:      Feet apart       Feet together      Semi-tandem     1 min  1 min B    1 min  1 min   1 min B    1/2 foam roll  1 min     Modified SLS 1 min B    Balance Board  1 min balance ea direction        Lateral walk 5 laps // bars   Ambulation - treadmill 5 min      Nu-step 5 min                                                       Other Therapeutic Activities:      Manual Treatments:         Modalities:      Test/Measurements:  See Initial

## 2020-07-24 NOTE — FLOWSHEET NOTE
Outpatient Occupational Therapy  Phone: [phone]            Fax: [phone]       ___________________________________________________________________________      Outpatient Occupational Therapy     [x] Daily Treatment Note   [] Progress Note   [] Discharge Note      Date:  7/24/2020    Patient Name:  Micki Duran         YOB: 2000    Medical Diagnosis and ICD 10:  Diagnosis: R53.81 debility, moderate malnutrition    Treatment Diagnosis:  Performance deficits / Impairments: Decreased functional mobility , Decreased ADL status, Decreased strength, Decreased endurance, Decreased sensation, Decreased balance, Decreased posture, Decreased high-level IADLs    Onset Date:  Onset Date: 06/11/20    Referring Physician and Referral Date:  Referring Practitioner: Dr. Miranda Mckinney, 7/2/20     Visits Allowed/Insurance/Certification information:   [place] [employer], 30 visits    Restrictions/Precautions: none     Plan of care sent to provider:    []Faxed  [x]Co-signature    (attempts: 1[x] 2 []3[])        Plan of care signed:    [x]Yes date: Dr. Miranda Mckinney 7/10/20            []No         Next Progress Note due:   8/21/2020    Visit# / total visits:  5/12    Plan for Next Session:  Putty, review HEP    Home Exercise Program:     Access Code: [email or web address]   URL: [email or web address]   Date: 07/24/2020   Prepared by: Mel Hodgkins     Program Notes   also complete bean in putty exercise, gripping with putty exercise, and pinching with putty exercise 3x/day     Exercises   Seated Elbow Flexion with Resistance Under Foot - 10 reps - 2 sets - 2x daily - 7x weekly   Standing Shoulder Row with Anchored Resistance - 10 reps - 2 sets - 2x daily - 7x weekly   Shoulder extension with resistance - Neutral - 10 reps - 2 sets - 2x daily - 7x weekly   Seated Shoulder Diagonal Pulls with Resistance - 10 reps - 2 sets - 2x daily - 7x weekly   Supine Shoulder Flexion with Anchored Resistance - 10 reps - 2

## 2020-07-27 ENCOUNTER — HOSPITAL ENCOUNTER (OUTPATIENT)
Dept: OCCUPATIONAL THERAPY | Age: 20
Setting detail: THERAPIES SERIES
Discharge: HOME | End: 2020-07-27
Payer: MEDICARE

## 2020-07-27 ENCOUNTER — HOSPITAL ENCOUNTER (OUTPATIENT)
Dept: PHYSICAL THERAPY | Age: 20
Setting detail: THERAPIES SERIES
Discharge: HOME | End: 2020-07-27
Payer: MEDICARE

## 2020-07-27 PROCEDURE — 97112 NEUROMUSCULAR REEDUCATION: CPT

## 2020-07-27 PROCEDURE — 97530 THERAPEUTIC ACTIVITIES: CPT

## 2020-07-27 PROCEDURE — 97110 THERAPEUTIC EXERCISES: CPT

## 2020-07-27 NOTE — FLOWSHEET NOTE
BUE seated - With medium resistance - 20x  At 90 shoulder horizontal abduction BUE at 90 With medium resistance - 20x - min cues  RUE/LUE shoulder flexion - 20x 75% full range medium resistance in supine  Shoulder extension standing - 20x min cues  Scapular retraction standing - 20x min cues     Patient verbalizes understanding to increase hold on band with HEP to increase RPE for maximum progress    Patient verbalizes understanding to increase postural awareness with HEP for maximizing strength gains and joint protection    Increased time needed for exercise training   Scapular retraction in standing 10x3, theraband with medium resistance, RPE reported of 13    Core strengthening Seated - hitting balloon with foam roll - 30x    Posterior shoulder strengthening Prone - 4# weighted ball elbow flexion 30x2  Prone - Scapular retraction with 2# BUE - 30x    Thera putty strengthening Gripping, pinching, bean  - review from rehab, no cues needed, patient verbalizes understanding to complete at home 3x/day    Foam roll 20x shoulder horizontal ab/adduction  20x scapular pro/retraction  20x shoulder flexion/extension  20x shoulder ab/adduction    Hand strengthening Blue  stick - frowns/smiles/twists 10x2 - less than full range, seated  Digiflex yellow/green - standing and alternating between hands 10x3  Clothespins RUE up - green/blue climb up/down with RUE 8x, red/yellow climb up/down the pole 30x three jaw  Clothespins LUE up - green/blue climb up/down with LUE 8x, red/yellow climb up/down pole 30x three jaw    Foam ball squeeze RUE/LUE 10x each Cues to hold for 5 seconds   Velcro rollers Large roll RUE/LUE - 3 minutes each, cues for fingertips  Three jaw roll RUE/LUE - 3 minutes each, cues for form                               MODALITIES                     SPLINTING                  Functional Outcome Measure:   [x]NA    Treatment/Activity Tolerance:    Patients response to treatment:   [x]Patient tolerated

## 2020-07-27 NOTE — FLOWSHEET NOTE
Physical Therapy Daily Treatment Note    Date:  2020    Patient Name:  Nicola Fitch    :  2000  MRN: [id number]  Restrictions/Precautions:    Medical/Treatment Diagnosis Information:  R53.81 (ICD-10-CM) - Other malaise  Insurance/Certification information:  PT Insurance Information: [place]   Physician Information:  Referring Practitioner: Gabriella Steel   Plan of care signed (Y/N):  N  Visit# / total visits:  6/10     G-Code (if applicable):          LEFS: 49/80    Medicare Cap (if applicable):   N/A= total amount used, updated 2020    Time in:  12:25    Timed Treatment: 45 min Total Treatment Time:  47 min  Time out: 1:12  ________________________________________________________________________________________    Pain Level:    0/10  SUBJECTIVE:  Pt reported that she continues to have no pain. Pt reports that she has been walking more without the walker. Pt stated that she has not had any LOB or falls.  Pt said she is careful at first bu     OBJECTIVE: See Initial Evaluation   BP: 111/85 mmHg         104/82          101/82          111/86    Exercise/Equipment Resistance/Repetitions Other comments     SLR      Flexion:      abduction    HEP     Clamshells  HEP     Bridges  HEP     LAQ  HEP     PKF c ball     PHE         Slant board       Heel raises     Mini Squat  Hip abduction  Hamstring curls x20B  x15B  x15B  x15B    marching 1 min     FSU x10B 6\" step        Balance:       Feet apart       Feet together       tandem  Foam Balance:      Feet apart eyes closed       Feet together eyes closed      Semi-tandem           1 min  1 min   1 min B    1/2 foam roll       Modified SLS 1 min B    Balance Board  1 min balance ea direction   1 min rocking ea direction    Lateral walk with green TB 5 laps // bars   Ambulation - treadmill     Nu-step 5 min    Forward walking with cones 8x10ft // bars                                               Other Therapeutic Activities:      Manual Treatments:         Modalities:      Test/Measurements:  See Initial Evaluation        ASSESSMENT:    Pt tolerated treatment well as pt performed all of today`s standing TE and balance with no major LOB. Balance activities progressed this date. Pt demonstrated decreased stance time on the R and decreased step length on the L. Pt demonstrates increased frontal and sagital plain movement during ambulation. Pt had decreased hip extension and toe off during terminal stance. No overt LOB noted. Pt demonstrated increased ability to ambulate unsupported in // bars this date with less hesitation. At end of session, pt stated that her vision was \"going in and out\" and felt dizzy. Assessed BP readings and continued to monitor for significant drops before letting her stand up and leave the clinic. BP measures can be seen above. Walking today was not performed due to low BP, loss of vision and dizziness. Will practice ambulation NV. Treatment/Activity Tolerance:   [x]Patient tolerated treatment well [] Patient limited by fatique  []Patient limited by pain [] Patient limited by other medical complications  [] Other:     Goals:        Long term goals  Time Frame for Long term goals : 5 weeks  Long term goal 1: Pt would be independent with HEP   Long term goal 2: Pt will improve BLE strength to 4/5 gross strength  Long term goal 3: Pt will ambulate with least restricitve AD with equal step length and no sign of RLE freezing. Long term goal 4: Pt will ascend/descend 12 steps with single hand rail and recirpocal pattern. Long term goal 5: Pt will will consistently demonstrate appropriate muscle firing pattern on BLE as measured by palpation      Plan: [x] Continue per plan of care [] Alter current plan (see comments)   [] Plan of care initiated [] Hold pending MD visit [] Discharge      Plan for Next Session:      Re-Certification Due Date:         Signature:   Malina Roca , PT

## 2020-07-31 ENCOUNTER — APPOINTMENT (OUTPATIENT)
Dept: PHYSICAL THERAPY | Age: 20
End: 2020-07-31
Payer: MEDICARE

## 2020-07-31 ENCOUNTER — APPOINTMENT (OUTPATIENT)
Dept: OCCUPATIONAL THERAPY | Age: 20
End: 2020-07-31
Payer: MEDICARE

## 2020-08-03 ENCOUNTER — APPOINTMENT (OUTPATIENT)
Dept: OCCUPATIONAL THERAPY | Age: 20
End: 2020-08-03
Payer: MEDICARE

## 2020-08-03 ENCOUNTER — HOSPITAL ENCOUNTER (OUTPATIENT)
Dept: PHYSICAL THERAPY | Age: 20
Setting detail: THERAPIES SERIES
Discharge: HOME | End: 2020-08-03
Payer: MEDICARE

## 2020-08-03 PROCEDURE — 97112 NEUROMUSCULAR REEDUCATION: CPT

## 2020-08-03 PROCEDURE — 97110 THERAPEUTIC EXERCISES: CPT

## 2020-08-03 PROCEDURE — 97116 GAIT TRAINING THERAPY: CPT

## 2020-08-03 NOTE — FLOWSHEET NOTE
Physical Therapy Daily Treatment Note    Date:  8/3/2020    Patient Name:  Apryl Nelson    :  2000  MRN: [id number]  Restrictions/Precautions:    Medical/Treatment Diagnosis Information:  R53.81 (ICD-10-CM) - Other malaise  Insurance/Certification information:  PT Insurance Information: [place]   Physician Information:  Referring Practitioner: Eric Jose of care signed (Y/N):  Y  Visit# / total visits:  7/10     G-Code (if applicable):          LEFS: 49/80    Medicare Cap (if applicable):   N/A= total amount used, updated 8/3/2020    Time in:  10:33   Timed Treatment: 56 min Total Treatment Time:  56 min  Time out: 11:29  ________________________________________________________________________________________    Pain Level:    0/10  SUBJECTIVE:  Pt stated that she was pretty good over the weekend.  No dizziness over the weekend     OBJECTIVE: See Initial Evaluation       Exercise/Equipment Resistance/Repetitions Other comments     SLR      Flexion:      abduction   x15B c 5# for first 5 reps   x15B  HEP     Clamshells  HEP     Bridges  HEP     LAQ x15B 5# HEP     PKF c ball     PHE   x15B       Slant board       Heel raises     Mini Squat  Hip abduction  Hamstring curls x20B  x15B  x15B  x15B    marching 1 min     FSU x10B 6\" step        Balance:       Feet apart       Feet together       tandem  Foam Balance:      Feet apart eyes closed       Feet together eyes closed      Semi-tandem       1 min B    1 min  1 min   1 min B    1/2 foam roll       Modified SLS 1 min B    Balance Board  1 min balance ea direction   1 min rocking ea direction    Lateral walk with green TB 4 laps // bars   Ambulation w/o AD 8 minutes     Nu-step 5 min    Forward walking with cones 4 laps  // bars                                               Other Therapeutic Activities:      Manual Treatments:         Modalities:      Test/Measurements:  See Initial Evaluation        ASSESSMENT:    Pt tolerated

## 2020-08-05 ENCOUNTER — APPOINTMENT (OUTPATIENT)
Dept: OCCUPATIONAL THERAPY | Age: 20
End: 2020-08-05
Payer: MEDICARE

## 2020-08-07 ENCOUNTER — HOSPITAL ENCOUNTER (OUTPATIENT)
Dept: OCCUPATIONAL THERAPY | Age: 20
Setting detail: THERAPIES SERIES
Discharge: HOME | End: 2020-08-07
Payer: MEDICARE

## 2020-08-07 ENCOUNTER — HOSPITAL ENCOUNTER (OUTPATIENT)
Dept: PHYSICAL THERAPY | Age: 20
Setting detail: THERAPIES SERIES
Discharge: HOME | End: 2020-08-07
Payer: MEDICARE

## 2020-08-07 PROCEDURE — 97116 GAIT TRAINING THERAPY: CPT

## 2020-08-07 PROCEDURE — 97110 THERAPEUTIC EXERCISES: CPT

## 2020-08-07 PROCEDURE — 97112 NEUROMUSCULAR REEDUCATION: CPT

## 2020-08-07 PROCEDURE — 97530 THERAPEUTIC ACTIVITIES: CPT

## 2020-08-07 NOTE — FLOWSHEET NOTE
Physical Therapy Daily Treatment Note    Date:  2020    Patient Name:  Cathi Dorman    :  2000  MRN: [id number]  Restrictions/Precautions:    Medical/Treatment Diagnosis Information:  R53.81 (ICD-10-CM) - Other malaise  Insurance/Certification information:  PT Insurance Information: [place]   Physician Information:  Referring Practitioner: Quinn Mendez of care signed (Y/N):  Y  Visit# / total visits:  8/10     G-Code (if applicable):          LEFS: 49/80    Medicare Cap (if applicable):   N/A= total amount used, updated 2020    Time in:  10:33   Timed Treatment:  40 min Total Treatment Time:  42 min  Time out: 11:15  ________________________________________________________________________________________    Pain Level:    0/10  SUBJECTIVE:  Pt stated that she feels good today. OBJECTIVE: See Initial Evaluation       Exercise/Equipment Resistance/Repetitions Other comments     SLR      Flexion:      abduction  HEP     Clamshells  HEP     Bridges  HEP     LAQ  HEP     PKF c ball     PHE       Slant board       Heel raises     Mini Squat  Hip abduction  Hamstring curls x20B  x15B  x15B  x15B    Marching tap on cones  1 min     FSU        Balance:       Feet apart       Feet together       tandem  Foam Balance:      Feet apart eyes closed       Feet together eyes closed      Semi-tandem       1 min B    1 min  1 min   1 min B    1/2 foam roll       Modified SLS 1 min B    Balance Board  1 min balance ea direction   1 min rocking ea direction    Lateral walk with green TB  // bars   Ambulation w/o AD 2 laps x1, and 3 laps x1    Nu-step 5 min    Forward walking with cones 4 laps  // bars                                               Other Therapeutic Activities:      Manual Treatments:         Modalities:      Test/Measurements:  See Initial Evaluation        ASSESSMENT:    Pt tolerated treatment well as pt performed all of today`s standing TE and balance with no major LOB. Balance activities progressed this date. Pt continues to ambulate with improved stance time on R this date. . Decreased frontal and sagital plain movement during ambulation this date. No overt LOB noted. Pt demonstrated increased ability to ambulat unsupported outside // bars this date. No dizziness reported throughout session this date with appropriate rest breaks throughout session    Treatment/Activity Tolerance:   [x]Patient tolerated treatment well [] Patient limited by fatique  []Patient limited by pain [] Patient limited by other medical complications  [] Other:     Goals:        Long term goals  Time Frame for Long term goals : 5 weeks  Long term goal 1: Pt would be independent with HEP   Long term goal 2: Pt will improve BLE strength to 4/5 gross strength  Long term goal 3: Pt will ambulate with least restricitve AD with equal step length and no sign of RLE freezing. Long term goal 4: Pt will ascend/descend 12 steps with single hand rail and recirpocal pattern. Long term goal 5: Pt will will consistently demonstrate appropriate muscle firing pattern on BLE as measured by palpation      Plan: [x] Continue per plan of care [] Alter current plan (see comments)   [] Plan of care initiated [] Hold pending MD visit [] Discharge      Plan for Next Session:      Re-Certification Due Date:         Signature:   Meghann Reyes PT

## 2020-08-07 NOTE — FLOWSHEET NOTE
Outpatient Occupational Therapy  Phone: [phone]            Fax: [phone]       ___________________________________________________________________________      Outpatient Occupational Therapy     [x] Daily Treatment Note   [] Progress Note   [] Discharge Note      Date:  8/7/2020    Patient Name:  Matthew Ortega         YOB: 2000    Medical Diagnosis and ICD 10:  Diagnosis: R53.81 debility, moderate malnutrition    Treatment Diagnosis:  Performance deficits / Impairments: Decreased functional mobility , Decreased ADL status, Decreased strength, Decreased endurance, Decreased sensation, Decreased balance, Decreased posture, Decreased high-level IADLs    Onset Date:  Onset Date: 06/11/20    Referring Physician and Referral Date:  Referring Practitioner: Dr. Nathalia Amor, 7/2/20     Visits Allowed/Insurance/Certification information:   [place] [employer], 30 visits    Restrictions/Precautions: none     Plan of care sent to provider:    []Faxed  [x]Co-signature    (attempts: 1[x] 2 []3[])        Plan of care signed:    [x]Yes date: Dr. Nathalia Amor 7/10/20            []No         Next Progress Note due:   8/21/2020    Visit# / total visits:  7/12    Plan for Next Session:  Putty, review HEP    Home Exercise Program:     Access Code: [email or web address]   URL: [email or web address]   Date: 07/24/2020   Prepared by: Porsche Ponce     Program Notes   also complete bean in putty exercise, gripping with putty exercise, and pinching with putty exercise 3x/day     Exercises   Seated Elbow Flexion with Resistance Under Foot - 10 reps - 2 sets - 2x daily - 7x weekly   Standing Shoulder Row with Anchored Resistance - 10 reps - 2 sets - 2x daily - 7x weekly   Shoulder extension with resistance - Neutral - 10 reps - 2 sets - 2x daily - 7x weekly   Seated Shoulder Diagonal Pulls with Resistance - 10 reps - 2 sets - 2x daily - 7x weekly   Supine Shoulder Flexion with Anchored Resistance - 10 reps - 2 sets - 2x daily - 7x weekly   Supine Shoulder Horizontal Abduction with Resistance - 10 reps - 2 sets - 2x daily - 7x weekly     Subjective: Patient states that she has been making eggs more often. Patient states she is walking more without the walker and doing her arm exercises also. Patient states she can go into smaller stores without her walker but gets tired easily. Patient states she has been able to do laundry without modified methods. Pain level: 0/10, tingling in fingers is \"basically gone\"    Objective:       Exercises: Exercises in bold performed in department today. Items not bolded are carried forward from prior visits for continuity of the record. Exercise/Equipment Resistance/Repetitions Other comments      ADL/IADL TRAINING                                                            NEUROMUSCULAR RE-EDU and THERAPEUTIC ACTIVITY     transfers Increased time and mod I with/without RW, cues for postural awareness, occasionally able to not use walker for support    Walking with shopping cart  3:47 seconds, reaching RPE of 12    Balloon batting 10:00 seconds, reaching RPE of 13    Laundry review Review of bumping laundry basket up stairs for increasing independence/safety with doing the laundry    Review of keeping walker with her and using outside door to participate in laundry tasks at the house    Demonstration of simulation of picking up items from the floor and placing into container on other side, and then moving from simulated washer to dryer on right side - 1 LOB but able to catch self with close SBA from therapist    Demonstration of \"bumping\" simulated laundry basket with 10 lbs.  Up 3 stairs with RW/handrail for occasional support and without LOB    Demonstration of using foot to push laundry basket and holding onto walker for safety and support during home-based laundry tasks                                   THERAPEUTIC EXERCISE and MANUAL TECHNIQUES    Theraband Shoulder scaption above 90 BUE seated - With medium resistance - 20x3 - mod cues for posture, form  At 90 shoulder horizontal abduction BUE at 90 With medium resistance - 20x3 - min cues  RUE/LUE shoulder flexion - 20x 75% full range medium resistance seated, cues for posture  Shoulder extension standing RUE/LUE - 20x3  Scapular retraction standing - 10x3 min cues for posture  Bicep curls with medium resistance - 20x3  Tricep extension with medium resistance - 20x3     Patient verbalizes understanding to increase hold on band with HEP to increase RPE for maximum progress    Patient verbalizes understanding to increase postural awareness with HEP for maximizing strength gains and joint protection    Increased time needed for exercise training   Core strengthening Planks with bent elbows - 10x, one second hold      Posterior shoulder strengthening Prone - 4# weighted ball elbow flexion 30x2  Prone - Scapular retraction with 2# BUE - 30x    Thera putty strengthening Gripping, pinching, bean  - review from rehab, no cues needed, patient verbalizes understanding to complete at home 3x/day    Foam roll 20x shoulder horizontal ab/adduction  20x scapular pro/retraction  20x shoulder flexion/extension  20x shoulder ab/adduction    Hand strengthening Blue  stick - frowns/smiles/twists 10x2 - less than full range, seated  Digiflex yellow/green - standing and alternating between hands 10x3  Clothespins RUE up - green/blue climb up/down with RUE  green/blue climb up/down the pole 30x three jaw and lateral pinch alternating, 15x red/yellow up/down pole with RUE alternating  Clothespins LUE up - green/blue climb up/down with LUE 30x three jaw    Foam ball squeeze RUE/LUE 10x each Cues to hold for 5 seconds   Velcro rollers Large roll RUE/LUE - 3 minutes each, cues for fingertips  Three jaw roll RUE/LUE - 3 minutes each, cues for form                               MODALITIES                     SPLINTING                  Functional Outcome Measure:   [x]NA    Treatment/Activity Tolerance:    Patients response to treatment:   [x]Patient tolerated treatment well []Patient limited by fatigue   []Patient limited by pain  []Patient limited by other medical complications   []Other:     Assessment:   Patient progressing in number of repetitions and sets of theraband exercises. Skilled cues needed for form due to strength and postural deficits. Hand strength improving for completing of three jaw clothespin climb, however, patient continues to be limited for higher resistance clothespins for RUE. Patient also progressing in skilled OT services required for patient to participate in safe and effective exercises and to progress POC. Goals  Short term goals  Time Frame for Short term goals: 3 weeks  Short term goal 1: Patient will be I with UE HEP with reported RPE via Ritesh Scale Rating of Perceived Exertion of 13+ (indicated as somewhat hard). - progressing but requires cues for increasing resistance with band exercises 8/7  Short term goal 2: Patient will report participation in homemaking (dishes, laundry, sweeping with a broom) using modified methods PRN. - PROGRESSING FOR DISHES AND SWEEPING 7/24, GOAL MET 8/7/20  Short term goal 3: Patient will report participation in once a week community outings with safe environmental precautions. - GOAL MET 7/20/20  Long term goals  Time Frame for Long term goals : 6 weeks  Long term goal 1: Patient will return to driving as medically cleared by MD. Abigail Lamas term goal 2: Patient will report increased independence with picking up 332 to 3year old niece weighing approx. 24 lbs.  - GOAL MET 7/17/20  Patient Goals   Patient goals : \"to be able to get back to being 100%\"    Prognosis: [x]Good   []Fair   []Poor    Patient Requires Follow-up:  [x]Yes  []No    Plan: []Plan of care initiated     [x]Continue per plan of care (2x/week for 6 weeks)             [] Alter current plan (see comments)    []Hold pending MD

## 2020-08-10 ENCOUNTER — HOSPITAL ENCOUNTER (OUTPATIENT)
Dept: PHYSICAL THERAPY | Age: 20
Setting detail: THERAPIES SERIES
Discharge: HOME | End: 2020-08-10
Payer: MEDICARE

## 2020-08-10 ENCOUNTER — HOSPITAL ENCOUNTER (OUTPATIENT)
Dept: OCCUPATIONAL THERAPY | Age: 20
Setting detail: THERAPIES SERIES
Discharge: HOME | End: 2020-08-10
Payer: MEDICARE

## 2020-08-10 PROCEDURE — 97116 GAIT TRAINING THERAPY: CPT

## 2020-08-10 PROCEDURE — 97110 THERAPEUTIC EXERCISES: CPT

## 2020-08-10 PROCEDURE — 97112 NEUROMUSCULAR REEDUCATION: CPT

## 2020-08-10 NOTE — FLOWSHEET NOTE
Outpatient Occupational Therapy  Phone: [phone]            Fax: [phone]       ___________________________________________________________________________      Outpatient Occupational Therapy     [x] Daily Treatment Note   [] Progress Note   [] Discharge Note      Date:  8/10/2020    Patient Name:  Angeline Lovett         YOB: 2000    Medical Diagnosis and ICD 10:  Diagnosis: R53.81 debility, moderate malnutrition    Treatment Diagnosis:  Performance deficits / Impairments: Decreased functional mobility , Decreased ADL status, Decreased strength, Decreased endurance, Decreased sensation, Decreased balance, Decreased posture, Decreased high-level IADLs    Onset Date:  Onset Date: 06/11/20    Referring Physician and Referral Date:  Referring Practitioner: Dr. Sherie Bains, 7/2/20     Visits Allowed/Insurance/Certification information:   [place] [employer], 30 visits    Restrictions/Precautions: none     Plan of care sent to provider:    []Faxed  [x]Co-signature    (attempts: 1[x] 2 []3[])        Plan of care signed:    [x]Yes date: Dr. Sherie Bains 7/10/20            []No         Next Progress Note due:   8/21/2020    Visit# / total visits:  8/12    Plan for Next Session:  Putty, adal HEP    Home Exercise Program:     Access Code: [email or web address]   URL: [email or web address]   Date: 07/24/2020   Prepared by: Nicol Lucero     Program Notes   also complete bean in putty exercise, gripping with putty exercise, and pinching with putty exercise 3x/day     Exercises   Seated Elbow Flexion with Resistance Under Foot - 10 reps - 2 sets - 2x daily - 7x weekly   Standing Shoulder Row with Anchored Resistance - 10 reps - 2 sets - 2x daily - 7x weekly   Shoulder extension with resistance - Neutral - 10 reps - 2 sets - 2x daily - 7x weekly   Seated Shoulder Diagonal Pulls with Resistance - 10 reps - 2 sets - 2x daily - 7x weekly   Supine Shoulder Flexion with Anchored Resistance - 10 reps - 2 sets - 2x daily - 7x weekly   Supine Shoulder Horizontal Abduction with Resistance - 10 reps - 2 sets - 2x daily - 7x weekly     Access Code: [facility]   URL: [email or web address]   Date: 08/10/2020   Prepared by: Porsche Ponce     Exercises   Rolling Putty on Table - 10 reps - 3 sets - 1x daily - 7x weekly   Finger Exension with Putty - 10 reps - 3 sets - 1x daily - 7x weekly   Tip Pinch with Putty - 10 reps - 3 sets - 1x daily - 7x weekly   Finger MP Flexion AROM - 10 reps - 3 sets - 1x daily - 7x weekly   Seated Finger MP Flexion AROM and Wrist Extension - 10 reps - 3 sets - 1x daily - 7x weekly     Subjective: Patient states she was a little sore after last session but is feeling better. She states she is back to doing everything around the house but just tires easily. She doesn't use her walker in the house anymore. She only brings it to community outings. Pain level: 0/10, tingling in fingers is \"basically gone\"    Objective:       Exercises: Exercises in bold performed in department today. Items not bolded are carried forward from prior visits for continuity of the record.     Exercise/Equipment Resistance/Repetitions Other comments      ADL/IADL TRAINING                                                            NEUROMUSCULAR RE-EDU and THERAPEUTIC ACTIVITY     transfers Increased time and mod I with/without RW, cues for postural awareness, occasionally able to not use walker for support    Walking with shopping cart  3:47 seconds, reaching RPE of 12    Balloon batting 10:00 seconds, reaching RPE of 13    Laundry review Review of bumping laundry basket up stairs for increasing independence/safety with doing the laundry    Review of keeping walker with her and using outside door to participate in laundry tasks at the house    Demonstration of simulation of picking up items from the floor and placing into container on other side, and then moving from simulated washer to dryer on right side - 1 LOB but able to catch self with close SBA from therapist    Demonstration of \"bumping\" simulated laundry basket with 10 lbs.  Up 3 stairs with RW/handrail for occasional support and without LOB    Demonstration of using foot to push laundry basket and holding onto walker for safety and support during home-based laundry tasks                                   THERAPEUTIC EXERCISE and MANUAL TECHNIQUES    Theraband Shoulder scaption above 90 BUE seated - With medium resistance - 27x3 - mod cues for posture, form  At 90 shoulder horizontal abduction BUE at 90 With medium resistance - 20x3 - min cues  RUE/LUE shoulder flexion - 20x 75% full range medium resistance seated, cues for posture  Shoulder extension standing RUE/LUE - 20x2  Scapular retraction standing - 20x2 min cues for posture  Bicep curls with medium resistance - 30x3  Tricep extension with medium resistance - 20x3     Patient verbalizes understanding to increase hold on band with HEP to increase RPE for maximum progress    Patient verbalizes understanding to increase postural awareness with HEP for maximizing strength gains and joint protection    Increased time needed for exercise training   Core strengthening Planks with bent elbows - 5x, three second hold  Kneeling with ball toss - 20x to right, 20x to left two hANDED      Posterior shoulder strengthening Prone - 4# weighted ball elbow flexion 30x2  Prone - Scapular retraction with 2# BUE - 30x    Thera putty strengthening Gripping, pinching, bean  - review from rehab, no cues needed, patient verbalizes understanding to complete at home 3x/day    Foam roll 20x shoulder horizontal ab/adduction  20x scapular pro/retraction  20x shoulder flexion/extension  20x shoulder ab/adduction    Hand strengthening Blue  stick - frowns/smiles/twists 10x2 - less than full range, seated  Digiflex yellow/green - standing and alternating between hands 10x3  In tall kneel  Clothespins RUE up -  green/blue climb up/down the pole 30x palmar , three jaw and lateral pinch alternating, 15x red/yellow up/down pole with RUE alternating 30x  Clothespins LUE up - green/blue climb up/down with LUE 30x three jaw    Exercises   Rolling Putty on Table - 10 reps  Finger Exension with Putty - 10 reps  Tip Pinch with Putty - 10 reps   Finger MP Flexion AROM - 10 reps   Seated Finger MP Flexion AROM and Wrist Extension - 10 reps       Foam ball squeeze RUE/LUE 10x each Cues to hold for 5 seconds   Velcro rollers Large roll RUE/LUE - 3 minutes each, cues for fingertips  Three jaw roll RUE/LUE - 3 minutes each, cues for form                               MODALITIES                     SPLINTING                  Functional Outcome Measure:   [x]NA    Treatment/Activity Tolerance:    Patients response to treatment:   [x]Patient tolerated treatment well []Patient limited by fatigue   []Patient limited by pain  []Patient limited by other medical complications   []Other:     Assessment:   Patient progressing in number of repetitions and sets of theraband exercises. Skilled cues needed for form due to strength and postural deficits. Patient fatigues easily with intrinsic hand strengthening exercises. Patient in agreement with plan to complete new HEP for hand and to continue with shoulder resistance exercises. Goals  Short term goals  Time Frame for Short term goals: 3 weeks  Short term goal 1: Patient will be I with UE HEP with reported RPE via Ritesh Scale Rating of Perceived Exertion of 13+ (indicated as somewhat hard). - progressing but requires cues for increasing resistance with band exercises 8/7  Short term goal 2: Patient will report participation in homemaking (dishes, laundry, sweeping with a broom) using modified methods PRN. - PROGRESSING FOR DISHES AND SWEEPING 7/24, GOAL MET 8/7/20  Short term goal 3: Patient will report participation in once a week community outings with safe environmental precautions.  - GOAL MET 7/20/20  Long term goals  Time Frame for Long term goals : 6 weeks  Long term goal 1: Patient will return to driving as medically cleared by MD.  Vitaliy Joselito term goal 2: Patient will report increased independence with picking up 332 to 3year old niece weighing approx. 24 lbs.  - GOAL MET 7/17/20  Patient Goals   Patient goals : \"to be able to get back to being 100%\"    Prognosis: [x]Good   []Fair   []Poor    Patient Requires Follow-up:  [x]Yes  []No    Plan: []Plan of care initiated     [x]Continue per plan of care (2x/week for 6 weeks)              [] Alter current plan (see comments)    []Hold pending MD visit []Discharge    Time in: 1115       Time out: 1210    Timed Code Treatment Minutes:  55    Total Treatment Minutes: 55    Electronically signed by:  SHALONDA Johnson/TAWANA

## 2020-08-10 NOTE — FLOWSHEET NOTE
Physical Therapy Daily Treatment Note    Date:  8/10/2020    Patient Name:  Angeline Lovett    :  2000  MRN: [id number]  Restrictions/Precautions:    Medical/Treatment Diagnosis Information:  R53.81 (ICD-10-CM) - Other malaise  Insurance/Certification information:  PT Insurance Information: [employer]   Physician Information:  Referring Practitioner: Frances Michael of care signed (Y/N):  Y  Visit# / total visits:  9/10     G-Code (if applicable):          LEFS: 49/80    Medicare Cap (if applicable):   N/A= total amount used, updated 8/10/2020    Time in:  10:33   Timed Treatment:  42 min Total Treatment Time:  42 min  Time out: 11:15  ________________________________________________________________________________________    Pain Level:    0/10  SUBJECTIVE:  Pt stated she is doing well today. Pt had no issues over the weekend.      OBJECTIVE: See Initial Evaluation       Exercise/Equipment Resistance/Repetitions Other comments     SLR      Flexion:      abduction  HEP     Clamshells  HEP     Bridges  HEP     LAQ  HEP     PKF c ball     PHE       Slant board       Heel raises     Mini Squat  Hip abduction  Hamstring curls x20B  x15B  x15B  x15B    Marching tap on cones  1 min     FSU x10B       Balance:       Feet apart       Feet together       tandem  Foam Balance:      Feet apart eyes closed       Feet together eyes closed      Semi-tandem       1 min B    1 min  1 min   1 min B    1/2 foam roll   1 min    Modified SLS 1 min B    Balance Board  1 min balance ea direction   1 min rocking ea direction    Lateral walk with green TB  // bars   Ambulation w/o AD 3 laps x1, and 3 laps x1    Nu-step 5 min    Forward walking with cones 4 laps  // bars                                               Other Therapeutic Activities:      Manual Treatments:         Modalities:      Test/Measurements:  See Initial Evaluation        ASSESSMENT:    Pt tolerated treatment well as pt performed all of today`s standing TE and balance with no major LOB. Pt continues to ambulate with improved stance time on R this date. Decreased frontal and sagital plain movement during ambulation this date. No overt LOB noted. Pt demonstrated increased ability to ambulat unsupported outside // bars this date with decreased fatigue. No dizziness reported throughout session this date with appropriate rest breaks throughout session    Treatment/Activity Tolerance:   [x]Patient tolerated treatment well [] Patient limited by fatique  []Patient limited by pain [] Patient limited by other medical complications  [] Other:     Goals:        Long term goals  Time Frame for Long term goals : 5 weeks  Long term goal 1: Pt would be independent with HEP   Long term goal 2: Pt will improve BLE strength to 4/5 gross strength  Long term goal 3: Pt will ambulate with least restricitve AD with equal step length and no sign of RLE freezing. Long term goal 4: Pt will ascend/descend 12 steps with single hand rail and recirpocal pattern. Long term goal 5: Pt will will consistently demonstrate appropriate muscle firing pattern on BLE as measured by palpation      Plan: [x] Continue per plan of care [] Alter current plan (see comments)   [] Plan of care initiated [] Hold pending MD visit [] Discharge      Plan for Next Session:      Re-Certification Due Date:         Signature:   Johanne Little , PT

## 2020-08-14 ENCOUNTER — HOSPITAL ENCOUNTER (OUTPATIENT)
Dept: PHYSICAL THERAPY | Age: 20
Setting detail: THERAPIES SERIES
Discharge: HOME | End: 2020-08-14
Payer: MEDICARE

## 2020-08-14 ENCOUNTER — HOSPITAL ENCOUNTER (OUTPATIENT)
Dept: OCCUPATIONAL THERAPY | Age: 20
Setting detail: THERAPIES SERIES
Discharge: HOME | End: 2020-08-14
Payer: MEDICARE

## 2020-08-14 PROCEDURE — 97110 THERAPEUTIC EXERCISES: CPT

## 2020-08-14 PROCEDURE — 97116 GAIT TRAINING THERAPY: CPT

## 2020-08-14 PROCEDURE — 97112 NEUROMUSCULAR REEDUCATION: CPT

## 2020-08-14 NOTE — FLOWSHEET NOTE
Outpatient Occupational Therapy  Phone: [phone]            Fax: [phone]       ___________________________________________________________________________      Outpatient Occupational Therapy     [x] Daily Treatment Note   [] Progress Note   [] Discharge Note      Date:  8/14/2020    Patient Name:  Jose Silva         YOB: 2000    Medical Diagnosis and ICD 10:  Diagnosis: R53.81 debility, moderate malnutrition    Treatment Diagnosis:  Performance deficits / Impairments: Decreased functional mobility , Decreased ADL status, Decreased strength, Decreased endurance, Decreased sensation, Decreased balance, Decreased posture, Decreased high-level IADLs    Onset Date:  Onset Date: 06/11/20    Referring Physician and Referral Date:  Referring Practitioner: Dr. Amie Maradiaga, 7/2/20     Visits Allowed/Insurance/Certification information:   [place] [employer], 30 visits    Restrictions/Precautions: none     Plan of care sent to provider:    []Faxed  [x]Co-signature    (attempts: 1[x] 2 []3[])        Plan of care signed:    [x]Yes date: Dr. Amie Maradiaga 7/10/20            []No         Next Progress Note due:   8/21/2020    Visit# / total visits: 9/12    Plan for Next Session:  Putty, review HEP    Home Exercise Program:     Access Code: [email or web address]   URL: [email or web address]   Date: 08/14/2020   Prepared by: Mary López     Exercises   Finger Exension with Putty - 10 reps - 3 sets - 2x daily - 7x weekly   Rolling Putty on Table - 10 reps - 3 sets - 2x daily - 7x weekly   Seated Claw Fist with Putty - 10 reps - 3 sets - 2x daily - 7x weekly   Hook Fist with Putty - 10 reps - 3 sets - 2x daily - 7x weekly   Seated Claw Fist with Putty - 10 reps - 3 sets - 2x daily - 7x weekly   Finger Abduction with Putty - 10 reps - 3 sets - 2x daily - 7x weekly   Finger Adduction with Putty - 10 reps - 3 sets - 2x daily - 7x weekly     Subjective: Patient states she is doing well but identifies hands as weakest areas and occasional tingling in her RUE. Pain level: 0/10, tingling in fingers is \"basically gone\"    Objective:       Exercises: Exercises in bold performed in department today. Items not bolded are carried forward from prior visits for continuity of the record. Exercise/Equipment Resistance/Repetitions Other comments      ADL/IADL TRAINING                                                            NEUROMUSCULAR RE-EDU and THERAPEUTIC ACTIVITY     transfers Increased time and mod I with/without RW, cues for postural awareness, occasionally able to not use walker for support    Walking with shopping cart  3:47 seconds, reaching RPE of 12    Balloon batting 10:00 seconds, reaching RPE of 13    Laundry review Review of bumping laundry basket up stairs for increasing independence/safety with doing the laundry    Review of keeping walker with her and using outside door to participate in laundry tasks at the house    Demonstration of simulation of picking up items from the floor and placing into container on other side, and then moving from simulated washer to dryer on right side - 1 LOB but able to catch self with close SBA from therapist    Demonstration of \"bumping\" simulated laundry basket with 10 lbs.  Up 3 stairs with RW/handrail for occasional support and without LOB    Demonstration of using foot to push laundry basket and holding onto walker for safety and support during home-based laundry tasks                                   THERAPEUTIC EXERCISE and MANUAL TECHNIQUES    Theraband Shoulder scaption above 90 BUE seated - With medium resistance - 27x3 - mod cues for posture, form  At 90 shoulder horizontal abduction BUE at 90 With medium resistance - 20x3 - min cues  RUE/LUE shoulder flexion - 20x 75% full range medium resistance seated, cues for posture  Shoulder extension standing RUE/LUE - 20x2  Scapular retraction standing - 20x2 min cues for posture  Bicep curls with medium resistance - 30x3  Tricep extension with medium resistance - 20x3     Patient verbalizes understanding to increase hold on band with HEP to increase RPE for maximum progress    Patient verbalizes understanding to increase postural awareness with HEP for maximizing strength gains and joint protection    Increased time needed for exercise training   Core strengthening Planks with bent elbows - 5x, three second hold  Kneeling with ball toss - 20x to right, 20x to left two hANDED      Posterior shoulder strengthening Prone - 4# weighted ball elbow flexion 30x2  Prone - Scapular retraction with 2# BUE - 30x    Thera putty strengthening Gripping, pinching, bean  - review from rehab, no cues needed, patient verbalizes understanding to complete at home 3x/day    Foam roll 20x shoulder horizontal ab/adduction  20x scapular pro/retraction  20x shoulder flexion/extension  20x shoulder ab/adduction    Hand strengthening Blue  stick - frowns/smiles/twists 10x2 - less than full range, seated  Digiflex yellow/green - standing and alternating between hands 10x3  In tall kneel  Clothespins RUE up -  green/blue climb up/down the pole 30x palmar , three jaw and lateral pinch alternating, 15x red/yellow up/down pole with RUE alternating 30x  Clothespins LUE up - green/blue climb up/down with LUE 30x three jaw    Exercises   Finger Exension with Putty - 10 reps - 3 sets  Rolling Putty on Table - 10 reps - 3 sets   Seated Claw Fist with Putty - 10 reps - 3 sets  Hook Fist with Putty - 10 reps - 3 sets   Seated Claw Fist with Putty - 10 reps - 3 sets  Finger Abduction with Putty - 10 reps - 3 sets   Finger Adduction with Putty - 10 reps - 3 sets    (cues for form throughout exercises)      Foam ball squeeze RUE/LUE 10x each Cues to hold for 5 seconds   Velcro rollers Large roll RUE/LUE - 3 minutes each, cues for fingertips  Three jaw roll RUE/LUE - 3 minutes each, cues for form                               MODALITIES SPLINTING                  Functional Outcome Measure:   [x]NA    Treatment/Activity Tolerance:    Patients response to treatment:   [x]Patient tolerated treatment well []Patient limited by fatigue   []Patient limited by pain  []Patient limited by other medical complications   []Other:     Assessment:   Patient requires skilled cues for form for hand exercises. Patient verbalizes understanding of importance of strengthening IP joints and not letting them hyperextend during high resistance exercises in order to prevent potential for hand deformities. Goals  Short term goals  Time Frame for Short term goals: 3 weeks  Short term goal 1: Patient will be I with UE HEP with reported RPE via Ritesh Scale Rating of Perceived Exertion of 13+ (indicated as somewhat hard). - progressing but requires cues for increasing resistance with band exercises 8/7  Short term goal 2: Patient will report participation in homemaking (dishes, laundry, sweeping with a broom) using modified methods PRN. - PROGRESSING FOR DISHES AND SWEEPING 7/24, GOAL MET 8/7/20  Short term goal 3: Patient will report participation in once a week community outings with safe environmental precautions. - GOAL MET 7/20/20  Long term goals  Time Frame for Long term goals : 6 weeks  Long term goal 1: Patient will return to driving as medically cleared by MD. Hannah Pastrana term goal 2: Patient will report increased independence with picking up 332 to 3year old niece weighing approx. 24 lbs.  - GOAL MET 7/17/20  Patient Goals   Patient goals : \"to be able to get back to being 100%\"    Prognosis: [x]Good   []Fair   []Poor    Patient Requires Follow-up:  [x]Yes  []No    Plan: []Plan of care initiated     [x]Continue per plan of care (2x/week for 6 weeks)              [] Alter current plan (see comments)    []Hold pending MD visit []Discharge    Time in: 1115       Time out: 1210    Timed Code Treatment Minutes:  50    Total Treatment Minutes: 55     Electronically signed by:  Gato Chamberlain, OTR/L

## 2020-08-14 NOTE — FLOWSHEET NOTE
Physical Therapy Daily Treatment Note    Date:  2020    Patient Name:  Tracey Kwon    :  2000  MRN: [id number]  Restrictions/Precautions:    Medical/Treatment Diagnosis Information:  R53.81 (ICD-10-CM) - Other malaise  Insurance/Certification information:  PT Insurance Information: [place]   Physician Information:  Referring Practitioner: Gloria Diazer of care signed (Y/N):  Y  Visit# / total visits:  10/10     G-Code (if applicable):          LEFS: 49/80 - initial Evaluation   LEFS: 58/80 - 2020    Medicare Cap (if applicable):   N/A= total amount used, updated 2020    Time in:  10:30   Timed Treatment:  45 min Total Treatment Time:  45 min  Time out: 11:15  ________________________________________________________________________________________    Pain Level:    0/10  SUBJECTIVE:  Pt reported that thinks therapy has really helped her. Pt said she believes she has made about 90% improvement.      OBJECTIVE: See Initial Evaluation       Exercise/Equipment Resistance/Repetitions Other comments     SLR      Flexion:      abduction   x10B c 2# HEP     Clamshells 5\"x10  HEP     Bridges 5\"x15 HEP     LAQ  HEP     PKF c ball     PHE       Slant board       Heel raises     Mini Squat  Hip abduction  Hamstring curls x20B  2x10B  x15B  x15B    Marching tap on cones  1 min     FSU x10B       Balance:       Feet apart       Feet together       tandem  Foam Balance:      Feet apart eyes closed       Feet together eyes closed      Semi-tandem             1 min   1 min B    1/2 foam roll       Modified SLS 1 min B    Balance Board  1 min balance ea direction   1 min rocking ea direction    Lateral walk with green TB  // bars   Ambulation w/o AD 4 laps x1, and 3 laps x1    Nu-step 5 min    Forward walking with cones   // bars                                               Other Therapeutic Activities:      Manual Treatments:         Modalities:      Test/Measurements:    Strength
(Goal met)   Long term goal 4: Pt will ascend/descend 12 steps with single hand rail and recirpocal pattern. (Goal Not Met)  Long term goal 5: Pt will will consistently demonstrate appropriate muscle firing pattern on BLE as measured by palpation  (Goal Met)       Frequency/Duration:  # Days per week: [] 1 day # Weeks: [] 1 week [] 4 weeks      [x] 2 days   [] 2 weeks [x] 5 weeks     [] 3 days   [] 3 weeks [] 6 weeks     Rehab Potential: [] Excellent [x] Good [] Fair  [] Poor     Goal Status:  [] Achieved [x] Partially Achieved  [] Not Achieved     Patient Status: [] Continue per initial plan of Care     [] Patient now discharged     [x] Additional visits requested, Please re-certify for additional visits:      Requested frequency/duration:  2x/week for 4 weeks    Electronically signed by:  Maikol Kwon PT    If you have any questions or concerns, please don't hesitate to call.   Thank you for your referral.    Physician Signature:________________________________Date:__________________  By signing above, therapists plan is approved by physician

## 2020-08-17 ENCOUNTER — HOSPITAL ENCOUNTER (OUTPATIENT)
Dept: OCCUPATIONAL THERAPY | Age: 20
Setting detail: THERAPIES SERIES
Discharge: HOME | End: 2020-08-17
Payer: MEDICARE

## 2020-08-17 ENCOUNTER — HOSPITAL ENCOUNTER (OUTPATIENT)
Dept: PHYSICAL THERAPY | Age: 20
Setting detail: THERAPIES SERIES
Discharge: HOME | End: 2020-08-17
Payer: MEDICARE

## 2020-08-17 PROCEDURE — 97116 GAIT TRAINING THERAPY: CPT

## 2020-08-17 PROCEDURE — 97110 THERAPEUTIC EXERCISES: CPT

## 2020-08-17 NOTE — FLOWSHEET NOTE
Outpatient Occupational Therapy  Phone: [phone]            Fax: [phone]       ___________________________________________________________________________      Outpatient Occupational Therapy     [x] Daily Treatment Note   [] Progress Note   [] Discharge Note      Date:  8/17/2020    Patient Name:  Estuardo Barraza         YOB: 2000    Medical Diagnosis and ICD 10:  Diagnosis: R53.81 debility, moderate malnutrition    Treatment Diagnosis:  Performance deficits / Impairments: Decreased functional mobility , Decreased ADL status, Decreased strength, Decreased endurance, Decreased sensation, Decreased balance, Decreased posture, Decreased high-level IADLs    Onset Date:  Onset Date: 06/11/20    Referring Physician and Referral Date:  Referring Practitioner: Dr. Madai Stokes, 7/2/20     Visits Allowed/Insurance/Certification information:   [employer], 30 visits    Restrictions/Precautions: none     Plan of care sent to provider:    []Faxed  [x]Co-signature    (attempts: 1[x] 2 []3[])        Plan of care signed:    [x]Yes date: Dr. Madai Stokes 7/10/20            []No         Next Progress Note due:   8/21/2020    Visit# / total visits: 10/12    Plan for Next Session:  Putty, review HEP    Home Exercise Program:     Access Code: [email or web address]   URL: [email or web address]   Date: 08/14/2020   Prepared by: Daryl Goins     Exercises   Finger Exension with Putty - 10 reps - 3 sets - 2x daily - 7x weekly   Rolling Putty on Table - 10 reps - 3 sets - 2x daily - 7x weekly   Seated Claw Fist with Putty - 10 reps - 3 sets - 2x daily - 7x weekly   Hook Fist with Putty - 10 reps - 3 sets - 2x daily - 7x weekly   Seated Claw Fist with Putty - 10 reps - 3 sets - 2x daily - 7x weekly   Finger Abduction with Putty - 10 reps - 3 sets - 2x daily - 7x weekly   Finger Adduction with Putty - 10 reps - 3 sets - 2x daily - 7x weekly     Subjective: Patient states she has no tingling in her RUE this date.     Pain level: 0/10, tingling in fingers is \"basically gone\"    Objective:       Exercises: Exercises in bold performed in department today. Items not bolded are carried forward from prior visits for continuity of the record. Exercise/Equipment Resistance/Repetitions Other comments      ADL/IADL TRAINING                                                            NEUROMUSCULAR RE-EDU and THERAPEUTIC ACTIVITY     transfers Increased time and mod I with/without RW, cues for postural awareness, occasionally able to not use walker for support    Walking with shopping cart  3:47 seconds, reaching RPE of 12    Balloon batting 10:00 seconds, reaching RPE of 13    Laundry review Review of bumping laundry basket up stairs for increasing independence/safety with doing the laundry    Review of keeping walker with her and using outside door to participate in laundry tasks at the house    Demonstration of simulation of picking up items from the floor and placing into container on other side, and then moving from simulated washer to dryer on right side - 1 LOB but able to catch self with close SBA from therapist    Demonstration of \"bumping\" simulated laundry basket with 10 lbs.  Up 3 stairs with RW/handrail for occasional support and without LOB    Demonstration of using foot to push laundry basket and holding onto walker for safety and support during home-based laundry tasks                                   THERAPEUTIC EXERCISE and MANUAL TECHNIQUES    Theraband Shoulder scaption above 90 BUE seated - With medium resistance - 27x3 - mod cues for posture, form  At 90 shoulder horizontal abduction BUE at 90 With medium resistance - 20x3 - min cues  RUE/LUE shoulder flexion - 20x 75% full range medium resistance seated, cues for posture  Shoulder extension standing RUE/LUE - 20x2  Scapular retraction standing - 20x2 min cues for posture  Bicep curls with medium resistance - 30x3  Tricep extension with medium resistance - [x]NA    Treatment/Activity Tolerance:    Patients response to treatment:   [x]Patient tolerated treatment well []Patient limited by fatigue   []Patient limited by pain  []Patient limited by other medical complications   []Other:     Assessment:   Patient requires skilled cues for form for hand exercises for maximizing IP strength. Patient demonstrates less hyperextension at IP joints this date, but requires cues to use tips of fingers and maintain wrist neutral positions as a result of IP weakness. Goals  Short term goals  Time Frame for Short term goals: 3 weeks  Short term goal 1: Patient will be I with UE HEP with reported RPE via Ritesh Scale Rating of Perceived Exertion of 13+ (indicated as somewhat hard). - progressing but requires cues for increasing resistance with band exercises 8/7, Requires cues for hand exercises 8/17  Short term goal 2: Patient will report participation in homemaking (dishes, laundry, sweeping with a broom) using modified methods PRN. - PROGRESSING FOR DISHES AND SWEEPING 7/24, GOAL MET 8/7/20  Short term goal 3: Patient will report participation in once a week community outings with safe environmental precautions. - GOAL MET 7/20/20  Long term goals  Time Frame for Long term goals : 6 weeks  Long term goal 1: Patient will return to driving as medically cleared by MD.  Riaan Mares term goal 2: Patient will report increased independence with picking up 332 to 3year old niece weighing approx. 24 lbs.  - GOAL MET 7/17/20  Patient Goals   Patient goals : \"to be able to get back to being 100%\"    Prognosis: [x]Good   []Fair   []Poor    Patient Requires Follow-up:  [x]Yes  []No    Plan: []Plan of care initiated     [x]Continue per plan of care (2x/week for 6 weeks)              [] Alter current plan (see comments)    []Hold pending MD visit []Discharge    Time in: 1030       Time out: 1125    Timed Code Treatment Minutes:  55    Total Treatment Minutes: 55     Electronically signed by:  Irma Engle Carla OTR/L

## 2020-08-17 NOTE — FLOWSHEET NOTE
Physical Therapy Daily Treatment Note    Date:  2020    Patient Name:  Tavares Graham    :  2000  MRN: [id number]  Restrictions/Precautions:    Medical/Treatment Diagnosis Information:  R53.81 (ICD-10-CM) - Other malaise  Insurance/Certification information:  PT Insurance Information: [place]   Physician Information:  Referring Practitioner: Ean Shipley of care signed (Y/N):  Y  Visit# / total visits:  10/10 1/8    G-Code (if applicable):          LEFS: 49/80 - initial Evaluation   LEFS: 58/80 - 2020    Medicare Cap (if applicable):   N/A= total amount used, updated 2020    Time in:  9:45   Timed Treatment:  45 min Total Treatment Time:  45 min  Time out: 10:30  ________________________________________________________________________________________    Pain Level:    0/10  SUBJECTIVE:  Pt reported that thinks therapy has really helped her. Pt said she believes she has made about 90% improvement.      OBJECTIVE: See Initial Evaluation       Exercise/Equipment Resistance/Repetitions Other comments     SLR      Flexion:      abduction   x10B c 2# HEP     Supine Clamshells 5\"x10  HEP     Bridges on ball  5\"x15 HEP     LAQ  Seated marching x15 c 4#  x10B c 4# HEP     PKF c ball     PHE       Slant board       Heel raises     Mini Squat  Hip abduction  Hamstring curls x20B  2x10B  x15B  x15B 2#    2#  2#   Marching tap on cones  1 min     FSU x15B c 2# - 6\"       Balance:       Feet apart       Feet together       tandem  Foam Balance:      Feet apart eyes closed       Feet together eyes closed      Semi-tandem                 1/2 foam roll       Modified SLS     Balance Board      Forward/backward walking 5 laps  // bars   Ambulation w/o AD 3 laps R 3 laps L     Nu-step 5 min    Forward walking with cones  x5 laps  // bars                                               Other Therapeutic Activities:      Manual Treatments:         Modalities:      Test/Measurements: ASSESSMENT:    Therapy session focused on strengthening and ambulation. Pt progressed supine and standing strengthening to improve hip strength. Pt demonstrated improved ability to perform cone walking and forward/backward walking with no UE support in // bars this date. HEP updated. PT will progress as pt can tolerate. Treatment/Activity Tolerance:   [x]Patient tolerated treatment well [] Patient limited by fatique  []Patient limited by pain [] Patient limited by other medical complications  [] Other:     Goals:        Long term goals  Time Frame for Long term goals : 5 weeks  Long term goal 1: Pt would be independent with HEP (Goal Met)  Long term goal 2: Pt will improve BLE strength to 4/5 gross strength (Progressing)  Long term goal 3: Pt will ambulate with least restricitve AD with equal step length and no sign of RLE freezing. (Goal met)   Long term goal 4: Pt will ascend/descend 12 steps with single hand rail and recirpocal pattern. (Goal Not Met)  Long term goal 5: Pt will will consistently demonstrate appropriate muscle firing pattern on BLE as measured by palpation  (Goal Met)    Plan: [x] Continue per plan of care [] Alter current plan (see comments)   [] Plan of care initiated [] Hold pending MD visit [] Discharge      Plan for Next Session:      Re-Certification Due Date:         Signature:   Fernanda Day PT

## 2020-08-21 ENCOUNTER — HOSPITAL ENCOUNTER (OUTPATIENT)
Dept: PHYSICAL THERAPY | Age: 20
Setting detail: THERAPIES SERIES
Discharge: HOME | End: 2020-08-21
Payer: MEDICARE

## 2020-08-21 ENCOUNTER — HOSPITAL ENCOUNTER (OUTPATIENT)
Dept: OCCUPATIONAL THERAPY | Age: 20
Setting detail: THERAPIES SERIES
Discharge: HOME | End: 2020-08-21
Payer: MEDICARE

## 2020-08-21 PROCEDURE — 97110 THERAPEUTIC EXERCISES: CPT

## 2020-08-21 PROCEDURE — 97116 GAIT TRAINING THERAPY: CPT

## 2020-08-21 NOTE — FLOWSHEET NOTE
Physical Therapy Daily Treatment Note    Date:  2020    Patient Name:  Asif Stearns    :  2000  MRN: [id number]  Restrictions/Precautions:    Medical/Treatment Diagnosis Information:  R53.81 (ICD-10-CM) - Other malaise  Insurance/Certification information:  PT Insurance Information: [place]   Physician Information:  Referring Practitioner: Celeste Hightower of care signed (Y/N):  Y  Visit# / total visits:  10/10 2/8    G-Code (if applicable):          LEFS: 49/80 - initial Evaluation   LEFS: 58/80 - 2020    Medicare Cap (if applicable):   N/A= total amount used, updated 2020    Time in:  9:45   Timed Treatment:  45 min Total Treatment Time:  45 min  Time out: 10:30  ________________________________________________________________________________________    Pain Level:    0/10  SUBJECTIVE:  Pt reported that thinks therapy has really helped her. Pt said she believes she has made about 90% improvement.      OBJECTIVE: See Initial Evaluation       Exercise/Equipment Resistance/Repetitions Other comments     SLR      Flexion:      abduction   x10B c 2# HEP     Supine Clamshells   SL ER with green band 5\"x15   HEP     Bridges on ball  5\"x15 HEP     LAQ  Seated marching x15 c 4#  x10B c 4# HEP     PKF c ball     PHE       Slant board       Heel raises     Mini Squat  3-way hip  Hamstring curls x20B  2x10B  x10B ea  x15B 2#    2#  2#   Marching tap on cones  1 min     FSU x10B c 2# - 6\"       Balance:       Feet apart       Feet together       tandem  Foam Balance:      Feet apart eyes closed       Feet together eyes closed      Semi-tandem                 1/2 foam roll       Modified SLS     Balance Board      Forward/backward walking 5 laps  // bars   Ambulation w/o AD 3 laps R 3 laps L     Nu-step 5 min    Forward walking with cones  x5 laps  // bars                                               Other Therapeutic Activities:      Manual Treatments:         Modalities:

## 2020-08-21 NOTE — FLOWSHEET NOTE
Outpatient Occupational Therapy  Phone: [phone]            Fax: [phone]       ___________________________________________________________________________      Outpatient Occupational Therapy     [x] Daily Treatment Note   [] Progress Note   [] Discharge Note      Date:  8/21/2020    Patient Name:  Neptali Waddell         YOB: 2000    Medical Diagnosis and ICD 10:  Diagnosis: R53.81 debility, moderate malnutrition    Treatment Diagnosis:  Performance deficits / Impairments: Decreased functional mobility , Decreased ADL status, Decreased strength, Decreased endurance, Decreased sensation, Decreased balance, Decreased posture, Decreased high-level IADLs    Onset Date:  Onset Date: 06/11/20    Referring Physician and Referral Date:  Referring Practitioner: Dr. Hardik Leiva, 7/2/20     Visits Allowed/Insurance/Certification information:   [place] [employer], 30 visits    Restrictions/Precautions: none     Plan of care sent to provider:    []Faxed  [x]Co-signature    (attempts: 1[x] 2 []3[])        Plan of care signed:    [x]Yes date: Dr. Hardik Leiva 7/10/20            []No         Next Progress Note due:   8/21/2020    Visit# / total visits: 11/12    Plan for Next Session:  Putty, review HEP    Home Exercise Program:     Access Code: [email or web address]   URL: [email or web address]   Date: 08/14/2020   Prepared by: Kristopher Sexton     Exercises   Finger Exension with Putty - 10 reps - 3 sets - 2x daily - 7x weekly   Rolling Putty on Table - 10 reps - 3 sets - 2x daily - 7x weekly   Seated Claw Fist with Putty - 10 reps - 3 sets - 2x daily - 7x weekly   Hook Fist with Putty - 10 reps - 3 sets - 2x daily - 7x weekly   Seated Claw Fist with Putty - 10 reps - 3 sets - 2x daily - 7x weekly   Finger Abduction with Putty - 10 reps - 3 sets - 2x daily - 7x weekly   Finger Adduction with Putty - 10 reps - 3 sets - 2x daily - 7x weekly     Subjective: Patient states she has no tingling in her RUE this hold  Kneeling with ball toss - 20x to right, 20x to left two hANDED      Posterior shoulder strengthening Prone - 4# weighted ball elbow flexion 30x2  Prone - Scapular retraction with 2# BUE - 30x    Thera putty strengthening Gripping, pinching, bean  - review from rehab, no cues needed, patient verbalizes understanding to complete at home 3x/day    Shoulder arc 20-30x RUE (skilled cues for posture)    Foam roll 20x shoulder horizontal ab/adduction  20x scapular pro/retraction  20x shoulder flexion/extension  20x shoulder ab/adduction    Hand strengthening Blue  stick - frowns/smiles/twists 10x2 - less than full range, seated  Digiflex yellow/green - standing and alternating between hands 10x3  In tall kneel  Clothespins RUE up -  green/blue climb up/down the pole 30x palmar , three jaw and lateral pinch alternating, 15x red/yellow up/down pole with RUE alternating 30x  Clothespins LUE up - green/blue climb up/down with LUE 30x three jaw    Exercises with orange putty  Finger Exension with Putty - 10 reps - 3 sets  Rolling Putty on Table - 10 reps - 3 sets   Seated Claw Fist with Putty - 10 reps - 3 sets  Hook Fist with Putty - 10 reps - 3 sets   Finger Abduction with Putty - 10 reps - 3 sets   Finger Adduction with Putty - 5 reps - 5 sets    (min to moderate cues for form for hook fist)      Foam ball squeeze RUE/LUE 10x each Cues to hold for 5 seconds   Velcro rollers Large roll RUE/LUE - 5x up and down  Three jaw roll RUE/LUE - 5x up and down   Occasional cues for form                            MODALITIES                     SPLINTING                  Functional Outcome Measure:   [x]NA    Treatment/Activity Tolerance:    Patients response to treatment:   [x]Patient tolerated treatment well []Patient limited by fatigue   []Patient limited by pain  []Patient limited by other medical complications   []Other:     Assessment:  Patient requires skilled cues for form for hand exercises for maximizing IP strength. Since 6 weeks ago, patient has improved strength in arms and hands but is still well below [facility] [place]. She requires additional OT 2x/week for 4 additional weeks to maximize potential for return to OF of adequate performance of upper extremity in writing and homemaking tasks. New STG and LTG established below. Goals  Short term goals  Time Frame for Short term goals: 3 weeks  Short term goal 1: Patient will be I with UE HEP with reported RPE via Ritesh Scale Rating of Perceived Exertion of 13+ (indicated as somewhat hard). - progressing but requires cues for increasing resistance with band exercises 8/7, Requires cues for hand exercises 8/17  Short term goal 2: Patient will report participation in homemaking (dishes, laundry, sweeping with a broom) using modified methods PRN. - PROGRESSING FOR DISHES AND SWEEPING 7/24, GOAL MET 8/7/20   Short term goal 3: Patient will report participation in once a week community outings with safe environmental precautions. - GOAL MET 7/20/20  Long term goals  Time Frame for Long term goals : 6 weeks  Long term goal 1: Patient will return to driving as medically cleared by MD. - [address] [facility] MD PRIOR TO RETURN TO DRIVING 1/64/41  Long term goal 2: Patient will report increased independence with picking up 332 to 3year old niece weighing approx. 24 lbs. - GOAL MET 7/17/20  Patient Goals   Patient goals : Tanis Lundborg be able to get back to being 100%\"    Patient goals: \"I think I still need to be in therapy to get my balance. I also want to be able to write better and not be as tired in your hands\". STG to be met in 2 weeks:  NEW STG 1: Patient will improve performance score in writing to at least 8/10 (from 6/10 reported on 8/21/20). NEW STG 2: Patient will be I with UE HEP for hand for increasing strength to [facility].     LTG to be met in 4 weeks:  NEW LTG 1: Patient will improve hand strength by at least 20% for improving hand participation in homemaking tasks. NEW LTG 2: Patient will report improved hand performance in homemaking to at least 9/10, currently rated at 7/10.       Prognosis: [x]Good   []Fair   []Poor    Patient Requires Follow-up:  [x]Yes  []No    Plan: []Plan of care initiated     [x]Continue per plan of care (2x/week for 6 weeks)              [] Alter current plan (see comments)    []Hold pending MD visit []Discharge    Time in: 0902       Time out: 0945    Timed Code Treatment Minutes:  43    Total Treatment Minutes: 43    Electronically signed by:  Edilberto Clark OTR/L

## 2020-08-21 NOTE — PROGRESS NOTES
Outpatient Occupational Therapy  Phone: [phone] Fax: [phone]     José Miguel Jacobo  Dear Dr. Justyn Harkins,    The following patient has been assessed for therapy services. Please review the attached summary of the patient's plan of care, and verify that you agree with plan for additional therapy services at this time. Plan of Care/Treatment to date:  [x] Therapeutic Exercise  [x]  Modalities:  [x] Therapeutic Activity   [] Ultrasound [] Electrical Stimulation   [] Total Motion Release   [] Fluidotherapy [] Kinesiotaping  [x]  Neuromuscular Re-education  [] Iontophoresis [] Coldpack/hotpack   [x]  Instruction in HEP    Other:  [x]  Manual Therapy     []   Dry needling             [x] ADL/Self Care  [x] IADL Training  [] LSVT BIG  [] Saebo  [x] Splinting  [] Wheelchair Mobility                       Frequency/Duration:  # Days per week: [] 1 day # Weeks: [] 1 week [] 5 weeks      [x] 2 days   [] 2 weeks [] 6 weeks     [] 3 days   [] 3 weeks [] 7 weeks     [] 4 days   [x] 4 weeks [] 8 weeks     [] 5 days   [] 10 weeks [] 12 weeks    Rehab Potential: [] Excellent [x] Good [] Fair  [] Poor     Thank you for the referral of this patient. Please sign.      Physician signature_______________________ Date________________  By signing above, therapists plan is approved by physician     Fax to: [facility] [phone]     Electronically signed by:  Merrill Caballero        Outpatient Occupational Therapy     [x] Daily Treatment Note   [x] Progress Note   [] Discharge Note      Date:  8/21/2020    Patient Name:  Cintia Alvarenga         YOB: 2000    Medical Diagnosis and ICD 10:  Diagnosis: R53.81 debility, moderate malnutrition    Treatment Diagnosis:  Performance deficits / Impairments: Decreased functional mobility , Decreased ADL status, Decreased strength, Decreased endurance, Decreased sensation, Decreased balance, Decreased posture, Decreased high-level IADLs    Onset Date:  Onset Date: 06/11/20    Referring Physician and Referral Date:  Referring Practitioner: Dr. Justyn Harkins, 7/2/20     Visits Allowed/Insurance/Certification information:   [employer], 30 visits    Restrictions/Precautions: none     Plan of care sent to provider:    []Faxed  [x]Co-signature    (attempts: 1[x] 2 []3[])        Plan of care signed:    [x]Yes date: Dr. Justyn Harkins 7/10/20            []No         Next Progress Note due:   8/21/2020    Visit# / total visits: 11/12    Plan for Next Session:  Putty, adal HEP    Home Exercise Program:     Access Code: [email or web address]   URL: [email or web address]   Date: 08/14/2020   Prepared by: Edilberto Clark     Exercises   Finger Exension with Putty - 10 reps - 3 sets - 2x daily - 7x weekly   Rolling Putty on Table - 10 reps - 3 sets - 2x daily - 7x weekly   Seated Claw Fist with Putty - 10 reps - 3 sets - 2x daily - 7x weekly   Hook Fist with Putty - 10 reps - 3 sets - 2x daily - 7x weekly   Seated Claw Fist with Putty - 10 reps - 3 sets - 2x daily - 7x weekly   Finger Abduction with Putty - 10 reps - 3 sets - 2x daily - 7x weekly   Finger Adduction with Putty - 10 reps - 3 sets - 2x daily - 7x weekly     Subjective: Patient states she has no tingling in her RUE this date.      Pain level: 0/10, tingling in fingers is \"basically gone\"    Objective   Observation/Palpation  Posture: Fair  ADL  Feeding: Modified independent   Grooming: Modified independent   UE Bathing: Modified independent   LE Bathing: Modified independent   UE Dressing: Modified independent   LE Dressing: Modified independent   Toileting: Modified independent     Tone RUE  RUE Tone: Normotonic  Tone LUE  LUE Tone: Normotonic  Coordination  Movements Are Fluid And Coordinated: Yes  Coordination and Movement description: Decreased speed    Functional Activity Tolerance  Additional Comments: less rest breaks needed per report    Balance  Sitting Balance: Modified independent   Standing Balance: Modified independent   Functional Mobility  Functional - Mobility Device: Rolling Walker  Activity: To/From therapy gym  Assist Level: Modified independent   Bed mobility  Supine to Sit: Modified independent  Sit to Supine: Modified independent  Transfers  Sit to stand: Modified independent  Stand to sit: Modified independent  Vision - Basic Assessment  Prior Vision: No visual deficits  Patient Visual Report: No visual complaint reported. Visual Field Cut: No  Oculo Motor Control:  WNL  Cognition  Overall Cognitive Status: WFL  Cognition Comment: improved affect, repeated cues needed for exercises even with visual demonstration and repeated practice  Perception    Sensation  Overall Sensation Status: Impaired  Additional Comments: numbness reported in fingertips, significantly improved since evaluation, comes and goes, but overall improved per report     LUE AROM (degrees)  LUE AROM : WNL  LUE General AROM: rounded shoulders bilaterally, but able to self monitor posture and improve 50% of the time  RUE AROM (degrees)  RUE AROM : WNL  RUE General AROM: rounded shoulders bilaterally, but able to self monitor posture and improve 50% of the time  LUE Strength  Gross LUE Strength: Exceptions to [facility]  L Shoulder Flex: 4/5  L Shoulder Ext: 4/5  L Shoulder ABduction: 4/5  L Shoulder ADduction: 4/5  L Shoulder Int Rotation: 4-/5  L Shoulder Ext Rotation: 4-/5  L Shoulder Horiz ABduction: 4/5  L Shoulder Horiz ADduction: 4-/5  L Elbow Flex: 4-/5  L Elbow Ext: 4-/5  L Forearm Pron: 4/5  L Forearm Sup: 4/5  L Wrist Flex: 4/5  L Wrist Ext: 4/5  L Wrist Radial Deviation: 4/5  L Wrist Ulnar Deviation: 4/5  RUE Strength  Gross RUE Strength: Ex[facility] to [facility]  R Shoulder Flex: 4/5  R Shoulder Ext: 4+/5  R Shoulder ABduction: 4/5  R Shoulder ADduction: 4/5  R Shoulder Int Rotation: 4/5  R Shoulder Ext Rotation: 4/5  R Shoulder Horiz ABduction: 4/5  R Shoulder Horiz ADduction: 4+/5  R Elbow Flex: 4/5  R Elbow Ext: 4/5  R Forearm Pron: 4+/5  R Forearm Sup: 4+/5  R Wrist Flex: 4+/5  R Wrist Ext: 4/5  R Wrist Radial Deviation: 5/5  R Wrist Ulnar Deviation: 5/5     Hand Dominance  Hand Dominance: Right  Left Hand Strength -  (lbs)  Handle Setting 2: 29, 24, 29  Left Hand Strength - Pinch (lbs)  Lateral: 10, 8, 6  Tip: 4, 4, 5  Palmar 3 point: 6, 7, 8  Left 9-Hole Peg Test  Left 9-Hole Peg Test: Impaired(1 peg dropped)  Right Hand Strength -  (lbs)  Handle Setting 2: 21, 21, 19  Right Hand Strength - Pinch (lbs)  Lateral: 5, 5, 4  Tip: 3, 3, 3  Palmar 3 point: 5, 5, 5  Right 9-Hole Peg Test  Right 9-Hole Peg Test: Functional  Fine Motor Skills  Left 9-Hole Peg Test: no pegs dropped  Left 9 Hole Peg Test Time (secs): 29  Right 9-Hole Peg Test: Functional  Right 9 Hole Peg Test Time (secs): 22  Hand Assessment Comment  Hand Assessment Comment: slight numbness reported in fingertips but no tingling reported, IP joints resting in slight flexion     Quickdash Score of 14: 6.8# DSI    Treatment/Activity Tolerance:    Patients response to treatment:   [x]Patient tolerated treatment well []Patient limited by fatigue   []Patient limited by pain  []Patient limited by other medical complications   []Other:     Assessment:  Patient requires skilled cues for form for hand exercises for maximizing IP strength. Since 6 weeks ago, patient has improved strength in arms and hands but is still well below [facility]. She requires additional OT 2x/week for 4 additional weeks to maximize potential for return to [facility] of adequate performance of upper extremity in writing and homemaking tasks. New STG and LTG established below. Goals  Short term goals  Time Frame for Short term goals: 3 weeks  Short term goal 1: Patient will be I with UE HEP with reported RPE via Ritesh Scale Rating of Perceived Exertion of 13+ (indicated as somewhat hard).  - progressing but requires cues for increasing resistance with band exercises 8/7, Requires cues for hand exercises 8/17  Short term goal 2: Patient will report participation in homemaking (dishes, laundry, sweeping with a broom) using modified methods PRN. - PROGRESSING FOR DISHES AND SWEEPING 7/24, GOAL MET 8/7/20   Short term goal 3: Patient will report participation in once a week community outings with safe environmental precautions. - GOAL MET 7/20/20  Long term goals  Time Frame for Long term goals : 6 weeks  Long term goal 1: Patient will return to driving as medically cleared by MD. - [address] [facility] MD PRIOR TO RETURN TO DRIVING 1/29/69  Long term goal 2: Patient will report increased independence with picking up 332 to 3year old niece weighing approx. 24 lbs. - GOAL MET 7/17/20  Patient Goals   Patient goals : Mavis Erm be able to get back to being 100%\"    Patient goals: \"I think I still need to be in therapy to get my balance. I also want to be able to write better and not be as tired in your hands\". STG to be met in 2 weeks:  NEW STG 1: Patient will improve performance score in writing to at least 8/10 (from 6/10 reported on 8/21/20). NEW STG 2: Patient will be I with UE HEP for hand for increasing strength to [facility]. LTG to be met in 4 weeks:  NEW LTG 1: Patient will improve hand strength by at least 20% for improving hand participation in homemaking tasks. NEW LTG 2: Patient will report improved hand performance in homemaking to at least 9/10, currently rated at 7/10.       Prognosis: [x]Good   []Fair   []Poor    Patient Requires Follow-up:  [x]Yes  []No    Plan: []Plan of care initiated     []Continue per plan of care (2x/week for 6 weeks)              [x] Alter current plan (2x/week for 4 weeks)    []Hold pending MD visit []Discharge    Time in: 0902       Time out: 0945    Timed Code Treatment Minutes:  43    Total Treatment Minutes: 43    Electronically signed by:  SHALONDA Serrato/TAWANA

## 2020-08-24 ENCOUNTER — HOSPITAL ENCOUNTER (OUTPATIENT)
Dept: OCCUPATIONAL THERAPY | Age: 20
Setting detail: THERAPIES SERIES
Discharge: HOME | End: 2020-08-24
Payer: MEDICARE

## 2020-08-24 PROCEDURE — 97110 THERAPEUTIC EXERCISES: CPT

## 2020-08-24 NOTE — FLOWSHEET NOTE
Outpatient Occupational Therapy  Phone: [phone]            Fax: [phone]       ___________________________________________________________________________      Outpatient Occupational Therapy     [x] Daily Treatment Note   [] Progress Note   [] Discharge Note      Date:  8/24/2020    Patient Name:  Nikolay Dye         YOB: 2000    Medical Diagnosis and ICD 10:  Diagnosis: R53.81 debility, moderate malnutrition    Treatment Diagnosis:  Performance deficits / Impairments: Decreased functional mobility , Decreased ADL status, Decreased strength, Decreased endurance, Decreased sensation, Decreased balance, Decreased posture, Decreased high-level IADLs    Onset Date:  Onset Date: 06/11/20    Referring Physician and Referral Date:  Referring Practitioner: Dr. Maxwell Lockett, 7/2/20     Visits Allowed/Insurance/Certification information:   [employer], 30 visits    Restrictions/Precautions: none     Plan of care sent to provider:    []Faxed  [x]Co-signature    (attempts: 1[x] 2 []3[])        Plan of care signed:    [x]Yes date: Dr. Maxwell Lockett 8/21/20           []No         Next Progress Note due:   9/18/20    Visit# / total visits: 2/8, 11/12 previously    Plan for Next Session:  Putty, review HEP    Home Exercise Program:     Access Code: [email or web address]   URL: [email or web address]   Date: 08/14/2020   Prepared by: Gurdeep Feliciano     Exercises   Finger Exension with Putty - 10 reps - 3 sets - 2x daily - 7x weekly   Rolling Putty on Table - 10 reps - 3 sets - 2x daily - 7x weekly   Seated Claw Fist with Putty - 10 reps - 3 sets - 2x daily - 7x weekly   Seated Claw Fist with Putty - 10 reps - 3 sets - 2x daily - 7x weekly   Finger Abduction with Putty - 10 reps - 3 sets - 2x daily - 7x weekly   Finger Adduction with Putty - 10 reps - 3 sets - 2x daily - 7x weekly     Subjective: Patient states she has no tingling in her RUE this date.      Pain level: 0/10, tingling in fingers is \"basically gone\"    Objective     Exercises: Exercises in bold performed in department today. Items not bolded are carried forward from prior visits for continuity of the record. Exercise/Equipment Resistance/Repetitions Other comments      ADL/IADL TRAINING                                                            NEUROMUSCULAR RE-EDU and THERAPEUTIC ACTIVITY     transfers Increased time and mod I with/without RW, cues for postural awareness, occasionally able to not use walker for support    Walking with shopping cart  3:47 seconds, reaching RPE of 12    Balloon batting 10:00 seconds, reaching RPE of 13    Laundry review Review of bumping laundry basket up stairs for increasing independence/safety with doing the laundry    Review of keeping walker with her and using outside door to participate in laundry tasks at the house    Demonstration of simulation of picking up items from the floor and placing into container on other side, and then moving from simulated washer to dryer on right side - 1 LOB but able to catch self with close SBA from therapist    Demonstration of \"bumping\" simulated laundry basket with 10 lbs.  Up 3 stairs with RW/handrail for occasional support and without LOB    Demonstration of using foot to push laundry basket and holding onto walker for safety and support during home-based laundry tasks                                   THERAPEUTIC EXERCISE and MANUAL TECHNIQUES    Theraband Shoulder scaption above 90 BUE seated - With medium resistance - 27x3 - mod cues for posture, form  At 90 shoulder horizontal abduction BUE at 90 With medium resistance - 20x3 - min cues  RUE/LUE shoulder flexion - 20x 75% full range medium resistance seated, cues for posture  Shoulder extension standing RUE/LUE - 20x2  Scapular retraction standing - 20x2 min cues for posture  Bicep curls with medium resistance - 30x3  Tricep extension with medium resistance - 20x3     Patient verbalizes understanding to increase down  Finger extension - 30x   Occasional cues for form                            MODALITIES                     SPLINTING                  Functional Outcome Measure:   [x]NA    Treatment/Activity Tolerance:    Patients response to treatment:   [x]Patient tolerated treatment well []Patient limited by fatigue   []Patient limited by pain  []Patient limited by other medical complications   []Other:     Assessment:  Patient tolerating 25# of  strength for RUE this date. She requires cues for posture throughout all hand exercises. Patient fatigues easily but is motivated and continues throughout all exercises cooperatively. Goals  Short term goals  Time Frame for Short term goals: 3 weeks  Short term goal 1: Patient will be I with UE HEP with reported RPE via Ritesh Scale Rating of Perceived Exertion of 13+ (indicated as somewhat hard). - progressing but requires cues for increasing resistance with band exercises 8/7, Requires cues for hand exercises 8/17  Short term goal 2: Patient will report participation in homemaking (dishes, laundry, sweeping with a broom) using modified methods PRN. - PROGRESSING FOR DISHES AND SWEEPING 7/24, GOAL MET 8/7/20   Short term goal 3: Patient will report participation in once a week community outings with safe environmental precautions. - GOAL MET 7/20/20  Long term goals  Time Frame for Long term goals : 6 weeks  Long term goal 1: Patient will return to driving as medically cleared by MD. - [address] [facility] MD PRIOR TO RETURN TO DRIVING 0/34/37  Long term goal 2: Patient will report increased independence with picking up 332 to 3year old niece weighing approx. 24 lbs. - GOAL MET 7/17/20  Patient Goals   Patient goals : Tretha Median be able to get back to being 100%\"    Patient goals: \"I think I still need to be in therapy to get my balance. I also want to be able to write better and not be as tired in your hands\".     STG to be met in 2 weeks:  NEW STG 1: Patient will improve performance score in writing to at least 8/10 (from 6/10 reported on 8/21/20). NEW STG 2: Patient will be I with UE HEP for hand for increasing strength to [facility]. LTG to be met in 4 weeks:  NEW LTG 1: Patient will improve hand strength by at least 20% for improving hand participation in homemaking tasks. NEW LTG 2: Patient will report improved hand performance in homemaking to at least 9/10, currently rated at 7/10.     Prognosis: [x]Good   []Fair   []Poor    Patient Requires Follow-up:  [x]Yes  []No    Plan: []Plan of care initiated     [x]Continue per plan of care (2x/week for 4 weeks)              [x] Alter current plan (see comments)    []Hold pending MD visit []Discharge    Time in: 0280       Time out: 1025    Timed Code Treatment Minutes:  40    Total Treatment Minutes: 40    Electronically signed by:  SHALONDA Britton/TAWANA

## 2020-08-26 ENCOUNTER — APPOINTMENT (OUTPATIENT)
Dept: OCCUPATIONAL THERAPY | Age: 20
End: 2020-08-26
Payer: MEDICARE

## 2020-08-28 ENCOUNTER — HOSPITAL ENCOUNTER (OUTPATIENT)
Dept: OCCUPATIONAL THERAPY | Age: 20
Setting detail: THERAPIES SERIES
Discharge: HOME | End: 2020-08-28
Payer: MEDICARE

## 2020-08-28 ENCOUNTER — APPOINTMENT (OUTPATIENT)
Dept: OCCUPATIONAL THERAPY | Age: 20
End: 2020-08-28
Payer: MEDICARE

## 2020-08-28 PROCEDURE — 97110 THERAPEUTIC EXERCISES: CPT

## 2020-08-28 NOTE — FLOWSHEET NOTE
Outpatient Occupational Therapy  Phone: [phone]            Fax: [phone]       ___________________________________________________________________________      Outpatient Occupational Therapy     [x] Daily Treatment Note   [] Progress Note   [] Discharge Note      Date:  8/28/2020    Patient Name:  Nicola Fitch         YOB: 2000    Medical Diagnosis and ICD 10:  Diagnosis: R53.81 debility, moderate malnutrition    Treatment Diagnosis:  Performance deficits / Impairments: Decreased functional mobility , Decreased ADL status, Decreased strength, Decreased endurance, Decreased sensation, Decreased balance, Decreased posture, Decreased high-level IADLs    Onset Date:  Onset Date: 06/11/20    Referring Physician and Referral Date:  Referring Practitioner: Dr. Debbie Bermeo, 7/2/20     Visits Allowed/Insurance/Certification information:   [place] [employer], 30 visits    Restrictions/Precautions: none     Plan of care sent to provider:    []Faxed  [x]Co-signature    (attempts: 1[x] 2 []3[])        Plan of care signed:    [x]Yes date: Dr. Debbie Bermeo 8/21/20           []No         Next Progress Note due:   9/18/20    Visit# / total visits: 3/8, 11/12 previously    Plan for Next Session:  Putty, review HEP    Home Exercise Program:     Access Code: [email or web address]   URL: [email or web address]   Date: 08/14/2020   Prepared by: Derryl Snellen     Exercises   Finger Exension with Putty - 10 reps - 3 sets - 2x daily - 7x weekly   Rolling Putty on Table - 10 reps - 3 sets - 2x daily - 7x weekly   Seated Claw Fist with Putty - 10 reps - 3 sets - 2x daily - 7x weekly   Seated Claw Fist with Putty - 10 reps - 3 sets - 2x daily - 7x weekly   Finger Abduction with Putty - 10 reps - 3 sets - 2x daily - 7x weekly   Finger Adduction with Putty - 10 reps - 3 sets - 2x daily - 7x weekly     Subjective: Patient states she has no tingling in her RUE this date.   Patient states she is now carrying laundry baskets with ambulation. Pain level: 0/10, tingling in fingers is \"basically gone\"    Objective     Hand Dominance  Hand Dominance: Right  Left Hand Strength -  (lbs)  Handle Setting 2: 29, 24, 25  Left Hand Strength - Pinch (lbs)  Lateral: 11, 9, 9  Tip: 4, 3, 3  Palmar 3 point: 7, 7, 6  Left 9-Hole Peg Test  Left 9-Hole Peg Test: Impaired(1 peg dropped)  Right Hand Strength -  (lbs)  Handle Setting 2: 25, 24, 23  Right Hand Strength - Pinch (lbs)  Lateral: 6, 4, 4  Tip: 3, 3, 3  Palmar 3 point: 6, 5, 5  Right 9-Hole Peg Test  Right 9-Hole Peg Test: Functional    Exercises: Exercises in bold performed in department today. Items not bolded are carried forward from prior visits for continuity of the record. Exercise/Equipment Resistance/Repetitions Other comments      ADL/IADL TRAINING                                                            NEUROMUSCULAR RE-EDU and THERAPEUTIC ACTIVITY     transfers Increased time and mod I with/without RW, cues for postural awareness, occasionally able to not use walker for support    Walking with shopping cart  3:47 seconds, reaching RPE of 12    Balloon batting 10:00 seconds, reaching RPE of 13    Laundry review Review of bumping laundry basket up stairs for increasing independence/safety with doing the laundry    Review of keeping walker with her and using outside door to participate in laundry tasks at the house    Demonstration of simulation of picking up items from the floor and placing into container on other side, and then moving from simulated washer to dryer on right side - 1 LOB but able to catch self with close SBA from therapist    Demonstration of \"bumping\" simulated laundry basket with 10 lbs.  Up 3 stairs with RW/handrail for occasional support and without LOB    Demonstration of using foot to push laundry basket and holding onto walker for safety and support during home-based laundry tasks                                   THERAPEUTIC EXERCISE and MANUAL TECHNIQUES    Theraband Shoulder scaption above 90 BUE seated - With medium resistance - 27x3 - mod cues for posture, form  At 90 shoulder horizontal abduction BUE at 90 With medium resistance - 20x3 - min cues  RUE/LUE shoulder flexion - 20x 75% full range medium resistance seated, cues for posture  Shoulder extension standing RUE/LUE - 20x2  Scapular retraction standing - 20x2 min cues for posture  Bicep curls with medium resistance - 30x3  Tricep extension with medium resistance - 20x3     Patient verbalizes understanding to increase hold on band with HEP to increase RPE for maximum progress    Patient verbalizes understanding to increase postural awareness with HEP for maximizing strength gains and joint protection    Increased time needed for exercise training   Core strengthening Planks with bent elbows - 5x, three second hold  Kneeling with ball toss - 20x to right, 20x to left two hANDED      Posterior shoulder strengthening Prone - 4# weighted ball elbow flexion 30x2  Prone - Scapular retraction with 2# BUE - 30x    Thera putty strengthening Gripping, pinching, bean  - review from rehab, no cues needed, patient verbalizes understanding to complete at home 3x/day    Shoulder arc 20-30x RUE (skilled cues for posture)    Foam roll 20x shoulder horizontal ab/adduction  20x scapular pro/retraction  20x shoulder flexion/extension  20x shoulder ab/adduction    Hand strengthening and Coordination Blue  stick - frowns/smiles/twists 10x2 - less than full range, seated, cues for posture, coordinated breathing, and pushing into full range  Digiflex yellow/green - standing and alternating between hands 10x3  In tall kneel  Clothespins RUE up -  green/blue climb up/down the pole 30x palmar , three jaw and lateral pinch alternating, 15x red/yellow up/down pole with RUE alternating 30x  Clothespins LUE up - green/blue climb up/down with LUE 30x three jaw    Exercises with orange putty  Finger Exension with Putty - 5x, min cues  Rolling Putty on Table - 10 reps - 3 sets   Seated Claw Fist with Putty - 5x  Finger Abduction with Putty - 5x3  Finger Adduction with Putty - 5 reps - 5 sets  Full  - 10x BUE     tool - 35#, 8x less than full range, 25#, 15x4  Finger extension - 5 second hold x10  Claw  with red band resistance - 15x4  Digiflex yellow - 10x3 RUE, Digiflex green - 10x3 LUE  Digiflex red - 10x3 RUE, Digiflex green - 10x3 RUE  Writing - tracing loops, performance rating of 8/10 (assist for setup and cues for posture throughout)  [place] translation - 30x          Foam ball squeeze RUE/LUE 10x each Cues to hold for 5 seconds   Velcro rollers Large roll RUE/LUE - 5x up and down  Three jaw roll RUE/LUE - 10x up and down  Finger extension - 30x   Occasional cues for form                            MODALITIES                     SPLINTING                  Functional Outcome Measure:   [x]NA    Treatment/Activity Tolerance:    Patients response to treatment:   [x]Patient tolerated treatment well []Patient limited by fatigue   []Patient limited by pain  []Patient limited by other medical complications   []Other:     Assessment:  Patient progressing in hand strength for  since 1 week ago but continues to require cues for form during exercises. Recommend skilled OT to address cues needed for effective participation in HEP. Writing performance rating improved to 8/10 this session which is 2 points better than 1 week ago. Patient states she may need to amend schedule to 1x/week but verbalizes understanding of completing putty exercises without hyperextending joints and also verbalizes understanding that she may need to consider additional day of therapy (back to 2x/week) if she is unable to complete HEP in the clinic with appropriate form.     Goals  Short term goals  Time Frame for Short term goals: 3 weeks  Short term goal 1: Patient will be I with UE HEP with reported RPE via Ritesh Scale Rating of Perceived Exertion of 13+ (indicated as somewhat hard). - progressing but requires cues for increasing resistance with band exercises 8/7, Requires cues for hand exercises 8/17  Short term goal 2: Patient will report participation in homemaking (dishes, laundry, sweeping with a broom) using modified methods PRN. - PROGRESSING FOR DISHES AND SWEEPING 7/24, GOAL MET 8/7/20   Short term goal 3: Patient will report participation in once a week community outings with safe environmental precautions. - GOAL MET 7/20/20  Long term goals  Time Frame for Long term goals : 6 weeks  Long term goal 1: Patient will return to driving as medically cleared by MD. - [address] [facility] MD PRIOR TO RETURN TO DRIVING 1/39/59  Long term goal 2: Patient will report increased independence with picking up 332 to 3year old niece weighing approx. 24 lbs. - GOAL MET 7/17/20  Patient Goals   Patient goals : Roxy Pee be able to get back to being 100%\"    Patient goals: \"I think I still need to be in therapy to get my balance. I also want to be able to write better and not be as tired in your hands\". STG to be met in 2 weeks:  NEW STG 1: Patient will improve performance score in writing to at least 8/10 (from 6/10 reported on 8/21/20). NEW STG 2: Patient will be I with UE HEP for hand for increasing strength to [facility]. LTG to be met in 4 weeks:  NEW LTG 1: Patient will improve hand strength by at least 20% for improving hand participation in homemaking tasks. NEW LTG 2: Patient will report improved hand performance in homemaking to at least 9/10, currently rated at 7/10.  PROGRESSING to 8/10 on 8/28    Prognosis: [x]Good   []Fair   []Poor    Patient Requires Follow-up:  [x]Yes  []No    Plan: []Plan of care initiated     [x]Continue per plan of care (2x/week for 4 weeks)              [] Alter current plan (see comments)    []Hold pending MD visit []Discharge    Time in: 8145       Time out: 1039    Timed Code Treatment Minutes:  56    Total Treatment Minutes: 56    Electronically signed by:  SHALONDA Carter/TAWANA

## 2020-09-02 ENCOUNTER — APPOINTMENT (OUTPATIENT)
Dept: PHYSICAL THERAPY | Age: 20
End: 2020-09-02
Payer: MEDICARE

## 2020-09-02 ENCOUNTER — APPOINTMENT (OUTPATIENT)
Dept: OCCUPATIONAL THERAPY | Age: 20
End: 2020-09-02
Payer: MEDICARE

## 2020-09-04 ENCOUNTER — HOSPITAL ENCOUNTER (OUTPATIENT)
Dept: PHYSICAL THERAPY | Age: 20
Setting detail: THERAPIES SERIES
Discharge: HOME | End: 2020-09-04
Payer: MEDICARE

## 2020-09-04 PROCEDURE — 97116 GAIT TRAINING THERAPY: CPT

## 2020-09-04 PROCEDURE — 97110 THERAPEUTIC EXERCISES: CPT

## 2020-09-04 NOTE — FLOWSHEET NOTE
Physical Therapy Daily Treatment Note    Date:  2020    Patient Name:  Anjelica Morales    :  2000  MRN: [id number]  Restrictions/Precautions:    Medical/Treatment Diagnosis Information:  R53.81 (ICD-10-CM) - Other malaise  Insurance/Certification information:  PT Insurance Information: [place]   Physician Information:  Referring Practitioner: Laura Reyes   Plan of care signed (Y/N):  Y  Visit# / total visits:  10/1032/8    G-Code (if applicable):          LEFS: 4980 - initial Evaluation   LEFS: 58/80 - 2020    Medicare Cap (if applicable):   N/A= total amount used, updated 2020    Time in:  13:30   Timed Treatment:  45 min Total Treatment Time:  45 min  Time out: 14:15  ________________________________________________________________________________________    Pain Level:    0/10  SUBJECTIVE:  Pt stated that she is doing well and has not had any issues lately. Pt has been using the RW less.      OBJECTIVE:   Pt ambulated in to clinic without AD       Exercise/Equipment Resistance/Repetitions Other comments     SLR      Flexion:      abduction   x10B c 2# HEP     Supine Clamshells   SL ER with green band 5\"x20  x15B    HEP     Bridges on ball  5\"x15 HEP     LAQ  Seated marching x15 c 4#  x10B c 4# HEP     PKF c ball     PHE x10B  x15      Slant board       Heel raises     Mini Squat  3-way hip  Hamstring curls x20B  2x10B  x10B ea  x15B 2#  2#  2#  2#   Marching tap on cones  1 min     FSU x15B c 2# - 6\"     Balance:       Feet apart       Feet together       tandem  Foam Balance:      Feet apart eyes closed       Feet together eyes closed      Semi-tandem                 1/2 foam roll       Modified SLS     Balance Board      Forward/backward walking x5 laps  // bars   Lateral walking x5 laps     Ambulation w/o AD xx3 laps R 3 laps L  No break    Nu-step 5 min    Forward walking with cones x5 laps  // bars                                               Other Therapeutic Activities:      Manual Treatments:         Modalities:      Test/Measurements:           ASSESSMENT:    Therapy session focused on strengthening and ambulation. PT progressed supine strengthening. Pt demonstrated ability to ambulate 6 laps without RW without break this date. No LOB noted. PT will progress as pt can tolerate. Treatment/Activity Tolerance:   [x]Patient tolerated treatment well [] Patient limited by fatique  []Patient limited by pain [] Patient limited by other medical complications  [] Other:     Goals:        Long term goals  Time Frame for Long term goals : 5 weeks  Long term goal 1: Pt would be independent with HEP (Goal Met)  Long term goal 2: Pt will improve BLE strength to 4/5 gross strength (Progressing)  Long term goal 3: Pt will ambulate with least restricitve AD with equal step length and no sign of RLE freezing. (Goal met)   Long term goal 4: Pt will ascend/descend 12 steps with single hand rail and recirpocal pattern. (Goal Not Met)  Long term goal 5: Pt will will consistently demonstrate appropriate muscle firing pattern on BLE as measured by palpation  (Goal Met)    Plan: [x] Continue per plan of care [] Alter current plan (see comments)   [] Plan of care initiated [] Hold pending MD visit [] Discharge      Plan for Next Session:      Re-Certification Due Date:         Signature:   Kady Rueda PT

## 2020-09-09 ENCOUNTER — APPOINTMENT (OUTPATIENT)
Dept: OCCUPATIONAL THERAPY | Age: 20
End: 2020-09-09
Payer: MEDICARE

## 2020-09-09 ENCOUNTER — APPOINTMENT (OUTPATIENT)
Dept: PHYSICAL THERAPY | Age: 20
End: 2020-09-09
Payer: MEDICARE

## 2020-09-11 ENCOUNTER — HOSPITAL ENCOUNTER (OUTPATIENT)
Dept: OCCUPATIONAL THERAPY | Age: 20
Setting detail: THERAPIES SERIES
Discharge: HOME | End: 2020-09-11
Payer: MEDICARE

## 2020-09-11 ENCOUNTER — HOSPITAL ENCOUNTER (OUTPATIENT)
Dept: PHYSICAL THERAPY | Age: 20
Setting detail: THERAPIES SERIES
Discharge: HOME | End: 2020-09-11
Payer: MEDICARE

## 2020-09-11 PROCEDURE — 97110 THERAPEUTIC EXERCISES: CPT

## 2020-09-11 PROCEDURE — 97112 NEUROMUSCULAR REEDUCATION: CPT

## 2020-09-11 NOTE — FLOWSHEET NOTE
Physical Therapy Daily Treatment Note    Date:  2020    Patient Name:  Jose Silva    :  2000  MRN: [id number]  Restrictions/Precautions:    Medical/Treatment Diagnosis Information:  R53.81 (ICD-10-CM) - Other malaise  Insurance/Certification information:  PT Insurance Information: [place]   Physician Information:  Referring Practitioner: Ivet Hudosn   Plan of care signed (Y/N):  Y  Visit# / total visits:  10/10 4/8    G-Code (if applicable):          LEFS: 4980 - initial Evaluation   LEFS: 58/80 - 2020    Medicare Cap (if applicable):   N/A= total amount used, updated 2020    Time in:  12:10   Timed Treatment:  35 min Total Treatment Time:  35 min  Time out: 12:45  ________________________________________________________________________________________    Pain Level:    0/10  SUBJECTIVE:  Pt stated that she is doing fine walking around. Pt went shopping and did not use the walker.   Pt stated she thought shopping would be quicker and that is the reason she is late     OBJECTIVE:   Pt ambulated in to clinic without AD       Exercise/Equipment Resistance/Repetitions Other comments     SLR      Flexion:      abduction   x10B c 4# HEP     Supine Clamshells   SL ER with green band 5\"x20  x15B    HEP     Bridges on ball  5\"x15 HEP     LAQ  Seated marching x15 c 4#  x10B c 4# HEP     PKF c ball     PHE x10B  x15      Slant board       Heel raises     Mini Squat  3-way hip  Hamstring curls x20B  2x10B  x10B ea  x15B 2#  2#  2#  2#   Marching tap on cones  1 min     FSU x15B c 2# - 6\"     Balance:       Feet apart       Feet together       tandem  Foam Balance:      Feet apart eyes closed       Feet together eyes closed      Semi-tandem                 1/2 foam roll       Modified SLS     Balance Board      Forward/backward walking x5 laps // bars   Lateral walking x5 laps    Ambulation w/o AD No break    Nu-step    Forward walking with cones // bars

## 2020-09-11 NOTE — FLOWSHEET NOTE
Outpatient Occupational Therapy  Phone: [phone]            Fax: [phone]       ___________________________________________________________________________      Outpatient Occupational Therapy     [x] Daily Treatment Note   [] Progress Note   [] Discharge Note      Date:  9/11/2020    Patient Name:  Rosa Andres         YOB: 2000    Medical Diagnosis and ICD 10:  Diagnosis: R53.81 debility, moderate malnutrition    Treatment Diagnosis:  Performance deficits / Impairments: Decreased functional mobility , Decreased ADL status, Decreased strength, Decreased endurance, Decreased sensation, Decreased balance, Decreased posture, Decreased high-level IADLs    Onset Date:  Onset Date: 06/11/20    Referring Physician and Referral Date:  Referring Practitioner: Dr. Hayes Almaraz, 7/2/20     Visits Allowed/Insurance/Certification information:   [place] [employer], 30 visits    Restrictions/Precautions: none     Plan of care sent to provider:    []Faxed  [x]Co-signature    (attempts: 1[x] 2 []3[])        Plan of care signed:    [x]Yes date: Dr. Hayes Almaraz 8/21/20           []No         Next Progress Note due:   9/18/20    Visit# / total visits: 4/8, 11/12 previously    Plan for Next Session:  Putty, adal HEP    Home Exercise Program:     Access Code: [email or web address]   URL: [email or web address]   Date: 08/14/2020   Prepared by: Emma Westbrook     Exercises   Finger Exension with Putty - 10 reps - 3 sets - 2x daily - 7x weekly   Rolling Putty on Table - 10 reps - 3 sets - 2x daily - 7x weekly   Seated Claw Fist with Putty - 10 reps - 3 sets - 2x daily - 7x weekly   Seated Claw Fist with Putty - 10 reps - 3 sets - 2x daily - 7x weekly   Finger Abduction with Putty - 10 reps - 3 sets - 2x daily - 7x weekly   Finger Adduction with Putty - 10 reps - 3 sets - 2x daily - 7x weekly     Subjective: Patient states she has no tingling in her RUE this date.   Patient states she is now carrying laundry baskets with ambulation. Pain level: 0/10, tingling in fingers is \"basically gone\"    Objective     Hand Dominance  Hand Dominance: Right  Left Hand Strength -  (lbs)  Handle Setting 2: 36, 31, 21  Left Hand Strength - Pinch (lbs)  Lateral: 10, 10, 10  Tip: 4, 3, 3  Palmar 3 point: 7, 6, 7  Left 9-Hole Peg Test  Left 9-Hole Peg Test: Impaired 25 seconds  Right Hand Strength -  (lbs)  Handle Setting 2: 30, 29, 25  Right Hand Strength - Pinch (lbs)  Lateral: 5, 5, 6  Tip: 3, 3, 3  Palmar 3 point: 5, 5, 5  Right 9-Hole Peg Test  Right 9-Hole Peg Test: Functional: 24 seconds    Exercises: Exercises in bold performed in department today. Items not bolded are carried forward from prior visits for continuity of the record. Exercise/Equipment Resistance/Repetitions Other comments      ADL/IADL TRAINING                                                            NEUROMUSCULAR RE-EDU and THERAPEUTIC ACTIVITY     transfers Increased time and mod I with/without RW, cues for postural awareness, occasionally able to not use walker for support    Walking with shopping cart  3:47 seconds, reaching RPE of 12    Balloon batting 10:00 seconds, reaching RPE of 13    Laundry review Review of bumping laundry basket up stairs for increasing independence/safety with doing the laundry    Review of keeping walker with her and using outside door to participate in laundry tasks at the house    Demonstration of simulation of picking up items from the floor and placing into container on other side, and then moving from simulated washer to dryer on right side - 1 LOB but able to catch self with close SBA from therapist    Demonstration of \"bumping\" simulated laundry basket with 10 lbs.  Up 3 stairs with RW/handrail for occasional support and without LOB    Demonstration of using foot to push laundry basket and holding onto walker for safety and support during home-based laundry tasks                                   THERAPEUTIC putty  Finger Exension with Putty - 5x, min cues  Rolling Putty on Table - 10 reps - 3 sets   Seated Claw Fist with Putty - 5x  Finger Abduction with Putty - 5x3  Finger Adduction with Putty - 5 reps - 5 sets  Full  - 10x BUE  Three jaw - 10x RUE, cues for limiting hyperextension at PIP  Tip pinch - 10x RUE, cues for limiting hyperextension at PIP     tool - 35#, 7x, 8x, 10x less than full range, 55# 10x, 25#, 10x4, LUE 75#, 10x3  Finger extension - 5 second hold x10  Claw  with red band resistance - 15x4  Digiflex yellow - 10x3 RUE, Digiflex red - 10x3 LUE  Digiflex red - 10x3 RUE, Digiflex green - 10x3 RUE  Writing - tracing loops, performance rating of 8/10 (assist for setup and cues for posture throughout)  [place] translation - 30x          Foam ball squeeze RUE/LUE 10x each Cues to hold for 5 seconds   Velcro rollers Large roll RUE/LUE - 5x up and down  Three jaw roll RUE/LUE - 10x up and down  Finger extension - 30x   Occasional cues for form                            MODALITIES                     SPLINTING                  Functional Outcome Measure:   [x]NA    Treatment/Activity Tolerance:    Patients response to treatment:   [x]Patient tolerated treatment well []Patient limited by fatigue   []Patient limited by pain  []Patient limited by other medical complications   []Other:     Assessment:  Patient progressing in hand strength for  since she was last here 2 weeks ago. Patient has made no improvements in tip pinch, lateral pinch, or three jaw pinch strength. Patient verbalizes understanding to complete exercises with detailed attention to IP joints of all fingers, especially index and middle for maximizing strength gains. Goals  Short term goals  Time Frame for Short term goals: 3 weeks  Short term goal 1: Patient will be I with UE HEP with reported RPE via Ritesh Scale Rating of Perceived Exertion of 13+ (indicated as somewhat hard).  - progressing but requires cues for increasing

## 2020-09-16 ENCOUNTER — APPOINTMENT (OUTPATIENT)
Dept: PHYSICAL THERAPY | Age: 20
End: 2020-09-16
Payer: MEDICARE

## 2020-09-16 ENCOUNTER — APPOINTMENT (OUTPATIENT)
Dept: OCCUPATIONAL THERAPY | Age: 20
End: 2020-09-16
Payer: MEDICARE

## 2020-09-18 ENCOUNTER — HOSPITAL ENCOUNTER (OUTPATIENT)
Dept: OCCUPATIONAL THERAPY | Age: 20
Setting detail: THERAPIES SERIES
Discharge: HOME | End: 2020-09-18
Payer: MEDICARE

## 2020-09-18 ENCOUNTER — HOSPITAL ENCOUNTER (OUTPATIENT)
Dept: PHYSICAL THERAPY | Age: 20
Setting detail: THERAPIES SERIES
Discharge: HOME | End: 2020-09-18
Payer: MEDICARE

## 2020-09-18 PROCEDURE — 97110 THERAPEUTIC EXERCISES: CPT

## 2020-09-18 NOTE — FLOWSHEET NOTE
carry things better, pick them up, getting stronger in her fingers compared to where she started.      Pain level: 0/10, tingling in fingers    Objective   LUE AROM (degrees)  LUE AROM : WNL  LUE General AROM: rounded shoulders bilaterally, but able to self monitor posture and improve when cued  RUE AROM (degrees)  RUE AROM : WNL  RUE General AROM: rounded shoulders bilaterally, but able to self monitor posture and improve when cued  LUE Strength  Gross LUE Strength: Exceptions to [facility]  L Shoulder Flex: 4/5  L Shoulder Ext: 4/5  L Shoulder ABduction: 4/5  L Shoulder ADduction: 4+/5  L Shoulder Int Rotation: 4+/5  L Shoulder Ext Rotation: 4+/5  L Shoulder Horiz ABduction: 4+/5  L Shoulder Horiz ADduction: 4+/5  L Elbow Flex: 4+/5  L Elbow Ext: 4/5  L Forearm Pron: 4/5  L Forearm Sup: 4/5  L Wrist Flex: 4/5  L Wrist Ext: 4/5  L Wrist Radial Deviation: 4/5  L Wrist Ulnar Deviation: 4/5  RUE Strength  Gross RUE Strength: Exceptions to [facility]  R Shoulder Flex: 4/5  R Shoulder Ext: 5/5  R Shoulder ABduction: 4+/5  R Shoulder ADduction: 4+/5  R Shoulder Int Rotation: 4+/5  R Shoulder Ext Rotation: 4/5  R Shoulder Horiz ABduction: 4+/5  R Shoulder Horiz ADduction: 4+/5  R Elbow Flex: 4/5  R Elbow Ext: 4/5  R Forearm Pron: 4+/5  R Forearm Sup: 4+/5  R Wrist Flex: 4+/5  R Wrist Ext: 4+/5  R Wrist Radial Deviation: 5/5  R Wrist Ulnar Deviation: 5/5     Hand Dominance  Hand Dominance: Right  Left Hand Strength -  (lbs)  Handle Setting 2: 29, 24, 30 (improved but declined since 1 week ago)  Left Hand Strength - Pinch (lbs)  Lateral: 8, 8, 9  Tip: 4, 4, 3  Palmar 3 point: 6, 6, 7  Left 9-Hole Peg Test  Left 9-Hole Peg Test: Impaired(1 peg dropped)  Right Hand Strength -  (lbs)  Handle Setting 2: 34, 31, 31 (improved)  Right Hand Strength - Pinch (lbs)  Lateral: 5, 5, 5  Tip: 3, 3, 3  Palmar 3 point: 6, 6, 5  Right 9-Hole Peg Test  Right 9-Hole Peg Test: Functional  Fine Motor Skills  Left 9-Hole Peg Test: no pegs dropped  Left 9 Hole Peg Test Time (secs): 22  Right 9-Hole Peg Test: Functional  Right 9 Hole Peg Test Time (secs): 21  Hand Assessment Comment    Sensation: no numbness/discomfort reported    Exercises: Exercises in bold performed in department today. Items not bolded are carried forward from prior visits for continuity of the record. Exercise/Equipment Resistance/Repetitions Other comments      ADL/IADL TRAINING                                                            NEUROMUSCULAR RE-EDU and THERAPEUTIC ACTIVITY     transfers Increased time and mod I with/without RW, cues for postural awareness, occasionally able to not use walker for support    Walking with shopping cart  3:47 seconds, reaching RPE of 12    Balloon batting 10:00 seconds, reaching RPE of 13    Laundry review Review of bumping laundry basket up stairs for increasing independence/safety with doing the laundry    Review of keeping walker with her and using outside door to participate in laundry tasks at the house    Demonstration of simulation of picking up items from the floor and placing into container on other side, and then moving from simulated washer to dryer on right side - 1 LOB but able to catch self with close SBA from therapist    Demonstration of \"bumping\" simulated laundry basket with 10 lbs.  Up 3 stairs with RW/handrail for occasional support and without LOB    Demonstration of using foot to push laundry basket and holding onto walker for safety and support during home-based laundry tasks                                   THERAPEUTIC EXERCISE and MANUAL TECHNIQUES    Theraband Shoulder scaption above 90 BUE seated - With medium resistance - 27x3 - mod cues for posture, form  At 90 shoulder horizontal abduction BUE at 90 With medium resistance - 20x3 - min cues  RUE/LUE shoulder flexion - 20x 75% full range medium resistance seated, cues for posture  Shoulder extension standing RUE/LUE - 20x2  Scapular retraction standing - 20x2 min cues for for hand exercises 8/17  Short term goal 2: Patient will report participation in homemaking (dishes, laundry, sweeping with a broom) using modified methods PRN. - PROGRESSING FOR DISHES AND SWEEPING 7/24, GOAL MET 8/7/20   Short term goal 3: Patient will report participation in once a week community outings with safe environmental precautions. - GOAL MET 7/20/20  Long term goals  Time Frame for Long term goals : 6 weeks  Long term goal 1: Patient will return to driving as medically cleared by MD. - [address] [facility] MD PRIOR TO RETURN TO DRIVING 7/96/44  Long term goal 2: Patient will report increased independence with picking up 332 to 3year old niece weighing approx. 24 lbs. - GOAL MET 7/17/20  Patient Goals   Patient goals : Arthea Fling be able to get back to being 100%\"    Patient goals: \"I think I still need to be in therapy to get my balance. I also want to be able to write better and not be as tired in your hands\". STG to be met in 2 weeks:  NEW STG 1: Patient will improve performance score in writing to at least 8/10 (from 6/10 reported on 8/21/20). - GOAL MET 8/28/20  NEW STG 2: Patient will be I with UE HEP for hand for increasing strength to [facility]. - REQUIRES CUES 8/28/20, CONTINUE GOAL UNTIL 9/18/20    LTG to be met in 4 weeks:  NEW LTG 1: Patient will improve hand strength by at least 20% for improving hand participation in homemaking tasks. GOAL MET for RUE, ADEQUATE 9/18  NEW LTG 2: Patient will report improved hand performance in homemaking to at least 9/10, currently rated at 7/10. PROGRESSING to 8/10 on 8/28. PROGRESSING to 8.5/10 on 9/18    NEW LTG: To be met in 4 weeks:  Patient will demonstrate ability to maintain and/or improve strength for  and pinch BUE for maximizing endurance for return to homemaking tasks and future employment goals.     Prognosis: [x]Good   []Fair   []Poor    Patient Requires Follow-up:  [x]Yes  []No    Plan: []Plan of care initiated     []Continue per plan of care (2x/week for 4 weeks)              [x] Alter current plan (additional visit next week for review of HEP then in 3 weeks to ensure patient is able to maintain independence with HEP    []Hold pending MD visit []Discharge    Time in: 1030       Time out: 1115    Timed Code Treatment Minutes:  45    Total Treatment Minutes: 48    Electronically signed by:  SHALONDA Sims/TAWANA

## 2020-09-18 NOTE — PROGRESS NOTES
_________________________________________________________________    Outpatient Occupational Therapy  Phone: [phone] Fax: [phone]     Elsy Robles  Dear Dr. Rojelio Queen,    The following patient has been assessed for therapy services. Please review the attached summary of the patient's plan of care, and verify that you agree with plan for additional therapy services at this time. Plan of Care/Treatment to date:  [x] Therapeutic Exercise  [x]  Modalities:  [x] Therapeutic Activity   [] Ultrasound [] Electrical Stimulation   [] Total Motion Release   [] Fluidotherapy [] Kinesiotaping  [x]  Neuromuscular Re-education  [] Iontophoresis [] Coldpack/hotpack   [x]  Instruction in HEP    Other:  [x]  Manual Therapy     []   Dry needling             [x] ADL/Self Care  [x] IADL Training  [] LSVT BIG  [] Saebo  [] Splinting  [] Wheelchair Mobility                       Frequency/Duration:  # Days per week: [x] 1 visit next week, then return 3 weeks from then # Weeks: [] 1 week [] 5 weeks      [] 2 days   [] 2 weeks [] 6 weeks     [] 3 days   [] 3 weeks [] 7 weeks     [] 4 days   [] 4 weeks [] 8 weeks     [] 5 days   [] 10 weeks [] 12 weeks    Rehab Potential: [] Excellent [] Good [x] Fair  [] Poor     Thank you for the referral of this patient. Please sign.      Physician signature_______________________ Date________________  By signing above, therapists plan is approved by physician     Fax to: [facility] [phone]     Electronically signed by:  Donta Newton         Outpatient Occupational Therapy     [] Daily Treatment Note   [x] Progress Note   [] Discharge Note      Date:  9/18/2020    Patient Name:  Danae Anand         YOB: 2000    Medical Diagnosis and ICD 10:  Diagnosis: R53.81 debility, moderate malnutrition    Treatment Diagnosis:  Performance deficits / Impairments: Decreased functional mobility , Decreased ADL status, Decreased strength, Decreased endurance, Decreased sensation, Decreased balance, Decreased posture, Decreased high-level IADLs    Onset Date:  Onset Date: 06/11/20    Referring Physician and Referral Date:  Referring Practitioner: Dr. Linford Cooks, 7/2/20     Visits Allowed/Insurance/Certification information:   [place] [employer], 30 visits    Restrictions/Precautions: none     Plan of care sent to provider:    []Faxed  []Co-signature    (attempts: 1[x] 2 []3[])        Plan of care signed:    [x]Yes date: Dr. Linford Cooks 8/21/20           []No         Next Progress Note due:   9/18/20    Visit# / total visits: 5/8, 11/12 previously    Plan for Next Session:  Putty, review HEP    Home Exercise Program:     Access Code: [email or web address]   URL: [email or web address]   Date: 08/14/2020   Prepared by: Makenzie Shannon     Exercises   Finger Exension with Putty - 10 reps - 3 sets - 2x daily - 7x weekly   Rolling Putty on Table - 10 reps - 3 sets - 2x daily - 7x weekly   Seated Claw Fist with Putty - 10 reps - 3 sets - 2x daily - 7x weekly   Seated Claw Fist with Putty - 10 reps - 3 sets - 2x daily - 7x weekly   Finger Abduction with Putty - 10 reps - 3 sets - 2x daily - 7x weekly   Finger Adduction with Putty - 10 reps - 3 sets - 2x daily - 7x weekly     Subjective: Patient states that she is not having tingling RUE. Patient states she feels like she is able to do carry things better, pick them up, getting stronger in her fingers compared to where she started.      Pain level: 0/10, tingling in fingers    Objective   LUE AROM (degrees)  LUE AROM : WNL  LUE General AROM: rounded shoulders bilaterally, but able to self monitor posture and improve when cued  RUE AROM (degrees)  RUE AROM : WNL  RUE General AROM: rounded shoulders bilaterally, but able to self monitor posture and improve when cued  LUE Strength  Gross LUE Strength: Exceptions to [facility]  L Shoulder Flex: 4/5  L Shoulder Ext: 4/5  L Shoulder ABduction: 4/5  L Shoulder ADduction: 4+/5  L Shoulder Int Rotation: 4+/5  L Shoulder Ext Rotation: 4+/5  L Shoulder Horiz ABduction: 4+/5  L Shoulder Horiz ADduction: 4+/5  L Elbow Flex: 4+/5  L Elbow Ext: 4/5  L Forearm Pron: 4/5  L Forearm Sup: 4/5  L Wrist Flex: 4/5  L Wrist Ext: 4/5  L Wrist Radial Deviation: 4/5  L Wrist Ulnar Deviation: 4/5  RUE Strength  Gross RUE Strength: Exceptions to [facility]  R Shoulder Flex: 4/5  R Shoulder Ext: 5/5  R Shoulder ABduction: 4+/5  R Shoulder ADduction: 4+/5  R Shoulder Int Rotation: 4+/5  R Shoulder Ext Rotation: 4/5  R Shoulder Horiz ABduction: 4+/5  R Shoulder Horiz ADduction: 4+/5  R Elbow Flex: 4/5  R Elbow Ext: 4/5  R Forearm Pron: 4+/5  R Forearm Sup: 4+/5  R Wrist Flex: 4+/5  R Wrist Ext: 4+/5  R Wrist Radial Deviation: 5/5  R Wrist Ulnar Deviation: 5/5     Hand Dominance  Hand Dominance: Right  Left Hand Strength -  (lbs)  Handle Setting 2: 29, 24, 30 (improved but declined since 1 week ago)  Left Hand Strength - Pinch (lbs)  Lateral: 8, 8, 9  Tip: 4, 4, 3  Palmar 3 point: 6, 6, 7  Left 9-Hole Peg Test  Left 9-Hole Peg Test: Impaired(1 peg dropped)  Right Hand Strength -  (lbs)  Handle Setting 2: 34, 31, 31 (improved)  Right Hand Strength - Pinch (lbs)  Lateral: 5, 5, 5  Tip: 3, 3, 3  Palmar 3 point: 6, 6, 5  Right 9-Hole Peg Test  Right 9-Hole Peg Test: Functional  Fine Motor Skills  Left 9-Hole Peg Test: no pegs dropped  Left 9 Hole Peg Test Time (secs): 22  Right 9-Hole Peg Test: Functional  Right 9 Hole Peg Test Time (secs): 21  Hand Assessment Comment    Sensation: no numbness/discomfort reported    Functional Outcome Measure:   [x]NA    Treatment/Activity Tolerance:    Patients response to treatment:   [x]Patient tolerated treatment well []Patient limited by fatigue   []Patient limited by pain  []Patient limited by other medical complications   []Other:     Assessment:  Patient demonstrates reduced hand strength since 2 weeks ago, but improved hand strength for  RUE and for three jaw pinch RUE since 4 weeks ago. She requires increased education regarding the importance of daily HEP given the significance of her injury as her strength measurements are significantly low for her age. 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     Goals  Short term goals  Time Frame for Short term goals: 3 weeks  Short term goal 1: Patient will be I with UE HEP with reported RPE via Ritesh Scale Rating of Perceived Exertion of 13+ (indicated as somewhat hard). - progressing but requires cues for increasing resistance with band exercises 8/7, Requires cues for hand exercises 8/17  Short term goal 2: Patient will report participation in homemaking (dishes, laundry, sweeping with a broom) using modified methods PRN. - PROGRESSING FOR DISHES AND SWEEPING 7/24, GOAL MET 8/7/20   Short term goal 3: Patient will report participation in once a week community outings with safe environmental precautions. - GOAL MET 7/20/20  Long term goals  Time Frame for Long term goals : 6 weeks  Long term goal 1: Patient will return to driving as medically cleared by MD. - [address] [facility] MD PRIOR TO RETURN TO DRIVING 9/18/96  Long term goal 2: Patient will report increased independence with picking up 332 to 3year old niece weighing approx. 24 lbs. - GOAL MET 7/17/20  Patient Goals   Patient goals : Carolina Donahuey be able to get back to being 100%\"    Patient goals: \"I think I still need to be in therapy to get my balance. I also want to be able to write better and not be as tired in your hands\".     STG to be met in 2 weeks:  NEW STG 1: Patient will improve performance score in writing to at least 8/10 (from 6/10 reported on 8/21/20). - GOAL MET 8/28/20  NEW STG 2: Patient will be I with UE HEP for hand for increasing strength to [facility]. - REQUIRES CUES 8/28/20, CONTINUE GOAL UNTIL 9/18/20    LTG to be met in 4 weeks:  NEW LTG 1: Patient will improve hand strength by at least 20% for improving hand participation in homemaking tasks. GOAL MET for RUE, ADEQUATE 9/18  NEW LTG 2: Patient will report improved hand performance in homemaking to at least 9/10, currently rated at 7/10. PROGRESSING to 8/10 on 8/28. PROGRESSING to 8.5/10 on 9/18    NEW LTG: To be met in 4 weeks:  Patient will demonstrate ability to maintain and/or improve strength for  and pinch BUE for maximizing endurance for return to homemaking tasks and future employment goals.     Prognosis: [x]Good   []Fair   []Poor    Patient Requires Follow-up:  [x]Yes  []No    Plan: []Plan of care initiated     []Continue per plan of care (2x/week for 4 weeks)              [x] Alter current plan (additional visit next week for review of HEP then in 3 weeks to ensure patient is able to maintain independence with HEP    []Hold pending MD visit []Discharge    Time in: 1030       Time out: 1115    Timed Code Treatment Minutes:  45    Total Treatment Minutes: 48    Electronically signed by:  SHALONDA Montoya/TAWANA

## 2020-09-18 NOTE — FLOWSHEET NOTE
Physical Therapy Daily Treatment Note    Date:  2020    Patient Name:  Caridad Jackson    :  2000  MRN: [id number]  Restrictions/Precautions:    Medical/Treatment Diagnosis Information:  R53.81 (ICD-10-CM) - Other malaise  Insurance/Certification information:  PT Insurance Information: [employer]   Physician Information:  Referring Practitioner: Imelda Flores   Plan of care signed (Y/N):  Y  Visit# / total visits:  10/10 4/8    G-Code (if applicable):          LEFS: 4980 - initial Evaluation   LEFS: 5880 - 2020    Medicare Cap (if applicable):   N/A= total amount used, updated 2020    Time in:  11:15   Timed Treatment:  45 min Total Treatment Time:  45 min  Time out: 12:00  ________________________________________________________________________________________    Pain Level:    0/10  SUBJECTIVE:  Pt stated that she continues to feel pretty good and think she is close to how she was functioning prior to therapy.      OBJECTIVE:   Pt ambulated in to clinic without AD       Exercise/Equipment Resistance/Repetitions Other comments     SLR      Flexion:      abduction   x15B c 4# HEP     Supine Clamshells   SL ER with green band 5\"x20  x15B    HEP     Bridges on ball  5\"x15 HEP     LAQ  Seated marching x20 c 4#  x15B c 4# HEP     PKF c ball     PHE x15B  x15      Toe taps on 4 inch x10B c 3#      Heel raises     Mini Squat  3-way hip  Hamstring curls x20B  2x10B  x10B ea  x15B 3#  3#  3#  3#   Marching tap on cones      Forward     Forward and across   1 min   1 min    FSU  LSD x15B c 3# - 6\"  x10B     Balance:       Feet apart       Feet together       tandem  Foam Balance:      Feet apart eyes closed       Feet together eyes closed      Semi-tandem                 1/2 foam roll       Modified SLS     Balance Board      Forward/backward walking x5 laps // bars   Lateral walking c band  x5 laps    Ambulation w/o AD No break    Nu-step 5 min   Forward walking with cones // bars Hip abduction machine x15B c 15#                                         Other Therapeutic Activities:      Manual Treatments:         Modalities:      Test/Measurements:           ASSESSMENT:    Therapy session focused on strengthening and ambulation. PT will progress as pt can tolerate. Treatment/Activity Tolerance:   [x]Patient tolerated treatment well [] Patient limited by fatique  []Patient limited by pain [] Patient limited by other medical complications  [] Other:     Goals:        Long term goals  Time Frame for Long term goals : 5 weeks  Long term goal 1: Pt would be independent with HEP (Goal Met)  Long term goal 2: Pt will improve BLE strength to 4/5 gross strength (Progressing)  Long term goal 3: Pt will ambulate with least restricitve AD with equal step length and no sign of RLE freezing. (Goal met)   Long term goal 4: Pt will ascend/descend 12 steps with single hand rail and recirpocal pattern. (Goal Not Met)  Long term goal 5: Pt will will consistently demonstrate appropriate muscle firing pattern on BLE as measured by palpation  (Goal Met)    Plan: [x] Continue per plan of care [] Alter current plan (see comments)   [] Plan of care initiated [] Hold pending MD visit [] Discharge      Plan for Next Session:      Re-Certification Due Date:         Signature:   Jose Banks PT

## 2020-09-18 NOTE — PROGRESS NOTES
Chief Complaint   Patient presents with   [facility].     will do tdap and flu. would like a referral to [facility]       HPI:  Leslie Solorzano is a 23 y.o. (: 2000) here today to establish care. Main concern is starting to drive again. She needs to be assessed in order to determine her capabilities. Did not provide any paperwork. Will refer her to [employer] or [facility] for driving test.    States that she has numbness in her toes and right hand. Difficult to discern any type of chronologic past medical history from this patient. Was very pleasant and polite however. Per chart review, she stopped eating and walking at home. Her sister will try to pick her up but she kept falling. 2020 CT head normal.    Per chart review of physical therapy notes, patient has recent significant past medical history of depression and history of suicidal ideations, anemia, transaminitis, hypothyroidism, right atrial mass, malnutrition and moderate debility. Review of Systems   Constitutional: Negative for activity change, appetite change, chills, fatigue, fever and unexpected weight change. HENT: Negative for congestion, postnasal drip, rhinorrhea, sinus pressure, sinus pain, sneezing and sore throat. Eyes: Negative for visual disturbance. Respiratory: Negative for cough, chest tightness, shortness of breath and wheezing. Cardiovascular: Negative for chest pain and palpitations. Gastrointestinal: Negative for abdominal pain, blood in stool, constipation, diarrhea, nausea and vomiting. Endocrine: Negative for cold intolerance, heat intolerance, polydipsia and polyuria. Genitourinary: Negative for dysuria, frequency, vaginal bleeding and vaginal discharge. Musculoskeletal: Negative for arthralgias, back pain, joint swelling, myalgias and neck pain. Skin: Negative for rash and wound. Allergic/Immunologic: Negative for environmental allergies. Neurological: Positive for numbness.  Negative for dizziness, tremors, syncope, weakness, light-headedness and headaches. Hematological: Negative for adenopathy. Psychiatric/Behavioral: Negative for behavioral problems, decreased concentration, sleep disturbance and suicidal ideas. The patient is not nervous/anxious. No past medical history on file. Family History   Problem Relation Age of Onset    High Blood Pressure Father     High Cholesterol Father     Diabetes Sister        Social History     Tobacco Use    Smoking status: Never Smoker    Smokeless tobacco: Never Used   Substance Use Topics    Alcohol use: No    Drug use: Never       New Prescriptions    No medications on file       Meds Prior to visit:  Current Outpatient Medications on File Prior to Visit   Medication Sig Dispense Refill    metoprolol tartrate (LOPRESSOR) 25 MG tablet Take 0.25 tablets by mouth 2 times daily 60 tablet 3    folic acid (FOLVITE) 1 MG tablet Take 1 tablet by mouth daily 30 tablet 3    levothyroxine (SYNTHROID) 100 MCG tablet Take 1 tablet by mouth Daily 30 tablet 3    dicyclomine (BENTYL) 10 MG capsule Take 1 capsule by mouth 2 times daily (before meals) 120 capsule 3    pantoprazole (PROTONIX) 40 MG tablet Take 1 tablet by mouth every morning (before breakfast) 30 tablet 3    vitamin D (ERGOCALCIFEROL) 1.25 MG (09983 UT) CAPS capsule Take 1 capsule by mouth once a week 5 capsule 1    magnesium oxide (MAG-OX) 400 (241.3 Mg) MG TABS tablet Take 1 tablet by mouth daily 30 tablet 3    PARoxetine (PAXIL) 20 MG tablet Take 1 tablet by mouth nightly 30 tablet 3     No current facility-administered medications on file prior to visit.       Allergies   Allergen Reactions    Lactose Intolerance (Gi)        OBJECTIVE:  BP 98/68   Pulse 87   Temp 98.6 °F (37 °C)   Resp 17   Ht 5' 5\" (1.651 m)   Wt 196 lb (88.9 kg)   LMP 09/15/2020   SpO2 98%   BMI 32.62 kg/m²   BP Readings from Last 2 Encounters:   09/21/20 98/68   07/10/20 103/80     Wt Readings from Last 3 Encounters:   09/21/20 196 lb (88.9 kg)   06/28/20 153 lb 14.4 oz (69.8 kg)   06/22/20 155 lb (70.3 kg)       Physical Exam  Vitals signs reviewed. Constitutional:       General: She is not in acute distress. Appearance: She is well-developed. HENT:      Head: Normocephalic and atraumatic. Right Ear: Tympanic membrane, ear canal and external ear normal. There is no impacted cerumen. Left Ear: Tympanic membrane, ear canal and external ear normal. There is no impacted cerumen. Mouth/Throat:      Mouth: Mucous membranes are moist.      Pharynx: Oropharynx is clear. No oropharyngeal exudate or posterior oropharyngeal erythema. Eyes:      General: No scleral icterus. Right eye: No discharge. Left eye: No discharge. Conjunctiva/sclera: Conjunctivae normal.      Pupils: Pupils are equal, round, and reactive to light. Neck:      Musculoskeletal: Normal range of motion and neck supple. Cardiovascular:      Rate and Rhythm: Normal rate and regular rhythm. Heart sounds: Normal heart sounds. No murmur. Comments: Radial and pedal pulses intact  Pulmonary:      Effort: Pulmonary effort is normal. No respiratory distress. Breath sounds: Normal breath sounds. No wheezing or rales. Chest:      Chest wall: No tenderness. Abdominal:      General: Bowel sounds are normal.      Palpations: Abdomen is soft. Tenderness: There is no abdominal tenderness. There is no guarding. Comments: Normal liver and spleen. No organomegaly   Musculoskeletal: Normal range of motion. General: No tenderness. Comments: Intact in all extremities   Lymphadenopathy:      Cervical: No cervical adenopathy. Skin:     General: Skin is warm. Findings: No erythema or rash. Neurological:      General: No focal deficit present. Mental Status: She is alert and oriented to person, place, and time. Mental status is at baseline.       Sensory: Sensory deficit present. Motor: No weakness or abnormal muscle tone. Coordination: Coordination normal.      Gait: Gait normal.      Deep Tendon Reflexes: Reflexes normal.      Comments: Toes and right hand numbness and tingling   Psychiatric:         Mood and Affect: Mood normal.         Behavior: Behavior normal.         Thought Content: Thought content normal.         Judgment: Judgment normal.         Lab Results   Component Value Date    WBC 6.3 07/03/2020    HGB 10.3 (L) 07/03/2020    HCT 31.7 (L) 07/03/2020    .1 (H) 07/03/2020     07/03/2020     Lab Results   Component Value Date     07/03/2020    K 3.7 07/03/2020     07/03/2020    CO2 26 07/03/2020    BUN 6 (L) 07/03/2020    CREATININE <0.5 (L) 07/03/2020    GLUCOSE 81 07/03/2020    CALCIUM 9.1 07/03/2020    PROT 4.9 (L) 06/23/2020    LABALBU 2.7 (L) 06/23/2020    BILITOT 0.4 06/23/2020    ALKPHOS 101 06/23/2020    AST 36 06/23/2020    ALT 46 (H) 06/23/2020    LABGLOM >60 07/03/2020    GFRAA >60 07/03/2020    AGRATIO 1.2 06/23/2020    GLOB 2.2 06/23/2020     Lab Results   Component Value Date    CHOL 120 06/19/2020    CHOL 136 06/11/2020     Lab Results   Component Value Date    TRIG 79 06/19/2020    TRIG 204 (H) 06/11/2020     Lab Results   Component Value Date    HDL 27 (L) 06/19/2020    HDL 21 (L) 06/11/2020     Lab Results   Component Value Date    LDLCALC 77 06/19/2020    LDLCALC 74 06/11/2020     Lab Results   Component Value Date    LABVLDL 16 06/19/2020    LABVLDL 41 06/11/2020     Lab Results   Component Value Date    LABA1C 5.2 06/19/2020         ASSESSMENT/PLAN:  1. Encounter to establish care  VS reviewed and WNL    BMI reviewed   All questions answered. F/u discussed. Healthy lifestyle modifications discussed. 2. Cervical cancer screening  Referral placed  - Ambulatory referral to Gynecology    3.  Immunization due  Administered  - Tdap (age 6y and older) IM (BOOSTRIX)  - INFLUENZA, QUADV, 3 YRS AND OLDER, IM, MDV, 0.5ML (AFLURIA QUADV)    4. Paresthesias  Provided paperwork on [facility] driving evaluation   Continue PT/OT        Discussed use, benefit, and side effects of prescribed medications. Barriers to medication compliance addressed. All patient questions answered. Pt voiced understanding. RTC Return in about 3 months (around 12/21/2020) for follow up.     Future Appointments   Date Time Provider Mar Christianson   9/25/2020  9:00 AM CATRINA Goodwin PT Minerva Allen   9/25/2020  9:45 AM Ezio Rai MD  9/21/2020  7:16 PM

## 2020-09-21 ENCOUNTER — OFFICE VISIT (OUTPATIENT)
Dept: PRIMARY CARE CLINIC | Age: 20
End: 2020-09-21
Payer: MEDICARE

## 2020-09-21 VITALS
RESPIRATION RATE: 17 BRPM | OXYGEN SATURATION: 98 % | HEIGHT: 65 IN | DIASTOLIC BLOOD PRESSURE: 68 MMHG | TEMPERATURE: 98.6 F | SYSTOLIC BLOOD PRESSURE: 98 MMHG | HEART RATE: 87 BPM | BODY MASS INDEX: 32.65 KG/M2 | WEIGHT: 196 LBS

## 2020-09-21 PROCEDURE — 90715 TDAP VACCINE 7 YRS/> IM: CPT | Performed by: FAMILY MEDICINE

## 2020-09-21 PROCEDURE — 1036F TOBACCO NON-USER: CPT | Performed by: FAMILY MEDICINE

## 2020-09-21 PROCEDURE — 90472 IMMUNIZATION ADMIN EACH ADD: CPT | Performed by: FAMILY MEDICINE

## 2020-09-21 PROCEDURE — G8417 CALC BMI ABV UP PARAM F/U: HCPCS | Performed by: FAMILY MEDICINE

## 2020-09-21 PROCEDURE — 99203 OFFICE O/P NEW LOW 30 MIN: CPT | Performed by: FAMILY MEDICINE

## 2020-09-21 PROCEDURE — 90688 IIV4 VACCINE SPLT 0.5 ML IM: CPT | Performed by: FAMILY MEDICINE

## 2020-09-21 PROCEDURE — G8427 DOCREV CUR MEDS BY ELIG CLIN: HCPCS | Performed by: FAMILY MEDICINE

## 2020-09-21 PROCEDURE — 90471 IMMUNIZATION ADMIN: CPT | Performed by: FAMILY MEDICINE

## 2020-09-21 ASSESSMENT — ENCOUNTER SYMPTOMS
BACK PAIN: 0
VOMITING: 0
CHEST TIGHTNESS: 0
COUGH: 0
BLOOD IN STOOL: 0
CONSTIPATION: 0
SINUS PRESSURE: 0
SHORTNESS OF BREATH: 0
DIARRHEA: 0
WHEEZING: 0
SORE THROAT: 0
ABDOMINAL PAIN: 0
NAUSEA: 0
RHINORRHEA: 0
SINUS PAIN: 0

## 2020-09-23 ENCOUNTER — APPOINTMENT (OUTPATIENT)
Dept: OCCUPATIONAL THERAPY | Age: 20
End: 2020-09-23
Payer: MEDICARE

## 2020-09-23 ENCOUNTER — APPOINTMENT (OUTPATIENT)
Dept: PHYSICAL THERAPY | Age: 20
End: 2020-09-23
Payer: MEDICARE

## 2020-09-25 ENCOUNTER — HOSPITAL ENCOUNTER (OUTPATIENT)
Dept: OCCUPATIONAL THERAPY | Age: 20
Setting detail: THERAPIES SERIES
Discharge: HOME | End: 2020-09-25
Payer: MEDICARE

## 2020-09-25 ENCOUNTER — HOSPITAL ENCOUNTER (OUTPATIENT)
Dept: PHYSICAL THERAPY | Age: 20
Setting detail: THERAPIES SERIES
Discharge: HOME | End: 2020-09-25
Payer: MEDICARE

## 2020-09-25 PROCEDURE — 97110 THERAPEUTIC EXERCISES: CPT

## 2020-09-25 NOTE — FLOWSHEET NOTE
Outpatient Occupational Therapy  Phone: [phone]            Fax: [phone]       ___________________________________________________________________________      Outpatient Occupational Therapy     [x] Daily Treatment Note   [] Progress Note   [] Discharge Note      Date:  9/25/2020    Patient Name:  Hailey Covington         YOB: 2000    Medical Diagnosis and ICD 10:  Diagnosis: R53.81 debility, moderate malnutrition    Treatment Diagnosis:  Performance deficits / Impairments: Decreased functional mobility , Decreased ADL status, Decreased strength, Decreased endurance, Decreased sensation, Decreased balance, Decreased posture, Decreased high-level IADLs    Onset Date:  Onset Date: 06/11/20    Referring Physician and Referral Date:  Referring Practitioner: Dr. Keshia Summers, 7/2/20     Visits Allowed/Insurance/Certification information:   [employer], 30 visits    Restrictions/Precautions: none     Plan of care sent to provider:    []Faxed  []Co-signature    (attempts: 1[x] 2 []3[])        Plan of care signed:    [x]Yes date: Dr. Keshia Summers 9/18/20           []No         Next Progress Note due:  10/16/20    Visit# / total visits: 1/3, 5/8, 11/12 previously    Plan for Next Session:  Putty, review HEP    Home Exercise Program:     Access Code: [email or web address]   URL: [email or web address]   Date: 09/25/2020   Prepared by: Ahmet Concepcion     Exercises   Finger Exension with Putty - 10 reps - 3 sets - 2x daily - 7x weekly   Seated Claw Fist with Putty - 10 reps - 3 sets - 2x daily - 7x weekly   Finger Abduction with Putty - 10 reps - 3 sets - 2x daily - 7x weekly   Finger Adduction with Putty - 10 reps - 3 sets - 2x daily - 7x weekly   Putty Squeezes - 10 reps - 3 sets - 2-3x daily - 7x weekly   Hook Fist with Putty - 10 reps - 3 sets - 2-3x daily - 7x weekly   Thumb Strengthening Stabilization CMC - 10 reps - 3 sets - 2-3x daily - 7x weekly   Thumb AROM MP Blocking - 10 reps - 3 sets - 2-3x daily - 7x weekly   Seated Thumb MP Extension AROM with Blocking - 10 reps - 3 sets - 2-3x daily - 7x weekly     Subjective: Patient states that she is continuing with her HEP with hands. Patient also states that she is in agreement with POC. Patient reports that she spoke to her MD about return to driving and they are recommending a driving evaluation at [facility]. Pain level: 0/10, tingling in fingers    Objective   RUE strength:  Finger ab/adduction: 3+/5  Finger extension: 3+/5, improved to 4/5 after exercise   strength: 31, 34, 29 (#)  Lateral pinch: 5#, 5#, 6#    LUE strength  Finger ab/adduction: 3+/5  Finger extension: 3+/5, improved to 4/5 after exercise   strength: 31, 30, 24(#)  Lateral pinch: 9#, 10#, 10#    Exercises: Exercises in bold performed in department today. Items not bolded are carried forward from prior visits for continuity of the record. Exercise/Equipment Resistance/Repetitions Other comments      ADL/IADL TRAINING                                                            NEUROMUSCULAR RE-EDU and THERAPEUTIC ACTIVITY     transfers Increased time and mod I with/without RW, cues for postural awareness, occasionally able to not use walker for support    Walking with shopping cart  3:47 seconds, reaching RPE of 12    Balloon batting 10:00 seconds, reaching RPE of 13    Laundry review Review of bumping laundry basket up stairs for increasing independence/safety with doing the laundry    Review of keeping walker with her and using outside door to participate in laundry tasks at the house    Demonstration of simulation of picking up items from the floor and placing into container on other side, and then moving from simulated washer to dryer on right side - 1 LOB but able to catch self with close SBA from therapist    Demonstration of \"bumping\" simulated laundry basket with 10 lbs.  Up 3 stairs with RW/handrail for occasional support and without LOB    Demonstration of using foot to push laundry basket and holding onto walker for safety and support during home-based laundry tasks                                   THERAPEUTIC EXERCISE and MANUAL TECHNIQUES    Theraband Shoulder scaption above 90 BUE seated - With medium resistance - 27x3 - mod cues for posture, form  At 90 shoulder horizontal abduction BUE at 90 With medium resistance - 20x3 - min cues  RUE/LUE shoulder flexion - 20x 75% full range medium resistance seated, cues for posture  Shoulder extension standing RUE/LUE - 20x2  Scapular retraction standing - 20x2 min cues for posture  Bicep curls with medium resistance - 30x3  Tricep extension with medium resistance - 20x3     Patient verbalizes understanding to increase hold on band with HEP to increase RPE for maximum progress    Patient verbalizes understanding to increase postural awareness with HEP for maximizing strength gains and joint protection    Increased time needed for exercise training   Core strengthening Planks with bent elbows - 5x, three second hold  Kneeling with ball toss - 20x to right, 20x to left two hANDED      Posterior shoulder strengthening Prone - 4# weighted ball elbow flexion 30x2  Prone - Scapular retraction with 2# BUE - 30x    TRX pull backs - max cues 10x  TRX chest press - max cues 10x    Thera putty strengthening Gripping, pinching, bean  - review from rehab, no cues needed, patient verbalizes understanding to complete at home 3x/day    Shoulder arc 20-30x RUE (skilled cues for posture)    Foam roll 20x shoulder horizontal ab/adduction  20x scapular pro/retraction  20x shoulder flexion/extension  20x shoulder ab/adduction    Hand strengthening and Coordination Green  stick - frowns/smiles/twists 10x2 - less than full range, seated, cues for posture, coordinated breathing, and pushing into full range  Digiflex yellow/green - standing and alternating between hands 10x3  In tall kneel  Clothespins RUE up -  green/blue climb up/down the pole 30x palmar , three jaw and lateral pinch alternating, 15x red/yellow up/down pole with RUE alternating 30x  Clothespins LUE up - green/blue climb up/down with LUE 30x three jaw    Exercises orange or yellow putty depending on form, completed each exercise 5x each  Finger Exension with Putty - 10 reps - 3 sets - 2x daily - 7x weekly   Seated Claw Fist with Putty - 10 reps - 3 sets - 2x daily - 7x weekly   Finger Abduction with Putty - 10 reps - 3 sets - 2x daily - 7x weekly   Finger Adduction with Putty - 10 reps - 3 sets - 2x daily - 7x weekly   Putty Squeezes - 10 reps - 3 sets - 2-3x daily - 7x weekly   Hook Fist with Putty - 10 reps - 3 sets - 2-3x daily - 7x weekly   Thumb Strengthening Stabilization CMC - 10 reps - 3 sets - 2-3x daily - 7x weekly   Thumb AROM MP Blocking - 10 reps - 3 sets - 2-3x daily - 7x weekly   Seated Thumb MP Extension AROM with Blocking - 10 reps - 3 sets - 2-3x daily - 7x weekly      tool - 35#, 7x, 8x, 10x less than full range, 55# 10x, 25#, 10x4, LUE 75#, 10x3  Finger extension - 5 second hold x10  Claw  with red band resistance - 15x4  Digiflex yellow - 10x3 RUE, Digiflex red - 10x3 LUE  Digiflex red - 10x3 RUE, Digiflex green - 10x3 RUE  Writing - tracing loops, performance rating of 8/10 (assist for setup and cues for posture throughout)  [place] translation - 30x          Foam ball squeeze RUE/LUE 10x each Cues to hold for 5 seconds   Velcro rollers Large roll RUE/LUE - 5x up and down  Three jaw roll RUE/LUE - 10x up and down  Finger extension - 30x   Occasional cues for form                            MODALITIES                     SPLINTING                  Functional Outcome Measure:   [x]NA    Treatment/Activity Tolerance:    Patients response to treatment:   [x]Patient tolerated treatment well []Patient limited by fatigue   []Patient limited by pain  []Patient limited by other medical complications   []Other:     Assessment:  Patient verbalizes understanding of HEP, requires multiple cues this date for accuracy with form with HEP. Patient verbalizes understanding to attend to form but has had difficulty over the past 4 weeks adhering to details of form for HEP. Her schedule and availability has been limited by transportation. She verbalizes understanding to continue with HEP    Goals  Short term goals  Time Frame for Short term goals: 3 weeks  Short term goal 1: Patient will be I with UE HEP with reported RPE via Ritesh Scale Rating of Perceived Exertion of 13+ (indicated as somewhat hard). - progressing but requires cues for increasing resistance with band exercises 8/7, Requires cues for hand exercises 8/17  Short term goal 2: Patient will report participation in homemaking (dishes, laundry, sweeping with a broom) using modified methods PRN. - PROGRESSING FOR DISHES AND SWEEPING 7/24, GOAL MET 8/7/20   Short term goal 3: Patient will report participation in once a week community outings with safe environmental precautions. - GOAL MET 7/20/20  Long term goals  Time Frame for Long term goals : 6 weeks  Long term goal 1: Patient will return to driving as medically cleared by MD. - [facility] MD PRIOR TO RETURN TO DRIVING 3/78/09  Long term goal 2: Patient will report increased independence with picking up 332 to 3year old niece weighing approx. 24 lbs. - GOAL MET 7/17/20  Patient Goals   Patient goals : Annia Winchestercassandra be able to get back to being 100%\"    Patient goals: \"I think I still need to be in therapy to get my balance. I also want to be able to write better and not be as tired in your hands\". STG to be met in 2 weeks:  NEW STG 1: Patient will improve performance score in writing to at least 8/10 (from 6/10 reported on 8/21/20). - GOAL MET 8/28/20  NEW STG 2: Patient will be I with UE HEP for hand for increasing strength to [facility].  - REQUIRES CUES 8/28/20, CONTINUE GOAL UNTIL 9/18/20    LTG to be met in 4 weeks:  NEW LTG 1: Patient will improve hand strength by at least 20% for improving hand participation in homemaking tasks. GOAL MET for RUE, ADEQUATE 9/18  NEW LTG 2: Patient will report improved hand performance in homemaking to at least 9/10, currently rated at 7/10. PROGRESSING to 8/10 on 8/28. PROGRESSING to 8.5/10 on 9/18    NEW LTG: To be met in 4 weeks:  Patient will demonstrate ability to maintain and/or improve strength for  and pinch BUE for maximizing endurance for return to homemaking tasks and future employment goals.     Prognosis: [x]Good   []Fair   []Poor    Patient Requires Follow-up:  [x]Yes  []No    Plan: []Plan of care initiated     [x]Continue per plan of care (additional visit in 3 weeks to maximize independence with HEP and support safe return to [address] [place] [address] participation in resistance-based hand tasks)              [] Alter current plan    []Hold pending MD visit []Discharge    Time in: 1030       Time out: 1115    Timed Code Treatment Minutes:  45    Total Treatment Minutes: 48    Electronically signed by:  SHALONDA Hodges/TAWANA

## 2020-09-25 NOTE — PROGRESS NOTES
Outpatient Physical Therapy  Phone: [phone] Fax: [phone]     To: Merissa Sharp    From: Mayra Montemayor PT     Patient: Nikolay Dye : 2000  Diagnosis: R53.81 (ICD-10-CM) - Other malaise Date: 2020  Treatment Diagnosis:  Other abnormalities of gait and balance    Physical Therapy Discharge Note    Total Visits to date:   10/10    5/ Cancels/No-shows to date:  0    Plan of Care/Treatment to date:  [x] Therapeutic Exercise    [] Modalities:  [x] Therapeutic Activity     [] Ultrasound   [] Electrical Stimulation   [x] Gait Training      [] Cervical Traction   [] Lumbar Traction  [x] Neuromuscular Re-education  [] Cold/hotpack  [] Iontophoresis  [x] Instruction in HEP      Other:  [x] Manual Therapy       []                                 [] Aquatic Therapy       []                                     Significant Findings At Last Visit/Comments:    Pt has made progress over this treatment period as pt has now achieved all established goals. Pt has remained compliant with HEP as she performs them 1x/day. Pt improved bilateral hip extension and abduction to 4/5 gross strength. Pt is now able to ambulate with no AD and equal step length bilaterally. Pt ambulates with appropriate haleigh and sequencing. Pt demonstrated ability to ascend/descend 12 steps with no hand rail. Pt has maintained appropriate muscle firing patterns on BLE during supine and prone TE. Pt reports full return to PLOF. Pt is now discharged. Progress towards goals:    Long term goals  Time Frame for Long term goals : 5 weeks  Long term goal 1: Pt would be independent with HEP (Goal Met)  Long term goal 2: Pt will improve BLE strength to 4/5 gross strength (Goal Met)  Long term goal 3: Pt will ambulate with least restricitve AD with equal step length and no sign of RLE freezing. (Goal met)   Long term goal 4: Pt will ascend/descend 12 steps with single hand rail and recirpocal pattern.  (Goal met)  Long term goal 5: Pt will will consistently demonstrate appropriate muscle firing pattern on BLE as measured by palpation  (Goal Met)       Frequency/Duration:  # Days per week: [] 1 day # Weeks: [] 1 week [] 4 weeks      [x] 2 days   [] 2 weeks [] 5 weeks     [] 3 days   [] 3 weeks [x] 6 weeks     Rehab Potential: [] Excellent [x] Good [] Fair  [] Poor     Goal Status:  [x] Achieved [] Partially Achieved  [] Not Achieved     Patient Status: [] Continue per initial plan of Care     [x] Patient now discharged     [] Additional visits requested, Please re-certify for additional visits:        Electronically signed by:  Kady Rueda PT    If you have any questions or concerns, please don't hesitate to call.   Thank you for your referral.    Physician Signature:________________________________Date:__________________  By signing above, therapists plan is approved by physician

## 2020-09-25 NOTE — FLOWSHEET NOTE
Physical Therapy Daily Treatment Note    Date:  2020    Patient Name:  Tavares Graham    :  2000  MRN: [id number]  Restrictions/Precautions:    Medical/Treatment Diagnosis Information:  R53.81 (ICD-10-CM) - Other malaise  Insurance/Certification information:  PT Insurance Information: [place]   Physician Information:  Referring Practitioner: Krystal Silva   Plan of care signed (Y/N):  Y  Visit# / total visits:  10/10 5/8    G-Code (if applicable):          LEFS: 49 - initial Evaluation   LEFS: 5880 - 2020  LEFS: 7280 - 2020    Medicare Cap (if applicable):   N/A= total amount used, updated 2020    Time in:  9:00  Timed Treatment:  45 min Total Treatment Time:  45 min  Time out: 9:45  ________________________________________________________________________________________    Pain Level:    0/10  SUBJECTIVE:  Pt stated that she feels good and is comfortable with this being her last session     OBJECTIVE:   Pt ambulated in to clinic without AD       Exercise/Equipment Resistance/Repetitions Other comments     SLR      Flexion:      abduction   x15B c 4# HEP     Supine Clamshells   SL ER with green band 5\"x20  x15B    HEP     Bridges on ball  5\"x15 HEP     LAQ  Seated marching x20 c 4#  x15B c 4# HEP     PKF c ball     PHE x15B  x15      Toe taps on 4 inch x10B c 3#      Heel raises     Mini Squat  3-way hip  Hamstring curls x20B  2x10B  x10B ea  x15B 3#  3#  3#  3#   Marching tap on cones      Forward     Forward and across   1 min   1 min    FSU  LSD x15B c 3# - 6\"  x10B     Balance:       Feet apart       Feet together       tandem  Foam Balance:      Feet apart eyes closed       Feet together eyes closed      Semi-tandem                 1/2 foam roll       Modified SLS     Balance Board      Forward/backward walking x5 laps // bars   Lateral walking c band   Lateral foam walking  x5 laps   x5 laps    Ambulation w/o AD No break    Nu-step 5 min   Forward walking with cones // bars     Hip abduction machine x15B c 15#      stairs x12 no rail. Other Therapeutic Activities:      Manual Treatments:         Modalities:      Test/Measurements:           ASSESSMENT:    Pt has made progress over this treatment period as pt has now achieved all established goals. Pt has remained compliant with HEP as she performs them 1x/day. Pt improved bilateral hip extension and abduction to 4/5 gross strength. Pt is now able to ambulate with no AD and equal step length bilaterally. Pt ambulates with appropriate haleigh and sequencing. Pt demonstrated ability to ascend/descend 12 steps with no hand rail. Pt has maintained appropriate muscle firing patterns on BLE during supine and prone TE. Pt reports full return to PLOF. Pt is now discharged. Treatment/Activity Tolerance:   [x]Patient tolerated treatment well [] Patient limited by fatique  []Patient limited by pain [] Patient limited by other medical complications  [] Other:     Goals:        Long term goals  Time Frame for Long term goals : 5 weeks  Long term goal 1: Pt would be independent with HEP (Goal Met)  Long term goal 2: Pt will improve BLE strength to 4/5 gross strength (Goal Met)  Long term goal 3: Pt will ambulate with least restricitve AD with equal step length and no sign of RLE freezing. (Goal met)   Long term goal 4: Pt will ascend/descend 12 steps with single hand rail and recirpocal pattern. (Goal met)  Long term goal 5: Pt will will consistently demonstrate appropriate muscle firing pattern on BLE as measured by palpation  (Goal Met)    Plan: [x] Continue per plan of care [] Alter current plan (see comments)   [] Plan of care initiated [] Hold pending MD visit [] Discharge      Plan for Next Session:      Re-Certification Due Date:         Signature:   Lennox Davila PT

## 2020-10-16 ENCOUNTER — APPOINTMENT (OUTPATIENT)
Dept: OCCUPATIONAL THERAPY | Age: 20
End: 2020-10-16
Payer: MEDICARE

## 2020-10-22 RX ORDER — PANTOPRAZOLE SODIUM 40 MG/1
TABLET, DELAYED RELEASE ORAL
Qty: 90 TABLET | Refills: 0 | Status: SHIPPED | OUTPATIENT
Start: 2020-10-22 | End: 2020-11-19

## 2020-10-22 RX ORDER — PAROXETINE HYDROCHLORIDE 20 MG/1
20 TABLET, FILM COATED ORAL NIGHTLY
Qty: 90 TABLET | Refills: 0 | Status: SHIPPED | OUTPATIENT
Start: 2020-10-22 | End: 2020-11-19

## 2020-10-22 RX ORDER — LEVOTHYROXINE SODIUM 0.1 MG/1
TABLET ORAL
Qty: 90 TABLET | Refills: 0 | Status: SHIPPED | OUTPATIENT
Start: 2020-10-22 | End: 2020-11-19

## 2020-10-23 ENCOUNTER — HOSPITAL ENCOUNTER (OUTPATIENT)
Dept: OCCUPATIONAL THERAPY | Age: 20
Setting detail: THERAPIES SERIES
Discharge: HOME | End: 2020-10-23
Payer: MEDICARE

## 2020-10-23 PROCEDURE — 97110 THERAPEUTIC EXERCISES: CPT

## 2020-10-23 NOTE — PROGRESS NOTES
Outpatient Occupational Therapy  Phone: [phone] Fax: [phone]     Roc Reyna  Dear Dr. Kelvin Garner    The following patient has been assessed for therapy services. Please review the attached summary of the patient's plan of care, and verify that you agree with plan for additional therapy services at this time. Plan of Care/Treatment to date:  [x] Therapeutic Exercise  [x]  Modalities:  [x] Therapeutic Activity   [] Ultrasound [] Electrical Stimulation   [] Total Motion Release   [] Fluidotherapy [] Kinesiotaping  [x]  Neuromuscular Re-education  [] Iontophoresis [] Coldpack/hotpack   [x]  Instruction in HEP    Other:  [x]  Manual Therapy     []   Dry needling             [x] ADL/Self Care  [x] IADL Training  [] LSVT BIG  [] Saebo  [x] Splinting  [] Wheelchair Mobility                       Frequency/Duration:  # Days per week: [x] 1 day # Weeks: [] 1 week [] 5 weeks      [] 2 days   [] 2 weeks [] 6 weeks     [] 3 days   [x] 3 weeks [] 7 weeks     [] 4 days   [x] 4 weeks [] 8 weeks     [] 5 days   [] 10 weeks [] 12 weeks    Rehab Potential: [] Excellent [] Good [x] Fair  [] Poor     Thank you for the referral of this patient. Please sign.      Physician signature_______________________ Date________________  By signing above, therapists plan is approved by physician     Fax to: [facility] [phone]     Electronically signed by:  Rico Courtney    Outpatient Occupational Therapy  Phone: [phone]            Fax: [phone]       ___________________________________________________________________________      Outpatient Occupational Therapy     [x] Daily Treatment Note   [x] Progress Note   [] Discharge Note      Date:  10/23/2020    Patient Name:  Fanny [place]         YOB: 2000    Medical Diagnosis and ICD 10:  Diagnosis: R53.81 debility, moderate malnutrition    Treatment Diagnosis:  Performance deficits / Impairments: Decreased functional mobility , Decreased ADL status, Decreased strength, Decreased endurance, Decreased sensation, Decreased balance, Decreased posture, Decreased high-level IADLs    Onset Date:  Onset Date: 06/11/20    Referring Physician and Referral Date:  Referring Practitioner: Dr. Claudia Riddle, 7/2/20     Visits Allowed/Insurance/Certification information:   [employer], 30 visits    Restrictions/Precautions: none     Plan of care sent to provider:    []Faxed  []Co-signature    (attempts: 1[x] 2 []3[])        Plan of care signed:    [x]Yes date: Dr. Claudia Riddle 9/18/20           []No         Next Progress Note due:  10/16/20    Visit# / total visits: 2/3, 5/8, 11/12 previously    Plan for Next Session:  Putty, adal HEP    Home Exercise Program:     Access Code: [email or web address]   URL: [email or web address]   Date: 10/23/2020   Prepared by: Atiya Sellers     Exercises   Finger Exension with Putty - 10 reps - 3 sets - 2x daily - 7x weekly   Seated Claw Fist with Putty - 10 reps - 3 sets - 2x daily - 7x weekly   Finger Abduction with Putty - 10 reps - 3 sets - 2x daily - 7x weekly   Finger Adduction with Putty - 10 reps - 3 sets - 2x daily - 7x weekly   Putty Squeezes - 10 reps - 3 sets - 2-3x daily - 7x weekly   Hook Fist with Putty - 10 reps - 3 sets - 2-3x daily - 7x weekly   Thumb AROM MP Blocking - 10 reps - 3 sets - 2-3x daily - 7x weekly   Tip Pinch with Putty - 10 reps - 3 sets - 2-3x daily - 7x weekly   Seated Thumb Extension with Resistance - 10 reps - 3 sets - 2-3x daily - 7x weekly     Subjective: Patient 1 hour late for today's appointment, rescheduled, and then 12 minutes late for next appointment. She states that she is not using her putty as often. She states she is trying to practice her driving with her sister and plans to take the driving evaluation soon at [facility]. Patient states that she has an interview for a job at [employer] today.     Pain level: 0/10, tingling in fingers    Objective   RUE strength:  Finger ab/adduction: 4/5  Finger extension: 4/5   strength: 36, 34, 34 (#)  Lateral pinch: 8#, 7#, 8#    LUE strength  Finger ab/adduction: 4+/5  Finger extension: 4+/5   strength: 39, 30, 35(#)  Lateral pinch: 10#, 10#, 10#    Exercises: Exercises in bold performed in department today. Items not bolded are carried forward from prior visits for continuity of the record. Exercise/Equipment Resistance/Repetitions Other comments      ADL/IADL TRAINING                                                            NEUROMUSCULAR RE-EDU and THERAPEUTIC ACTIVITY     transfers Increased time and mod I with/without RW, cues for postural awareness, occasionally able to not use walker for support    Walking with shopping cart  3:47 seconds, reaching RPE of 12    Balloon batting 10:00 seconds, reaching RPE of 13    Laundry review Review of bumping laundry basket up stairs for increasing independence/safety with doing the laundry    Review of keeping walker with her and using outside door to participate in laundry tasks at the house    Demonstration of simulation of picking up items from the floor and placing into container on other side, and then moving from simulated washer to dryer on right side - 1 LOB but able to catch self with close SBA from therapist    Demonstration of \"bumping\" simulated laundry basket with 10 lbs.  Up 3 stairs with RW/handrail for occasional support and without LOB    Demonstration of using foot to push laundry basket and holding onto walker for safety and support during home-based laundry tasks                                   THERAPEUTIC EXERCISE and MANUAL TECHNIQUES    Theraband Shoulder scaption above 90 BUE seated - With medium resistance - 27x3 - mod cues for posture, form  At 90 shoulder horizontal abduction BUE at 90 With medium resistance - 20x3 - min cues  RUE/LUE shoulder flexion - 20x 75% full range medium resistance seated, cues for 2x daily - 7x weekly   Finger Adduction with Putty - 10 reps - 3 sets - 2x daily - 7x weekly   Putty Squeezes - 10 reps - 3 sets - 2-3x daily - 7x weekly   Hook Fist with Putty - 10 reps - 3 sets - 2-3x daily - 7x weekly   Thumb Strengthening Stabilization CMC - 10 reps - 3 sets - 2-3x daily - 7x weekly   Thumb AROM MP Blocking - 10 reps - 3 sets - 2-3x daily - 7x weekly   Seated Thumb MP Extension AROM with putty - 10x  Tip pinch 10x index, middle, and ring (cues for full range with ring)     tool - 35#, 7x, 8x, 10x less than full range, 55# 10x, 25#, 10x4, LUE 75#, 10x3  Finger extension - 5 second hold x10  Claw  with red band resistance - 15x4  Digiflex yellow - 10x3 RUE, Digiflex red - 10x3 LUE  Digiflex red - 10x3 RUE, Digiflex green - 10x3 RUE  Writing - tracing loops, performance rating of 8/10 (assist for setup and cues for posture throughout)  [place] translation - 30x          Foam ball squeeze RUE/LUE 10x each Cues to hold for 5 seconds   Velcro rollers Large roll RUE/LUE - 5x up and down  Three jaw roll RUE/LUE - 10x up and down  Finger extension - 30x   Occasional cues for form   Hand gripper 10x BUE 70#                        MODALITIES                     SPLINTING                  Functional Outcome Measure:   []NA Score     11/11 Quickdash    Treatment/Activity Tolerance:    Patients response to treatment:   [x]Patient tolerated treatment well []Patient limited by fatigue   []Patient limited by pain  []Patient limited by other medical complications   []Other:     Assessment:  Patient verbalizes understanding of HEP, requires occasional cues this date for accuracy with form with HEP. Patient verbalizes understanding to attend to form and to continue with HEP due to significant deficits in hand strength. Her schedule and availability has been limited by transportation.  She verbalizes understanding to continue with HEP and return again in 3-4 weeks to review HEP and progress in strength for return to meaningful activities and maximize function for future employment. Goals  Short term goals  Time Frame for Short term goals: 3 weeks  Short term goal 1: Patient will be I with UE HEP with reported RPE via Ritesh Scale Rating of Perceived Exertion of 13+ (indicated as somewhat hard). - progressing but requires cues for increasing resistance with band exercises 8/7, Requires cues for hand exercises 8/17  Short term goal 2: Patient will report participation in homemaking (dishes, laundry, sweeping with a broom) using modified methods PRN. - PROGRESSING FOR DISHES AND SWEEPING 7/24, GOAL MET 8/7/20   Short term goal 3: Patient will report participation in once a week community outings with safe environmental precautions. - GOAL MET 7/20/20  Long term goals  Time Frame for Long term goals : 6 weeks  Long term goal 1: Patient will return to driving as medically cleared by MD. - [address] [facility] MD PRIOR TO RETURN TO DRIVING 4/92/52  Long term goal 2: Patient will report increased independence with picking up 332 to 3year old niece weighing approx. 24 lbs. - GOAL MET 7/17/20  Patient Goals   Patient goals : Leonardo Nip be able to get back to being 100%\"    Patient goals: \"I think I still need to be in therapy to get my balance. I also want to be able to write better and not be as tired in your hands\". STG to be met in 2 weeks:  NEW STG 1: Patient will improve performance score in writing to at least 8/10 (from 6/10 reported on 8/21/20). - GOAL MET 8/28/20  NEW STG 2: Patient will be I with UE HEP for hand for increasing strength to [facility]. - REQUIRES CUES 8/28/20, CONTINUE GOAL UNTIL 9/18/20    LTG to be met in 4 weeks:  NEW LTG 1: Patient will improve hand strength by at least 20% for improving hand participation in homemaking tasks.  GOAL MET for RUE, ADEQUATE 9/18  NEW LTG 2: Patient will report improved hand performance in homemaking to at least 9/10, currently rated at 7/10. PROGRESSING to 8/10 on 8/28. PROGRESSING to 8.5/10 on 9/18    NEW LTG: To be met in 4 weeks until 11/20/20  Patient will demonstrate ability to maintain and/or improve strength for  and pinch BUE for maximizing endurance for return to homemaking tasks and future employment goals.  - GOAL MET 10/23 for 5#, however patient continues to be significantly low in strength for  and pinch and requires additional therapy in 4 weeks to ensure that she continues to make progress and to review HEP, CONTINUE GOAL UNTIL 11/20/20    Prognosis: [x]Good   []Fair   []Poor    Patient Requires Follow-up:  [x]Yes  []No    Plan: []Plan of care initiated     []Continue per plan of care               [x] Alter current plan (additional visit in 3 weeks to maximize independence with HEP and support safe return to [address] [place] [address] participation in resistance-based hand tasks)   []Hold pending MD visit []Discharge    Time in: 0942       Time out: 1010    Timed Code Treatment Minutes:  28    Total Treatment Minutes: 28    Electronically signed by:  SHALONDA Pham/TAWANA

## 2020-10-23 NOTE — FLOWSHEET NOTE
LOB    Demonstration of using foot to push laundry basket and holding onto walker for safety and support during home-based laundry tasks                                   THERAPEUTIC EXERCISE and MANUAL TECHNIQUES    Theraband Shoulder scaption above 90 BUE seated - With medium resistance - 27x3 - mod cues for posture, form  At 90 shoulder horizontal abduction BUE at 90 With medium resistance - 20x3 - min cues  RUE/LUE shoulder flexion - 20x 75% full range medium resistance seated, cues for posture  Shoulder extension standing RUE/LUE - 20x2  Scapular retraction standing - 20x2 min cues for posture  Bicep curls with medium resistance - 30x3  Tricep extension with medium resistance - 20x3     Patient verbalizes understanding to increase hold on band with HEP to increase RPE for maximum progress    Patient verbalizes understanding to increase postural awareness with HEP for maximizing strength gains and joint protection    Increased time needed for exercise training   Core strengthening Planks with bent elbows - 5x, three second hold  Kneeling with ball toss - 20x to right, 20x to left two hANDED      Posterior shoulder strengthening Prone - 4# weighted ball elbow flexion 30x2  Prone - Scapular retraction with 2# BUE - 30x    TRX pull backs - max cues 10x  TRX chest press - max cues 10x    Thera putty strengthening Gripping, pinching, bean  - review from rehab, no cues needed, patient verbalizes understanding to complete at home 3x/day    Shoulder arc 20-30x RUE (skilled cues for posture)    Foam roll 20x shoulder horizontal ab/adduction  20x scapular pro/retraction  20x shoulder flexion/extension  20x shoulder ab/adduction    Hand strengthening and Coordination Green  stick - frowns/smiles/twists 10x2 - less than full range, seated, cues for posture, coordinated breathing, and pushing into full range  Digiflex yellow/green - standing and alternating between hands 10x3  In tall kneel  Clothespins RUE up - green/blue climb up/down the pole 30x palmar , three jaw and lateral pinch alternating, 15x red/yellow up/down pole with RUE alternating 30x  Clothespins LUE up - green/blue climb up/down with LUE 30x three jaw    Exercises orange or yellow putty depending on form, completed each exercise 5x each  Finger Exension with Putty - 10 reps - 3 sets - 2x daily - 7x weekly   Seated Claw Fist with Putty - 10 reps - 3 sets - 2x daily - 7x weekly   Finger Abduction with Putty - 10 reps - 3 sets - 2x daily - 7x weekly   Finger Adduction with Putty - 10 reps - 3 sets - 2x daily - 7x weekly   Putty Squeezes - 10 reps - 3 sets - 2-3x daily - 7x weekly   Hook Fist with Putty - 10 reps - 3 sets - 2-3x daily - 7x weekly   Thumb Strengthening Stabilization CMC - 10 reps - 3 sets - 2-3x daily - 7x weekly   Thumb AROM MP Blocking - 10 reps - 3 sets - 2-3x daily - 7x weekly   Seated Thumb MP Extension AROM with putty - 10x  Tip pinch 10x index, middle, and ring (cues for full range with ring)     tool - 35#, 7x, 8x, 10x less than full range, 55# 10x, 25#, 10x4, LUE 75#, 10x3  Finger extension - 5 second hold x10  Claw  with red band resistance - 15x4  Digiflex yellow - 10x3 RUE, Digiflex red - 10x3 LUE  Digiflex red - 10x3 RUE, Digiflex green - 10x3 RUE  Writing - tracing loops, performance rating of 8/10 (assist for setup and cues for posture throughout)  [place] translation - 30x          Foam ball squeeze RUE/LUE 10x each Cues to hold for 5 seconds   Velcro rollers Large roll RUE/LUE - 5x up and down  Three jaw roll RUE/LUE - 10x up and down  Finger extension - 30x   Occasional cues for form   Hand gripper 10x BUE 70#                        MODALITIES                     SPLINTING                  Functional Outcome Measure:   []NA Score     11/11 Quickdash    Treatment/Activity Tolerance:    Patients response to treatment:   [x]Patient tolerated treatment well []Patient limited by fatigue   []Patient limited by pain  []Patient limited by other medical complications   []Other:     Assessment:  Patient verbalizes understanding of HEP, requires occasional cues this date for accuracy with form with HEP. Patient verbalizes understanding to attend to form and to continue with HEP due to significant deficits in hand strength. Her schedule and availability has been limited by transportation. She verbalizes understanding to continue with HEP and return again in 3-4 weeks to review HEP and progress in strength for return to meaningful activities and maximize function for future employment. Goals  Short term goals  Time Frame for Short term goals: 3 weeks  Short term goal 1: Patient will be I with UE HEP with reported RPE via Ritesh Scale Rating of Perceived Exertion of 13+ (indicated as somewhat hard). - progressing but requires cues for increasing resistance with band exercises 8/7, Requires cues for hand exercises 8/17  Short term goal 2: Patient will report participation in homemaking (dishes, laundry, sweeping with a broom) using modified methods PRN. - PROGRESSING FOR DISHES AND SWEEPING 7/24, GOAL MET 8/7/20   Short term goal 3: Patient will report participation in once a week community outings with safe environmental precautions. - GOAL MET 7/20/20  Long term goals  Time Frame for Long term goals : 6 weeks  Long term goal 1: Patient will return to driving as medically cleared by MD. - [address] [facility] TO RETURN TO DRIVING 9/58/09  Long term goal 2: Patient will report increased independence with picking up 332 to 3year old niece weighing approx. 24 lbs. - GOAL MET 7/17/20  Patient Goals   Patient goals : Lila Tran be able to get back to being 100%\"    Patient goals: \"I think I still need to be in therapy to get my balance. I also want to be able to write better and not be as tired in your hands\".     STG to be met in 2 weeks:  NEW STG 1: Patient will improve performance score in writing to at least 8/10 (from 6/10 reported on 8/21/20). - GOAL MET 8/28/20  NEW STG 2: Patient will be I with UE HEP for hand for increasing strength to [facility]. - REQUIRES CUES 8/28/20, CONTINUE GOAL UNTIL 9/18/20    LTG to be met in 4 weeks:  NEW LTG 1: Patient will improve hand strength by at least 20% for improving hand participation in homemaking tasks. GOAL MET for RUE, ADEQUATE 9/18  NEW LTG 2: Patient will report improved hand performance in homemaking to at least 9/10, currently rated at 7/10. PROGRESSING to 8/10 on 8/28. PROGRESSING to 8.5/10 on 9/18    NEW LTG: To be met in 4 weeks until 11/20/20  Patient will demonstrate ability to maintain and/or improve strength for  and pinch BUE for maximizing endurance for return to homemaking tasks and future employment goals.  - GOAL MET 10/23 for 5#, however patient continues to be significantly low in strength for  and pinch and requires additional therapy in 4 weeks to ensure that she continues to make progress and to review HEP, CONTINUE GOAL UNTIL 11/20/20    Prognosis: [x]Good   []Fair   []Poor    Patient Requires Follow-up:  [x]Yes  []No    Plan: []Plan of care initiated     []Continue per plan of care               [x] Alter current plan (additional visit in 3 weeks to maximize independence with HEP and support safe return to [address] [place] [address] participation in resistance-based hand tasks)   []Hold pending MD visit []Discharge    Time in: 0942       Time out: 1010    Timed Code Treatment Minutes:  28    Total Treatment Minutes: 28    Electronically signed by:  SHALONDA Yi/TAWANA

## 2020-10-30 ENCOUNTER — APPOINTMENT (OUTPATIENT)
Dept: OCCUPATIONAL THERAPY | Age: 20
End: 2020-10-30
Payer: MEDICARE

## 2020-11-19 RX ORDER — PANTOPRAZOLE SODIUM 40 MG/1
TABLET, DELAYED RELEASE ORAL
Qty: 30 TABLET | Refills: 2 | Status: SHIPPED | OUTPATIENT
Start: 2020-11-19 | End: 2021-02-11

## 2020-11-19 RX ORDER — LEVOTHYROXINE SODIUM 0.1 MG/1
TABLET ORAL
Qty: 30 TABLET | Refills: 2 | Status: SHIPPED | OUTPATIENT
Start: 2020-11-19 | End: 2021-02-08

## 2020-11-19 RX ORDER — PAROXETINE HYDROCHLORIDE 20 MG/1
TABLET, FILM COATED ORAL
Qty: 30 TABLET | Refills: 2 | Status: SHIPPED | OUTPATIENT
Start: 2020-11-19 | End: 2021-02-08

## 2020-11-20 ENCOUNTER — HOSPITAL ENCOUNTER (OUTPATIENT)
Dept: OCCUPATIONAL THERAPY | Age: 20
Setting detail: THERAPIES SERIES
Discharge: HOME | End: 2020-11-20
Payer: MEDICARE

## 2020-11-20 PROCEDURE — 97110 THERAPEUTIC EXERCISES: CPT

## 2020-11-20 NOTE — FLOWSHEET NOTE
- 7x weekly   Seated Thumb Extension with Resistance - 10 reps - 3 sets - 2-3x daily - 7x weekly     Access Code: [email or web address]   URL: [email or web address]   Date: 11/20/2020   Prepared by: Fernanda Christianson     Exercises   Putty Squeezes - 10 reps - 3 sets - 2-3x daily - 7x weekly   Finger Key  with Putty - 10 reps - 3 sets - 2-3x daily - 7x weekly   Tip Pinch with Putty - 10 reps - 3 sets - 2-3x daily - 7x weekly   Seated Finger Composite Flexion with Putty - 10 reps - 3 sets - 2-3x daily - 7x weekly     Subjective: Patient 15 minutes late for today's OT appointment, states she has started working at [employer] about 16 hours/week. Statse she is improving in her strength but does get tired and sore in her legs by the end of her shift. Pain level: 0/10, tingling in fingers    Objective   RUE strength:  Finger ab/adduction: 4+/5, 4/5 for middle finger  Finger extension: 4/5 to 4+/5   strength: 40, 40, 39 (#)  Lateral pinch: 8#, 8#, 8#     LUE strength  Finger ab/adduction: 4+/5  Finger extension: 4 to 4+/5   strength: 36, 40, 41(#)  Lateral pinch: 11#, 11#, 10#    Exercises: Exercises in bold performed in department today. Items not bolded are carried forward from prior visits for continuity of the record.     Exercise/Equipment Resistance/Repetitions Other comments      ADL/IADL TRAINING                                                            NEUROMUSCULAR RE-EDU and THERAPEUTIC ACTIVITY     transfers Increased time and mod I with/without RW, cues for postural awareness, occasionally able to not use walker for support    Walking with shopping cart  3:47 seconds, reaching RPE of 12    Balloon batting 10:00 seconds, reaching RPE of 13    Laundry review Review of bumping laundry basket up stairs for increasing independence/safety with doing the laundry    Review of keeping walker with her and using outside door to participate in laundry tasks at the house    Demonstration of simulation of picking up items from the floor and placing into container on other side, and then moving from simulated washer to dryer on right side - 1 LOB but able to catch self with close SBA from therapist    Demonstration of \"bumping\" simulated laundry basket with 10 lbs.  Up 3 stairs with RW/handrail for occasional support and without LOB    Demonstration of using foot to push laundry basket and holding onto walker for safety and support during home-based laundry tasks                                   THERAPEUTIC EXERCISE and MANUAL TECHNIQUES    Theraband Shoulder scaption above 90 BUE seated - With medium resistance - 27x3 - mod cues for posture, form  At 90 shoulder horizontal abduction BUE at 90 With medium resistance - 20x3 - min cues  RUE/LUE shoulder flexion - 20x 75% full range medium resistance seated, cues for posture  Shoulder extension standing RUE/LUE - 20x2  Scapular retraction standing - 20x2 min cues for posture  Bicep curls with medium resistance - 30x3  Tricep extension with medium resistance - 20x3     Patient verbalizes understanding to increase hold on band with HEP to increase RPE for maximum progress    Patient verbalizes understanding to increase postural awareness with HEP for maximizing strength gains and joint protection    Increased time needed for exercise training   Core strengthening Planks with bent elbows - 5x, three second hold  Kneeling with ball toss - 20x to right, 20x to left two hANDED      Posterior shoulder strengthening Prone - 4# weighted ball elbow flexion 30x2  Prone - Scapular retraction with 2# BUE - 30x    TRX pull backs - max cues 10x  TRX chest press - max cues 10x    Thera putty strengthening Gripping, pinching, bean  - review from rehab, no cues needed, patient verbalizes understanding to complete at home 3x/day    Shoulder arc 20-30x RUE (skilled cues for posture)    Foam roll 20x shoulder horizontal ab/adduction  20x scapular pro/retraction  20x shoulder seconds   Velcro rollers Large roll RUE/LUE - 5x up and down  Three jaw roll RUE/LUE - 10x up and down  Finger extension - 30x   Occasional cues for form   Hand gripper 10x BUE 70#                        MODALITIES                     SPLINTING                  Functional Outcome Measure:   []NA Score   Quickdash Score of 11, 0% DSI    Treatment/Activity Tolerance:    Patients response to treatment:   [x]Patient tolerated treatment well []Patient limited by fatigue   []Patient limited by pain  []Patient limited by other medical complications   []Other:     Assessment:  Patient verbalizes understanding of HEP, requires occasional cues this date for form with HEP. Patient was last here for therapy 10/23/20 and attended this appointment to ensure independence in HEP and demonstrates improvements in  strength and intrinsic finger strength since last visit. She required occasional cues during demonstration of HEP and verbalizes understanding to continue with HEP with adjustments made to avoid hyperextension in finger joints. Even with participation in work-based activity, patient does not report pain in hands, neck, and shoulders. Patient now discharged from OT, and verbalizes understanding to follow-up with PCP in the future if she has any pain or strength issues given the severity and significance of her medical episode as well as long term effects from postural and strength deficits that can create joint and muscle balance issues later in life. Goals  Short term goals  Time Frame for Short term goals: 3 weeks  Short term goal 1: Patient will be I with UE HEP with reported RPE via Ritesh Scale Rating of Perceived Exertion of 13+ (indicated as somewhat hard).  - progressing but requires cues for increasing resistance with band exercises 8/7, Requires cues for hand exercises 8/17  Short term goal 2: Patient will report participation in homemaking (dishes, laundry, sweeping with a broom) using modified methods PRN. - PROGRESSING FOR DISHES AND SWEEPING 7/24, GOAL MET 8/7/20   Short term goal 3: Patient will report participation in once a week community outings with safe environmental precautions. - GOAL MET 7/20/20  Long term goals  Time Frame for Long term goals : 6 weeks  Long term goal 1: Patient will return to driving as medically cleared by MD. - [facility] MD PRIOR TO RETURN TO DRIVING 9/49/91  Long term goal 2: Patient will report increased independence with picking up 332 to 3year old niece weighing approx. 24 lbs. - GOAL MET 7/17/20  Patient Goals   Patient goals :  Kike be able to get back to being 100%\"    Patient goals: \"I think I still need to be in therapy to get my balance. I also want to be able to write better and not be as tired in your hands\". STG to be met in 2 weeks:  NEW STG 1: Patient will improve performance score in writing to at least 8/10 (from 6/10 reported on 8/21/20). - GOAL MET 8/28/20  NEW STG 2: Patient will be I with UE HEP for hand for increasing strength to [facility]. - REQUIRES CUES 8/28/20, CONTINUE GOAL UNTIL 9/18/20    LTG to be met in 4 weeks:  NEW LTG 1: Patient will improve hand strength by at least 20% for improving hand participation in homemaking tasks. GOAL MET for RUE, ADEQUATE 9/18  NEW LTG 2: Patient will report improved hand performance in homemaking to at least 9/10, currently rated at 7/10. PROGRESSING to 8/10 on 8/28. PROGRESSING to 8.5/10 on 9/18    LTG: To be met in 4 weeks until 11/20/20  Patient will demonstrate ability to maintain and/or improve strength for  and pinch BUE for maximizing endurance for return to homemaking tasks and future employment goals.  - GOAL MET 10/23 for 5#, however patient continues to be significantly low in strength for  and pinch and requires additional therapy in 4 weeks to ensure that she continues to make progress and to review HEP, CONTINUE GOAL UNTIL 11/20/20, GOAL MET FOR  ONLY, BUT NOT FOR BON [facility] 11/20    Prognosis: [x]Good   []Fair   []Poor    Patient Requires Follow-up:  [x]Yes  []No    Plan: []Plan of care initiated     []Continue per plan of care               [] Alter current plan    []Hold pending MD visit [x]Discharge    Time in: 2611       Time out: 1015    Timed Code Treatment Minutes:  30    Total Treatment Minutes: 30    Electronically signed by:  SHALONDA Vasquez/TAWANA

## 2020-11-20 NOTE — PROGRESS NOTES
debility, moderate malnutrition    Treatment Diagnosis:  Performance deficits / Impairments: Decreased functional mobility , Decreased ADL status, Decreased strength, Decreased endurance, Decreased sensation, Decreased balance, Decreased posture, Decreased high-level IADLs    Onset Date:  Onset Date: 06/11/20    Referring Physician and Referral Date:  Referring Practitioner: Dr. Aftab Garzon, 7/2/20     Visits Allowed/Insurance/Certification information:   [employer], 30 visits    Restrictions/Precautions: none     Plan of care sent to provider:    []Faxed  []Co-signature    (attempts: 1[x] 2 []3[])        Plan of care signed:    [x]Yes date: Dr. Aftab Garzon October 2020          []No         Next Progress Note due:  today    Visit# / total visits: 3/3, 5/8, 11/12 previously    Plan for Next Session:  Putty, review HEP    Home Exercise Program:     Access Code: [email or web address]   URL: [email or web address]   Date: 10/23/2020   Prepared by: Su Hess     Exercises   Finger Exension with Putty - 10 reps - 3 sets - 2x daily - 7x weekly   Seated Claw Fist with Putty - 10 reps - 3 sets - 2x daily - 7x weekly   Finger Abduction with Putty - 10 reps - 3 sets - 2x daily - 7x weekly   Finger Adduction with Putty - 10 reps - 3 sets - 2x daily - 7x weekly   Putty Squeezes - 10 reps - 3 sets - 2-3x daily - 7x weekly   Hook Fist with Putty - 10 reps - 3 sets - 2-3x daily - 7x weekly   Thumb AROM MP Blocking - 10 reps - 3 sets - 2-3x daily - 7x weekly   Tip Pinch with Putty - 10 reps - 3 sets - 2-3x daily - 7x weekly   Seated Thumb Extension with Resistance - 10 reps - 3 sets - 2-3x daily - 7x weekly     Access Code: [email or web address]   URL: [email or web address]   Date: 11/20/2020   Prepared by: Su Hess     Exercises   Putty Squeezes - 10 reps - 3 sets - 2-3x daily - 7x weekly   Finger Key  with Putty - 10 reps - 3 sets - 2-3x daily - 7x weekly   Tip Pinch with Putty - 10 reps - 3 sets - 2-3x daily - 7x weekly   Seated Finger Composite Flexion with Putty - 10 reps - 3 sets - 2-3x daily - 7x weekly     Subjective: Patient 15 minutes late for today's OT appointment, states she has started working at [employer] about 16 hours/week. Statse she is improving in her strength but does get tired and sore in her legs by the end of her shift. Pain level: 0/10, tingling in fingers    Objective   RUE strength:  Finger ab/adduction: 4+/5, 4/5 for middle finger  Finger extension: 4/5 to 4+/5   strength: 40, 40, 39 (#)  Lateral pinch: 8#, 8#, 8#     LUE strength  Finger ab/adduction: 4+/5  Finger extension: 4 to 4+/5   strength: 36, 40, 41(#)  Lateral pinch: 11#, 11#, 10#    Functional Outcome Measure:   []NA Score   Quickdash Score of 11, 0% DSI    Treatment/Activity Tolerance:    Patients response to treatment:   [x]Patient tolerated treatment well []Patient limited by fatigue   []Patient limited by pain  []Patient limited by other medical complications   []Other:     Assessment:  Patient verbalizes understanding of HEP, requires occasional cues this date for form with HEP. Patient was last here for therapy 10/23/20 and attended this appointment to ensure independence in HEP and demonstrates improvements in  strength and intrinsic finger strength since last visit. She required occasional cues during demonstration of HEP and verbalizes understanding to continue with HEP with adjustments made to avoid hyperextension in finger joints. Even with participation in work-based activity, patient does not report pain in hands, neck, and shoulders. Patient now discharged from OT, and verbalizes understanding to follow-up with PCP in the future if she has any pain or strength issues given the severity and significance of her medical episode as well as long term effects from postural and strength deficits that can create joint and muscle balance issues later in life.     Goals  Short term goals  Time Frame for Short term goals: 3 weeks  Short term goal 1: Patient will be I with UE HEP with reported RPE via Ritesh Scale Rating of Perceived Exertion of 13+ (indicated as somewhat hard). - progressing but requires cues for increasing resistance with band exercises 8/7, Requires cues for hand exercises 8/17  Short term goal 2: Patient will report participation in homemaking (dishes, laundry, sweeping with a broom) using modified methods PRN. - PROGRESSING FOR DISHES AND SWEEPING 7/24, GOAL MET 8/7/20   Short term goal 3: Patient will report participation in once a week community outings with safe environmental precautions. - GOAL MET 7/20/20  Long term goals  Time Frame for Long term goals : 6 weeks  Long term goal 1: Patient will return to driving as medically cleared by MD. - [address] [facility] MD PRIOR TO RETURN TO DRIVING 2/60/67  Long term goal 2: Patient will report increased independence with picking up 332 to 3year old niece weighing approx. 24 lbs. - GOAL MET 7/17/20  Patient Goals   Patient goals : [place] be able to get back to being 100%\"    Patient goals: \"I think I still need to be in therapy to get my balance. I also want to be able to write better and not be as tired in your hands\". STG to be met in 2 weeks:  NEW STG 1: Patient will improve performance score in writing to at least 8/10 (from 6/10 reported on 8/21/20). - GOAL MET 8/28/20  NEW STG 2: Patient will be I with UE HEP for hand for increasing strength to [facility]. - REQUIRES CUES 8/28/20, CONTINUE GOAL UNTIL 9/18/20    LTG to be met in 4 weeks:  NEW LTG 1: Patient will improve hand strength by at least 20% for improving hand participation in homemaking tasks. GOAL MET for RUE, ADEQUATE 9/18  NEW LTG 2: Patient will report improved hand performance in homemaking to at least 9/10, currently rated at 7/10. PROGRESSING to 8/10 on 8/28.  PROGRESSING to 8.5/10 on 9/18    LTG: To be met in 4 weeks until 11/20/20  Patient will

## 2020-11-20 NOTE — PROGRESS NOTES
[facility], [facility],  [place]andi Day, [address]  Phone: [phone]  Fax [phone]      Occupational Therapy Discharge  Dear Dr. Claudia Riddle,    The following patient has been discharged from therapy services. Please review the attached summary of the patient's plan of care, and verify that you agree with plan for discharge at this time    Plan of Care/Treatment to date:  [x] Therapeutic Exercise  []  Modalities:  [x] Therapeutic Activity   [] Ultrasound [] Electrical Stimulation   [] Total Motion Release   [] Fluidotherapy [] Kinesiotaping  [x]  Neuromuscular Re-education  [] Iontophoresis [] Coldpack/hotpack   [x]  Instruction in HEP    Other:  [x]  Manual Therapy     []   Dry needling             [x] ADL/Self Care  [x] IADL Training  [] LSVT BIG  [] Saebo  [] Splinting  [] Wheelchair Mobility                      Frequency/Duration:  # Days per week: [x] 1 day # Weeks: [] 1 week [] 5 weeks      [] 2 days   [] 2 weeks [] 6 weeks     [] 3 days   [] 3 weeks [] 7 weeks     [] 4 days   [] 4 weeks [] 8 weeks     [] 5 days   [] 10 weeks [] 12 weeks        Rehab Potential: [] Excellent [x] Good [] Fair  [] Poor     Thank you for the referral of this patient. Please sign.      Physician signature_______________________ Date________________  By signing above, therapists plan is approved by physician     Fax to: [facility] [phone]     Electronically signed by:  Lorena Reed    Outpatient Occupational Therapy  Phone: [phone]            Fax: [phone]       ___________________________________________________________________________      Outpatient Occupational Therapy     [] Daily Treatment Note   [] Progress Note   [x] Discharge Note      Date:  11/20/2020    Patient Name:  Kwaku Gonzalez         YOB: 2000    Medical Diagnosis and ICD 10:  Diagnosis: R53.81 debility, moderate malnutrition    Treatment Diagnosis:  Performance deficits / Impairments: Decreased functional mobility , Decreased ADL status, Decreased strength, Decreased endurance, Decreased sensation, Decreased balance, Decreased posture, Decreased high-level IADLs    Onset Date:  Onset Date: 06/11/20    Referring Physician and Referral Date:  Referring Practitioner: Dr. Dulce Ritter, 7/2/20     Visits Allowed/Insurance/Certification information:   [employer], 30 visits    Restrictions/Precautions: none     Plan of care sent to provider:    []Faxed  []Co-signature    (attempts: 1[x] 2 []3[])        Plan of care signed:    [x]Yes date: Dr. Dulce Ritter October 2020          []No         Next Progress Note due:  today    Visit# / total visits: 3/3, 5/8, 11/12 previously    Plan for Next Session:  Putty, review HEP    Home Exercise Program:     Access Code: [email or web address]   URL: [email or web address]   Date: 10/23/2020   Prepared by: Lisbeth Kimble     Exercises   Finger Exension with Putty - 10 reps - 3 sets - 2x daily - 7x weekly   Seated Claw Fist with Putty - 10 reps - 3 sets - 2x daily - 7x weekly   Finger Abduction with Putty - 10 reps - 3 sets - 2x daily - 7x weekly   Finger Adduction with Putty - 10 reps - 3 sets - 2x daily - 7x weekly   Putty Squeezes - 10 reps - 3 sets - 2-3x daily - 7x weekly   Hook Fist with Putty - 10 reps - 3 sets - 2-3x daily - 7x weekly   Thumb AROM MP Blocking - 10 reps - 3 sets - 2-3x daily - 7x weekly   Tip Pinch with Putty - 10 reps - 3 sets - 2-3x daily - 7x weekly   Seated Thumb Extension with Resistance - 10 reps - 3 sets - 2-3x daily - 7x weekly     Access Code: [email or web address]   URL: [email or web address]   Date: 11/20/2020   Prepared by: Lisbeth Kimble     Exercises   Putty Squeezes - 10 reps - 3 sets - 2-3x daily - 7x weekly   Finger Key  with Putty - 10 reps - 3 sets - 2-3x daily - 7x weekly   Tip Pinch with Putty - 10 reps - 3 sets - 2-3x daily - 7x weekly   Seated Finger Composite Flexion with Putty - 10 reps - 3 sets - 2-3x daily - 7x weekly     Subjective: Patient 15 minutes late for today's OT appointment, states she has started working at [employer] about 16 hours/week. Statse she is improving in her strength but does get tired and sore in her legs by the end of her shift. Pain level: 0/10, tingling in fingers    Objective   RUE strength:  Finger ab/adduction: 4+/5, 4/5 for middle finger  Finger extension: 4/5 to 4+/5   strength: 40, 40, 39 (#)  Lateral pinch: 8#, 8#, 8#     LUE strength  Finger ab/adduction: 4+/5  Finger extension: 4 to 4+/5   strength: 36, 40, 41(#)  Lateral pinch: 11#, 11#, 10#    Functional Outcome Measure:   []NA Score   Quickdash Score of 11, 0% DSI    Treatment/Activity Tolerance:    Patients response to treatment:   [x]Patient tolerated treatment well []Patient limited by fatigue   []Patient limited by pain  []Patient limited by other medical complications   []Other:     Assessment:  Patient verbalizes understanding of HEP, requires occasional cues this date for form with HEP. Patient was last here for therapy 10/23/20 and attended this appointment to ensure independence in HEP and demonstrates improvements in  strength and intrinsic finger strength since last visit. She required occasional cues during demonstration of HEP and verbalizes understanding to continue with HEP with adjustments made to avoid hyperextension in finger joints. Even with participation in work-based activity, patient does not report pain in hands, neck, and shoulders. Patient now discharged from OT, and verbalizes understanding to follow-up with PCP in the future if she has any pain or strength issues given the severity and significance of her medical episode as well as long term effects from postural and strength deficits that can create joint and muscle balance issues later in life.     Goals  Short term goals  Time Frame for Short term goals: 3 weeks  Short term goal 1: and/or improve strength for  and pinch BUE for maximizing endurance for return to homemaking tasks and future employment goals.  - GOAL MET 10/23 for 5#, however patient continues to be significantly low in strength for  and pinch and requires additional therapy in 4 weeks to ensure that she continues to make progress and to review HEP, CONTINUE GOAL UNTIL 11/20/20, GOAL MET FOR  ONLY, BUT NOT FOR BON [facility] 11/20    Prognosis: [x]Good   []Fair   []Poor    Patient Requires Follow-up:  [x]Yes  []No    Plan: []Plan of care initiated     []Continue per plan of care               [] Alter current plan    []Hold pending MD visit [x]Discharge    Time in: 0945       Time out: 1015    Timed Code Treatment Minutes:  30    Total Treatment Minutes: 30    Electronically signed by:  SHALONDA Alvarez/TAWANA

## 2020-12-04 ENCOUNTER — PATIENT MESSAGE (OUTPATIENT)
Dept: PRIMARY CARE CLINIC | Age: 20
End: 2020-12-04

## 2020-12-05 NOTE — TELEPHONE ENCOUNTER
From: Alfred Hernandes  To: Fly Pickett MD  Sent: 12/4/2020 7:59 PM EST  Subject: Prescription Question    Could I get refills at the [facility] for Magnesium and Folic Acid? Thank you.

## 2020-12-14 ENCOUNTER — TELEPHONE (OUTPATIENT)
Dept: PRIMARY CARE CLINIC | Age: 20
End: 2020-12-14

## 2020-12-14 NOTE — TELEPHONE ENCOUNTER
----- Message from Wil Ramesh sent at 12/14/2020  3:13 PM EST -----  Subject: Refill Request    QUESTIONS  Name of Medication? folic acid (FOLVITE) 1 MG tablet  Patient-reported dosage and instructions? 1 mg tab  How many days do you have left? 0  Preferred Pharmacy? [id number]  Pharmacy phone number (if available)? [phone]  Additional Information for Provider? pt needs a refill until her appt . please advise .  ---------------------------------------------------------------------------  --------------  CALL BACK INFO  What is the best way for the office to contact you? OK to leave message on   voicemail  Preferred Call Back Phone Number?  [phone]

## 2020-12-27 NOTE — PROGRESS NOTES
Chief Complaint   Patient presents with    Follow-up       History of Present Ilness:  Justyn Westbrook is a 23 y.o. female who presents to clinic for follow-up of foot pain and her hepatitis A shot. Onset: Foot pain has been going on for some time. Her feet hurt at the arches and inside of her ankles, medial aspect where she is pointing, when she is on them for long periods of time. She has tried to lose weight to get the pressure off of them. Progression: Getting worse over time and she has not been able to lose weight. Has not tried any orthotics or other methods of treatment. Quality: Soreness and aching. Timing: Worse throughout the day and the more she is on her feet. Associated symptoms: Denies swelling, numbness and tingling, cold temperature  Aggravating: Standing and walking for long periods of time  Alleviating: Keeping her feet elevated    Taking folic acid and vitamin D supplements. Needs refills. Review of Systems   Constitutional: Negative for activity change, appetite change, chills, fatigue, fever and unexpected weight change. HENT: Negative for congestion, postnasal drip, rhinorrhea, sinus pressure, sinus pain, sneezing and sore throat. Eyes: Negative for visual disturbance. Respiratory: Negative for cough, chest tightness, shortness of breath and wheezing. Cardiovascular: Negative for chest pain and palpitations. Gastrointestinal: Negative for abdominal pain, blood in stool, constipation, diarrhea, nausea and vomiting. Endocrine: Negative for cold intolerance, heat intolerance, polydipsia and polyuria. Genitourinary: Negative for dysuria, frequency, vaginal bleeding and vaginal discharge. Musculoskeletal: Positive for arthralgias. Negative for back pain, joint swelling, myalgias and neck pain. Bilateral foot pain   Skin: Negative for rash and wound. Allergic/Immunologic: Negative for environmental allergies. Right lower leg: No edema. Left lower leg: No edema. Feet:       Comments: Bilateral feet are sore at the end of the day. Skin:     General: Skin is warm. Capillary Refill: Capillary refill takes less than 2 seconds. Findings: No erythema or rash. Neurological:      General: No focal deficit present. Mental Status: She is alert and oriented to person, place, and time. Mental status is at baseline. Motor: No weakness. Coordination: Coordination normal.      Gait: Gait normal.   Psychiatric:         Mood and Affect: Mood normal.         Behavior: Behavior normal.         Thought Content: Thought content normal.         Judgment: Judgment normal.         Point of Care labs:  No results found for this or any previous visit (from the past 24 hour(s)). Assessment/Plan:  Micki Hinson is a 23 y.o. female with PMH significant for bilateral foot pain who presents to clinic for possible treatment. 1. Need for hepatitis A immunization  Administered  - Hep A Vaccine Adult (HAVRIX)    2. Paresthesias  Refilled  - folic acid (FOLVITE) 1 MG tablet; Take 1 tablet by mouth daily  Dispense: 30 tablet; Refill: 3    3. Vitamin D deficiency  Refilled  - vitamin D (ERGOCALCIFEROL) 1.25 MG (51850 UT) CAPS capsule; Take 1 capsule by mouth once a week  Dispense: 5 capsule; Refill: 1    4. Chronic pain of both ankles  We will try orthotics. I believe she has a stretched arch and is getting more flat-footed over time. Recommended she still work on weight loss but will try to fix the mechanical issue with the mechanical solution. [facility]  If orthotics do not help, will refer to podiatry or epidural if this too can make her custom orthotics. This does not sound like a plantar fasciitis or ankle arthritis. - Foot Care Products (DR SCHOLLS CUSTOM FIT ORTHOTIC) PADS; 1 each by Does not apply route daily  Dispense: 2 each;  Refill: 0

## 2020-12-28 ENCOUNTER — OFFICE VISIT (OUTPATIENT)
Dept: PRIMARY CARE CLINIC | Age: 20
End: 2020-12-28
Payer: MEDICARE

## 2020-12-28 VITALS
HEART RATE: 86 BPM | BODY MASS INDEX: 41.2 KG/M2 | SYSTOLIC BLOOD PRESSURE: 133 MMHG | RESPIRATION RATE: 16 BRPM | DIASTOLIC BLOOD PRESSURE: 80 MMHG | TEMPERATURE: 97.3 F | WEIGHT: 247.6 LBS

## 2020-12-28 PROCEDURE — G8482 FLU IMMUNIZE ORDER/ADMIN: HCPCS | Performed by: FAMILY MEDICINE

## 2020-12-28 PROCEDURE — 90632 HEPA VACCINE ADULT IM: CPT | Performed by: FAMILY MEDICINE

## 2020-12-28 PROCEDURE — 99213 OFFICE O/P EST LOW 20 MIN: CPT | Performed by: FAMILY MEDICINE

## 2020-12-28 PROCEDURE — 1036F TOBACCO NON-USER: CPT | Performed by: FAMILY MEDICINE

## 2020-12-28 PROCEDURE — G8417 CALC BMI ABV UP PARAM F/U: HCPCS | Performed by: FAMILY MEDICINE

## 2020-12-28 PROCEDURE — G8427 DOCREV CUR MEDS BY ELIG CLIN: HCPCS | Performed by: FAMILY MEDICINE

## 2020-12-28 PROCEDURE — 90471 IMMUNIZATION ADMIN: CPT | Performed by: FAMILY MEDICINE

## 2020-12-28 RX ORDER — ERGOCALCIFEROL 1.25 MG/1
50000 CAPSULE ORAL WEEKLY
Qty: 5 CAPSULE | Refills: 1 | Status: SHIPPED | OUTPATIENT
Start: 2020-12-28 | End: 2021-02-18

## 2020-12-28 RX ORDER — FOLIC ACID 1 MG/1
1 TABLET ORAL DAILY
Qty: 30 TABLET | Refills: 3 | Status: SHIPPED | OUTPATIENT
Start: 2020-12-28 | End: 2021-04-19

## 2020-12-28 ASSESSMENT — PATIENT HEALTH QUESTIONNAIRE - PHQ9
SUM OF ALL RESPONSES TO PHQ QUESTIONS 1-9: 0
DEPRESSION UNABLE TO ASSESS: FUNCTIONAL CAPACITY MOTIVATION LIMITS ACCURACY
SUM OF ALL RESPONSES TO PHQ QUESTIONS 1-9: 0
1. LITTLE INTEREST OR PLEASURE IN DOING THINGS: 0
SUM OF ALL RESPONSES TO PHQ QUESTIONS 1-9: 0
SUM OF ALL RESPONSES TO PHQ9 QUESTIONS 1 & 2: 0
2. FEELING DOWN, DEPRESSED OR HOPELESS: 0

## 2020-12-28 ASSESSMENT — ENCOUNTER SYMPTOMS
WHEEZING: 0
SORE THROAT: 0
COUGH: 0
VOMITING: 0
BLOOD IN STOOL: 0
RHINORRHEA: 0
CONSTIPATION: 0
ABDOMINAL PAIN: 0
SINUS PAIN: 0
SHORTNESS OF BREATH: 0
CHEST TIGHTNESS: 0
DIARRHEA: 0
SINUS PRESSURE: 0
NAUSEA: 0
BACK PAIN: 0

## 2021-01-27 NOTE — PROGRESS NOTES
Physical Therapy  Facility/Department: [facility]  Daily Treatment Note  NAME: Nicola Fitch  : 2000  MRN: [id number]    Date of Service: 2020    Discharge Recommendations:  Home with assist PRN, Home with Home health PT   PT Equipment Recommendations  Equipment Needed: Yes  Mobility Devices: Viviann Lingon: Rolling    Assessment   Body structures, Functions, Activity limitations: Decreased functional mobility ; Decreased strength;Decreased endurance;Decreased balance;Decreased posture; Increased pain  Assessment: Pt able to improve to SBA with sit to/from stand transfers at [facility] and even progressed to SBA with ambulation at . Pt demonstrated decreased LE muscle control (especially quad control) with stair managment. Targeted LE strength and muscular control/endurance with various sitting and standing exercises this date. Pt will continue to benefit from skilled PT in ARU. Treatment Diagnosis: debility  PT Education: Goals; Functional Mobility Training;PT Role;Transfer Training; Adaptive Device Training;Plan of Care;Equipment;Gait Training;General Safety; Disease Specific Education  Patient Education: pt  verbalized understanding  Barriers to Learning: None  REQUIRES PT FOLLOW UP: Yes  Activity Tolerance  Activity Tolerance: Patient Tolerated treatment well;Patient limited by fatigue;Patient limited by endurance     Patient Diagnosis(es): There were no encounter diagnoses. has no past medical history on file. has no past surgical history on file. Restrictions  Restrictions/Precautions  Restrictions/Precautions: Fall Risk, Up as Tolerated  Position Activity Restriction  Other position/activity restrictions: ambulate pt  Subjective   Pain Screening  Patient Currently in Pain: Denies  Vital Signs  Patient Currently in Pain: Denies       Orientation     Cognition   Cognition  Overall Cognitive Status: (pleasant and cooperative)  Following Commands:  Follows multistep commands consistently  Problem Solving: Assistance required to generate solutions  Initiation: Does not require cues  Sequencing: Does not require cues  Objective   Bed mobility  Supine to Sit: Unable to assess  Sit to Supine: Unable to assess  Comment: pt in chair at start and returned to chair at end  Transfers  Sit to Stand: Stand by assistance(to RW)  Stand to sit: Stand by assistance(to RW)  Ambulation  Ambulation?: Yes  Ambulation 1  Surface: level tile  Device: Rolling Walker  Assistance: Stand by assistance(CGA progressed to SBA)  Gait Deviations: Slow Mariah;Decreased step length;Decreased step height(decreased heel strike, mild B foot drop)  Distance: 165 ft + 425 ft  Stairs  # Steps : 4  Stairs Height: 6\"  Rails: Bilateral  Assistance: Contact guard assistance  Comment: Note increased use of UE to ascend/descend, note weak LE control.         Exercises  Hamstring Sets: seated: 1 x 20 BLE 2# and yellow Tband; standing: HS curls with blocking to prevent compensation  Gluteal Sets: 1 x 20   Hip Flexion: 1x 20 BLE seated marches 2#  Hip Abduction: seated: 1 x 20 BLE yellow Tband: standing side steps with yellow Tband x 15 each direction  Knee Long Arc Quad: 1x 20 BLE 2#  Ankle Pumps: 1x 20 BLE 2#  Other exercises  Other exercises 1: 2 x 10 forward step-ups at 4\" step in // bars BUE then UUE support with CGA  Other exercises 2: Airex standing balance with no UE support and with UUE and reaching all directions with other UE   Other exercises 3: 2 x 10 forward step-downs at 4\" step in // bars BUE then UUE support with CGA  Other exercises 4: scap squeezes x 20, shoulder rolls forward and backwards x 20 each direction, UE chair push-ups x 20  Other exercises 5: B LE mini squats with UE support at // bars x 20  Other exercises 6: standing gastroc stretch x 30 sec each LE                        Goals  Short term goals  Time Frame for Short term goals: 5 days  Short term goal 1: Patient will complete sit<>stand with SBA and LRAD. - goal met at [facility] [place]  Short term goal 2: Patient will ambulate 150 ft with SBA and LRAD. - goal met at [facility] 6/[place]  Short term goal 3: Patient will ascend/descend 4 stairs with SBA and B HR - not met, requires CGA 6/29  Short term goal 4: Patient will demonstrate tolerate 20 reps of B LE exercises. - goal met at [facility] 6/29  Short term goal 5: Patient will  an object from the floor with SBA and LRAD. Long term goals  Time Frame for Long term goals : 10 days  Long term goal 1: Patient will complete sit<>stand with MI and LRAD. Long term goal 2: Patient will ambulate 300 ft with MI and LRAD. Long term goal 3: Patient will ascend/descend 4 stairs with MI and B HR. Long term goal 4: Patient will be independent with HEP. Long term goal 5: Patient will  an object from the floor with MI and LRAD. Patient Goals   Patient goals : to get back to walking normally    Plan    Plan  Times per week: 5 out ot 7 days  Times per day: Daily  Current Treatment Recommendations: Strengthening, Transfer Training, Endurance Training, Balance Training, Gait Training, Functional Mobility Training, Stair training, Safety Education & Training, Patient/Caregiver Education & Training  Safety Devices  Type of devices:  All fall risk precautions in place, Call light within reach, Gait belt, Patient at risk for falls, Nurse notified, Chair alarm in place, Left in chair  Restraints  Initially in place: No     Therapy Time   Individual Concurrent Group Co-treatment   Time In 0930         Time Out 1100         Minutes 90         Timed Code Treatment Minutes: 90 Minutes       Elmer Daley, PT No pertinent past medical history <<----- Click to add NO pertinent Past Medical History

## 2021-02-08 RX ORDER — PAROXETINE HYDROCHLORIDE 20 MG/1
TABLET, FILM COATED ORAL
Qty: 30 TABLET | Refills: 2 | Status: SHIPPED | OUTPATIENT
Start: 2021-02-08 | End: 2021-05-03

## 2021-02-08 RX ORDER — LEVOTHYROXINE SODIUM 0.1 MG/1
TABLET ORAL
Qty: 30 TABLET | Refills: 2 | Status: SHIPPED | OUTPATIENT
Start: 2021-02-08 | End: 2021-05-03

## 2021-02-11 RX ORDER — PANTOPRAZOLE SODIUM 40 MG/1
TABLET, DELAYED RELEASE ORAL
Qty: 30 TABLET | Refills: 2 | Status: SHIPPED | OUTPATIENT
Start: 2021-02-11 | End: 2021-05-03

## 2021-02-18 DIAGNOSIS — E55.9 VITAMIN D DEFICIENCY: ICD-10-CM

## 2021-02-18 RX ORDER — ERGOCALCIFEROL 1.25 MG/1
CAPSULE ORAL
Qty: 12 CAPSULE | Refills: 2 | Status: SHIPPED | OUTPATIENT
Start: 2021-02-18 | End: 2021-11-29

## 2021-02-19 ENCOUNTER — HOSPITAL ENCOUNTER (EMERGENCY)
Age: 21
Discharge: HOME | End: 2021-02-19
Payer: MEDICARE

## 2021-02-19 ENCOUNTER — APPOINTMENT (OUTPATIENT)
Dept: GENERAL RADIOLOGY | Age: 21
End: 2021-02-19
Payer: MEDICARE

## 2021-02-19 VITALS
HEART RATE: 90 BPM | BODY MASS INDEX: 39.99 KG/M2 | SYSTOLIC BLOOD PRESSURE: 133 MMHG | DIASTOLIC BLOOD PRESSURE: 78 MMHG | RESPIRATION RATE: 15 BRPM | HEIGHT: 65 IN | OXYGEN SATURATION: 97 % | WEIGHT: 240 LBS | TEMPERATURE: 97.3 F

## 2021-02-19 DIAGNOSIS — S82.832A OTHER CLOSED FRACTURE OF DISTAL END OF LEFT FIBULA, INITIAL ENCOUNTER: Primary | ICD-10-CM

## 2021-02-19 PROCEDURE — 6370000000 HC RX 637 (ALT 250 FOR IP): Performed by: PHYSICIAN ASSISTANT

## 2021-02-19 PROCEDURE — 73610 X-RAY EXAM OF ANKLE: CPT

## 2021-02-19 PROCEDURE — 99284 EMERGENCY DEPT VISIT MOD MDM: CPT

## 2021-02-19 PROCEDURE — 29515 APPLICATION SHORT LEG SPLINT: CPT

## 2021-02-19 RX ORDER — HYDROCODONE BITARTRATE AND ACETAMINOPHEN 5; 325 MG/1; MG/1
1 TABLET ORAL EVERY 6 HOURS PRN
Qty: 10 TABLET | Refills: 0 | Status: SHIPPED | OUTPATIENT
Start: 2021-02-19 | End: 2021-02-22

## 2021-02-19 RX ORDER — DICYCLOMINE HYDROCHLORIDE 10 MG/1
10 CAPSULE ORAL
Qty: 120 CAPSULE | Refills: 3 | Status: SHIPPED | OUTPATIENT
Start: 2021-02-19 | End: 2021-02-25 | Stop reason: SDUPTHER

## 2021-02-19 RX ORDER — OXYCODONE HYDROCHLORIDE AND ACETAMINOPHEN 5; 325 MG/1; MG/1
1 TABLET ORAL ONCE
Status: COMPLETED | OUTPATIENT
Start: 2021-02-19 | End: 2021-02-19

## 2021-02-19 RX ADMIN — OXYCODONE HYDROCHLORIDE AND ACETAMINOPHEN 1 TABLET: 5; 325 TABLET ORAL at 15:40

## 2021-02-19 ASSESSMENT — PAIN DESCRIPTION - ORIENTATION: ORIENTATION: LEFT

## 2021-02-19 ASSESSMENT — PAIN SCALES - GENERAL: PAINLEVEL_OUTOF10: 8

## 2021-02-19 ASSESSMENT — PAIN DESCRIPTION - PAIN TYPE: TYPE: ACUTE PAIN

## 2021-02-19 ASSESSMENT — PAIN DESCRIPTION - LOCATION: LOCATION: ANKLE

## 2021-02-20 NOTE — ED PROVIDER NOTES
[facility] Emergency Department    CHIEF COMPLAINT  Ankle Pain (walking at gas station and slipped on curb and heard left ankle snap )      SHARED SERVICE VISIT:  Evaluated by HARLEEN. My supervising physician was available for consultation. HISTORY OF PRESENT ILLNESS  Milagro Reid is a 23 y.o. female who presents to the ED complaining of     No other complaints, modifying factors or associated symptoms. Nursing notes reviewed. History reviewed. No pertinent past medical history. History reviewed. No pertinent surgical history.   Family History   Problem Relation Age of Onset    High Blood Pressure Father     High Cholesterol Father     Diabetes Sister      Social History     Socioeconomic History    Marital status: Single     Spouse name: Not on file    Number of children: 0    Years of education: 6    Highest education level: Not on file   Occupational History     Employer: UNEMPLOYED   Social Needs    Financial resource strain: Not on file    Food insecurity     Worry: Not on file     Inability: Not on file   [employer] needs     Medical: Not on file     Non-medical: Not on file   Tobacco Use    Smoking status: Never Smoker    Smokeless tobacco: Never Used   Substance and Sexual Activity    Alcohol use: No    Drug use: Never    Sexual activity: Not on file   Lifestyle    Physical activity     Days per week: Not on file     Minutes per session: Not on file    Stress: Not on file   Relationships    Social connections     Talks on phone: Not on file     Gets together: Not on file     Attends Episcopal service: Not on file     Active member of club or organization: Not on file     Attends meetings of clubs or organizations: Not on file     Relationship status: Not on file    Intimate partner violence     Fear of current or ex partner: Not on file     Emotionally abused: Not on file     Physically abused: Not on file     Forced sexual activity: Not on file   Other Topics Concern    Not on file   Social History Narrative    Not on file     No current facility-administered medications for this encounter. Current Outpatient Medications   Medication Sig Dispense Refill    dicyclomine (BENTYL) 10 MG capsule Take 1 capsule by mouth 2 times daily (before meals) 120 capsule 3    HYDROcodone-acetaminophen (NORCO) 5-325 MG per tablet Take 1 tablet by mouth every 6 hours as needed for Pain for up to 3 days. Intended supply: 3 days. Take lowest dose possible to manage pain 10 tablet 0    vitamin D (ERGOCALCIFEROL) 1.25 MG (76677 UT) CAPS capsule TAKE 1 CAPSULE BY MOUTH ONE TIME PER WEEK 12 capsule 2    pantoprazole (PROTONIX) 40 MG tablet TAKE 1 TABLET BY MOUTH EVERY DAY IN THE MORNING 30 tablet 2    PARoxetine (PAXIL) 20 MG tablet TAKE 1 TABLET BY MOUTH EVERY DAY AT NIGHT 30 tablet 2    levothyroxine (SYNTHROID) 100 MCG tablet TAKE 1 TABLET BY MOUTH EVERY DAY 30 tablet 2    folic acid (FOLVITE) 1 MG tablet Take 1 tablet by mouth daily 30 tablet 3    magnesium oxide (MAG-OX) 400 (241.3 Mg) MG TABS tablet Take 1 tablet by mouth daily 30 tablet 3    metoprolol tartrate (LOPRESSOR) 25 MG tablet Take 0.25 tablets by mouth 2 times daily 60 tablet 3    Foot Care Products (DR SCHOLLS CUSTOM FIT ORTHOTIC) PADS 1 each by Does not apply route daily 2 each 0     Allergies   Allergen Reactions    Lactose Intolerance (Gi)        REVIEW OF SYSTEMS  6 systems reviewed, pertinent positives per HPI otherwise noted to be negative    PHYSICAL EXAM  /78   Pulse 90   Temp 97.3 °F (36.3 °C)   Resp 15   Ht 5' 5\" (1.651 m)   Wt 240 lb (108.9 kg)   SpO2 97%   BMI 39.94 kg/m²   GENERAL APPEARANCE: Awake and alert. Cooperative. No acute distress. HEAD: Normocephalic. Atraumatic. EYES: PERRL. EOM's grossly intact. ENT: Mucous membranes are moist.   NECK: Supple. Normal ROM. CHEST: Equal symmetric chest rise. LUNGS: Breathing is unlabored.  Speaking comfortably in full sentences. Abdomen: Nondistended  EXTREMITIES: MAEE. No acute deformities. On exam of the left lower extremity patient pain to the proximal tibia or fibula. Soft tissue swelling noted to ankle with tenderness over distal fibula. No obvious deformities or step-offs. No appreciable joint effusion. No evidence for dislocation subluxations. Normal range of motion and strength testing though although does elicit pain. Pedal pulses +2 and cap refill less than 5 seconds. SKIN: Warm and dry. NEUROLOGICAL: Alert and oriented. Strength is 5/5 in all extremities and sensation is intact. RADIOLOGY  Xr Ankle Left (min 3 Views)    Result Date: 2/19/2021  EXAMINATION: THREE XRAY VIEWS OF THE LEFT ANKLE 2/19/2021 3:02 pm COMPARISON: None. HISTORY: ORDERING SYSTEM PROVIDED HISTORY: pain TECHNOLOGIST PROVIDED HISTORY: Reason for exam:->pain Reason for Exam: ankle pain, fell on curb, heard ankle pop Acuity: Acute Type of Exam: Initial FINDINGS: There is a minimally displaced transverse fracture through the distal left fibula extending into the lateral ankle mortise. Ankle mortise appears maintained. No other fractures are identified. There is diffuse soft tissue swelling over the lateral aspect of the ankle and an ankle joint effusion. Minimally displaced transverse fracture through the lateral malleolus. ED COURSE   Patient received Percocet for pain, with good relief. X-ray imaging of left ankle consistent with transverse fracture of lateral malleolus. A short leg OCL splint was applied to the left lower extremity by nursing staff. Patient remained neurovascularly intact status post application. Educated on nonweightbearing status she was provided with crutches and demonstrated good use of them here in the ED. Will be discharged with orthopedic referral for further evaluation more definitive care.   Of also discussed return precautions and patient in agreement and comfortable discharge. A discussion was had with Ms. Irasema Can regarding ankle pain, ED findings and recommendations for follow-up. Risk management discussed and shared decision making had with patient and/or surrogate. All questions were answered. Patient will follow up with orthopedist within 1 week for further evaluation/treatment. Patient will return to ED for new/worsening symptoms. Patient was sent home with a prescription for Norco.    MDM  I estimate there is LOW risk for COMPARTMENT SYNDROME, DEEP VENOUS THROMBOSIS, SEPTIC ARTHRITIS, TENDON OR NEUROVASCULAR INJURY, thus I consider the discharge disposition reasonable. Darryn Ruffin and I have discussed the diagnosis and risks, and we agree with discharging home to follow-up with their primary doctor or the referral orthopedist. We also discussed returning to the Emergency Department immediately if new or worsening symptoms occur. We have discussed the symptoms which are most concerning (e.g., changing or worsening pain, numbness, weakness) that necessitate immediate return. Final Impression  1. Other closed fracture of distal end of left fibula, initial encounter      Blood pressure 133/78, pulse 90, temperature 97.3 °F (36.3 °C), resp. rate 15, height 5' 5\" (1.651 m), weight 240 lb (108.9 kg), SpO2 97 %, not currently breastfeeding. DISPOSITION  Patient was discharged to home in good condition.            Hal Jewell  02/20/21 1007

## 2021-02-24 ENCOUNTER — PATIENT MESSAGE (OUTPATIENT)
Dept: PRIMARY CARE CLINIC | Age: 21
End: 2021-02-24

## 2021-02-24 ENCOUNTER — TELEPHONE (OUTPATIENT)
Dept: ORTHOPEDIC SURGERY | Age: 21
End: 2021-02-24

## 2021-02-24 ENCOUNTER — OFFICE VISIT (OUTPATIENT)
Dept: ORTHOPEDIC SURGERY | Age: 21
End: 2021-02-24
Payer: MEDICARE

## 2021-02-24 VITALS — WEIGHT: 240 LBS | HEIGHT: 65 IN | BODY MASS INDEX: 39.99 KG/M2

## 2021-02-24 DIAGNOSIS — S82.62XA CLOSED LOW LATERAL MALLEOLUS FRACTURE, LEFT, INITIAL ENCOUNTER: Primary | ICD-10-CM

## 2021-02-24 PROCEDURE — L4360 PNEUMAT WALKING BOOT PRE CST: HCPCS | Performed by: ORTHOPAEDIC SURGERY

## 2021-02-24 PROCEDURE — 99203 OFFICE O/P NEW LOW 30 MIN: CPT | Performed by: ORTHOPAEDIC SURGERY

## 2021-02-24 PROCEDURE — 27786 TREATMENT OF ANKLE FRACTURE: CPT | Performed by: ORTHOPAEDIC SURGERY

## 2021-02-24 RX ORDER — MAGNESIUM OXIDE 400 MG/1
TABLET ORAL
COMMUNITY
Start: 2021-02-10 | End: 2021-03-31

## 2021-02-24 NOTE — PROGRESS NOTES
[facility]  Outpatient Progress Note  Wagner Sales MD    Patient Name: Riya Faye MRN: [id number]   Age: 23 y.o. YOB: 2000   Sex: female      [address] Complaint   Patient presents with    Ankle Pain     NP LEFT ANKLE [address]        HISTORY OF PRESENT ILLNESS   Milagro Beltre is a 23 y.o. female slipped and fell twisting her left ankle several days ago. She was seen in the emergency department where x-rays showed a distal fibula fracture. She was splinted and referred for detoxification. PAST MEDICAL HISTORY    No past medical history on file. PAST SURGICAL HISTORY   No past surgical history on file. MEDICATIONS     Current Outpatient Medications   Medication Sig Dispense Refill    magnesium oxide (MAG-OX) 400 MG tablet TAKE 1 TABLET BY MOUTH EVERY DAY      dicyclomine (BENTYL) 10 MG capsule Take 1 capsule by mouth 2 times daily (before meals) 120 capsule 3    vitamin D (ERGOCALCIFEROL) 1.25 MG (39985 UT) CAPS capsule TAKE 1 CAPSULE BY MOUTH ONE TIME PER WEEK 12 capsule 2    pantoprazole (PROTONIX) 40 MG tablet TAKE 1 TABLET BY MOUTH EVERY DAY IN THE MORNING 30 tablet 2    PARoxetine (PAXIL) 20 MG tablet TAKE 1 TABLET BY MOUTH EVERY DAY AT NIGHT 30 tablet 2    levothyroxine (SYNTHROID) 100 MCG tablet TAKE 1 TABLET BY MOUTH EVERY DAY 30 tablet 2    folic acid (FOLVITE) 1 MG tablet Take 1 tablet by mouth daily 30 tablet 3    Foot Care Products (DR SCHOLLS CUSTOM FIT ORTHOTIC) PADS 1 each by Does not apply route daily 2 each 0    magnesium oxide (MAG-OX) 400 (241.3 Mg) MG TABS tablet Take 1 tablet by mouth daily 30 tablet 3    metoprolol tartrate (LOPRESSOR) 25 MG tablet Take 0.25 tablets by mouth 2 times daily 60 tablet 3     No current facility-administered medications for this visit.         ALLERGIES     Allergies   Allergen Reactions    Lactose Intolerance (Gi)        FAMILY HISTORY     Family History   Problem Relation Age of Onset    High Blood Pressure Father     High Cholesterol Father     Diabetes Sister        SOCIAL HISTORY     Social History     Socioeconomic History    Marital status: Single     Spouse name: None    Number of children: 0    Years of education: 6    Highest education level: None   Occupational History     Employer: UNEMPLOYED   Social Needs    Financial resource strain: None    Food insecurity     Worry: None     Inability: None    Transportation needs     Medical: None     Non-medical: None   Tobacco Use    Smoking status: Never Smoker    Smokeless tobacco: Never Used   Substance and Sexual Activity    Alcohol use: No    Drug use: Never    Sexual activity: None   Lifestyle    Physical activity     Days per week: None     Minutes per session: None    Stress: None   Relationships    Social connections     Talks on phone: None     Gets together: None     Attends Taoism service: None     Active member of club or organization: None     Attends meetings of clubs or organizations: None     Relationship status: None    Intimate partner violence     Fear of current or ex partner: None     Emotionally abused: None     Physically abused: None     Forced sexual activity: None   Other Topics Concern    None   Social History Narrative    None       REVIEW OF SYSTEMS   General: no fever, chills, night sweats, anorexia, malaise, fatigue, or weight change  Hematologic:  no unexplained bleeding or bruising  HEENT:   no nasal congestion, rhinorrhea, sore throat, or facial pain  Respiratory:  no cough, dyspnea, or chest pain  Cardiovascular:  no angina, LYNNE, PND, orthopnea, dependent edema, or palpitations  Gastrointestinal:  no nausea, vomiting, diarrhea, constipation, or abdominal pain  Genitourinary:  no urinary urgency, frequency, dysuria, or hematuria  Musculoskeletal: see HPI  Endocrine:  no heat or cold intolerance and no polyphagia, polydipsia, or polyuria  Skin:  no skin eruptions or changing lesions  Neurologic:  no focal weakness, numbness/tingling, tremor, or severe headache. See HPI. See HPI for pertinent positives. PHYSICAL EXAM   Vital Signs:   Vitals:    02/24/21 1529   Weight: 240 lb (108.9 kg)   Height: 5' 5\" (1.651 m)       General appearance: healthy, alert, no distress  Skin: Skin color, texture, turgor normal. No rashes or lesions  HEENT: atraumatic, normocephalic. PERRL  Respiratory: Unlabored breathing  Lymphatic: No adenopathy   Neuro: Alert and oriented, normal distal sensation, normal bilateral DTRs  Vascular: Normal distal capillary and distal pulses  Muskuloskeletal Exam: Left ankle examination demonstrates mild swelling. No deformity. Tenderness noted at the distal fibula. No medial tenderness. No ligament instability. Neurovascularly intact. RADIOLOGY   X-rays obtained and reviewed in office:  Views 3 views left ankle    Impression: Transverse nondisplaced Chung a distal fibula fracture. Ankle mortise intact. IMPRESSION     1. Closed low lateral malleolus fracture, left, initial encounter         PLAN   I had a lengthy discussion with patient today regarding diagnosis and treatment options and recommendations. Nonoperative management. Immobilized in a removable walking cast boot. She may weight-bear as tolerated. FOLLOWUP     Return in about 3 weeks (around 3/17/2021) for re-evaluation and xray. Orders Placed This Encounter   Procedures    MS CLOSED [address] FIBULA FX    Airselect Tall Pneumatic Walking Boot     Patient was prescribed a Rj Rolling Tall Walking Boot. The left ankle will require stabilization / immobilization from this semi-rigid / rigid orthosis to improve their function. The orthosis will assist in protecting the affected area, provide functional support and facilitate healing. Patient was instructed to progress ambulation  as partial weight bearing in the device.      The patient was educated and fit by a healthcare professional with expert knowledge and specialization in brace application while under the direct supervision of the physician. Verbal and written instructions for the use of and application of this item were provided. They were instructed to contact the office immediately should the brace result in increased pain, decreased sensation, increased swelling or worsening of the condition. No orders of the defined types were placed in this encounter.       Patient was instructed on appropriate use of braces, participation in home exercise programs, healthy lifestyle choices and weight loss as appropriate     Jovana Schroeder MD

## 2021-02-25 RX ORDER — DICYCLOMINE HYDROCHLORIDE 10 MG/1
10 CAPSULE ORAL
Qty: 120 CAPSULE | Refills: 3 | Status: SHIPPED | OUTPATIENT
Start: 2021-02-25 | End: 2021-04-05

## 2021-02-25 NOTE — TELEPHONE ENCOUNTER
From: Mary Silvestre  To: Shaun Zacarias MD  Sent: 2/24/2021 5:41 PM EST  Subject: Prescription Question    I need a refill of my dicyclomine and magnesium as soon as possible, I have ran all out. Thank you!

## 2021-03-17 ENCOUNTER — OFFICE VISIT (OUTPATIENT)
Dept: ORTHOPEDIC SURGERY | Age: 21
End: 2021-03-17

## 2021-03-17 VITALS — WEIGHT: 240 LBS | BODY MASS INDEX: 39.99 KG/M2 | HEIGHT: 65 IN

## 2021-03-17 DIAGNOSIS — S82.62XD CLOSED LOW LATERAL MALLEOLUS FRACTURE, LEFT, WITH ROUTINE HEALING, SUBSEQUENT ENCOUNTER: Primary | ICD-10-CM

## 2021-03-17 PROCEDURE — 99024 POSTOP FOLLOW-UP VISIT: CPT | Performed by: ORTHOPAEDIC SURGERY

## 2021-03-17 NOTE — LETTER
[address] [place] [address] [facility] [address]  Phone: [phone]  Fax: [phone]    Fermin Ty MD        March 17, 2021     Patient: Linda Suarez   YOB: 2000   Date of Visit: 3/17/2021       To Whom It May Concern: It is my medical opinion that Kelly Babin may return to work on 3/18/2021 with the following restrictions: must be able to wear boot as she works x 4 weeks 4/17/2021. If you have any questions or concerns, please don't hesitate to call.     Sincerely,        Fermin Ty MD

## 2021-03-17 NOTE — PROGRESS NOTES
[facility]  Outpatient Progress Note  Ezra García MD    Patient Name: Avani Turner MRN: [id number]   Age: 23 y.o. YOB: 2000   Sex: female      [address] Complaint   Patient presents with    Follow-up     F/U LEFT ANKLE FX       HISTORY OF PRESENT ILLNESS   Milagro Olivier is a 23 y.o. female  The patient presents for follow up on their fracture. 1 month status post nondisplaced Chung a distal fibula fracture. She has been ambulating in a walking cast boot. Still has some discomfort laterally    PAST MEDICAL HISTORY    No past medical history on file. PAST SURGICAL HISTORY   No past surgical history on file. MEDICATIONS     Current Outpatient Medications   Medication Sig Dispense Refill    dicyclomine (BENTYL) 10 MG capsule Take 1 capsule by mouth 2 times daily (before meals) 120 capsule 3    magnesium oxide (MAG-OX) 400 (241.3 Mg) MG TABS tablet Take 1 tablet by mouth daily 30 tablet 3    magnesium oxide (MAG-OX) 400 MG tablet TAKE 1 TABLET BY MOUTH EVERY DAY      vitamin D (ERGOCALCIFEROL) 1.25 MG (11469 UT) CAPS capsule TAKE 1 CAPSULE BY MOUTH ONE TIME PER WEEK 12 capsule 2    pantoprazole (PROTONIX) 40 MG tablet TAKE 1 TABLET BY MOUTH EVERY DAY IN THE MORNING 30 tablet 2    PARoxetine (PAXIL) 20 MG tablet TAKE 1 TABLET BY MOUTH EVERY DAY AT NIGHT 30 tablet 2    levothyroxine (SYNTHROID) 100 MCG tablet TAKE 1 TABLET BY MOUTH EVERY DAY 30 tablet 2    folic acid (FOLVITE) 1 MG tablet Take 1 tablet by mouth daily 30 tablet 3    Foot Care Products (DR SCHOLLS CUSTOM FIT ORTHOTIC) PADS 1 each by Does not apply route daily 2 each 0    metoprolol tartrate (LOPRESSOR) 25 MG tablet Take 0.25 tablets by mouth 2 times daily 60 tablet 3     No current facility-administered medications for this visit.         ALLERGIES     Allergies   Allergen Reactions    Lactose Intolerance (Gi)        FAMILY HISTORY     Family History eruptions or changing lesions  Neurologic:  no focal weakness, numbness/tingling, tremor, or severe headache. See HPI. See HPI for pertinent positives. PHYSICAL EXAM   Vital Signs: Ht 5' 5\" (1.651 m)   Wt 240 lb (108.9 kg)   BMI 39.94 kg/m²     General appearance: healthy, alert, no distress  Skin: Skin color, texture, turgor normal. No rashes or lesions  HEENT: atraumatic, normocephalic. PERRL  Respiratory: Unlabored breathing  Lymphatic: No adenopathy   Neuro: Alert and oriented, normal distal sensation, normal bilateral DTRs  Vascular: Normal distal capillary and distal pulses  Muskuloskeletal Exam:   Left ankle has no swelling. Mild tenderness at the known fracture site. Full range of motion is noted. RADIOLOGY   X-rays obtained and reviewed in office:  Views left ankle 3 views    Impression: Transverse Chung a distal fibula fracture with early consolidation    IMPRESSION     1. Closed low lateral malleolus fracture, left, with routine healing, subsequent encounter           PLAN   Continue walking cast boot for comfort. Transition to an accommodative athletic shoe as tolerated over the next 4 weeks    FOLLOWUP     Return in about 4 weeks (around 4/14/2021) for re-evaluation and xray. Orders Placed This Encounter   Procedures    XR ANKLE LEFT (MIN 3 VIEWS)     Standing Status:   Future     Number of Occurrences:   1     Standing Expiration Date:   3/17/2022     Order Specific Question:   Reason for exam:     Answer:   fx follow up      No orders of the defined types were placed in this encounter.       Patient was instructed on appropriate use of braces, participation in home exercise programs, healthy lifestyle choices and weight loss as appropriate     Kay Burt MD

## 2021-03-31 RX ORDER — MAGNESIUM OXIDE 400 MG/1
TABLET ORAL
Qty: 30 TABLET | Refills: 3 | Status: SHIPPED | OUTPATIENT
Start: 2021-03-31 | End: 2021-08-02

## 2021-04-05 RX ORDER — DICYCLOMINE HYDROCHLORIDE 10 MG/1
CAPSULE ORAL
Qty: 60 CAPSULE | Refills: 6 | Status: SHIPPED | OUTPATIENT
Start: 2021-04-05 | End: 2021-11-01

## 2021-04-14 ENCOUNTER — OFFICE VISIT (OUTPATIENT)
Dept: ORTHOPEDIC SURGERY | Age: 21
End: 2021-04-14

## 2021-04-14 VITALS — BODY MASS INDEX: 39.99 KG/M2 | HEIGHT: 65 IN | WEIGHT: 240 LBS

## 2021-04-14 DIAGNOSIS — S82.62XD CLOSED LOW LATERAL MALLEOLUS FRACTURE, LEFT, WITH ROUTINE HEALING, SUBSEQUENT ENCOUNTER: Primary | ICD-10-CM

## 2021-04-14 PROCEDURE — 99024 POSTOP FOLLOW-UP VISIT: CPT | Performed by: ORTHOPAEDIC SURGERY

## 2021-04-15 NOTE — PROGRESS NOTES
facility-administered medications for this visit.         ALLERGIES     Allergies   Allergen Reactions    Lactose Intolerance (Gi)        FAMILY HISTORY     Family History   Problem Relation Age of Onset    High Blood Pressure Father     High Cholesterol Father     Diabetes Sister        SOCIAL HISTORY     Social History     Socioeconomic History    Marital status: Single     Spouse name: None    Number of children: 0    Years of education: 6    Highest education level: None   Occupational History     Employer: UNEMPLOYED   Social Needs    Financial resource strain: None    Food insecurity     Worry: None     Inability: None    Transportation needs     Medical: None     Non-medical: None   Tobacco Use    Smoking status: Never Smoker    Smokeless tobacco: Never Used   Substance and Sexual Activity    Alcohol use: No    Drug use: Never    Sexual activity: None   Lifestyle    Physical activity     Days per week: None     Minutes per session: None    Stress: None   Relationships    Social connections     Talks on phone: None     Gets together: None     Attends Orthodox service: None     Active member of club or organization: None     Attends meetings of clubs or organizations: None     Relationship status: None    Intimate partner violence     Fear of current or ex partner: None     Emotionally abused: None     Physically abused: None     Forced sexual activity: None   Other Topics Concern    None   Social History Narrative    None       REVIEW OF SYSTEMS   General: no fever, chills, night sweats, anorexia, malaise, fatigue, or weight change  Hematologic:  no unexplained bleeding or bruising  HEENT:   no nasal congestion, rhinorrhea, sore throat, or facial pain  Respiratory:  no cough, dyspnea, or chest pain  Cardiovascular:  no angina, LYNNE, PND, orthopnea, dependent edema, or palpitations  Gastrointestinal:  no nausea, vomiting, diarrhea, constipation, or abdominal pain  Genitourinary:  no urinary urgency, frequency, dysuria, or hematuria  Musculoskeletal: see HPI  Endocrine:  no heat or cold intolerance and no polyphagia, polydipsia, or polyuria  Skin:  no skin eruptions or changing lesions  Neurologic:  no focal weakness, numbness/tingling, tremor, or severe headache. See HPI. See HPI for pertinent positives. PHYSICAL EXAM   Vital Signs: Ht 5' 5\" (1.651 m)   Wt 240 lb (108.9 kg)   BMI 39.94 kg/m²     General appearance: healthy, alert, no distress  Skin: Skin color, texture, turgor normal. No rashes or lesions  HEENT: atraumatic, normocephalic. PERRL  Respiratory: Unlabored breathing  Lymphatic: No adenopathy   Neuro: Alert and oriented, normal distal sensation, normal bilateral DTRs  Vascular: Normal distal capillary and distal pulses  Muskuloskeletal Exam:   Left ankle has no swelling. No tenderness at the distal fibula. Range of motion is full. There is no ligament instability. RADIOLOGY   X-rays obtained and reviewed in office:  Views left ankle 3 views    Impression: Fracture is well consolidated. Ankle mortise intact. IMPRESSION     1. Closed low lateral malleolus fracture, left, with routine healing, subsequent encounter           PLAN   Continue to increase activities as tolerated. FOLLOWUP     Return if symptoms worsen or fail to improve. Orders Placed This Encounter   Procedures    XR ANKLE LEFT (MIN 3 VIEWS)     Standing Status:   Future     Number of Occurrences:   1     Standing Expiration Date:   4/12/2022     Order Specific Question:   Reason for exam:     Answer:   FX FOLLOW UP      No orders of the defined types were placed in this encounter.       Patient was instructed on appropriate use of braces, participation in home exercise programs, healthy lifestyle choices and weight loss as appropriate     Ruperto Delgado MD

## 2021-04-19 DIAGNOSIS — R20.2 PARESTHESIAS: ICD-10-CM

## 2021-04-19 RX ORDER — FOLIC ACID 1 MG/1
TABLET ORAL
Qty: 30 TABLET | Refills: 5 | Status: SHIPPED | OUTPATIENT
Start: 2021-04-19 | End: 2021-11-01

## 2021-05-03 RX ORDER — PANTOPRAZOLE SODIUM 40 MG/1
TABLET, DELAYED RELEASE ORAL
Qty: 30 TABLET | Refills: 2 | Status: SHIPPED | OUTPATIENT
Start: 2021-05-03 | End: 2021-08-02

## 2021-05-03 RX ORDER — PAROXETINE HYDROCHLORIDE 20 MG/1
TABLET, FILM COATED ORAL
Qty: 30 TABLET | Refills: 2 | Status: SHIPPED | OUTPATIENT
Start: 2021-05-03 | End: 2021-08-02

## 2021-05-03 RX ORDER — LEVOTHYROXINE SODIUM 0.1 MG/1
TABLET ORAL
Qty: 30 TABLET | Refills: 2 | Status: SHIPPED | OUTPATIENT
Start: 2021-05-03 | End: 2021-08-02

## 2021-07-06 ENCOUNTER — HOSPITAL ENCOUNTER (EMERGENCY)
Age: 21
Discharge: HOME | End: 2021-07-06
Payer: MEDICARE

## 2021-07-06 VITALS
BODY MASS INDEX: 39.99 KG/M2 | TEMPERATURE: 98.3 F | RESPIRATION RATE: 16 BRPM | OXYGEN SATURATION: 96 % | WEIGHT: 240 LBS | HEIGHT: 65 IN | DIASTOLIC BLOOD PRESSURE: 87 MMHG | SYSTOLIC BLOOD PRESSURE: 112 MMHG | HEART RATE: 88 BPM

## 2021-07-06 DIAGNOSIS — S61.211A LACERATION OF LEFT INDEX FINGER WITHOUT DAMAGE TO NAIL, FOREIGN BODY PRESENCE UNSPECIFIED, INITIAL ENCOUNTER: Primary | ICD-10-CM

## 2021-07-06 PROCEDURE — 99283 EMERGENCY DEPT VISIT LOW MDM: CPT

## 2021-07-06 ASSESSMENT — PAIN SCALES - GENERAL: PAINLEVEL_OUTOF10: 5

## 2021-07-06 ASSESSMENT — PAIN DESCRIPTION - LOCATION: LOCATION: FINGER (COMMENT WHICH ONE)

## 2021-07-06 ASSESSMENT — PAIN DESCRIPTION - PAIN TYPE: TYPE: ACUTE PAIN

## 2021-07-07 NOTE — ED PROVIDER NOTES
[facility] Emergency Department    CHIEF COMPLAINT  Laceration (minor laceration to tip of left index finger from ; unsure of last tetanus)      SHARED SERVICE VISIT:  Evaluated by HARLEEN. My supervising physician was available for consultation. HISTORY OF PRESENT ILLNESS  Milgaro Tripathi is a 23 y.o. female who presents to the ED complaining of several hour history of laceration to index finger. Patient reports accidentally cutting tip of digit with . Bandaged it and presents for evaluation. Mild discomfort at site exacerbated by touch and movement. Does not radiate. No numbness, tingling, weakness of extremity. She is right-hand dominant. Believes she is up-to-date on tetanus vaccination. No other complaints, modifying factors or associated symptoms. Nursing notes reviewed. History reviewed. No pertinent past medical history. History reviewed. No pertinent surgical history. Family History   Problem Relation Age of Onset    High Blood Pressure Father     High Cholesterol Father     Diabetes Sister      Social History     Socioeconomic History    Marital status: Single     Spouse name: Not on file    Number of children: 0    Years of education: 6    Highest education level: Not on file   Occupational History     Employer: UNEMPLOYED   Tobacco Use    Smoking status: Never Smoker    Smokeless tobacco: Never Used   Vaping Use    Vaping Use: Never used   Substance and Sexual Activity    Alcohol use: No    Drug use: Never    Sexual activity: Not on file   Other Topics Concern    Not on file   Social History Narrative    Not on file     Social Determinants of Health     Financial Resource Strain:     Difficulty of Paying Living Expenses:    Food Insecurity:     Worried About Running Out of Food in the Last Year:     [address] in the Last Year:    Transportation Needs:     Lack of Transportation (Medical):      Lack of Transportation (Non-Medical):    Physical Activity:     Days of Exercise per Week:     Minutes of Exercise per Session:    Stress:     Feeling of Stress :    Social Connections:     Frequency of Communication with Friends and Family:     Frequency of Social Gatherings with Friends and Family:     Attends Restoration Services:     Active Member of Clubs or Organizations:     Attends Club or Organization Meetings:     Marital Status:    Intimate Partner Violence:     Fear of Current or Ex-Partner:     Emotionally Abused:     Physically Abused:     Sexually Abused:      No current facility-administered medications for this encounter.      Current Outpatient Medications   Medication Sig Dispense Refill    levothyroxine (SYNTHROID) 100 MCG tablet TAKE 1 TABLET BY MOUTH EVERY DAY 30 tablet 2    PARoxetine (PAXIL) 20 MG tablet TAKE 1 TABLET BY MOUTH EVERY DAY AT NIGHT 30 tablet 2    pantoprazole (PROTONIX) 40 MG tablet TAKE 1 TABLET BY MOUTH EVERY DAY IN THE MORNING 30 tablet 2    folic acid (FOLVITE) 1 MG tablet TAKE 1 TABLET BY MOUTH EVERY DAY 30 tablet 5    dicyclomine (BENTYL) 10 MG capsule TAKE 1 CAPSULE BY MOUTH TWICE A DAY BEFORE MEALS 60 capsule 6    magnesium oxide (MAG-OX) 400 MG tablet TAKE 1 TABLET BY MOUTH EVERY DAY 30 tablet 3    magnesium oxide (MAG-OX) 400 (241.3 Mg) MG TABS tablet Take 1 tablet by mouth daily 30 tablet 3    vitamin D (ERGOCALCIFEROL) 1.25 MG (37537 UT) CAPS capsule TAKE 1 CAPSULE BY MOUTH ONE TIME PER WEEK 12 capsule 2    Foot Care Products (DR SCHOLLS CUSTOM FIT ORTHOTIC) PADS 1 each by Does not apply route daily 2 each 0    metoprolol tartrate (LOPRESSOR) 25 MG tablet Take 0.25 tablets by mouth 2 times daily 60 tablet 3     Allergies   Allergen Reactions    Lactose Intolerance (Gi)        REVIEW OF SYSTEMS  6 systems reviewed, pertinent positives per HPI otherwise noted to be negative    PHYSICAL EXAM  /87   Pulse 88   Temp 98.3 °F (36.8 °C) (Oral)   Resp 16 Ht 5' 5\" (1.651 m)   Wt 240 lb (108.9 kg)   LMP 06/08/2021   SpO2 96%   BMI 39.94 kg/m²   GENERAL APPEARANCE: Awake and alert. Cooperative. No acute distress. HEAD: Normocephalic. Atraumatic. EYES: PERRL. EOM's grossly intact. ENT: Mucous membranes are moist.   NECK: Supple. Normal ROM. CHEST: Equal symmetric chest rise. LUNGS: Breathing is unlabored. Speaking comfortably in full sentences. Abdomen: Nondistended  EXTREMITIES: MAEE. On exam of left index finger patient with very minimal laceration noted to the digit. Bleeding well controlled without signs of foreign bodies. No underlying bony tenderness. No nailbed involvement. Compartment soft without crepitus. Normal range of motion and strength testing. Radial pulses +2 and cap refill less than 5 seconds. SKIN: Warm and dry. NEUROLOGICAL: Alert and oriented. Strength is 5/5 in all extremities and sensation is intact. ED COURSE  Pain control was not required while here in the emergency department. Wound was cleansed and on reevaluation at this time will not require any intervention. Patient discharged home with recommendations for wound aftercare as well as follow-up. Have discussed return precautions otherwise and she is in agreement and comfortable at discharge. A sterile bandage was applied. A discussion was had with Ms. Ramesh Issa regarding finger laceration, ED findings and recommendations for follow-up. Risk management discussed and shared decision making had with patient and/or surrogate. All questions were answered. Patient will follow up with PCP in 2 to 3 days for wound check for further evaluation/treatment. Patient will return to ED for new/worsening symptoms. No medications prescribed at discharge. MDM  I estimate there is LOW risk for CELLULITIS, COMPARTMENT SYNDROME, NECROTIZING FASCIITIS, TENDON OR NEUROVASCULAR INJURY, or FOREIGN BODY, thus I consider the discharge disposition reasonable.  Also, there is no evidence or peritonitis, sepsis, or toxicity. Buster Merlin and KRISTEN have discussed the diagnosis and risks, and we agree with discharging home to follow-up with their primary doctor. We also discussed returning to the Emergency Department immediately if new or worsening symptoms occur. We have discussed the symptoms which are most concerning (e.g., changing or worsening pain, fever, numbness, weakness, cool or painful digits) that necessitate immediate return. Final Impression  1. Laceration of left index finger without damage to nail, foreign body presence unspecified, initial encounter      Discharge Vital Signs:  Blood pressure 112/87, pulse 88, temperature 98.3 °F (36.8 °C), temperature source Oral, resp. rate 16, height 5' 5\" (1.651 m), weight 240 lb (108.9 kg), last menstrual period 06/08/2021, SpO2 96 %, not currently breastfeeding. DISPOSITION  Patient was discharged to home in good condition.            Mali Iyerma  07/06/21 5401

## 2021-07-07 NOTE — DISCHARGE INSTR - COC
Continuity of Care Form    Patient Name: Nabil Denton   :  2000  MRN:  [id number]    Admit date:  2021  Discharge date:  ***    Code Status Order: Prior   Advance Directives:     Admitting Physician:  No admitting provider for patient encounter. PCP: Hong Clement MD    Discharging Nurse: [facility] Unit/Room#:   Discharging Unit Phone Number: ***    Emergency Contact:   Extended Emergency Contact Information  Primary Emergency Contact: [facility]  Address: [address] [place] [address], [address] [facility]  Home Phone: [phone]  Relation: Parent  Secondary Emergency Contact: Shabnam Yung  Home Phone: [phone]  Relation: Brother/Sister    Past Surgical History:  History reviewed. No pertinent surgical history.     Immunization History:   Immunization History   Administered Date(s) Administered    DTaP 1998, 1999, 03/15/1999, 2000, 2003    HPV Quadrivalent (Gardasil) 2010, 10/11/2010, 2011    Hepatitis A Adult (Havrix, Vaqta) 2020    Hepatitis B 2000, 2000, 03/15/1999    Hib, unspecified 1998, 1999, 03/15/1999, 2000    Influenza A ([id number]) Vaccine PF IM 2009    Influenza, Sofia Bebo, IM, (6 mo and older Fluzone, Flulaval, Fluarix and 3 yrs and older Afluria) 2020    MMR 12/10/1999, 2003    Meningococcal MCV4P (Menactra) 2015    Polio IPV (IPOL) 1998, 1999, 2000, 2003    Tdap (Boostrix, Adacel) 2010, 2020    Varicella (Varivax) 2007, 2008       Active Problems:  Patient Active Problem List   Diagnosis Code    Obesity E66.9    Hyponatremia E87.1    Moderate malnutrition ([address] E44.0    Hypokalemia E87.6    Syncope R55    Right atrial mass I51.89    Dysphagia R13.10    Other specified hypothyroidism E03.8    Severe malnutrition (HCC) E43    Abnormal finding on echocardiogram R93.1    Tachycardia R00.0    Abnormal CT of the chest R93.89    E. coli UTI N39.0, B96.20    Oral thrush B37.0    Paresthesias R20.2    Mood disorder (HCC) F39    Anemia D64.9    Debility R53.81       Isolation/Infection:   Isolation          No Isolation        Patient Infection Status     Infection Onset Added Last Indicated Last Indicated By Review Planned Expiration Resolved Resolved By    None active    Resolved    COVID-19 Rule Out 06/15/20 06/15/20 06/15/20 COVID-19 (Ordered)   06/15/20 Rule-Out Test Resulted    C-diff Rule Out 20 Gastrointestinal Panel, Molecular (Ordered)   20 Rule-Out Test Resulted          Nurse Assessment:  Last Vital Signs: /87   Pulse 88   Temp 98.3 °F (36.8 °C) (Oral)   Resp 16   Ht 5' 5\" (1.651 m)   Wt 240 lb (108.9 kg)   LMP 2021   SpO2 96%   BMI 39.94 kg/m²     Last documented pain score (0-10 scale): Pain Level: 5  Last Weight:   Wt Readings from Last 1 Encounters:   21 240 lb (108.9 kg)     Mental Status:  {IP PT MENTAL STATUS:}    IV Access:  { ABIMAEL IV ACCESS:814396644}    Nursing Mobility/ADLs:  Walking   {CHP DME NUGE:548992457}  Transfer  {CHP DME FBGE:807237329}  Bathing  {CHP DME MKRY:380710860}  Dressing  {CHP DME BZRP:282820359}  Toileting  {CHP DME NGZX:992434078}  Feeding  {CHP DME FFH}  Med Admin  {CHP DME QXWC:346564362}  Med Delivery   { ABIMAEL MED Delivery:327556905}    Wound Care Documentation and Therapy:  Wound 20 Perineum Medial (Active)   Number of days: 389       Wound 20 Foot Dorsal;Left (Active)   Number of days: 376       Wound 20 Foot Dorsal;Right (Active)   Number of days: 376        Elimination:  Continence:   · Bowel: {YES / WB:97635}  · Bladder: {YES / TF:34911}  Urinary Catheter: {Urinary Catheter:771342684}   Colostomy/Ileostomy/Ileal Conduit: {YES / TR:80421}       Date of Last BM: ***  No intake or output data in the 24 hours ending 21  No intake/output data recorded.     Safety Concerns:     [address] [facility] Safety Concerns:184503319}    Impairments/Disabilities:      [address] [facility] Impairments/Disabilities:253932728}    Nutrition Therapy:  Current Nutrition Therapy:   [address] [facility] Diet List:049039531}    Routes of Feeding: {CHP DME Other Feedings:407837937}  Liquids: {Slp liquid thickness:22282}  Daily Fluid Restriction: {CHP DME Yes amt example:388829020}  Last Modified Barium Swallow with Video (Video Swallowing Test): {Done Not Done ZALR:498905973}    Treatments at the Time of Hospital Discharge:   Respiratory Treatments: ***  Oxygen Therapy:  {Therapy; copd oxygen:22994}  Ventilator:    {[facility] Vent OOAY:165251487}    Rehab Therapies: {THERAPEUTIC INTERVENTION:4451580018}  Weight Bearing Status/Restrictions: [address]  Weight Bearin}  Other Medical Equipment (for information only, NOT a DME order):  {EQUIPMENT:157533701}  Other Treatments: ***    Patient's personal belongings (please select all that are sent with patient):  {P DME Belongings:811943295}    RN SIGNATURE:  {Esignature:551480668}    CASE MANAGEMENT/SOCIAL WORK SECTION    Inpatient Status Date: ***    Readmission Risk Assessment Score:  Readmission Risk              Risk of Unplanned Readmission:  0           Discharging to Facility/ Agency   · Name:   · Address:  · Phone:  · Fax:    Dialysis Facility (if applicable)   · Name:  · Address:  · Dialysis Schedule:  · Phone:  · Fax:    / signature: {Esignature:168462218}    PHYSICIAN SECTION    Prognosis: {Prognosis:9225097791}    Condition at Discharge: [address] Patient Condition:647790111}    Rehab Potential (if transferring to Rehab): {Prognosis:7088943445}    Recommended Labs or Other Treatments After Discharge: ***    Physician Certification: I certify the above information and transfer of Kwaku Gonzalez  is necessary for the continuing treatment of the diagnosis listed and that she requires {Admit to Appropriate Level of Care:41472} for {GREATER/LESS:508919959} 30 days.      Update Admission H&P: {CHP DME Changes in MBKVB:156628415}    PHYSICIAN SIGNATURE:  {Esignature:314333187}

## 2021-08-02 RX ORDER — PAROXETINE HYDROCHLORIDE 20 MG/1
TABLET, FILM COATED ORAL
Qty: 30 TABLET | Refills: 1 | Status: SHIPPED | OUTPATIENT
Start: 2021-08-02 | End: 2021-09-27

## 2021-08-02 RX ORDER — PANTOPRAZOLE SODIUM 40 MG/1
TABLET, DELAYED RELEASE ORAL
Qty: 30 TABLET | Refills: 1 | Status: SHIPPED | OUTPATIENT
Start: 2021-08-02 | End: 2021-09-27

## 2021-08-02 RX ORDER — LEVOTHYROXINE SODIUM 0.1 MG/1
TABLET ORAL
Qty: 30 TABLET | Refills: 1 | Status: SHIPPED | OUTPATIENT
Start: 2021-08-02 | End: 2021-09-27

## 2021-08-02 NOTE — TELEPHONE ENCOUNTER
Medication:   Requested Prescriptions     Pending Prescriptions Disp Refills    MAGNESIUM-OXIDE 400 (241.3 Mg) MG TABS tablet [Pharmacy Med Name: MAGNESIUM OXIDE 400 MG TABLET] 30 tablet 3     Sig: TAKE 1 TABLET BY MOUTH EVERY DAY    levothyroxine (SYNTHROID) 100 MCG tablet [Pharmacy Med Name: LEVOTHYROXINE 100 MCG TABLET] 30 tablet 2     Sig: TAKE 1 TABLET BY MOUTH EVERY DAY    pantoprazole (PROTONIX) 40 MG tablet [Pharmacy Med Name: PANTOPRAZOLE SOD DR 40 MG TAB] 30 tablet 2     Sig: TAKE 1 TABLET BY MOUTH EVERY DAY IN THE MORNING    PARoxetine (PAXIL) 20 MG tablet [Pharmacy Med Name: PAROXETINE HCL 20 MG TABLET] 30 tablet 2     Sig: TAKE 1 TABLET BY MOUTH EVERY DAY AT NIGHT        Last Filled:      Patient Phone Number: [phone] (home)     Last appt: 12/28/2020   Next appt: Visit date not found    Last OARRS: No flowsheet data found.

## 2021-08-02 NOTE — TELEPHONE ENCOUNTER
Medication:   Requested Prescriptions     Pending Prescriptions Disp Refills    metoprolol tartrate (LOPRESSOR) 25 MG tablet [Pharmacy Med Name: METOPROLOL TARTRATE 25 MG TAB] 15 tablet 14     Sig: TAKE A QUARTER TABLET BY MOUTH TWICE DAILY        Last Filled:  07/02/2020     Patient Phone Number: [phone] (home)     Last appt: 12/28/2020 Follow Up: 4 to 6 weeks or as needed   No future appointments. Next appt: Visit date not found    Last OARRS: No flowsheet data found.

## 2021-09-13 ENCOUNTER — OFFICE VISIT (OUTPATIENT)
Dept: PRIMARY CARE CLINIC | Age: 21
End: 2021-09-13

## 2021-09-13 VITALS
RESPIRATION RATE: 16 BRPM | BODY MASS INDEX: 47.79 KG/M2 | HEART RATE: 76 BPM | WEIGHT: 287.2 LBS | TEMPERATURE: 98.6 F | DIASTOLIC BLOOD PRESSURE: 77 MMHG | SYSTOLIC BLOOD PRESSURE: 119 MMHG

## 2021-09-13 DIAGNOSIS — Z12.4 CERVICAL CANCER SCREENING: ICD-10-CM

## 2021-09-13 DIAGNOSIS — R20.0 NUMBNESS IN FEET: Primary | ICD-10-CM

## 2021-09-13 PROCEDURE — 99213 OFFICE O/P EST LOW 20 MIN: CPT | Performed by: FAMILY MEDICINE

## 2021-09-13 ASSESSMENT — PATIENT HEALTH QUESTIONNAIRE - PHQ9
SUM OF ALL RESPONSES TO PHQ QUESTIONS 1-9: 0
2. FEELING DOWN, DEPRESSED OR HOPELESS: 0
SUM OF ALL RESPONSES TO PHQ9 QUESTIONS 1 & 2: 0
SUM OF ALL RESPONSES TO PHQ QUESTIONS 1-9: 0
SUM OF ALL RESPONSES TO PHQ QUESTIONS 1-9: 0
1. LITTLE INTEREST OR PLEASURE IN DOING THINGS: 0
DEPRESSION UNABLE TO ASSESS: FUNCTIONAL CAPACITY MOTIVATION LIMITS ACCURACY

## 2021-09-13 ASSESSMENT — ENCOUNTER SYMPTOMS
COUGH: 0
SHORTNESS OF BREATH: 0
EYE PAIN: 0
CONSTIPATION: 0
ABDOMINAL PAIN: 0
DIARRHEA: 0

## 2021-09-13 NOTE — PROGRESS NOTES
Chief Complaint   Patient presents with    Foot Pain     both, started last year, only time she can feel is if there is some injury          HPI:  Pamela Cervantes is a 23 y.o. (: 2000) here today for BL foot numbness for one year after an episode of swelling. She states she is worried that it has not improved. She only feels sensation if she hurts her feet, dropping something on them. No weakness or pain otherwise. She is on her feet all day for work. No issues. No problem walking. Needs referral to gyn. Review of Systems   Constitutional: Negative for appetite change, chills, fatigue and fever. HENT: Negative for congestion. Eyes: Negative for pain and visual disturbance. Respiratory: Negative for cough and shortness of breath. Cardiovascular: Negative for chest pain and palpitations. Gastrointestinal: Negative for abdominal pain, constipation and diarrhea. Genitourinary: Negative for difficulty urinating. Musculoskeletal: Negative for arthralgias. Skin: Negative for rash and wound. Neurological: Positive for numbness. Negative for dizziness, weakness, light-headedness and headaches. Hematological: Does not bruise/bleed easily. Psychiatric/Behavioral: Negative for behavioral problems. History reviewed. No pertinent past medical history.     Family History   Problem Relation Age of Onset    High Blood Pressure Father     High Cholesterol Father     Diabetes Sister        Social History     Tobacco Use    Smoking status: Never Smoker    Smokeless tobacco: Never Used   Vaping Use    Vaping Use: Never used   Substance Use Topics    Alcohol use: No    Drug use: Never       New Prescriptions    No medications on file       Meds Prior to visit:  Current Outpatient Medications on File Prior to Visit   Medication Sig Dispense Refill    MAGNESIUM-OXIDE 400 (241.3 Mg) MG TABS tablet TAKE 1 TABLET BY MOUTH EVERY DAY 30 tablet 1    levothyroxine (SYNTHROID) 100 MCG tablet TAKE 1 TABLET BY MOUTH EVERY DAY 30 tablet 1    pantoprazole (PROTONIX) 40 MG tablet TAKE 1 TABLET BY MOUTH EVERY DAY IN THE MORNING 30 tablet 1    PARoxetine (PAXIL) 20 MG tablet TAKE 1 TABLET BY MOUTH EVERY DAY AT NIGHT 30 tablet 1    metoprolol tartrate (LOPRESSOR) 25 MG tablet TAKE A QUARTER TABLET BY MOUTH TWICE DAILY 15 tablet 14    folic acid (FOLVITE) 1 MG tablet TAKE 1 TABLET BY MOUTH EVERY DAY 30 tablet 5    dicyclomine (BENTYL) 10 MG capsule TAKE 1 CAPSULE BY MOUTH TWICE A DAY BEFORE MEALS 60 capsule 6    vitamin D (ERGOCALCIFEROL) 1.25 MG (20755 UT) CAPS capsule TAKE 1 CAPSULE BY MOUTH ONE TIME PER WEEK 12 capsule 2     No current facility-administered medications on file prior to visit. Allergies   Allergen Reactions    Lactose Intolerance (Gi)        OBJECTIVE:  /77   Pulse 76   Temp 98.6 °F (37 °C) (Temporal)   Resp 16   Wt 287 lb 3.2 oz (130.3 kg)   LMP 09/12/2021   Breastfeeding No   BMI 47.79 kg/m²   BP Readings from Last 2 Encounters:   09/13/21 119/77   07/06/21 112/87     Wt Readings from Last 3 Encounters:   09/13/21 287 lb 3.2 oz (130.3 kg)   07/06/21 240 lb (108.9 kg)   04/14/21 240 lb (108.9 kg)       Physical Exam  Vitals reviewed. Constitutional:       General: She is not in acute distress. Appearance: Normal appearance. She is not ill-appearing. HENT:      Head: Normocephalic and atraumatic. Right Ear: External ear normal.      Left Ear: External ear normal.      Nose: Nose normal. No congestion. Mouth/Throat:      Mouth: Mucous membranes are moist.      Pharynx: Oropharynx is clear. No oropharyngeal exudate. Eyes:      Extraocular Movements: Extraocular movements intact. Conjunctiva/sclera: Conjunctivae normal.      Pupils: Pupils are equal, round, and reactive to light. Cardiovascular:      Rate and Rhythm: Normal rate and regular rhythm. Pulses: Normal pulses. Heart sounds: Normal heart sounds.    Pulmonary: 07/03/2020    CALCIUM 9.1 07/03/2020    PROT 4.9 (L) 06/23/2020    LABALBU 2.7 (L) 06/23/2020    BILITOT 0.4 06/23/2020    ALKPHOS 101 06/23/2020    AST 36 06/23/2020    ALT 46 (H) 06/23/2020    LABGLOM >60 07/03/2020    GFRAA >60 07/03/2020    AGRATIO 1.2 06/23/2020    GLOB 2.2 06/23/2020     Lab Results   Component Value Date    CHOL 120 06/19/2020    CHOL 136 06/11/2020     Lab Results   Component Value Date    TRIG 79 06/19/2020    TRIG 204 (H) 06/11/2020     Lab Results   Component Value Date    HDL 27 (L) 06/19/2020    HDL 21 (L) 06/11/2020     Lab Results   Component Value Date    LDLCALC 77 06/19/2020    LDLCALC 74 06/11/2020     Lab Results   Component Value Date    LABVLDL 16 06/19/2020    LABVLDL 41 06/11/2020     Lab Results   Component Value Date    LABA1C 5.2 06/19/2020         ASSESSMENT/PLAN:  1. Numbness in feet  May need EMG to figure out where nerve issue is   No muscle wasting or weakness or gait abnormality,   No foot drop. Unsure how to gauge the severity of this problem, it is difficult to get a good history and story about the problem. Has not tried any other means of treatment  Patellar reflex 1+ BL  Difficult to elicit a achilles reflex. - [facility] - Ariadna Voss DO, Neurology, [facility]    2. Cervical cancer screening  Referral placed for pap. - [facility], DO Pili, Gynecology, Melissa Angel        Discussed use, benefit, and side effects of prescribed medications. Barriers to medication compliance addressed. All patient questions answered. Pt voiced understanding. RTC No follow-ups on file. No future appointments.     Kary Hayden MD  9/13/2021  12:26 PM

## 2021-09-13 NOTE — Clinical Note
Robert Thorne has had BL foot numbness for about one year after an episode of severe swelling. She states she can only feel her feet when she hurts them. Wondering if an EMG is in order. [address], she wanted a referral to neurology to evaluate the issue. Thank you for your help!   Fani

## 2021-09-27 RX ORDER — LEVOTHYROXINE SODIUM 0.1 MG/1
TABLET ORAL
Qty: 30 TABLET | Refills: 1 | Status: SHIPPED | OUTPATIENT
Start: 2021-09-27 | End: 2021-12-03

## 2021-09-27 RX ORDER — PANTOPRAZOLE SODIUM 40 MG/1
TABLET, DELAYED RELEASE ORAL
Qty: 30 TABLET | Refills: 1 | Status: SHIPPED | OUTPATIENT
Start: 2021-09-27 | End: 2021-12-03

## 2021-09-27 RX ORDER — PAROXETINE HYDROCHLORIDE 20 MG/1
TABLET, FILM COATED ORAL
Qty: 30 TABLET | Refills: 1 | Status: SHIPPED | OUTPATIENT
Start: 2021-09-27 | End: 2021-12-03

## 2021-09-27 RX ORDER — MAGNESIUM OXIDE 400 MG/1
TABLET ORAL
Qty: 30 TABLET | Refills: 1 | Status: SHIPPED | OUTPATIENT
Start: 2021-09-27 | End: 2021-12-03

## 2021-09-27 NOTE — TELEPHONE ENCOUNTER
Medication:   Requested Prescriptions     Pending Prescriptions Disp Refills    pantoprazole (PROTONIX) 40 MG tablet [Pharmacy Med Name: PANTOPRAZOLE SOD DR 40 MG TAB] 30 tablet 1     Sig: TAKE 1 TABLET BY MOUTH EVERY DAY IN THE MORNING    magnesium oxide (MAG-OX) 400 MG tablet [Pharmacy Med Name: MAGNESIUM OXIDE 400 MG TABLET] 30 tablet 1     Sig: TAKE 1 TABLET BY MOUTH EVERY DAY    levothyroxine (SYNTHROID) 100 MCG tablet [Pharmacy Med Name: LEVOTHYROXINE 100 MCG TABLET] 30 tablet 1     Sig: TAKE 1 TABLET BY MOUTH EVERY DAY    PARoxetine (PAXIL) 20 MG tablet [Pharmacy Med Name: PAROXETINE HCL 20 MG TABLET] 30 tablet 1     Sig: TAKE 1 TABLET BY MOUTH EVERY DAY AT NIGHT        Last Filled:  8/2/2021    Patient Phone Number: [phone] (home)     Last appt: 9/13/2021 RTC No follow-ups on file. No future appointments. Next appt: Visit date not found    Last OARRS: No flowsheet data found.

## 2021-10-30 DIAGNOSIS — R20.2 PARESTHESIAS: ICD-10-CM

## 2021-11-01 RX ORDER — DICYCLOMINE HYDROCHLORIDE 10 MG/1
CAPSULE ORAL
Qty: 60 CAPSULE | Refills: 6 | Status: SHIPPED | OUTPATIENT
Start: 2021-11-01 | End: 2022-06-06

## 2021-11-01 RX ORDER — FOLIC ACID 1 MG/1
TABLET ORAL
Qty: 30 TABLET | Refills: 5 | Status: SHIPPED | OUTPATIENT
Start: 2021-11-01 | End: 2022-05-09

## 2021-11-29 DIAGNOSIS — E55.9 VITAMIN D DEFICIENCY: ICD-10-CM

## 2021-11-29 RX ORDER — ERGOCALCIFEROL 1.25 MG/1
CAPSULE ORAL
Qty: 4 CAPSULE | Refills: 8 | Status: SHIPPED | OUTPATIENT
Start: 2021-11-29 | End: 2022-09-08

## 2021-11-29 NOTE — TELEPHONE ENCOUNTER
Medication:   Requested Prescriptions     Pending Prescriptions Disp Refills    vitamin D (ERGOCALCIFEROL) 1.25 MG (73404 UT) CAPS capsule [Pharmacy Med Name: VITAMIN D2 1.25MG(50,000 UNIT)] 4 capsule 8     Sig: TAKE 1 CAPSULE BY MOUTH ONE TIME PER WEEK        Last Filled:      Patient Phone Number: [phone] (home)     Last appt: 9/13/2021   Next appt: Visit date not found    Last OARRS: No flowsheet data found.

## 2021-12-03 RX ORDER — PANTOPRAZOLE SODIUM 40 MG/1
TABLET, DELAYED RELEASE ORAL
Qty: 30 TABLET | Refills: 1 | Status: SHIPPED | OUTPATIENT
Start: 2021-12-03 | End: 2022-01-31

## 2021-12-03 RX ORDER — MAGNESIUM OXIDE 400 MG/1
TABLET ORAL
Qty: 30 TABLET | Refills: 1 | Status: SHIPPED | OUTPATIENT
Start: 2021-12-03 | End: 2022-01-31

## 2021-12-03 RX ORDER — PAROXETINE HYDROCHLORIDE 20 MG/1
TABLET, FILM COATED ORAL
Qty: 30 TABLET | Refills: 1 | Status: SHIPPED | OUTPATIENT
Start: 2021-12-03 | End: 2022-01-31

## 2021-12-03 RX ORDER — LEVOTHYROXINE SODIUM 0.1 MG/1
TABLET ORAL
Qty: 30 TABLET | Refills: 1 | Status: SHIPPED | OUTPATIENT
Start: 2021-12-03 | End: 2022-01-31

## 2021-12-03 NOTE — TELEPHONE ENCOUNTER
Medication:   Requested Prescriptions     Pending Prescriptions Disp Refills    levothyroxine (SYNTHROID) 100 MCG tablet [Pharmacy Med Name: LEVOTHYROXINE 100 MCG TABLET] 30 tablet 1     Sig: TAKE 1 TABLET BY MOUTH EVERY DAY    pantoprazole (PROTONIX) 40 MG tablet [Pharmacy Med Name: PANTOPRAZOLE SOD DR 40 MG TAB] 30 tablet 1     Sig: TAKE 1 TABLET BY MOUTH EVERY DAY IN THE MORNING    PARoxetine (PAXIL) 20 MG tablet [Pharmacy Med Name: PAROXETINE HCL 20 MG TABLET] 30 tablet 1     Sig: TAKE 1 TABLET BY MOUTH EVERY DAY AT NIGHT    magnesium oxide (MAG-OX) 400 MG tablet [Pharmacy Med Name: MAGNESIUM OXIDE 400 MG TABLET] 30 tablet 1     Sig: TAKE 1 TABLET BY MOUTH EVERY DAY       Last Filled:  10/31/2021    Patient Phone Number: [phone] (home)     Last appt: 9/13/2021   Next appt: Visit date not found    Last Thyroid:   Lab Results   Component Value Date    TSH 4.59 07/01/2020    FT3 1.5 06/11/2020    E1LNBCF 0.74 06/11/2020    T4FREE 1.8 06/11/2020    S8NVJPZ 5.6 06/11/2020

## 2022-01-07 ENCOUNTER — E-VISIT (OUTPATIENT)
Dept: PRIMARY CARE CLINIC | Age: 22
End: 2022-01-07

## 2022-01-07 ASSESSMENT — LIFESTYLE VARIABLES: SMOKING_STATUS: NO, I HAVE NEVER SMOKED

## 2022-01-07 NOTE — PROGRESS NOTES
Let patient know she is ok to get the vaccine in lieu of the medications she is taking. Send [employer] message to patient. Spent 5 min reviewing harley, reviewing questionnaire and formulating plan.      Chad Paz MD  1/7/2022  2:29 PM

## 2022-01-31 RX ORDER — PAROXETINE HYDROCHLORIDE 20 MG/1
TABLET, FILM COATED ORAL
Qty: 30 TABLET | Refills: 1 | Status: SHIPPED | OUTPATIENT
Start: 2022-01-31 | End: 2022-04-04

## 2022-01-31 RX ORDER — LEVOTHYROXINE SODIUM 0.1 MG/1
TABLET ORAL
Qty: 30 TABLET | Refills: 1 | Status: SHIPPED | OUTPATIENT
Start: 2022-01-31 | End: 2022-04-04

## 2022-01-31 RX ORDER — PANTOPRAZOLE SODIUM 40 MG/1
TABLET, DELAYED RELEASE ORAL
Qty: 30 TABLET | Refills: 1 | Status: SHIPPED | OUTPATIENT
Start: 2022-01-31 | End: 2022-04-04

## 2022-01-31 RX ORDER — MAGNESIUM OXIDE 400 MG/1
TABLET ORAL
Qty: 30 TABLET | Refills: 1 | Status: SHIPPED | OUTPATIENT
Start: 2022-01-31 | End: 2022-04-04

## 2022-01-31 NOTE — TELEPHONE ENCOUNTER
Medication:   Requested Prescriptions     Pending Prescriptions Disp Refills    pantoprazole (PROTONIX) 40 MG tablet [Pharmacy Med Name: PANTOPRAZOLE SOD DR 40 MG TAB] 30 tablet 1     Sig: TAKE 1 TABLET BY MOUTH EVERY DAY IN THE MORNING        Last Filled:      Patient Phone Number: [phone] (home)     Last appt: 1/7/2022   Next appt: Visit date not found    Last OARRS: No flowsheet data found.

## 2022-01-31 NOTE — TELEPHONE ENCOUNTER
Medication:   Requested Prescriptions     Pending Prescriptions Disp Refills    levothyroxine (SYNTHROID) 100 MCG tablet [Pharmacy Med Name: LEVOTHYROXINE 100 MCG TABLET] 30 tablet 1     Sig: TAKE 1 TABLET BY MOUTH EVERY DAY    magnesium oxide (MAG-OX) 400 MG tablet [Pharmacy Med Name: MAGNESIUM OXIDE 400 MG TABLET] 30 tablet 1     Sig: TAKE 1 TABLET BY MOUTH EVERY DAY    PARoxetine (PAXIL) 20 MG tablet [Pharmacy Med Name: PAROXETINE HCL 20 MG TABLET] 30 tablet 1     Sig: TAKE 1 TABLET BY MOUTH EVERY DAY AT NIGHT        Last Filled:      Patient Phone Number: [phone] (home)     Last appt: 1/7/2022   Next appt: 1/31/2022    Last OARRS: No flowsheet data found.

## 2022-04-04 RX ORDER — MAGNESIUM OXIDE 400 MG/1
TABLET ORAL
Qty: 30 TABLET | Refills: 1 | Status: SHIPPED | OUTPATIENT
Start: 2022-04-04 | End: 2022-06-06

## 2022-04-04 RX ORDER — PAROXETINE HYDROCHLORIDE 20 MG/1
TABLET, FILM COATED ORAL
Qty: 30 TABLET | Refills: 1 | Status: SHIPPED | OUTPATIENT
Start: 2022-04-04 | End: 2022-06-06

## 2022-04-04 RX ORDER — PANTOPRAZOLE SODIUM 40 MG/1
TABLET, DELAYED RELEASE ORAL
Qty: 30 TABLET | Refills: 1 | Status: SHIPPED | OUTPATIENT
Start: 2022-04-04 | End: 2022-06-06

## 2022-04-04 RX ORDER — LEVOTHYROXINE SODIUM 0.1 MG/1
TABLET ORAL
Qty: 30 TABLET | Refills: 1 | Status: SHIPPED | OUTPATIENT
Start: 2022-04-04 | End: 2022-06-06

## 2022-04-04 NOTE — TELEPHONE ENCOUNTER
Please have patient schedule a follow-up appointment within the next month for thyroid function.   Thanks

## 2022-04-04 NOTE — TELEPHONE ENCOUNTER
Medication:   Requested Prescriptions     Pending Prescriptions Disp Refills    levothyroxine (SYNTHROID) 100 MCG tablet [Pharmacy Med Name: LEVOTHYROXINE 100 MCG TABLET] 30 tablet 1     Sig: TAKE 1 TABLET BY MOUTH EVERY DAY    PARoxetine (PAXIL) 20 MG tablet [Pharmacy Med Name: PAROXETINE HCL 20 MG TABLET] 30 tablet 1     Sig: TAKE 1 TABLET BY MOUTH EVERY DAY AT NIGHT    magnesium oxide (MAG-OX) 400 MG tablet [Pharmacy Med Name: MAGNESIUM OXIDE 400 MG TABLET] 30 tablet 1     Sig: TAKE 1 TABLET BY MOUTH EVERY DAY    pantoprazole (PROTONIX) 40 MG tablet [Pharmacy Med Name: PANTOPRAZOLE SOD DR 40 MG TAB] 30 tablet 1     Sig: TAKE 1 TABLET BY MOUTH EVERY DAY IN THE MORNING        Last Filled:      Patient Phone Number: [phone] (home)     Last appt: 1/7/2022   Next appt: Visit date not found    Last OARRS: No flowsheet data found.

## 2022-05-07 DIAGNOSIS — R20.2 PARESTHESIAS: ICD-10-CM

## 2022-05-09 RX ORDER — FOLIC ACID 1 MG/1
TABLET ORAL
Qty: 30 TABLET | Refills: 5 | Status: SHIPPED | OUTPATIENT
Start: 2022-05-09

## 2022-05-09 NOTE — TELEPHONE ENCOUNTER
Medication:   Requested Prescriptions     Pending Prescriptions Disp Refills    folic acid (FOLVITE) 1 MG tablet [Pharmacy Med Name: FOLIC ACID 1 MG TABLET] 30 tablet 5     Sig: TAKE 1 TABLET BY MOUTH EVERY DAY        Last Filled:  11/1/21    Patient Phone Number: [phone] (home)     Last appt: 1/7/2022 RTC No follow-ups on file. No future appointments    Next appt: Visit date not found    Last OARRS: No flowsheet data found.

## 2022-06-06 DIAGNOSIS — E03.8 OTHER SPECIFIED HYPOTHYROIDISM: Primary | ICD-10-CM

## 2022-06-06 RX ORDER — LANOLIN ALCOHOL/MO/W.PET/CERES
CREAM (GRAM) TOPICAL
Qty: 30 TABLET | Refills: 1 | Status: SHIPPED | OUTPATIENT
Start: 2022-06-06 | End: 2022-08-15

## 2022-06-06 RX ORDER — DICYCLOMINE HYDROCHLORIDE 10 MG/1
CAPSULE ORAL
Qty: 60 CAPSULE | Refills: 6 | Status: SHIPPED | OUTPATIENT
Start: 2022-06-06

## 2022-06-06 RX ORDER — PANTOPRAZOLE SODIUM 40 MG/1
TABLET, DELAYED RELEASE ORAL
Qty: 30 TABLET | Refills: 1 | Status: SHIPPED | OUTPATIENT
Start: 2022-06-06 | End: 2022-08-15

## 2022-06-06 RX ORDER — PAROXETINE HYDROCHLORIDE 20 MG/1
TABLET, FILM COATED ORAL
Qty: 30 TABLET | Refills: 1 | Status: SHIPPED | OUTPATIENT
Start: 2022-06-06 | End: 2022-08-15

## 2022-06-06 RX ORDER — LEVOTHYROXINE SODIUM 0.1 MG/1
TABLET ORAL
Qty: 30 TABLET | Refills: 1 | Status: SHIPPED | OUTPATIENT
Start: 2022-06-06 | End: 2022-08-15

## 2022-06-06 NOTE — TELEPHONE ENCOUNTER
Medication:   Requested Prescriptions     Pending Prescriptions Disp Refills    PARoxetine (PAXIL) 20 MG tablet [Pharmacy Med Name: PAROXETINE HCL 20 MG TABLET] 30 tablet 1     Sig: TAKE 1 TABLET BY MOUTH EVERY DAY AT NIGHT    magnesium oxide (MAG-OX) 400 (240 Mg) MG tablet [Pharmacy Med Name: MAGNESIUM OXIDE 400 MG TABLET] 30 tablet 1     Sig: TAKE 1 TABLET BY MOUTH EVERY DAY    pantoprazole (PROTONIX) 40 MG tablet [Pharmacy Med Name: PANTOPRAZOLE SOD DR 40 MG TAB] 30 tablet 1     Sig: TAKE 1 TABLET BY MOUTH EVERY DAY IN THE MORNING        Last Filled:  04/04/22    Patient Phone Number: [phone] (home)     Last appt: 1/7/2022   Next appt: Visit date not found    Last OARRS: No flowsheet data found.

## 2022-06-06 NOTE — TELEPHONE ENCOUNTER
Medication:   Requested Prescriptions     Pending Prescriptions Disp Refills    levothyroxine (SYNTHROID) 100 MCG tablet [Pharmacy Med Name: LEVOTHYROXINE 100 MCG TABLET] 30 tablet 1     Sig: TAKE 1 TABLET BY MOUTH EVERY DAY       Last Filled:  04/04/22    Patient Phone Number: [phone] (home)     Last appt: 1/7/2022   Next appt: Visit date not found    Last Thyroid:   Lab Results   Component Value Date    TSH 4.59 07/01/2020    FT3 1.5 06/11/2020    L8GCZHY 0.74 06/11/2020    T4FREE 1.8 06/11/2020    [id number] 5.6 06/11/2020

## 2022-06-06 NOTE — TELEPHONE ENCOUNTER
Medication:   Requested Prescriptions     Pending Prescriptions Disp Refills    dicyclomine (BENTYL) 10 MG capsule [Pharmacy Med Name: DICYCLOMINE 10 MG CAPSULE] 60 capsule 6     Sig: TAKE 1 CAPSULE BY MOUTH TWICE A DAY BEFORE MEALS        Last Filled:  11/1/21    Patient Phone Number: [phone] (home)     Last appt: 1/7/2022   Next appt: Visit date not found    Last OARRS: No flowsheet data found.

## 2022-07-04 ENCOUNTER — HOSPITAL ENCOUNTER (EMERGENCY)
Age: 22
Discharge: HOME | End: 2022-07-04
Payer: MEDICARE

## 2022-07-04 ENCOUNTER — APPOINTMENT (OUTPATIENT)
Dept: GENERAL RADIOLOGY | Age: 22
End: 2022-07-04
Payer: MEDICARE

## 2022-07-04 VITALS
TEMPERATURE: 98.2 F | HEART RATE: 68 BPM | HEIGHT: 65 IN | SYSTOLIC BLOOD PRESSURE: 112 MMHG | RESPIRATION RATE: 16 BRPM | OXYGEN SATURATION: 98 % | WEIGHT: 240 LBS | BODY MASS INDEX: 39.99 KG/M2 | DIASTOLIC BLOOD PRESSURE: 68 MMHG

## 2022-07-04 DIAGNOSIS — S93.402A MODERATE LEFT ANKLE SPRAIN, INITIAL ENCOUNTER: Primary | ICD-10-CM

## 2022-07-04 DIAGNOSIS — M25.572 ACUTE LEFT ANKLE PAIN: ICD-10-CM

## 2022-07-04 PROCEDURE — 99283 EMERGENCY DEPT VISIT LOW MDM: CPT

## 2022-07-04 PROCEDURE — 6370000000 HC RX 637 (ALT 250 FOR IP): Performed by: NURSE PRACTITIONER

## 2022-07-04 PROCEDURE — 73610 X-RAY EXAM OF ANKLE: CPT

## 2022-07-04 RX ORDER — NAPROXEN 500 MG/1
500 TABLET ORAL 2 TIMES DAILY WITH MEALS
Qty: 30 TABLET | Refills: 0 | Status: SHIPPED | OUTPATIENT
Start: 2022-07-04

## 2022-07-04 RX ORDER — IBUPROFEN 600 MG/1
600 TABLET ORAL ONCE
Status: COMPLETED | OUTPATIENT
Start: 2022-07-04 | End: 2022-07-04

## 2022-07-04 RX ADMIN — IBUPROFEN 600 MG: 600 TABLET ORAL at 11:50

## 2022-07-04 ASSESSMENT — ENCOUNTER SYMPTOMS
WHEEZING: 0
COLOR CHANGE: 0
BACK PAIN: 0
ABDOMINAL PAIN: 0
VOMITING: 0
NAUSEA: 0
COUGH: 0
DIARRHEA: 0
SHORTNESS OF BREATH: 0

## 2022-07-04 ASSESSMENT — PAIN SCALES - GENERAL
PAINLEVEL_OUTOF10: 7
PAINLEVEL_OUTOF10: 9

## 2022-07-04 ASSESSMENT — PAIN - FUNCTIONAL ASSESSMENT
PAIN_FUNCTIONAL_ASSESSMENT: NONE - DENIES PAIN
PAIN_FUNCTIONAL_ASSESSMENT: 0-10

## 2022-07-04 NOTE — ED NOTES
Wrapped pt left ankle with 4 inch ace wrap and placed ankle brace. RN notified.       Rene Moser  07/04/22 [id number] [phone] [id number]

## 2022-07-04 NOTE — ED PROVIDER NOTES
**ADVANCED PRACTICE PROVIDER, I HAVE EVALUATED THIS [facility]  ED  EMERGENCY DEPARTMENT ENCOUNTER      Pt Name: Judene Leventhal  IXN:[id number]  [place] 2000  Date of evaluation: 7/4/2022  Provider: RAMIREZ Hicks - CNP      Chief Complaint:    Chief Complaint   Patient presents with    Ankle Pain     left ankle pain after stepping on a step wrong today while taking dogs out. pt reports she heard a pop and now has pain and swelling. did not hit head, no LOC, no other injuries         Nursing Notes, Past Medical Hx, Past Surgical Hx, Social Hx, Allergies, and Family Hx were all reviewed and agreed with or any disagreements were addressed in the HPI.    HPI: (Location, Duration, Timing, Severity, Quality, Assoc Sx, Context, Modifying factors)    Chief Complaint of left ankle pain    This is a  19 y.o. female who presents today with left ankle pain and swelling, patient states around 10 AM she was going down her steps putting her dogs in her cleats however, she rolled her left ankle externally. She is having more pain when she walks in her ankle, she rates the pain a 9 on a 10, any movement or ambulation worsens the pain. She denies any additional injuries or complaints. No numbness tingling or paresthesias, no additional aggravating relieving factors. Patient presents awake, alert and in no acute distress or toxic appearance. PastMedical/Surgical History:  History reviewed. No pertinent past medical history. History reviewed. No pertinent surgical history.     Medications:  Previous Medications    DICYCLOMINE (BENTYL) 10 MG CAPSULE    TAKE 1 CAPSULE BY MOUTH TWICE A DAY BEFORE MEALS    FOLIC ACID (FOLVITE) 1 MG TABLET    TAKE 1 TABLET BY MOUTH EVERY DAY    LEVOTHYROXINE (SYNTHROID) 100 MCG TABLET    TAKE 1 TABLET BY MOUTH EVERY DAY    MAGNESIUM OXIDE (MAG-OX) 400 (240 MG) MG TABLET    TAKE 1 TABLET BY MOUTH EVERY DAY    METOPROLOL TARTRATE (LOPRESSOR) 25 MG TABLET    TAKE A QUARTER TABLET BY MOUTH TWICE DAILY    PANTOPRAZOLE (PROTONIX) 40 MG TABLET    TAKE 1 TABLET BY MOUTH EVERY DAY IN THE MORNING    PAROXETINE (PAXIL) 20 MG TABLET    TAKE 1 TABLET BY MOUTH EVERY DAY AT NIGHT    VITAMIN D (ERGOCALCIFEROL) 1.25 MG (83011 UT) CAPS CAPSULE    TAKE 1 CAPSULE BY MOUTH ONE TIME PER WEEK         Review of Systems:  (2-9 systems needed)  Review of Systems   Constitutional: Negative for chills and fever. HENT: Negative for congestion. Respiratory: Negative for cough, shortness of breath and wheezing. Cardiovascular: Negative for chest pain. Gastrointestinal: Negative for abdominal pain, diarrhea, nausea and vomiting. Musculoskeletal: Positive for arthralgias and joint swelling. Negative for back pain. Patient complains of left ankle pain and swelling, patient states around 10 AM she was going down her steps putting her dogs in her cleats however, she rolled her left ankle externally. She is having more pain when she walks in her ankle, she rates the pain a 9 on a 10, any movement or ambulation worsens the pain. Skin: Negative for color change. Neurological: Negative for weakness, numbness and headaches. \"Positives and Pertinent negatives as per HPI\"    Physical Exam:  Physical Exam  Vitals and nursing note reviewed. Constitutional:       Appearance: She is well-developed. She is not diaphoretic. HENT:      Head: Normocephalic. Right Ear: External ear normal.      Left Ear: External ear normal.   Eyes:      General: No scleral icterus. Right eye: No discharge. Left eye: No discharge. Cardiovascular:      Rate and Rhythm: Normal rate. Pulmonary:      Effort: Pulmonary effort is normal. No respiratory distress. Abdominal:      Palpations: Abdomen is soft. Musculoskeletal:         General: Swelling and tenderness present. Cervical back: Normal range of motion and neck supple.       Comments: Patient has swelling to the left lateral malleoli, however compartments in the left lower extremity are soft, she has no numbness tingling or paresthesias, she is neurovascularly and neurologically intact. No open wounds, break in skin integrity, she has flexion extension of the toes however ankle elicits pain. Skin:     General: Skin is warm. Capillary Refill: Capillary refill takes less than 2 seconds. Coloration: Skin is not pale. Neurological:      Mental Status: She is alert and oriented to person, place, and time. GCS: GCS eye subscore is 4. GCS verbal subscore is 5. GCS motor subscore is 6. Comments: Patient is awake, alert, following commands correctly, neurologically intact no focal deficits   Psychiatric:         Behavior: Behavior normal.         MEDICAL DECISION MAKING    Vitals:    Vitals:    07/04/22 1118   BP: 106/60   Pulse: 65   Resp: 18   Temp: 98.2 °F (36.8 °C)   TempSrc: Oral   SpO2: 100%   Weight: 240 lb (108.9 kg)   Height: 5' 5\" (1.651 m)       LABS:Labs Reviewed - No data to display     Remainder of labs reviewed and were negative at this time or not returned at the time of this note. RADIOLOGY:   Non-plain film images such as CT, Ultrasound and MRI are read by the radiologist. Maryan PETERSON APRN - CNP have directly visualized the radiologic plain film image(s) with the below findings:      Interpretation per the Radiologist below, if available at the time of this note:    XR ANKLE LEFT (MIN 3 VIEWS)   Final Result   No acute osseous abnormality of the left ankle. MEDICAL DECISION MAKING / ED COURSE:      PROCEDURES:   Procedures    None    Patient was given:  Medications   ibuprofen (ADVIL;MOTRIN) tablet 600 mg (600 mg Oral Given 7/4/22 1150)       Patient complains of left ankle pain and swelling, patient states around 10 AM she was going down her steps putting her dogs in her cleats however, she rolled her left ankle externally.   She is having more pain when she walks in her ankle, she rates the pain a 9 on a 10, any movement or ambulation worsens the pain. After evaluation and examination the patient x-ray was obtained. X-ray shows no acute osseous abnormality of the left ankle, no acute fracture, dislocation, DVT or septic joint. Patient will be placed in an Ace wrap and Aircast.  At this time I believe is in the management outpatient basis at home. Therefore, shared medical decision was made between the patient and myself and we agreed that she could be discharged home with outpatient follow-up. Patient was educated about [facility], she was given Ortho referral to follow-up in the next 3 days for reevaluation, return to the ER for any worsening or concerning symptoms. Educated about [address] Discharged home with Naprosyn to take for discomfort. The patient tolerated their visit well. I evaluated the patient. The physician was available for consultation as needed. The patient and / or the family were informed of the results of any tests, a time was given to answer questions, a plan was proposed and they agreed with plan. Patient verbalized understanding of discharge instructions and the patient was discharged in the department in stable condition    CLINICAL IMPRESSION:  1. Moderate left ankle sprain, initial encounter    2.  Acute left ankle pain        DISPOSITION Decision To Discharge 07/04/2022 12:06:58 PM      PATIENT REFERRED TO:  Meghan Lerma MD  [address] [facility]  [address] [facility] [address]  [phone]      Follow-up with your family doctor in the next 2 to 3 days for reevaluation, As needed    [facility]  ED  [address] [facility] [address] [facility] Professor Johnson Simmons [facility] [address] [place] [address]  [phone]      This is an orthopedic referral, please call make an appointment in the morning for reevaluation to be seen in the next 2 to 3 days      DISCHARGE MEDICATIONS:  New Prescriptions    NAPROXEN (NAPROSYN) 500 MG TABLET    Take 1 tablet by mouth 2 times daily (with meals)       DISCONTINUED MEDICATIONS:  Discontinued Medications    No medications on file              (Please note the MDM and HPI sections of this note were completed with a voice recognition program.  Efforts were made to edit the dictations but occasionally words are mis-transcribed.)    Electronically signed, RAMIREZ Go CNP,          RAMIREZ Go CNP  07/04/22 7528

## 2022-07-08 ENCOUNTER — OFFICE VISIT (OUTPATIENT)
Dept: ORTHOPEDIC SURGERY | Age: 22
End: 2022-07-08
Payer: MEDICARE

## 2022-07-08 VITALS — HEIGHT: 65 IN | BODY MASS INDEX: 39.99 KG/M2 | WEIGHT: 240 LBS

## 2022-07-08 DIAGNOSIS — S93.412A SPRAIN OF CALCANEOFIBULAR LIGAMENT OF LEFT ANKLE, INITIAL ENCOUNTER: Primary | ICD-10-CM

## 2022-07-08 PROCEDURE — 99213 OFFICE O/P EST LOW 20 MIN: CPT | Performed by: ORTHOPAEDIC SURGERY

## 2022-07-08 PROCEDURE — G8427 DOCREV CUR MEDS BY ELIG CLIN: HCPCS | Performed by: ORTHOPAEDIC SURGERY

## 2022-07-08 PROCEDURE — L4360 PNEUMAT WALKING BOOT PRE CST: HCPCS | Performed by: ORTHOPAEDIC SURGERY

## 2022-07-08 PROCEDURE — G8417 CALC BMI ABV UP PARAM F/U: HCPCS | Performed by: ORTHOPAEDIC SURGERY

## 2022-07-08 PROCEDURE — 1036F TOBACCO NON-USER: CPT | Performed by: ORTHOPAEDIC SURGERY

## 2022-07-08 NOTE — PROGRESS NOTES
Allergen Reactions    Lactose Intolerance (Gi)        FAMILY HISTORY     Family History   Problem Relation Age of Onset    High Blood Pressure Father     High Cholesterol Father     Diabetes Sister        SOCIAL HISTORY     Social History     Socioeconomic History    Marital status: Single     Spouse name: None    Number of children: 0    Years of education: 6    Highest education level: None   Occupational History     Employer: UNEMPLOYED   Tobacco Use    Smoking status: Never Smoker    Smokeless tobacco: Never Used   Vaping Use    Vaping Use: Never used   Substance and Sexual Activity    Alcohol use: No    Drug use: Never    Sexual activity: None   Other Topics Concern    None   Social History Narrative    None     Social Determinants of Health     Financial Resource Strain:     Difficulty of Paying Living Expenses: Not on file   Food Insecurity:     Worried About Running Out of Food in the Last Year: Not on file    Xuan of Food in the Last Year: Not on file   Transportation Needs:     Lack of Transportation (Medical): Not on file    Lack of Transportation (Non-Medical):  Not on file   Physical Activity:     Days of Exercise per Week: Not on file    Minutes of Exercise per Session: Not on file   Stress:     Feeling of Stress : Not on file   Social Connections:     Frequency of Communication with Friends and Family: Not on file    Frequency of Social Gatherings with Friends and Family: Not on file    Attends Judaism Services: Not on file    Active Member of Clubs or Organizations: Not on file    Attends Club or Organization Meetings: Not on file    Marital Status: Not on file   Intimate Partner Violence:     Fear of Current or Ex-Partner: Not on file    Emotionally Abused: Not on file    Physically Abused: Not on file    Sexually Abused: Not on file   Housing Stability:     Unable to Pay for Housing in the Last Year: Not on file    Number of [place] in the Last Year: Not on file    Unstable Housing in the Last Year: Not on file       REVIEW OF SYSTEMS   General: no fever, chills, night sweats, anorexia, malaise, fatigue, or weight change  Hematologic:  no unexplained bleeding or bruising  HEENT:   no nasal congestion, rhinorrhea, sore throat, or facial pain  Respiratory:  no cough, dyspnea, or chest pain  Cardiovascular:  no angina, LYNNE, PND, orthopnea, dependent edema, or palpitations  Gastrointestinal:  no nausea, vomiting, diarrhea, constipation, or abdominal pain  Genitourinary:  no urinary urgency, frequency, dysuria, or hematuria  Musculoskeletal: see HPI  Endocrine:  no heat or cold intolerance and no polyphagia, polydipsia, or polyuria  Skin:  no skin eruptions or changing lesions  Neurologic:  no focal weakness, numbness/tingling, tremor, or severe headache. See HPI. See HPI for pertinent positives. PHYSICAL EXAM   Vital Signs: Ht 5' 5\" (1.651 m)   Wt 240 lb (108.9 kg)   LMP 06/27/2022   BMI 39.94 kg/m²     Constitutional: One constitutional examination, the patient was found to be well groomed, a medium stature and body habitus, without obvious deformities. Neurological/psychiatric: The patient was alert and oriented to self, time, and place. The patient's mood is appropriate for the situation. Sensation to light touch was checked bilaterally and found to be normal.   Skin: Skin examination reveals an absence of rashes burns cuts or scrapes. There is no focal erythema no ecchymosis and no evidence of recent trauma. There are no previous incisions noted. Cardiovascular: Cardiovascular examination showed no evidence of pedal edema and no varicosities. The dorsalis pedis and posterior tibial pulses are palpable and regular in rhythm. Musculoskeletal:  Left Ankle Examination    Walking examination: The patient was not able to walk or stand on the affected foot. Seated examination: Seated examination reveals tenderness around the lateral ligament complex. It is primarily over the anterior talofibular ligament and subtalar ligaments. The patient does not have pain over the peroneal tendons today. The patient has mild medial sided ankle pain. Hindfoot exam: Range of motion of the ankle was approximately 0° of dorsiflexion to 15° of plantarflexion. Subtalar motion was approximately 0° of inversion and 0° of eversion. No deformities or malalignments were noted. Midfoot exam: Mid foot motion was normal no obvious arthritic spurs were noted. Forefoot exam: The forefoot examination showed normal 1st MTP motion. No bunions or hammertoes were noted. RADIOLOGY   X-rays obtained and reviewed in office:  3 views of the affected ankle show no acute bony abnormality    IMPRESSION     1. Sprain of calcaneofibular ligament of left ankle, initial encounter     without bony injury    PLAN   I had a lengthy discussion with patient today regarding diagnosis and treatment options and recommendations. I explained the nature of the ankle sprain that has been sustained. I explained that this should be a nonoperative injury. We will treat this with early and continue with range of motion exercises. I have also given the patient a boot today to assist with weightbearing. The patient will take an over-the-counter anti-inflammatory and ice the foot and ankle twice a day for 15 minutes for the next week or so. I will recheck the patient in 3 weeks. I have answered all of their questions in the office today. FOLLOWUP     Return in 18 days (on 7/26/2022). No orders of the defined types were placed in this encounter. No orders of the defined types were placed in this encounter.       Patient was instructed on appropriate use of braces, participation in home exercise programs, healthy lifestyle choices and weight loss as appropriate     Dane Perez MD

## 2022-07-08 NOTE — PATIENT INSTRUCTIONS
Patient Education        Ankle Sprain: Rehab Exercises  Introduction  Here are some examples of exercises for you to try. The exercises may be suggested for a condition or for rehabilitation. Start each exercise slowly. Ease off the exercises if you start to have pain. You will be told when to start these exercises and which ones will work bestfor you. How to do the exercises  'Alphabet' exercise    1. Trace the alphabet with your toe. This helps your ankle move in all directions. Side-to-side knee swing exercise    1. Sit in a chair with your foot flat on the floor. 2. Slowly move your knee from side to side. Keep your foot pressed flat. 3. Continue this exercise for 2 to 3 minutes. Towel curl    1. While sitting, place your foot on a towel on the floor. Scrunch the towel toward you with your toes. 2. Then use your toes to push the towel away from you. 3. To make this exercise more challenging you can put something on the other end of the towel. A can of soup is about the right weight for this. Towel stretch    1. Sit with your legs extended and knees straight. 2. Place a towel around your foot just under the toes. 3. Hold each end of the towel in each hand, with your hands above your knees. 4. Pull back with the towel so that your foot stretches toward you. 5. Hold the position for at least 15 to 30 seconds. 6. Repeat 2 to 4 times a session. Do up to 5 sessions a day. Ankle eversion exercise    1. Start by sitting with your foot flat on the floor. Push your foot outward against a wall or a piece of furniture that doesn't move. Hold for about 6 seconds, and relax. Repeat 8 to 12 times. 2. After you feel comfortable with this, try using rubber tubing looped around the outside of your feet for resistance. Push your foot out to the side against the tubing, and then count to 10 as you slowly bring your foot back to the middle. Repeat 8 to 12 times. Isometric opposition exercises    1.  While sitting, put your feet together flat on the floor. 2. Press your injured foot inward against your other foot. Hold for about 6 seconds, and relax. Repeat 8 to 12 times. 3. Then place the heel of your other foot on top of the injured one. Push down with the top heel while trying to push up with your injured foot. Hold for about 6 seconds, and relax. Repeat 8 to 12 times. Resisted ankle inversion    1. Sit on the floor with your good leg crossed over your other leg. 2. Hold both ends of an exercise band and loop the band around the inside of your affected foot. Then press your other foot against the band. 3. Keeping your legs crossed, slowly push your affected foot against the band so that foot moves away from your other foot. Then slowly relax. 4. Repeat 8 to 12 times. Resisted ankle eversion    1. Sit on the floor with your legs straight. 2. Hold both ends of an exercise band and loop the band around the outside of your affected foot. Then press your other foot against the band. 3. Keeping your leg straight, slowly push your affected foot outward against the band and away from your other foot without letting your leg rotate. Then slowly relax. 4. Repeat 8 to 12 times. Resisted ankle dorsiflexion    1. Tie the ends of an exercise band together to form a loop. Attach one end of the loop to a secure object or shut a door on it to hold it in place. (Or you can have someone hold one end of the loop to provide resistance.)  2. While sitting on the floor or in a chair, loop the other end of the band over the top of your affected foot. 3. Keeping your knee and leg straight, slowly flex your foot to pull back on the exercise band, and then slowly relax. 4. Repeat 8 to 12 times. Single-leg balance    1. Stand on a flat surface with your arms stretched out to your sides like you are making the letter \"T. \" Then lift your good leg off the floor, bending it at the knee.  If you are not steady on your feet, use one hand to hold on to a chair, counter, or wall. 2. Standing on the leg with your affected ankle, keep that knee straight. Try to balance on that leg for up to 30 seconds. Then rest for up to 10 seconds. 3. Repeat 6 to 8 times. 4. When you can balance on your affected leg for 30 seconds with your eyes open, try to balance on it with your eyes closed. 5. When you can do this exercise with your eyes closed for 30 seconds and with ease and no pain, try standing on a pillow or piece of foam, and repeat steps 1 through 4. Follow-up care is a key part of your treatment and safety. Be sure to make and go to all appointments, and call your doctor if you are having problems. It's also a good idea to know your test results and keep alist of the medicines you take. Where can you learn more? Go to [email or web address] and sign in to your [employer] account. Enter Libertad Hurtado in the [employer] box to learn more about \"Ankle Sprain: Rehab Exercises. \"     If you do not have an account, please click on the \"Sign Up Now\" link. Current as of: March 9, 2022               Content Version: 13.3  © 2006-2022 [employer]wise[employer]. Care instructions adapted under license by [facility] ([facility]). If you have questions about a medical condition or this instruction, always ask your healthcare professional. [place] [address] any warranty or liability for your use of this information.

## 2022-07-29 ENCOUNTER — OFFICE VISIT (OUTPATIENT)
Dept: ORTHOPEDIC SURGERY | Age: 22
End: 2022-07-29
Payer: MEDICARE

## 2022-07-29 VITALS — WEIGHT: 240 LBS | BODY MASS INDEX: 39.99 KG/M2 | HEIGHT: 65 IN

## 2022-07-29 DIAGNOSIS — S93.412A SPRAIN OF CALCANEOFIBULAR LIGAMENT OF LEFT ANKLE, INITIAL ENCOUNTER: Primary | ICD-10-CM

## 2022-07-29 PROCEDURE — 99213 OFFICE O/P EST LOW 20 MIN: CPT | Performed by: ORTHOPAEDIC SURGERY

## 2022-07-29 PROCEDURE — 1036F TOBACCO NON-USER: CPT | Performed by: ORTHOPAEDIC SURGERY

## 2022-07-29 PROCEDURE — G8417 CALC BMI ABV UP PARAM F/U: HCPCS | Performed by: ORTHOPAEDIC SURGERY

## 2022-07-29 PROCEDURE — G8427 DOCREV CUR MEDS BY ELIG CLIN: HCPCS | Performed by: ORTHOPAEDIC SURGERY

## 2022-07-29 NOTE — PROGRESS NOTES
[facility]  Outpatient Progress Note  Daryl Patel MD    Patient Name: Price Massey MRN: [id number]   Age: 19 y.o. YOB: 2000   Sex: female      [address] Complaint   Patient presents with    Follow-up     RP Left ankle follow up 3 weeks continued pain and swelling around ankle joint       HISTORY OF PRESENT ILLNESS   Milagro London is a 19 y.o. female   The patient presents for a follow up visit:  The patient sustained a lateral ankle sprain about 4 weeks ago and has been immobilized in a removable walking cast boot for the last 3 weeks. She reports improvement in pain and swelling and has been able to spend periods of time in a regular gym [address] [place]    No past medical history on file. PAST SURGICAL HISTORY   No past surgical history on file. MEDICATIONS     Current Outpatient Medications   Medication Sig Dispense Refill    naproxen (NAPROSYN) 500 MG tablet Take 1 tablet by mouth 2 times daily (with meals) 30 tablet 0    dicyclomine (BENTYL) 10 MG capsule TAKE 1 CAPSULE BY MOUTH TWICE A DAY BEFORE MEALS 60 capsule 6    PARoxetine (PAXIL) 20 MG tablet TAKE 1 TABLET BY MOUTH EVERY DAY AT NIGHT 30 tablet 1    magnesium oxide (MAG-OX) 400 (240 Mg) MG tablet TAKE 1 TABLET BY MOUTH EVERY DAY 30 tablet 1    pantoprazole (PROTONIX) 40 MG tablet TAKE 1 TABLET BY MOUTH EVERY DAY IN THE MORNING 30 tablet 1    levothyroxine (SYNTHROID) 100 MCG tablet TAKE 1 TABLET BY MOUTH EVERY DAY 30 tablet 1    folic acid (FOLVITE) 1 MG tablet TAKE 1 TABLET BY MOUTH EVERY DAY 30 tablet 5    vitamin D (ERGOCALCIFEROL) 1.25 MG (51922 UT) CAPS capsule TAKE 1 CAPSULE BY MOUTH ONE TIME PER WEEK 4 capsule 8    metoprolol tartrate (LOPRESSOR) 25 MG tablet TAKE A QUARTER TABLET BY MOUTH TWICE DAILY 15 tablet 14     No current facility-administered medications for this visit.        ALLERGIES     Allergies   Allergen Reactions    Lactose Intolerance (Gi)        FAMILY HISTORY     Family History   Problem Relation Age of Onset    High Blood Pressure Father     High Cholesterol Father     Diabetes Sister        SOCIAL HISTORY     Social History     Socioeconomic History    Marital status: Single    Number of children: 0    Years of education: 6   Occupational History     Employer: UNEMPLOYED   Tobacco Use    Smoking status: Never    Smokeless tobacco: Never   Vaping Use    Vaping Use: Never used   Substance and Sexual Activity    Alcohol use: No    Drug use: Never       REVIEW OF SYSTEMS   General: no fever, chills, night sweats, anorexia, malaise, fatigue, or weight change  Hematologic:  no unexplained bleeding or bruising  HEENT:   no nasal congestion, rhinorrhea, sore throat, or facial pain  Respiratory:  no cough, dyspnea, or chest pain  Cardiovascular:  no angina, LYNNE, PND, orthopnea, dependent edema, or palpitations  Gastrointestinal:  no nausea, vomiting, diarrhea, constipation, or abdominal pain  Genitourinary:  no urinary urgency, frequency, dysuria, or hematuria  Musculoskeletal: see HPI  Endocrine:  no heat or cold intolerance and no polyphagia, polydipsia, or polyuria  Skin:  no skin eruptions or changing lesions  Neurologic:  no focal weakness, numbness/tingling, tremor, or severe headache. See HPI. See HPI for pertinent positives. PHYSICAL EXAM   Vital Signs: Ht 5' 5\" (1.651 m)   Wt 240 lb (108.9 kg)   BMI 39.94 kg/m²     General appearance: healthy, alert, no distress  Skin: Skin color, texture, turgor normal. No rashes or lesions  HEENT: atraumatic, normocephalic. PERRL  Respiratory: Unlabored breathing  Lymphatic: No adenopathy   Neuro: Alert and oriented, normal distal sensation, normal bilateral DTRs  Vascular: Normal distal capillary and distal pulses  Muskuloskeletal Exam:   Left ankle has no swelling. Mild tenderness noted at the ATFL. Range of motion is full.   No laxity to anterior drawer or inversion stress      IMPRESSION     1. Sprain of calcaneofibular ligament of left ankle, subsequent encounter           PLAN   Transition from the walking cast boot into an accommodative athletic shoe. Patient declined referral to physical therapy. She was instructed on a home exercise program.  She will increase activities as tolerated and return if she does not improve    FOLLOWUP     Return if symptoms worsen or fail to improve. No orders of the defined types were placed in this encounter. No orders of the defined types were placed in this encounter.       Patient was instructed on appropriate use of braces, participation in home exercise programs, healthy lifestyle choices and weight loss as appropriate     Dane Perez MD

## 2022-07-29 NOTE — PATIENT INSTRUCTIONS
Ankle Sprain: Rehab Exercises  Introduction  Here are some examples of exercises for you to try. The exercises may be suggested for a condition or for rehabilitation. Start each exercise slowly. Ease off the exercises if you start to have pain. You will be told when to start these exercises and which ones will work bestfor you. How to do the exercises  'Alphabet' exercise    Trace the alphabet with your toe. This helps your ankle move in all directions. Side-to-side knee swing exercise    Sit in a chair with your foot flat on the floor. Slowly move your knee from side to side. Keep your foot pressed flat. Continue this exercise for 2 to 3 minutes. Towel curl    While sitting, place your foot on a towel on the floor. Scrunch the towel toward you with your toes. Then use your toes to push the towel away from you. To make this exercise more challenging you can put something on the other end of the towel. A can of soup is about the right weight for this. Towel stretch    Sit with your legs extended and knees straight. Place a towel around your foot just under the toes. Hold each end of the towel in each hand, with your hands above your knees. Pull back with the towel so that your foot stretches toward you. Hold the position for at least 15 to 30 seconds. Repeat 2 to 4 times a session. Do up to 5 sessions a day. Ankle eversion exercise    Start by sitting with your foot flat on the floor. Push your foot outward against a wall or a piece of furniture that doesn't move. Hold for about 6 seconds, and relax. Repeat 8 to 12 times. After you feel comfortable with this, try using rubber tubing looped around the outside of your feet for resistance. Push your foot out to the side against the tubing, and then count to 10 as you slowly bring your foot back to the middle. Repeat 8 to 12 times. Isometric opposition exercises    While sitting, put your feet together flat on the floor.   Press your injured foot inward against your other foot. Hold for about 6 seconds, and relax. Repeat 8 to 12 times. Then place the heel of your other foot on top of the injured one. Push down with the top heel while trying to push up with your injured foot. Hold for about 6 seconds, and relax. Repeat 8 to 12 times. Resisted ankle inversion    Sit on the floor with your good leg crossed over your other leg. Hold both ends of an exercise band and loop the band around the inside of your affected foot. Then press your other foot against the band. Keeping your legs crossed, slowly push your affected foot against the band so that foot moves away from your other foot. Then slowly relax. Repeat 8 to 12 times. Resisted ankle eversion    Sit on the floor with your legs straight. Hold both ends of an exercise band and loop the band around the outside of your affected foot. Then press your other foot against the band. Keeping your leg straight, slowly push your affected foot outward against the band and away from your other foot without letting your leg rotate. Then slowly relax. Repeat 8 to 12 times. Resisted ankle dorsiflexion    Tie the ends of an exercise band together to form a loop. Attach one end of the loop to a secure object or shut a door on it to hold it in place. (Or you can have someone hold one end of the loop to provide resistance.)  While sitting on the floor or in a chair, loop the other end of the band over the top of your affected foot. Keeping your knee and leg straight, slowly flex your foot to pull back on the exercise band, and then slowly relax. Repeat 8 to 12 times. Single-leg balance    Stand on a flat surface with your arms stretched out to your sides like you are making the letter \"T. \" Then lift your good leg off the floor, bending it at the knee. If you are not steady on your feet, use one hand to hold on to a chair, counter, or wall. Standing on the leg with your affected ankle, keep that knee straight.  Try

## 2022-08-13 DIAGNOSIS — E03.8 OTHER SPECIFIED HYPOTHYROIDISM: ICD-10-CM

## 2022-08-15 RX ORDER — LEVOTHYROXINE SODIUM 0.1 MG/1
TABLET ORAL
Qty: 30 TABLET | Refills: 1 | Status: SHIPPED | OUTPATIENT
Start: 2022-08-15 | End: 2022-10-20

## 2022-08-15 RX ORDER — PANTOPRAZOLE SODIUM 40 MG/1
TABLET, DELAYED RELEASE ORAL
Qty: 30 TABLET | Refills: 1 | Status: SHIPPED | OUTPATIENT
Start: 2022-08-15 | End: 2022-10-20

## 2022-08-15 RX ORDER — PAROXETINE HYDROCHLORIDE 20 MG/1
TABLET, FILM COATED ORAL
Qty: 30 TABLET | Refills: 1 | Status: SHIPPED | OUTPATIENT
Start: 2022-08-15 | End: 2022-10-20

## 2022-08-15 RX ORDER — LANOLIN ALCOHOL/MO/W.PET/CERES
CREAM (GRAM) TOPICAL
Qty: 30 TABLET | Refills: 1 | Status: SHIPPED | OUTPATIENT
Start: 2022-08-15 | End: 2022-10-20

## 2022-08-15 NOTE — TELEPHONE ENCOUNTER
Medication:   Requested Prescriptions     Pending Prescriptions Disp Refills    metoprolol tartrate (LOPRESSOR) 25 MG tablet [Pharmacy Med Name: METOPROLOL TARTRATE 25 MG TAB] 15 tablet 14     Sig: TAKE A QUARTER TABLET BY MOUTH TWICE DAILY        Last Filled:  8/2/21    Patient Phone Number: [phone] (home)     Last appt: 1/7/2022   Next appt: Visit date not found    Last OARRS: No flowsheet data found.

## 2022-08-15 NOTE — TELEPHONE ENCOUNTER
Medication:   Requested Prescriptions     Pending Prescriptions Disp Refills    PARoxetine (PAXIL) 20 MG tablet [Pharmacy Med Name: PAROXETINE HCL 20 MG TABLET] 30 tablet 1     Sig: TAKE 1 TABLET BY MOUTH EVERY DAY AT NIGHT    levothyroxine (SYNTHROID) 100 MCG tablet [Pharmacy Med Name: LEVOTHYROXINE 100 MCG TABLET] 30 tablet 1     Sig: TAKE 1 TABLET BY MOUTH EVERY DAY    magnesium oxide (MAG-OX) 400 (240 Mg) MG tablet [Pharmacy Med Name: MAGNESIUM OXIDE 400 MG TABLET] 30 tablet 1     Sig: TAKE 1 TABLET BY MOUTH EVERY DAY    pantoprazole (PROTONIX) 40 MG tablet [Pharmacy Med Name: PANTOPRAZOLE SOD DR 40 MG TAB] 30 tablet 1     Sig: TAKE 1 TABLET BY MOUTH EVERY DAY IN THE MORNING        Last Filled:      Patient Phone Number: [phone] (home)     Last appt: 1/7/2022   Next appt: 8/14/2022    Last OARRS: No flowsheet data found.

## 2022-09-08 DIAGNOSIS — E55.9 VITAMIN D DEFICIENCY: ICD-10-CM

## 2022-09-08 RX ORDER — ERGOCALCIFEROL 1.25 MG/1
CAPSULE ORAL
Qty: 4 CAPSULE | Refills: 8 | Status: SHIPPED | OUTPATIENT
Start: 2022-09-08

## 2022-09-08 NOTE — TELEPHONE ENCOUNTER
Medication:   Requested Prescriptions     Pending Prescriptions Disp Refills    vitamin D (ERGOCALCIFEROL) 1.25 MG (35838 UT) CAPS capsule [Pharmacy Med Name: VITAMIN D2 1.25MG(50,000 UNIT)] 4 capsule 8     Sig: TAKE 1 CAPSULE BY MOUTH ONE TIME PER WEEK        Last Filled:  11/29/21    Patient Phone Number: [phone] (home)     Last appt: 1/7/2022   Next appt: Visit date not found    Last OARRS: No flowsheet data found.

## 2022-10-20 DIAGNOSIS — E03.8 OTHER SPECIFIED HYPOTHYROIDISM: ICD-10-CM

## 2022-10-20 RX ORDER — LEVOTHYROXINE SODIUM 0.1 MG/1
TABLET ORAL
Qty: 30 TABLET | Refills: 1 | Status: SHIPPED | OUTPATIENT
Start: 2022-10-20

## 2022-10-20 RX ORDER — PANTOPRAZOLE SODIUM 40 MG/1
TABLET, DELAYED RELEASE ORAL
Qty: 30 TABLET | Refills: 1 | Status: SHIPPED | OUTPATIENT
Start: 2022-10-20

## 2022-10-20 RX ORDER — PAROXETINE HYDROCHLORIDE 20 MG/1
TABLET, FILM COATED ORAL
Qty: 30 TABLET | Refills: 1 | Status: SHIPPED | OUTPATIENT
Start: 2022-10-20

## 2022-10-20 RX ORDER — LANOLIN ALCOHOL/MO/W.PET/CERES
CREAM (GRAM) TOPICAL
Qty: 30 TABLET | Refills: 1 | Status: SHIPPED | OUTPATIENT
Start: 2022-10-20

## 2022-10-20 NOTE — TELEPHONE ENCOUNTER
Medication:   Requested Prescriptions     Pending Prescriptions Disp Refills    magnesium oxide (MAG-OX) 400 (240 Mg) MG tablet [Pharmacy Med Name: MAGNESIUM OXIDE 400 MG TABLET] 30 tablet 1     Sig: TAKE 1 TABLET BY MOUTH EVERY DAY    PARoxetine (PAXIL) 20 MG tablet [Pharmacy Med Name: PAROXETINE HCL 20 MG TABLET] 30 tablet 1     Sig: TAKE 1 TABLET BY MOUTH EVERY DAY AT NIGHT    levothyroxine (SYNTHROID) 100 MCG tablet [Pharmacy Med Name: LEVOTHYROXINE 100 MCG TABLET] 30 tablet 1     Sig: TAKE 1 TABLET BY MOUTH EVERY DAY    pantoprazole (PROTONIX) 40 MG tablet [Pharmacy Med Name: PANTOPRAZOLE SOD DR 40 MG TAB] 30 tablet 1     Sig: TAKE 1 TABLET BY MOUTH EVERY DAY IN THE MORNING        Last Filled:      Patient Phone Number: [phone] (home)     Last appt: 1/7/2022   Next appt: Visit date not found    Last OARRS: No flowsheet data found.

## 2022-11-20 DIAGNOSIS — R20.2 PARESTHESIAS: ICD-10-CM

## 2022-11-21 RX ORDER — FOLIC ACID 1 MG/1
TABLET ORAL
Qty: 30 TABLET | Refills: 5 | Status: SHIPPED | OUTPATIENT
Start: 2022-11-21

## 2023-05-10 DIAGNOSIS — E03.8 OTHER SPECIFIED HYPOTHYROIDISM: ICD-10-CM

## 2023-05-10 RX ORDER — LEVOTHYROXINE SODIUM 0.1 MG/1
TABLET ORAL
Qty: 30 TABLET | Refills: 1 | Status: SHIPPED | OUTPATIENT
Start: 2023-05-10

## 2023-05-10 RX ORDER — PAROXETINE HYDROCHLORIDE 20 MG/1
TABLET, FILM COATED ORAL
Qty: 30 TABLET | Refills: 1 | Status: SHIPPED | OUTPATIENT
Start: 2023-05-10

## 2023-05-10 RX ORDER — LANOLIN ALCOHOL/MO/W.PET/CERES
CREAM (GRAM) TOPICAL
Qty: 30 TABLET | Refills: 1 | Status: SHIPPED | OUTPATIENT
Start: 2023-05-10

## 2023-05-10 RX ORDER — PANTOPRAZOLE SODIUM 40 MG/1
TABLET, DELAYED RELEASE ORAL
Qty: 30 TABLET | Refills: 1 | Status: SHIPPED | OUTPATIENT
Start: 2023-05-10

## 2023-05-10 NOTE — TELEPHONE ENCOUNTER
Medication:   Requested Prescriptions     Pending Prescriptions Disp Refills    PARoxetine (PAXIL) 20 MG tablet [Pharmacy Med Name: PAROXETINE HCL 20 MG TABLET] 30 tablet 1     Sig: TAKE 1 TABLET BY MOUTH EVERY DAY AT NIGHT    levothyroxine (SYNTHROID) 100 MCG tablet [Pharmacy Med Name: LEVOTHYROXINE 100 MCG TABLET] 30 tablet 1     Sig: TAKE 1 TABLET BY MOUTH EVERY DAY    pantoprazole (PROTONIX) 40 MG tablet [Pharmacy Med Name: PANTOPRAZOLE SOD DR 40 MG TAB] 30 tablet 1     Sig: TAKE 1 TABLET BY MOUTH EVERY DAY IN THE MORNING    magnesium oxide (MAG-OX) 400 (240 Mg) MG tablet [Pharmacy Med Name: MAGNESIUM OXIDE 400 MG TABLET] 30 tablet 1     Sig: TAKE 1 TABLET BY MOUTH EVERY DAY        Last Filled:  Last refill: 4/13/2023    Patient Phone Number: [phone] (home)     Last appt: 1/7/2022   Next appt: Visit date not found  RTC No follow-ups on file. Last OARRS: No flowsheet data found.

## 2023-07-17 DIAGNOSIS — E03.8 OTHER SPECIFIED HYPOTHYROIDISM: ICD-10-CM

## 2023-07-17 RX ORDER — LANOLIN ALCOHOL/MO/W.PET/CERES
CREAM (GRAM) TOPICAL
Qty: 30 TABLET | Refills: 1 | Status: SHIPPED | OUTPATIENT
Start: 2023-07-17

## 2023-07-17 RX ORDER — LEVOTHYROXINE SODIUM 0.1 MG/1
TABLET ORAL
Qty: 30 TABLET | Refills: 1 | Status: SHIPPED | OUTPATIENT
Start: 2023-07-17

## 2023-07-17 RX ORDER — PAROXETINE HYDROCHLORIDE 20 MG/1
TABLET, FILM COATED ORAL
Qty: 30 TABLET | Refills: 1 | Status: SHIPPED | OUTPATIENT
Start: 2023-07-17

## 2023-07-17 NOTE — TELEPHONE ENCOUNTER
Medication:   Requested Prescriptions     Pending Prescriptions Disp Refills    levothyroxine (SYNTHROID) 100 MCG tablet [Pharmacy Med Name: LEVOTHYROXINE 100 MCG TABLET] 30 tablet 1     Sig: TAKE 1 TABLET BY MOUTH EVERY DAY    PARoxetine (PAXIL) 20 MG tablet [Pharmacy Med Name: PAROXETINE HCL 20 MG TABLET] 30 tablet 1     Sig: TAKE 1 TABLET BY MOUTH EVERY DAY AT NIGHT    magnesium oxide (MAG-OX) 400 (240 Mg) MG tablet [Pharmacy Med Name: MAGNESIUM OXIDE 400 MG TABLET] 30 tablet 1     Sig: TAKE 1 TABLET BY MOUTH EVERY DAY        Last Filled:  05/10/23    Patient Phone Number: [phone] (home)     Last appt: 1/7/2022 RTC No follow-ups on file. No future appointments. Next appt: Visit date not found    Last OARRS: No flowsheet data found.

## 2023-08-18 NOTE — TELEPHONE ENCOUNTER
Medication:   Requested Prescriptions     Pending Prescriptions Disp Refills    metoprolol tartrate (LOPRESSOR) 25 MG tablet [Pharmacy Med Name: METOPROLOL TARTRATE 25 MG TAB] 15 tablet 14     Sig: TAKE A QUARTER TABLET BY MOUTH TWICE DAILY        Last Filled:  8/15/22    Patient Phone Number: [phone] (home)     Last appt: 1/7/2022   Next appt: Visit date not found    Last OARRS: No flowsheet data found.

## 2023-09-15 DIAGNOSIS — E03.8 OTHER SPECIFIED HYPOTHYROIDISM: ICD-10-CM

## 2023-09-15 RX ORDER — PANTOPRAZOLE SODIUM 40 MG/1
TABLET, DELAYED RELEASE ORAL
Qty: 30 TABLET | Refills: 1 | OUTPATIENT
Start: 2023-09-15

## 2023-09-15 RX ORDER — PAROXETINE HYDROCHLORIDE 20 MG/1
TABLET, FILM COATED ORAL
Qty: 30 TABLET | Refills: 1 | OUTPATIENT
Start: 2023-09-15

## 2023-09-15 RX ORDER — LANOLIN ALCOHOL/MO/W.PET/CERES
CREAM (GRAM) TOPICAL
Qty: 30 TABLET | Refills: 1 | OUTPATIENT
Start: 2023-09-15

## 2023-09-15 RX ORDER — LEVOTHYROXINE SODIUM 0.1 MG/1
TABLET ORAL
Qty: 30 TABLET | Refills: 1 | OUTPATIENT
Start: 2023-09-15

## 2023-09-15 NOTE — TELEPHONE ENCOUNTER
Medication:   Requested Prescriptions     Pending Prescriptions Disp Refills    pantoprazole (PROTONIX) 40 MG tablet [Pharmacy Med Name: PANTOPRAZOLE SOD DR 40 MG TAB] 30 tablet 1     Sig: TAKE 1 TABLET BY MOUTH EVERY DAY IN THE MORNING        Last Filled:  05/10/23    Patient Phone Number: [phone] (home)     Last appt: 1/7/2022   Next appt: Visit date not found    Last OARRS:        No data to display

## 2023-09-15 NOTE — TELEPHONE ENCOUNTER
Medication:   Requested Prescriptions     Pending Prescriptions Disp Refills    levothyroxine (SYNTHROID) 100 MCG tablet [Pharmacy Med Name: LEVOTHYROXINE 100 MCG TABLET] 30 tablet 1     Sig: TAKE 1 TABLET BY MOUTH EVERY DAY    metoprolol tartrate (LOPRESSOR) 25 MG tablet [Pharmacy Med Name: METOPROLOL TARTRATE 25 MG TAB] 15 tablet 14     Sig: TAKE A QUARTER TABLET BY MOUTH TWICE DAILY    magnesium oxide (MAG-OX) 400 (240 Mg) MG tablet [Pharmacy Med Name: MAGNESIUM OXIDE 400 MG TABLET] 30 tablet 1     Sig: TAKE 1 TABLET BY MOUTH EVERY DAY    PARoxetine (PAXIL) 20 MG tablet [Pharmacy Med Name: PAROXETINE HCL 20 MG TABLET] 30 tablet 1     Sig: TAKE 1 TABLET BY MOUTH EVERY DAY AT NIGHT        Last Filled:  07/17/23    Patient Phone Number: [phone] (home)     Last appt: 1/7/2022   Next appt: 9/15/2023    Last OARRS:        No data to display

## 2024-05-06 NOTE — CONSULTS
Nutrition Assessment    Type and Reason for Visit: Initial, Positive Nutrition Screen, Consult    Nutrition Recommendations:   1. Continue general, soft and bite sized diet  2. Diet texture/liquid level per SLP recommendations  3. Continue Ensure High Protein  4. Encourage po intakes  5. Monitor po intakes, nutrition adequacy, weights, pertinent labs, BMs    Nutrition Assessment: Consult for supplement recommendations. Positive screen for weight loss, decreased appetite, and wounds. Pt [facility] with diffuse numbness, generalized weakness, inability to ambulate, light headedness, difficulty swallowing, possible syncope, unplanned weight loss. Pt unavailable today after multiple attempts. Per MD note, pt has had a poor appetite since January 2020 and had endorsed weight loss of ~50 lb. Pt currently on a soft and bite sized diet. SLp following. One intake recorded of % since admission. JESSICA weight changes d/t lack of actual weights in EMR. Will continue general diet with texture and liquid level per SLP. Will continue Ensure ONS and encourage po intakes. Malnutrition Assessment:  · Malnutrition Status: Meets the criteria for moderate malnutrition  · Context: Social or environmental circumstances  · Findings of the 6 clinical characteristics of malnutrition (Minimum of 2 out of 6 clinical characteristics is required to make the diagnosis of moderate or severe Protein Calorie Malnutrition based on AND/ASPEN Guidelines):  1. Energy Intake-Less than or equal to 75% of estimated energy requirement, Greater than or equal to 3 months    2. Weight Loss-Unable to assess,    3. Fat Loss-Unable to assess,    4. Muscle Loss-Unable to assess,    5. Fluid Accumulation-Mild fluid accumulation, Extremities  6.   Strength-Not measured    Nutrition Risk Level: High    Nutrient Needs:  · Estimated Daily Total Kcal: 9149-7961  · Estimated Daily Protein (g): 73-87  · Estimated Daily Total Fluid (ml/day): 1 ml/kcal    Nutrition Diagnosis:   · Problem: Moderate malnutrition  · Etiology: related to Insufficient energy/nutrient consumption     Signs and symptoms:  as evidenced by Diet history of poor intake, Presence of wounds    Objective Information:  · Nutrition-Focused Physical Findings: Missing teeth. +1 pitting BLE Edema  · Wound Type: Multiple  · Current Nutrition Therapies:  · Oral Diet Orders: General, Dysphagia  Soft and Bite-Sized (Dysphagia 3)   · Oral Diet intake: %(one intake)  · Oral Nutrition Supplement (ONS) Orders: Low Calorie High Protein Supplement  · ONS intake: Unable to assess  · Anthropometric Measures:  · Ht: 5' 5\" (165.1 cm)   · Current Body Wt: 160 lb (72.6 kg)(stated)  · % Weight Change:  ,  JESSICA  · Ideal Body Wt: 125 lb (56.7 kg),  · BMI Classification: BMI 25.0 - 29.9 Overweight    Nutrition Interventions:   Continue current diet, Continue current ONS  Continued Inpatient Monitoring, Speech Therapy    Nutrition Evaluation:   · Evaluation: Goals set   · Goals:  Tolerate diet and have po intakes 50% or greater during ARU stay    · Monitoring: Meal Intake, Supplement Intake, Diet Tolerance, Nutrition Progression, Weight, Pertinent Labs, Chewing/Swallowing      Electronically signed by Lucho Lee RD, LD on 6/25/20 at 3:41 PM EDT    Contact Number: Office: [phone]; [address]: [id number] Opt out